# Patient Record
Sex: FEMALE | Race: WHITE | Employment: PART TIME | URBAN - METROPOLITAN AREA
[De-identification: names, ages, dates, MRNs, and addresses within clinical notes are randomized per-mention and may not be internally consistent; named-entity substitution may affect disease eponyms.]

---

## 2019-04-25 ENCOUNTER — OFFICE VISIT (OUTPATIENT)
Dept: ORTHOPEDIC SURGERY | Age: 39
End: 2019-04-25
Payer: MEDICAID

## 2019-04-25 VITALS
BODY MASS INDEX: 20.49 KG/M2 | WEIGHT: 120 LBS | HEART RATE: 95 BPM | DIASTOLIC BLOOD PRESSURE: 88 MMHG | HEIGHT: 64 IN | SYSTOLIC BLOOD PRESSURE: 130 MMHG | TEMPERATURE: 97.8 F

## 2019-04-25 DIAGNOSIS — M25.532 LEFT WRIST PAIN: Primary | ICD-10-CM

## 2019-04-25 PROCEDURE — G8427 DOCREV CUR MEDS BY ELIG CLIN: HCPCS | Performed by: ORTHOPAEDIC SURGERY

## 2019-04-25 PROCEDURE — 99213 OFFICE O/P EST LOW 20 MIN: CPT | Performed by: ORTHOPAEDIC SURGERY

## 2019-04-25 PROCEDURE — G8420 CALC BMI NORM PARAMETERS: HCPCS | Performed by: ORTHOPAEDIC SURGERY

## 2019-04-25 PROCEDURE — 1036F TOBACCO NON-USER: CPT | Performed by: ORTHOPAEDIC SURGERY

## 2019-04-25 PROCEDURE — L3908 WHO COCK-UP NONMOLDE PRE OTS: HCPCS | Performed by: ORTHOPAEDIC SURGERY

## 2019-05-01 ENCOUNTER — HOSPITAL ENCOUNTER (OUTPATIENT)
Age: 39
Discharge: HOME | End: 2019-05-03
Payer: MEDICAID

## 2019-05-01 ENCOUNTER — HOSPITAL ENCOUNTER (OUTPATIENT)
Dept: ULTRASOUND IMAGING | Age: 39
Discharge: HOME | End: 2019-05-03
Payer: MEDICAID

## 2019-05-01 DIAGNOSIS — N91.2 ABSENCE OF MENSTRUATION: ICD-10-CM

## 2019-05-01 PROCEDURE — 88305 TISSUE EXAM BY PATHOLOGIST: CPT

## 2019-05-01 PROCEDURE — 76830 TRANSVAGINAL US NON-OB: CPT

## 2019-05-01 PROCEDURE — 76856 US EXAM PELVIC COMPLETE: CPT

## 2019-05-22 ENCOUNTER — OFFICE VISIT (OUTPATIENT)
Dept: ORTHOPEDIC SURGERY | Age: 39
End: 2019-05-22
Payer: MEDICAID

## 2019-05-22 VITALS
WEIGHT: 120 LBS | HEART RATE: 83 BPM | DIASTOLIC BLOOD PRESSURE: 88 MMHG | BODY MASS INDEX: 20.49 KG/M2 | SYSTOLIC BLOOD PRESSURE: 135 MMHG | TEMPERATURE: 97.5 F | HEIGHT: 64 IN

## 2019-05-22 DIAGNOSIS — S52.522D CLOSED TORUS FRACTURE OF DISTAL END OF LEFT RADIUS WITH ROUTINE HEALING, SUBSEQUENT ENCOUNTER: Primary | ICD-10-CM

## 2019-05-22 PROCEDURE — G8427 DOCREV CUR MEDS BY ELIG CLIN: HCPCS | Performed by: ORTHOPAEDIC SURGERY

## 2019-05-22 PROCEDURE — G8420 CALC BMI NORM PARAMETERS: HCPCS | Performed by: ORTHOPAEDIC SURGERY

## 2019-05-22 PROCEDURE — 1036F TOBACCO NON-USER: CPT | Performed by: ORTHOPAEDIC SURGERY

## 2019-05-22 PROCEDURE — 99212 OFFICE O/P EST SF 10 MIN: CPT | Performed by: ORTHOPAEDIC SURGERY

## 2019-05-23 NOTE — PROGRESS NOTES
Chief Complaint:   Chief Complaint   Patient presents with    Wrist Injury     FU Lt wrist pain. Rarely goes without brace. Pain is slightly improved. Osker Favre  reports persistent but diminishing left wrist pain, still reliant on the brace for the most part. Otherwise she is independent with activities denies numbness tingling in fingers no other joint complaints. Allergies; medications; past medical, surgical, family, and social history; and problem list have been reviewed today and updated as indicated in this encounter seen below. Exam: Left wrist shows some persisting tenderness in the distal radial metaphysis although minimal if any swelling, good motion of the wrist with minimal pain no crepitus no effusion, no motor sensory deficits nor intrinsic or thenar atrophy. Snuffbox nontender, Kit Ora is negative.  strength approaching normal limits. Radiographs: Repeat x-rays 3 views of the left wrist today show healing nondisplaced extra-articular transverse fracture distal radial metaphysis, no angulation or displacement. Scaphoid view shows no evidence for injury of the carpus. Aisha Workman was seen today for wrist injury. Diagnoses and all orders for this visit:    Closed torus fracture of distal end of left radius with routine healing, subsequent encounter  -     XR WRIST LEFT (MIN 3 VIEWS)       Patient was advised as to these findings confirming the presence of a nondisplaced but healing fracture. She will gradually wean herself from the brace as activities allow over the next 4-6 weeks do some exercises as tolerated on the brace, follow-up in 6 weeks if pain persists for further assessment. Return if symptoms worsen or fail to improve. No current outpatient medications on file. No current facility-administered medications for this visit. There is no problem list on file for this patient.       Past Medical History:   Diagnosis Date    Anxiety     Hypertension     diet controlled    Pancreatic cyst        Past Surgical History:   Procedure Laterality Date    ARM SURGERY  1983    PANCREAS SURGERY      SPLENECTOMY, TOTAL         Allergies   Allergen Reactions    Pcn [Penicillins] Hives       Social History     Socioeconomic History    Marital status: Single     Spouse name: None    Number of children: None    Years of education: None    Highest education level: None   Occupational History    None   Social Needs    Financial resource strain: None    Food insecurity:     Worry: None     Inability: None    Transportation needs:     Medical: None     Non-medical: None   Tobacco Use    Smoking status: Never Smoker    Smokeless tobacco: Never Used   Substance and Sexual Activity    Alcohol use: Yes     Comment: social    Drug use: No    Sexual activity: Yes     Partners: Male   Lifestyle    Physical activity:     Days per week: None     Minutes per session: None    Stress: None   Relationships    Social connections:     Talks on phone: None     Gets together: None     Attends Catholic service: None     Active member of club or organization: None     Attends meetings of clubs or organizations: None     Relationship status: None    Intimate partner violence:     Fear of current or ex partner: None     Emotionally abused: None     Physically abused: None     Forced sexual activity: None   Other Topics Concern    None   Social History Narrative    None       Family History   Problem Relation Age of Onset    Other Mother         lymphoma    High Blood Pressure Mother     Other Father         ms    Breast Cancer Other         AUNT         Review of Systems  As follows except as previously noted in HPI:  Constitutional: Negative for chills, diaphoresis, fatigue, fever and unexpected weight change. Respiratory: Negative for cough, shortness of breath and wheezing. Cardiovascular: Negative for chest pain and palpitations.    Neurological: Negative for dizziness, syncope, cephalgia. GI / : negative  Musculoskeletal: see HPI       Objective:   Physical Exam   Constitutional: Oriented to person, place, and time. and appears well-developed and well-nourished. :   Head: Normocephalic and atraumatic. Eyes: EOM are normal.   Neck: Neck supple. Cardiovascular: Normal rate and regular rhythm. Pulmonary/Chest: Effort normal. No stridor. No respiratory distress, no wheezes. Abdominal:  No abnormal distension. Neurological: Alert and oriented to person, place, and time. Skin: Skin is warm and dry. Psychiatric: Normal mood and affect.  Behavior is normal. Thought content normal.    5/23/2019  10:39 AM

## 2019-08-09 ENCOUNTER — APPOINTMENT (OUTPATIENT)
Dept: CT IMAGING | Age: 39
End: 2019-08-09
Payer: MEDICAID

## 2019-08-09 ENCOUNTER — HOSPITAL ENCOUNTER (EMERGENCY)
Age: 39
Discharge: ANOTHER HOSPITAL | End: 2019-08-10
Attending: EMERGENCY MEDICINE
Payer: MEDICAID

## 2019-08-09 ENCOUNTER — APPOINTMENT (OUTPATIENT)
Dept: GENERAL RADIOLOGY | Age: 39
End: 2019-08-09
Payer: MEDICAID

## 2019-08-09 DIAGNOSIS — R16.0 LIVER MASS: ICD-10-CM

## 2019-08-09 DIAGNOSIS — R17 JAUNDICE, NON-NEONATAL: Primary | ICD-10-CM

## 2019-08-09 LAB
ACANTHOCYTES: ABNORMAL
ALBUMIN SERPL-MCNC: 3.1 G/DL (ref 3.5–5.2)
ALP BLD-CCNC: 185 U/L (ref 35–104)
ALT SERPL-CCNC: 63 U/L (ref 0–32)
AMMONIA: 56 UMOL/L (ref 11–51)
AMYLASE: 30 U/L (ref 20–100)
ANION GAP SERPL CALCULATED.3IONS-SCNC: 17 MMOL/L (ref 7–16)
ANISOCYTOSIS: ABNORMAL
AST SERPL-CCNC: 315 U/L (ref 0–31)
BACTERIA: ABNORMAL /HPF
BASOPHILS ABSOLUTE: 0 E9/L (ref 0–0.2)
BASOPHILS RELATIVE PERCENT: 0 % (ref 0–2)
BILIRUB SERPL-MCNC: 21.5 MG/DL (ref 0–1.2)
BILIRUBIN DIRECT: 14.7 MG/DL (ref 0–0.3)
BILIRUBIN URINE: ABNORMAL
BILIRUBIN, INDIRECT: 6.8 MG/DL (ref 0–1)
BLOOD, URINE: ABNORMAL
BUN BLDV-MCNC: 12 MG/DL (ref 6–20)
CALCIUM SERPL-MCNC: 8.3 MG/DL (ref 8.6–10.2)
CASTS: ABNORMAL /LPF
CHLORIDE BLD-SCNC: 89 MMOL/L (ref 98–107)
CLARITY: CLEAR
CO2: 23 MMOL/L (ref 22–29)
COLOR: YELLOW
CREAT SERPL-MCNC: 0.6 MG/DL (ref 0.5–1)
DOHLE BODIES: ABNORMAL
EKG ATRIAL RATE: 108 BPM
EKG P AXIS: 56 DEGREES
EKG P-R INTERVAL: 162 MS
EKG Q-T INTERVAL: 354 MS
EKG QRS DURATION: 80 MS
EKG QTC CALCULATION (BAZETT): 474 MS
EKG R AXIS: 38 DEGREES
EKG T AXIS: -19 DEGREES
EKG VENTRICULAR RATE: 108 BPM
EOSINOPHILS ABSOLUTE: 0 E9/L (ref 0.05–0.5)
EOSINOPHILS RELATIVE PERCENT: 0 % (ref 0–6)
EPITHELIAL CELLS, UA: ABNORMAL /HPF
GFR AFRICAN AMERICAN: >60
GFR NON-AFRICAN AMERICAN: >60 ML/MIN/1.73
GLUCOSE BLD-MCNC: 225 MG/DL (ref 74–99)
GLUCOSE URINE: 100 MG/DL
HCG, URINE, POC: NEGATIVE
HCG, URINE, POC: NEGATIVE
HCT VFR BLD CALC: 30.4 % (ref 34–48)
HEMOGLOBIN: 11 G/DL (ref 11.5–15.5)
HOWELL-JOLLY BODIES: ABNORMAL
HYPOCHROMIA: ABNORMAL
KETONES, URINE: ABNORMAL MG/DL
LACTIC ACID: 4.4 MMOL/L (ref 0.5–2.2)
LACTIC ACID: 5.2 MMOL/L (ref 0.5–2.2)
LEUKOCYTE ESTERASE, URINE: NEGATIVE
LIPASE: 40 U/L (ref 13–60)
LYMPHOCYTES ABSOLUTE: 1.73 E9/L (ref 1.5–4)
LYMPHOCYTES RELATIVE PERCENT: 13 % (ref 20–42)
Lab: NORMAL
Lab: NORMAL
MCH RBC QN AUTO: 36.2 PG (ref 26–35)
MCHC RBC AUTO-ENTMCNC: 36.2 % (ref 32–34.5)
MCV RBC AUTO: 100 FL (ref 80–99.9)
METAMYELOCYTES RELATIVE PERCENT: 1.7 % (ref 0–1)
MONO TEST: NEGATIVE
MONOCYTES ABSOLUTE: 0.93 E9/L (ref 0.1–0.95)
MONOCYTES RELATIVE PERCENT: 7 % (ref 2–12)
MUCUS: PRESENT
NEGATIVE QC PASS/FAIL: NORMAL
NEGATIVE QC PASS/FAIL: NORMAL
NEUTROPHILS ABSOLUTE: 10.64 E9/L (ref 1.8–7.3)
NEUTROPHILS RELATIVE PERCENT: 78.3 % (ref 43–80)
NITRITE, URINE: POSITIVE
NUCLEATED RED BLOOD CELLS: 2.6 /100 WBC
PAPPENHEIMER BODIES: ABNORMAL
PDW BLD-RTO: 20.3 FL (ref 11.5–15)
PH UA: 6.5 (ref 5–9)
PLATELET # BLD: 181 E9/L (ref 130–450)
PMV BLD AUTO: 11.6 FL (ref 7–12)
POIKILOCYTES: ABNORMAL
POLYCHROMASIA: ABNORMAL
POSITIVE QC PASS/FAIL: NORMAL
POSITIVE QC PASS/FAIL: NORMAL
POTASSIUM SERPL-SCNC: 3.2 MMOL/L (ref 3.5–5)
PROTEIN UA: 30 MG/DL
RBC # BLD: 3.04 E12/L (ref 3.5–5.5)
RBC UA: ABNORMAL /HPF (ref 0–2)
RENAL EPITHELIAL, UA: ABNORMAL /HPF
ROULEAUX: ABNORMAL
SODIUM BLD-SCNC: 129 MMOL/L (ref 132–146)
SPECIFIC GRAVITY UA: 1.02 (ref 1–1.03)
TARGET CELLS: ABNORMAL
TOTAL PROTEIN: 9.1 G/DL (ref 6.4–8.3)
TOXIC GRANULATION: ABNORMAL
UROBILINOGEN, URINE: 4 E.U./DL
VACUOLATED NEUTROPHILS: ABNORMAL
WBC # BLD: 13.3 E9/L (ref 4.5–11.5)
WBC UA: ABNORMAL /HPF (ref 0–5)

## 2019-08-09 PROCEDURE — 83605 ASSAY OF LACTIC ACID: CPT

## 2019-08-09 PROCEDURE — 6360000002 HC RX W HCPCS: Performed by: EMERGENCY MEDICINE

## 2019-08-09 PROCEDURE — 81001 URINALYSIS AUTO W/SCOPE: CPT

## 2019-08-09 PROCEDURE — 99285 EMERGENCY DEPT VISIT HI MDM: CPT

## 2019-08-09 PROCEDURE — 85025 COMPLETE CBC W/AUTO DIFF WBC: CPT

## 2019-08-09 PROCEDURE — 2580000003 HC RX 258: Performed by: EMERGENCY MEDICINE

## 2019-08-09 PROCEDURE — 96361 HYDRATE IV INFUSION ADD-ON: CPT

## 2019-08-09 PROCEDURE — 2580000003 HC RX 258: Performed by: RADIOLOGY

## 2019-08-09 PROCEDURE — 87040 BLOOD CULTURE FOR BACTERIA: CPT

## 2019-08-09 PROCEDURE — 6360000002 HC RX W HCPCS: Performed by: STUDENT IN AN ORGANIZED HEALTH CARE EDUCATION/TRAINING PROGRAM

## 2019-08-09 PROCEDURE — 99223 1ST HOSP IP/OBS HIGH 75: CPT | Performed by: INTERNAL MEDICINE

## 2019-08-09 PROCEDURE — 87088 URINE BACTERIA CULTURE: CPT

## 2019-08-09 PROCEDURE — 6360000004 HC RX CONTRAST MEDICATION: Performed by: RADIOLOGY

## 2019-08-09 PROCEDURE — 93005 ELECTROCARDIOGRAM TRACING: CPT | Performed by: STUDENT IN AN ORGANIZED HEALTH CARE EDUCATION/TRAINING PROGRAM

## 2019-08-09 PROCEDURE — 96366 THER/PROPH/DIAG IV INF ADDON: CPT

## 2019-08-09 PROCEDURE — 83690 ASSAY OF LIPASE: CPT

## 2019-08-09 PROCEDURE — 71045 X-RAY EXAM CHEST 1 VIEW: CPT

## 2019-08-09 PROCEDURE — 2580000003 HC RX 258: Performed by: STUDENT IN AN ORGANIZED HEALTH CARE EDUCATION/TRAINING PROGRAM

## 2019-08-09 PROCEDURE — 36415 COLL VENOUS BLD VENIPUNCTURE: CPT

## 2019-08-09 PROCEDURE — 86308 HETEROPHILE ANTIBODY SCREEN: CPT

## 2019-08-09 PROCEDURE — 82140 ASSAY OF AMMONIA: CPT

## 2019-08-09 PROCEDURE — 82150 ASSAY OF AMYLASE: CPT

## 2019-08-09 PROCEDURE — 74177 CT ABD & PELVIS W/CONTRAST: CPT

## 2019-08-09 PROCEDURE — 96365 THER/PROPH/DIAG IV INF INIT: CPT

## 2019-08-09 PROCEDURE — 80053 COMPREHEN METABOLIC PANEL: CPT

## 2019-08-09 PROCEDURE — 96375 TX/PRO/DX INJ NEW DRUG ADDON: CPT

## 2019-08-09 PROCEDURE — 82247 BILIRUBIN TOTAL: CPT

## 2019-08-09 PROCEDURE — 87186 SC STD MICRODIL/AGAR DIL: CPT

## 2019-08-09 PROCEDURE — 82248 BILIRUBIN DIRECT: CPT

## 2019-08-09 RX ORDER — SODIUM CHLORIDE 0.9 % (FLUSH) 0.9 %
10 SYRINGE (ML) INJECTION ONCE
Status: COMPLETED | OUTPATIENT
Start: 2019-08-09 | End: 2019-08-09

## 2019-08-09 RX ORDER — 0.9 % SODIUM CHLORIDE 0.9 %
1000 INTRAVENOUS SOLUTION INTRAVENOUS ONCE
Status: COMPLETED | OUTPATIENT
Start: 2019-08-09 | End: 2019-08-09

## 2019-08-09 RX ORDER — MORPHINE SULFATE 2 MG/ML
2 INJECTION, SOLUTION INTRAMUSCULAR; INTRAVENOUS ONCE
Status: COMPLETED | OUTPATIENT
Start: 2019-08-09 | End: 2019-08-09

## 2019-08-09 RX ORDER — MORPHINE SULFATE 4 MG/ML
4 INJECTION, SOLUTION INTRAMUSCULAR; INTRAVENOUS ONCE
Status: COMPLETED | OUTPATIENT
Start: 2019-08-09 | End: 2019-08-09

## 2019-08-09 RX ORDER — ONDANSETRON 2 MG/ML
4 INJECTION INTRAMUSCULAR; INTRAVENOUS ONCE
Status: DISCONTINUED | OUTPATIENT
Start: 2019-08-09 | End: 2019-08-10 | Stop reason: HOSPADM

## 2019-08-09 RX ORDER — KETOROLAC TROMETHAMINE 30 MG/ML
15 INJECTION, SOLUTION INTRAMUSCULAR; INTRAVENOUS ONCE
Status: DISCONTINUED | OUTPATIENT
Start: 2019-08-09 | End: 2019-08-10 | Stop reason: HOSPADM

## 2019-08-09 RX ORDER — POTASSIUM CHLORIDE 7.45 MG/ML
10 INJECTION INTRAVENOUS ONCE
Status: COMPLETED | OUTPATIENT
Start: 2019-08-09 | End: 2019-08-09

## 2019-08-09 RX ADMIN — IOPAMIDOL 110 ML: 755 INJECTION, SOLUTION INTRAVENOUS at 15:18

## 2019-08-09 RX ADMIN — SODIUM CHLORIDE 1000 ML: 9 INJECTION, SOLUTION INTRAVENOUS at 20:15

## 2019-08-09 RX ADMIN — POTASSIUM CHLORIDE 10 MEQ: 10 INJECTION, SOLUTION INTRAVENOUS at 15:57

## 2019-08-09 RX ADMIN — MORPHINE SULFATE 2 MG: 2 INJECTION, SOLUTION INTRAMUSCULAR; INTRAVENOUS at 23:32

## 2019-08-09 RX ADMIN — MORPHINE SULFATE 4 MG: 4 INJECTION, SOLUTION INTRAMUSCULAR; INTRAVENOUS at 21:59

## 2019-08-09 RX ADMIN — Medication 10 ML: at 15:18

## 2019-08-09 RX ADMIN — SODIUM CHLORIDE 1000 ML: 9 INJECTION, SOLUTION INTRAVENOUS at 16:30

## 2019-08-09 RX ADMIN — MEROPENEM 1 G: 1 INJECTION, POWDER, FOR SOLUTION INTRAVENOUS at 22:52

## 2019-08-09 RX ADMIN — SODIUM CHLORIDE 1000 ML: 9 INJECTION, SOLUTION INTRAVENOUS at 14:52

## 2019-08-09 ASSESSMENT — ENCOUNTER SYMPTOMS
APNEA: 0
RHINORRHEA: 0
SHORTNESS OF BREATH: 0
ROS SKIN COMMENTS: JAUNDICE
COUGH: 0
PHOTOPHOBIA: 0
TROUBLE SWALLOWING: 0
WHEEZING: 0
VOMITING: 0
ABDOMINAL PAIN: 0
DIARRHEA: 0
SORE THROAT: 0
NAUSEA: 0
EYE PAIN: 0
BACK PAIN: 0
JAUNDICE: 1
CONSTIPATION: 0
COLOR CHANGE: 1
CHEST TIGHTNESS: 0

## 2019-08-09 ASSESSMENT — PAIN SCALES - GENERAL
PAINLEVEL_OUTOF10: 4
PAINLEVEL_OUTOF10: 4

## 2019-08-09 NOTE — ED PROVIDER NOTES
[address]      Pt Name: Yamilet Bird  MRN: [id number]  [place] 1980  Date of evaluation: 8/9/2019      CHIEF COMPLAINT       Chief Complaint   Patient presents with    Jaundice     Hx of ETOH abuse      The history is provided by the patient. GI Problem   The primary symptoms include fatigue and jaundice. Primary symptoms do not include fever, abdominal pain, nausea, vomiting, diarrhea or dysuria. The illness began more than 7 days ago. The onset was gradual. The problem has been gradually worsening. The illness does not include chills, constipation, back pain or itching. Significant associated medical issues include alcohol abuse. Associated medical issues do not include inflammatory bowel disease, gallstones or liver disease. Yamilet Bird is a 41 y.o. female with a history of alcohol abuse, pancreatic mass that was resected about 2 years ago, who presents emergency department with chief complaint of jaundice. She states she first noticed that her skin and eyes were turning more yellow about 11 days ago. She reports feeling more fatigued than normal.  She denies any pain complaints. She has never had jaundice in the past.  She denies any nausea, vomiting, constipation, diarrhea, black or bloody stools. Denies any chest pain or shortness of breath. She denies any history of IV drug use. Denies history of viral hepatitis. Denies risk factors for hep B or hep C. She does drink at least 3 drinks every night. She denies ever going through alcohol withdrawal.  After resection of her pancreatic mass about 2 years ago she has not had any follow-up imaging. She did not have jaundice at that time. Review of Systems   Constitutional: Positive for fatigue. Negative for chills, diaphoresis and fever. HENT: Negative for rhinorrhea, sore throat and trouble swallowing.     Eyes: Negative for photophobia and Sexual Activity    Alcohol use: Yes     Comment: daily wine or beer    Drug use: No    Sexual activity: Yes     Partners: Male   Lifestyle    Physical activity:     Days per week: None     Minutes per session: None    Stress: None   Relationships    Social connections:     Talks on phone: None     Gets together: None     Attends Taoism service: None     Active member of club or organization: None     Attends meetings of clubs or organizations: None     Relationship status: None    Intimate partner violence:     Fear of current or ex partner: None     Emotionally abused: None     Physically abused: None     Forced sexual activity: None   Other Topics Concern    None   Social History Narrative    None       SCREENINGS              Physical Exam   Constitutional: She is oriented to person, place, and time. She appears well-developed and well-nourished. No distress. HENT:   Head: Normocephalic and atraumatic. Right Ear: External ear normal.   Left Ear: External ear normal.   Mouth/Throat: Oropharynx is clear and moist.   Eyes: Pupils are equal, round, and reactive to light. EOM are normal. No scleral icterus. Neck: Normal range of motion. Neck supple. Cardiovascular: Regular rhythm and intact distal pulses. No murmur heard. Tachycardic. Pulmonary/Chest: Effort normal and breath sounds normal. No respiratory distress. She has no wheezes. Abdominal: Soft. She exhibits distension and mass. There is no rebound and no guarding. Well-healed old surgical scar in the mid abdomen. Significant mass consistent with hepatomegaly. Patient is nontender. Negative Conklin sign. Musculoskeletal: Normal range of motion. She exhibits no edema, tenderness or deformity. Neurological: She is alert and oriented to person, place, and time. No sensory deficit. She exhibits normal muscle tone. Skin: Skin is warm and dry. Capillary refill takes less than 2 seconds. Jaundiced.    Psychiatric: She has a normal mood and affect. Her behavior is normal.   Nursing note and vitals reviewed. Procedures     EKG: This EKG is signed and interpreted by me. Rate: 108bpm  Rhythm: Sinus  Interpretation: no acute changes, non-specific EKG and sinus tachycardia  Comparison: Stable compared to previous EKG, tachycardia is new, compared to EKG from March 6, 2017. MDM   This is a 61-year-old female with a history of regular alcohol use, pancreatic mass that was removed about 2 years ago without subsequent follow-up, who presents to the emergency department with about 2 weeks of jaundice. Denies any pain complaints, nausea, vomiting. The emergency department she is tachycardic with a pulse of 118. She is afebrile. She is jaundiced. Abdomen is mildly distended with mass consistent of hepatomegaly. There is no abdominal tenderness. Given her tachycardia and Rosemary Mk is a broad work-up was initiated. Initially administered 1 L normal saline IV bolus. Initial lactate returned at 5.2. Administered a second liter of normal saline. Potassium was also slightly low 3.2 and potassium repletion was began. Pregnancy test was negative. Given her jaundice and history of pancreatic mass CT with contrast IV was obtained. The CT was notable for multiple small lesions consistent with malignancy. Pancreas showed postsurgical findings no evidence of mass. No obvious obstruction in the bile duct or common duct. Given the concerning nature of her findings and that she is jaundice, presented tachycardic, we were concerned of progressive disease and wanted to admit her to the hospital or transfer [facility] by ambulance [facility] for further evaluation with a GI specialist.  Throughout multiple discussions the patient is adamant that she did not want to be transferred by ambulance to [facility] for further evaluation from the emergency department.   Initially she also declined to be admitted to the hospital and was going to leave pancreas. The remaining pancreas and tail   demonstrate fatty changes. There are ascites seen throughout the abdomen and pelvis      Abnormal thickened mucosa involving small and large bowel with no   evidence of small bowel obstruction. The gallbladder is very distended with small cholelithiasis             ------------------------- NURSING NOTES AND VITALS REVIEWED ---------------------------  Date / Time Roomed:  8/9/2019  1:47 PM  ED Bed Assignment:  12/12    The nursing notes within the ED encounter and vital signs as below have been reviewed. BP (!) 145/87   Pulse 113   Temp 100.9 °F (38.3 °C) (Rectal)   Resp 16   Ht 5' 4\" (1.626 m)   Wt 119 lb (54 kg)   SpO2 95%   BMI 20.43 kg/m²   Oxygen Saturation Interpretation: Normal      ------------------------------------------ PROGRESS NOTES ------------------------------------------  5:27 PM  I have spoken with the patient and discussed todays results, in addition to providing specific details for the plan of care and counseling regarding the diagnosis and prognosis. Their questions are answered at this time and they are agreeable with the plan. I discussed at length with them reasons for immediate return here for re evaluation. They will followup with their primary care physician and general surgeon by calling their office Today and then [facility] immediately for further evaluation. Davey Morales --------------------------------- ADDITIONAL PROVIDER NOTES ---------------------------------  At this time the patient is without objective evidence of an acute process requiring hospitalization or inpatient management. They have remained hemodynamically stable throughout their entire ED visit and are stable for discharge with outpatient follow-up. The plan has been discussed in detail and they are aware of the specific conditions for emergent return, as well as the importance of follow-up.       New Prescriptions    No medications on file

## 2019-08-10 VITALS
DIASTOLIC BLOOD PRESSURE: 79 MMHG | OXYGEN SATURATION: 96 % | HEIGHT: 64 IN | TEMPERATURE: 97.9 F | BODY MASS INDEX: 20.32 KG/M2 | HEART RATE: 109 BPM | WEIGHT: 119 LBS | SYSTOLIC BLOOD PRESSURE: 126 MMHG | RESPIRATION RATE: 20 BRPM

## 2019-08-10 PROCEDURE — 6370000000 HC RX 637 (ALT 250 FOR IP)

## 2019-08-10 RX ORDER — LORAZEPAM 0.5 MG/1
TABLET ORAL
Status: COMPLETED
Start: 2019-08-10 | End: 2019-08-10

## 2019-08-10 RX ORDER — LORAZEPAM 0.5 MG/1
0.5 TABLET ORAL ONCE
Status: COMPLETED | OUTPATIENT
Start: 2019-08-10 | End: 2019-08-10

## 2019-08-10 RX ADMIN — LORAZEPAM 0.5 MG: 0.5 TABLET ORAL at 02:43

## 2019-08-10 NOTE — PROGRESS NOTES
Called the access center to get patient sent to [facility] main. @[phone]. Wanted too speak too surgeon Dr. Bouchra Buck who did her panc mass resected.     [facility] they will get the Dr baker and hospitalist.

## 2019-08-10 NOTE — CONSULTS
gallbladder. There are small stones seen in the dependent portion of the gallbladder lumen PA There is subhepatic ascites seen. Patient is status post splenectomy. . The head and neck of the pancreas is not identified. The body and the tail the pancreas is seen with fatty changes. There are surgical clips in the pancreatic bed from prior surgery. The adrenal glands do not show any appreciable abnormality. Both kidneys have normal nephrogram with normal excretion of contrast. There is no stone, mass or hydronephrosis. The bowels demonstrate thickened mucosa including small and large bowel with ascites seen in the leaves of the mesentery. No evidence of intestinal obstruction is seen. The appendix is not visualized; however, no evidence of appendicitis is seen. No retroperitoneal or mesenteric lymphadenopathy is detected. The abdominal aorta and iliac arteries demonstrate no evidence of atherosclerotic changes or aneurysmal dilatation. The osseous structures of the abdomen and pelvis appear to be normal. The contrast study demonstrates no enhancing lesion identified. The urinary bladder appears to be normal. There is no mass or debris seen. There is a large ascites seen in the pelvis the uterus and adnexa appears to be normal..   The liver has diffuse metastatic disease. This is amenable for CT-guided biopsy for histological sampling. Status post splenectomy and partial pancreatectomy involving the head and the proximal body of the pancreas. The remaining pancreas and tail demonstrate fatty changes. There are ascites seen throughout the abdomen and pelvis Abnormal thickened mucosa involving small and large bowel with no evidence of small bowel obstruction.  The gallbladder is very distended with small cholelithiasis     Xr Chest Portable  Result Date: 2019  Patient MRN: [id number] : 1980 Age:  41 years Gender: Female Order Date: 2019 3:45 PM Exam: XR CHEST PORTABLE Number of Images: 1 view Indication:

## 2019-08-12 LAB
ORGANISM: ABNORMAL
URINE CULTURE, ROUTINE: ABNORMAL

## 2019-08-14 LAB
BLOOD CULTURE, ROUTINE: NORMAL
CULTURE, BLOOD 2: NORMAL

## 2019-09-25 ENCOUNTER — HOSPITAL ENCOUNTER (OUTPATIENT)
Age: 39
Discharge: HOME | End: 2019-09-25
Payer: MEDICAID

## 2019-09-25 LAB
ALBUMIN SERPL-MCNC: 3.1 G/DL (ref 3.5–5.2)
ALP BLD-CCNC: 185 U/L (ref 35–104)
ALT SERPL-CCNC: 39 U/L (ref 0–32)
ANION GAP SERPL CALCULATED.3IONS-SCNC: 13 MMOL/L (ref 7–16)
AST SERPL-CCNC: 94 U/L (ref 0–31)
BASOPHILS ABSOLUTE: 0.11 E9/L (ref 0–0.2)
BASOPHILS RELATIVE PERCENT: 1 % (ref 0–2)
BILIRUB SERPL-MCNC: 8.9 MG/DL (ref 0–1.2)
BUN BLDV-MCNC: 7 MG/DL (ref 6–20)
BURR CELLS: ABNORMAL
CALCIUM SERPL-MCNC: 8.9 MG/DL (ref 8.6–10.2)
CHLORIDE BLD-SCNC: 103 MMOL/L (ref 98–107)
CO2: 26 MMOL/L (ref 22–29)
CREAT SERPL-MCNC: 0.7 MG/DL (ref 0.5–1)
EOSINOPHILS ABSOLUTE: 0.21 E9/L (ref 0.05–0.5)
EOSINOPHILS RELATIVE PERCENT: 2 % (ref 0–6)
GFR AFRICAN AMERICAN: >60
GFR NON-AFRICAN AMERICAN: >60 ML/MIN/1.73
GLUCOSE BLD-MCNC: 98 MG/DL (ref 74–99)
HCT VFR BLD CALC: 32.4 % (ref 34–48)
HEMOGLOBIN: 11 G/DL (ref 11.5–15.5)
INR BLD: 2.1
LYMPHOCYTES ABSOLUTE: 2.57 E9/L (ref 1.5–4)
LYMPHOCYTES RELATIVE PERCENT: 24 % (ref 20–42)
MCH RBC QN AUTO: 40 PG (ref 26–35)
MCHC RBC AUTO-ENTMCNC: 34 % (ref 32–34.5)
MCV RBC AUTO: 117.8 FL (ref 80–99.9)
MONOCYTES ABSOLUTE: 0.96 E9/L (ref 0.1–0.95)
MONOCYTES RELATIVE PERCENT: 9 % (ref 2–12)
NEUTROPHILS ABSOLUTE: 6.85 E9/L (ref 1.8–7.3)
NEUTROPHILS RELATIVE PERCENT: 64 % (ref 43–80)
PDW BLD-RTO: 14.6 FL (ref 11.5–15)
PLATELET # BLD: 333 E9/L (ref 130–450)
PMV BLD AUTO: 9.5 FL (ref 7–12)
POIKILOCYTES: ABNORMAL
POTASSIUM SERPL-SCNC: 4.3 MMOL/L (ref 3.5–5)
PROTHROMBIN TIME: 24.5 SEC (ref 9.3–12.4)
RBC # BLD: 2.75 E12/L (ref 3.5–5.5)
SODIUM BLD-SCNC: 142 MMOL/L (ref 132–146)
TARGET CELLS: ABNORMAL
TOTAL PROTEIN: 8 G/DL (ref 6.4–8.3)
WBC # BLD: 10.7 E9/L (ref 4.5–11.5)

## 2019-09-25 PROCEDURE — 36415 COLL VENOUS BLD VENIPUNCTURE: CPT

## 2019-09-25 PROCEDURE — 85610 PROTHROMBIN TIME: CPT

## 2019-09-25 PROCEDURE — 85025 COMPLETE CBC W/AUTO DIFF WBC: CPT

## 2019-09-25 PROCEDURE — 80053 COMPREHEN METABOLIC PANEL: CPT

## 2019-10-02 ENCOUNTER — HOSPITAL ENCOUNTER (OUTPATIENT)
Age: 39
Discharge: HOME | End: 2019-10-02
Payer: MEDICAID

## 2019-10-02 LAB
ALBUMIN SERPL-MCNC: 3.1 G/DL (ref 3.5–5.2)
ALP BLD-CCNC: 170 U/L (ref 35–104)
ALT SERPL-CCNC: 32 U/L (ref 0–32)
ANION GAP SERPL CALCULATED.3IONS-SCNC: 11 MMOL/L (ref 7–16)
ANISOCYTOSIS: ABNORMAL
AST SERPL-CCNC: 91 U/L (ref 0–31)
BASOPHILS ABSOLUTE: 0.15 E9/L (ref 0–0.2)
BASOPHILS RELATIVE PERCENT: 1.5 % (ref 0–2)
BILIRUB SERPL-MCNC: 8.1 MG/DL (ref 0–1.2)
BUN BLDV-MCNC: 9 MG/DL (ref 6–20)
BURR CELLS: ABNORMAL
CALCIUM SERPL-MCNC: 9.2 MG/DL (ref 8.6–10.2)
CHLORIDE BLD-SCNC: 100 MMOL/L (ref 98–107)
CO2: 25 MMOL/L (ref 22–29)
CREAT SERPL-MCNC: 0.7 MG/DL (ref 0.5–1)
EOSINOPHILS ABSOLUTE: 0.3 E9/L (ref 0.05–0.5)
EOSINOPHILS RELATIVE PERCENT: 3 % (ref 0–6)
GFR AFRICAN AMERICAN: >60
GFR NON-AFRICAN AMERICAN: >60 ML/MIN/1.73
GLUCOSE BLD-MCNC: 141 MG/DL (ref 74–99)
HCT VFR BLD CALC: 30.5 % (ref 34–48)
HEMOGLOBIN: 10.5 G/DL (ref 11.5–15.5)
HOWELL-JOLLY BODIES: ABNORMAL
IMMATURE GRANULOCYTES #: 0.04 E9/L
IMMATURE GRANULOCYTES %: 0.4 % (ref 0–5)
INR BLD: 2.1
LYMPHOCYTES ABSOLUTE: 2.95 E9/L (ref 1.5–4)
LYMPHOCYTES RELATIVE PERCENT: 29.2 % (ref 20–42)
MCH RBC QN AUTO: 38.9 PG (ref 26–35)
MCHC RBC AUTO-ENTMCNC: 34.4 % (ref 32–34.5)
MCV RBC AUTO: 113 FL (ref 80–99.9)
MONOCYTES ABSOLUTE: 0.69 E9/L (ref 0.1–0.95)
MONOCYTES RELATIVE PERCENT: 6.8 % (ref 2–12)
NEUTROPHILS ABSOLUTE: 5.96 E9/L (ref 1.8–7.3)
NEUTROPHILS RELATIVE PERCENT: 59.1 % (ref 43–80)
PAPPENHEIMER BODIES: ABNORMAL
PDW BLD-RTO: 14.6 FL (ref 11.5–15)
PLATELET # BLD: 292 E9/L (ref 130–450)
PMV BLD AUTO: 9.6 FL (ref 7–12)
POIKILOCYTES: ABNORMAL
POTASSIUM SERPL-SCNC: 3.9 MMOL/L (ref 3.5–5)
PROTHROMBIN TIME: 24.2 SEC (ref 9.3–12.4)
RBC # BLD: 2.7 E12/L (ref 3.5–5.5)
ROULEAUX: ABNORMAL
SCHISTOCYTES: ABNORMAL
SODIUM BLD-SCNC: 136 MMOL/L (ref 132–146)
TARGET CELLS: ABNORMAL
TOTAL PROTEIN: 8.2 G/DL (ref 6.4–8.3)
WBC # BLD: 10.1 E9/L (ref 4.5–11.5)

## 2019-10-02 PROCEDURE — 85025 COMPLETE CBC W/AUTO DIFF WBC: CPT

## 2019-10-02 PROCEDURE — 80053 COMPREHEN METABOLIC PANEL: CPT

## 2019-10-02 PROCEDURE — 85610 PROTHROMBIN TIME: CPT

## 2019-10-02 PROCEDURE — 36415 COLL VENOUS BLD VENIPUNCTURE: CPT

## 2019-11-26 ENCOUNTER — HOSPITAL ENCOUNTER (OUTPATIENT)
Age: 39
Discharge: HOME | End: 2019-11-26
Payer: MEDICAID

## 2019-11-26 LAB
ALBUMIN SERPL-MCNC: 3.4 G/DL (ref 3.5–5.2)
ALP BLD-CCNC: 123 U/L (ref 35–104)
ALT SERPL-CCNC: 40 U/L (ref 0–32)
ANION GAP SERPL CALCULATED.3IONS-SCNC: 12 MMOL/L (ref 7–16)
AST SERPL-CCNC: 96 U/L (ref 0–31)
BASOPHILS ABSOLUTE: 0.13 E9/L (ref 0–0.2)
BASOPHILS RELATIVE PERCENT: 1.9 % (ref 0–2)
BILIRUB SERPL-MCNC: 3.4 MG/DL (ref 0–1.2)
BUN BLDV-MCNC: 8 MG/DL (ref 6–20)
CALCIUM SERPL-MCNC: 9.9 MG/DL (ref 8.6–10.2)
CHLORIDE BLD-SCNC: 103 MMOL/L (ref 98–107)
CO2: 24 MMOL/L (ref 22–29)
CREAT SERPL-MCNC: 0.6 MG/DL (ref 0.5–1)
EOSINOPHILS ABSOLUTE: 0.82 E9/L (ref 0.05–0.5)
EOSINOPHILS RELATIVE PERCENT: 12.2 % (ref 0–6)
GFR AFRICAN AMERICAN: >60
GFR NON-AFRICAN AMERICAN: >60 ML/MIN/1.73
GLUCOSE BLD-MCNC: 132 MG/DL (ref 74–99)
HCT VFR BLD CALC: 32.1 % (ref 34–48)
HEMOGLOBIN: 10.7 G/DL (ref 11.5–15.5)
IMMATURE GRANULOCYTES #: 0.02 E9/L
IMMATURE GRANULOCYTES %: 0.3 % (ref 0–5)
INR BLD: 1.6
LYMPHOCYTES ABSOLUTE: 2.25 E9/L (ref 1.5–4)
LYMPHOCYTES RELATIVE PERCENT: 33.6 % (ref 20–42)
MCH RBC QN AUTO: 35.4 PG (ref 26–35)
MCHC RBC AUTO-ENTMCNC: 33.3 % (ref 32–34.5)
MCV RBC AUTO: 106.3 FL (ref 80–99.9)
MONOCYTES ABSOLUTE: 0.68 E9/L (ref 0.1–0.95)
MONOCYTES RELATIVE PERCENT: 10.1 % (ref 2–12)
NEUTROPHILS ABSOLUTE: 2.8 E9/L (ref 1.8–7.3)
NEUTROPHILS RELATIVE PERCENT: 41.9 % (ref 43–80)
PDW BLD-RTO: 14.2 FL (ref 11.5–15)
PLATELET # BLD: 293 E9/L (ref 130–450)
PMV BLD AUTO: 9.3 FL (ref 7–12)
POTASSIUM SERPL-SCNC: 3.8 MMOL/L (ref 3.5–5)
PROTHROMBIN TIME: 19.1 SEC (ref 9.3–12.4)
RBC # BLD: 3.02 E12/L (ref 3.5–5.5)
SODIUM BLD-SCNC: 139 MMOL/L (ref 132–146)
TOTAL PROTEIN: 7.4 G/DL (ref 6.4–8.3)
WBC # BLD: 6.7 E9/L (ref 4.5–11.5)

## 2019-11-26 PROCEDURE — 85610 PROTHROMBIN TIME: CPT

## 2019-11-26 PROCEDURE — 36415 COLL VENOUS BLD VENIPUNCTURE: CPT

## 2019-11-26 PROCEDURE — 80053 COMPREHEN METABOLIC PANEL: CPT

## 2019-11-26 PROCEDURE — 85025 COMPLETE CBC W/AUTO DIFF WBC: CPT

## 2020-01-02 ENCOUNTER — HOSPITAL ENCOUNTER (OUTPATIENT)
Age: 40
Discharge: HOME | End: 2020-01-02
Payer: MEDICAID

## 2020-01-02 LAB
ALBUMIN SERPL-MCNC: 3.3 G/DL (ref 3.5–5.2)
ALP BLD-CCNC: 179 U/L (ref 35–104)
ALT SERPL-CCNC: 37 U/L (ref 0–32)
ANION GAP SERPL CALCULATED.3IONS-SCNC: 12 MMOL/L (ref 7–16)
AST SERPL-CCNC: 91 U/L (ref 0–31)
BASOPHILS ABSOLUTE: 0.11 E9/L (ref 0–0.2)
BASOPHILS RELATIVE PERCENT: 1.7 % (ref 0–2)
BILIRUB SERPL-MCNC: 2.3 MG/DL (ref 0–1.2)
BUN BLDV-MCNC: 8 MG/DL (ref 6–20)
CALCIUM SERPL-MCNC: 8.9 MG/DL (ref 8.6–10.2)
CHLORIDE BLD-SCNC: 100 MMOL/L (ref 98–107)
CO2: 24 MMOL/L (ref 22–29)
CREAT SERPL-MCNC: 0.6 MG/DL (ref 0.5–1)
EOSINOPHILS ABSOLUTE: 0.35 E9/L (ref 0.05–0.5)
EOSINOPHILS RELATIVE PERCENT: 5.5 % (ref 0–6)
GFR AFRICAN AMERICAN: >60
GFR NON-AFRICAN AMERICAN: >60 ML/MIN/1.73
GLUCOSE BLD-MCNC: 114 MG/DL (ref 74–99)
HCT VFR BLD CALC: 33.8 % (ref 34–48)
HEMOGLOBIN: 11.5 G/DL (ref 11.5–15.5)
IMMATURE GRANULOCYTES #: 0.02 E9/L
IMMATURE GRANULOCYTES %: 0.3 % (ref 0–5)
INR BLD: 1.5
LYMPHOCYTES ABSOLUTE: 2.88 E9/L (ref 1.5–4)
LYMPHOCYTES RELATIVE PERCENT: 45 % (ref 20–42)
MCH RBC QN AUTO: 34.7 PG (ref 26–35)
MCHC RBC AUTO-ENTMCNC: 34 % (ref 32–34.5)
MCV RBC AUTO: 102.1 FL (ref 80–99.9)
MONOCYTES ABSOLUTE: 0.74 E9/L (ref 0.1–0.95)
MONOCYTES RELATIVE PERCENT: 11.6 % (ref 2–12)
NEUTROPHILS ABSOLUTE: 2.3 E9/L (ref 1.8–7.3)
NEUTROPHILS RELATIVE PERCENT: 35.9 % (ref 43–80)
PDW BLD-RTO: 14.2 FL (ref 11.5–15)
PLATELET # BLD: 317 E9/L (ref 130–450)
PMV BLD AUTO: 9.1 FL (ref 7–12)
POTASSIUM SERPL-SCNC: 4.3 MMOL/L (ref 3.5–5)
PROTHROMBIN TIME: 17.9 SEC (ref 9.3–12.4)
RBC # BLD: 3.31 E12/L (ref 3.5–5.5)
SODIUM BLD-SCNC: 136 MMOL/L (ref 132–146)
TOTAL PROTEIN: 7.3 G/DL (ref 6.4–8.3)
WBC # BLD: 6.4 E9/L (ref 4.5–11.5)

## 2020-01-02 PROCEDURE — 36415 COLL VENOUS BLD VENIPUNCTURE: CPT

## 2020-01-02 PROCEDURE — 85610 PROTHROMBIN TIME: CPT

## 2020-01-02 PROCEDURE — 85025 COMPLETE CBC W/AUTO DIFF WBC: CPT

## 2020-01-02 PROCEDURE — 80053 COMPREHEN METABOLIC PANEL: CPT

## 2020-02-14 ENCOUNTER — HOSPITAL ENCOUNTER (OUTPATIENT)
Age: 40
Discharge: HOME | End: 2020-02-14
Payer: MEDICAID

## 2020-02-14 LAB
ALBUMIN SERPL-MCNC: 3.2 G/DL (ref 3.5–5.2)
ALP BLD-CCNC: 171 U/L (ref 35–104)
ALT SERPL-CCNC: 26 U/L (ref 0–32)
ANION GAP SERPL CALCULATED.3IONS-SCNC: 8 MMOL/L (ref 7–16)
AST SERPL-CCNC: 58 U/L (ref 0–31)
BASOPHILS ABSOLUTE: 0.1 E9/L (ref 0–0.2)
BASOPHILS RELATIVE PERCENT: 1.8 % (ref 0–2)
BILIRUB SERPL-MCNC: 1.5 MG/DL (ref 0–1.2)
BUN BLDV-MCNC: 9 MG/DL (ref 6–20)
CALCIUM SERPL-MCNC: 8.8 MG/DL (ref 8.6–10.2)
CHLORIDE BLD-SCNC: 105 MMOL/L (ref 98–107)
CO2: 23 MMOL/L (ref 22–29)
CREAT SERPL-MCNC: 0.6 MG/DL (ref 0.5–1)
EOSINOPHILS ABSOLUTE: 0.27 E9/L (ref 0.05–0.5)
EOSINOPHILS RELATIVE PERCENT: 4.7 % (ref 0–6)
GFR AFRICAN AMERICAN: >60
GFR NON-AFRICAN AMERICAN: >60 ML/MIN/1.73
GLUCOSE BLD-MCNC: 139 MG/DL (ref 74–99)
HCT VFR BLD CALC: 34.2 % (ref 34–48)
HEMOGLOBIN: 11.4 G/DL (ref 11.5–15.5)
IMMATURE GRANULOCYTES #: 0.01 E9/L
IMMATURE GRANULOCYTES %: 0.2 % (ref 0–5)
INR BLD: 1.6
LYMPHOCYTES ABSOLUTE: 2.81 E9/L (ref 1.5–4)
LYMPHOCYTES RELATIVE PERCENT: 49.3 % (ref 20–42)
MCH RBC QN AUTO: 32.9 PG (ref 26–35)
MCHC RBC AUTO-ENTMCNC: 33.3 % (ref 32–34.5)
MCV RBC AUTO: 98.8 FL (ref 80–99.9)
MONOCYTES ABSOLUTE: 0.69 E9/L (ref 0.1–0.95)
MONOCYTES RELATIVE PERCENT: 12.1 % (ref 2–12)
NEUTROPHILS ABSOLUTE: 1.82 E9/L (ref 1.8–7.3)
NEUTROPHILS RELATIVE PERCENT: 31.9 % (ref 43–80)
PDW BLD-RTO: 14 FL (ref 11.5–15)
PLATELET # BLD: 277 E9/L (ref 130–450)
PMV BLD AUTO: 8.8 FL (ref 7–12)
POTASSIUM SERPL-SCNC: 3.8 MMOL/L (ref 3.5–5)
PROTHROMBIN TIME: 19 SEC (ref 9.3–12.4)
RBC # BLD: 3.46 E12/L (ref 3.5–5.5)
SODIUM BLD-SCNC: 136 MMOL/L (ref 132–146)
TOTAL PROTEIN: 6.5 G/DL (ref 6.4–8.3)
WBC # BLD: 5.7 E9/L (ref 4.5–11.5)

## 2020-02-14 PROCEDURE — 36415 COLL VENOUS BLD VENIPUNCTURE: CPT

## 2020-02-14 PROCEDURE — 80053 COMPREHEN METABOLIC PANEL: CPT

## 2020-02-14 PROCEDURE — 85025 COMPLETE CBC W/AUTO DIFF WBC: CPT

## 2020-02-14 PROCEDURE — 85610 PROTHROMBIN TIME: CPT

## 2020-03-11 ENCOUNTER — OFFICE VISIT (OUTPATIENT)
Dept: PRIMARY CARE CLINIC | Age: 40
End: 2020-03-11
Payer: MEDICAID

## 2020-03-11 VITALS
HEIGHT: 64 IN | SYSTOLIC BLOOD PRESSURE: 110 MMHG | WEIGHT: 126 LBS | TEMPERATURE: 97.9 F | OXYGEN SATURATION: 98 % | DIASTOLIC BLOOD PRESSURE: 74 MMHG | BODY MASS INDEX: 21.51 KG/M2 | HEART RATE: 80 BPM

## 2020-03-11 PROCEDURE — 99203 OFFICE O/P NEW LOW 30 MIN: CPT | Performed by: INTERNAL MEDICINE

## 2020-03-11 PROCEDURE — G8484 FLU IMMUNIZE NO ADMIN: HCPCS | Performed by: INTERNAL MEDICINE

## 2020-03-11 PROCEDURE — G8420 CALC BMI NORM PARAMETERS: HCPCS | Performed by: INTERNAL MEDICINE

## 2020-03-11 PROCEDURE — G8427 DOCREV CUR MEDS BY ELIG CLIN: HCPCS | Performed by: INTERNAL MEDICINE

## 2020-03-11 PROCEDURE — 1036F TOBACCO NON-USER: CPT | Performed by: INTERNAL MEDICINE

## 2020-03-11 RX ORDER — ESCITALOPRAM OXALATE 10 MG/1
10 TABLET ORAL DAILY
COMMUNITY
Start: 2020-03-03 | End: 2022-02-10

## 2020-03-11 RX ORDER — ZINC SULFATE 50(220)MG
CAPSULE ORAL
Status: ON HOLD | COMMUNITY
Start: 2020-02-17 | End: 2020-07-04

## 2020-03-11 RX ORDER — ERGOCALCIFEROL 1.25 MG/1
50000 CAPSULE ORAL WEEKLY
Status: ON HOLD | COMMUNITY
Start: 2020-02-11 | End: 2022-07-01

## 2020-03-11 RX ORDER — PANCRELIPASE 60000; 12000; 38000 [USP'U]/1; [USP'U]/1; [USP'U]/1
12000 CAPSULE, DELAYED RELEASE PELLETS ORAL
Status: ON HOLD | COMMUNITY
Start: 2020-02-12 | End: 2022-11-01 | Stop reason: HOSPADM

## 2020-03-11 ASSESSMENT — PATIENT HEALTH QUESTIONNAIRE - PHQ9
SUM OF ALL RESPONSES TO PHQ QUESTIONS 1-9: 0
2. FEELING DOWN, DEPRESSED OR HOPELESS: 0
SUM OF ALL RESPONSES TO PHQ9 QUESTIONS 1 & 2: 0
1. LITTLE INTEREST OR PLEASURE IN DOING THINGS: 0
SUM OF ALL RESPONSES TO PHQ QUESTIONS 1-9: 0

## 2020-03-11 NOTE — PROGRESS NOTES
activity change, appetite change, chills and fatigue. HENT: Negative for hearing loss, Negative for congestion. Respiratory: Negative for chest tightness, shortness of breath and wheezing. Cardiovascular: Negative for leg swelling Negative for chest pain and palpitations. Gastrointestinal: Negative for abdominal pain, Negative for abdominal distention, constipation and diarrhea. Genitourinary: Negative for difficulty urinating, dysuria and frequency. Musculoskeletal: Bilateral hand pain and multiple joint swelling  Skin: Negative for color change rash or wound     Neurological: Negative for dizziness, seizures and headaches. Hematological: Negative for adenopathy. Does not bruise/bleed easily. Psychiatric/Behavioral: Negative for agitation, behavioral problems, confusion and decreased concentration. OBJECTIVE:  /74   Pulse 80   Temp 97.9 °F (36.6 °C)   Ht 5' 4\" (1.626 m)   Wt 126 lb (57.2 kg)   SpO2 98%   BMI 21.63 kg/m²      Physical Exam   Constitutional: Oriented to person, place, and time. Appears well-developed and well-nourished. No distress. HENT:   Head: Normocephalic and atraumatic. Eyes: Pupils are equal, round, and reactive to light. EOM are normal.   Neck: Neck supple. No JVD present. No tracheal deviation present. No thyromegaly present. Cardiovascular: Normal rate, regular rhythm, normal heart sounds and intact distal pulses. Exam reveals no gallop and no friction rub. No murmur heard. Pulmonary/Chest: Effort normal and breath sounds normal. No stridor. No respiratory distress. No wheezes or rales. Abdominal: Soft. Bowel sounds are normal. There is no distension nor mass. There is no tenderness. There is no guarding. Musculoskeletal: No edema tenderness or deformity  Neurological: Alert and oriented to person, place, and time. No cranial nerve deficit. Normal muscle tone. Coordination normal.   Skin: Skin is warm and dry. No diaphoresis. No erythema. Psychiatric: Normal mood and affect. Behavior is normal.         ASSESSMENT AND PLAN:    [facility] was seen today for new patient, hand pain and knee pain. Diagnoses and all orders for this visit:    Myalgia  -     PAYTON; Future  -     Anti-DNA Antibody, Double-Stranded; Future  -     C-Reactive Protein; Future  -     Cyclic Citrul Peptide Antibody, IgG; Future  -     Histone Antibodies IgG; Future  -     Sedimentation Rate; Future  -     Rheumatoid Factor; Future  -     TSH without Reflex; Future  -     XR HAND LEFT (MIN 3 VIEWS); Future    Pain in both hands  -     PAYTON; Future  -     Anti-DNA Antibody, Double-Stranded; Future  -     C-Reactive Protein; Future  -     Cyclic Citrul Peptide Antibody, IgG; Future  -     Histone Antibodies IgG; Future  -     Sedimentation Rate; Future  -     Rheumatoid Factor; Future  -     TSH without Reflex; Future  -     XR HAND LEFT (MIN 3 VIEWS); Future  -     XR HAND RIGHT (MIN 3 VIEWS); Future    H/O malignant neoplasm of pancreas    Elevated liver enzymes    Depression, unspecified depression type        Garvin Meigs presents today as a new patient. She has a history of pancreatic tumor status post pancreatectomy and splenectomy in 2017. Since then she has had persistently elevated liver enzymes and bilirubin. She follows up with gastroenterology at the [facility] clinic. Currently she takes Creon. She denies any symptoms today. She is complaining today of bilateral hand pain which is worse in the morning. She is a student and writes often. After a week of class her hand pain is usually worsened. I will obtain blood work to rule out any autoimmune phenomenon. I will also perform radiography of the hands. In the interim I advised her to use topicals. She is to be wary of using Tylenol given her liver disease. Her mood is well controlled on Lexapro. I will defer any changes for that for now.   Discussed her blood work which showed persistently elevated liver enzymes

## 2020-07-04 ENCOUNTER — HOSPITAL ENCOUNTER (INPATIENT)
Age: 40
LOS: 12 days | Discharge: HOME HEALTH | DRG: 280 | End: 2020-07-16
Attending: EMERGENCY MEDICINE | Admitting: INTERNAL MEDICINE
Payer: MEDICAID

## 2020-07-04 ENCOUNTER — ANESTHESIA EVENT (OUTPATIENT)
Dept: ENDOSCOPY | Age: 40
DRG: 280 | End: 2020-07-04
Payer: MEDICAID

## 2020-07-04 ENCOUNTER — ANESTHESIA (OUTPATIENT)
Dept: ENDOSCOPY | Age: 40
DRG: 280 | End: 2020-07-04
Payer: MEDICAID

## 2020-07-04 ENCOUNTER — APPOINTMENT (OUTPATIENT)
Dept: GENERAL RADIOLOGY | Age: 40
DRG: 280 | End: 2020-07-04
Payer: MEDICAID

## 2020-07-04 VITALS — DIASTOLIC BLOOD PRESSURE: 98 MMHG | SYSTOLIC BLOOD PRESSURE: 152 MMHG | OXYGEN SATURATION: 99 %

## 2020-07-04 PROBLEM — K92.0 HEMATEMESIS: Status: ACTIVE | Noted: 2020-07-04

## 2020-07-04 LAB
ABO/RH: NORMAL
ACETAMINOPHEN LEVEL: <5 MCG/ML (ref 10–30)
ALBUMIN SERPL-MCNC: 2.7 G/DL (ref 3.5–5.2)
ALP BLD-CCNC: 137 U/L (ref 35–104)
ALT SERPL-CCNC: 36 U/L (ref 0–32)
AMMONIA: 149 UMOL/L (ref 11–51)
ANION GAP SERPL CALCULATED.3IONS-SCNC: 27 MMOL/L (ref 7–16)
ANTIBODY SCREEN: NORMAL
AST SERPL-CCNC: 153 U/L (ref 0–31)
BASOPHILS ABSOLUTE: 0.01 E9/L (ref 0–0.2)
BASOPHILS RELATIVE PERCENT: 0.2 % (ref 0–2)
BILIRUB SERPL-MCNC: 1.8 MG/DL (ref 0–1.2)
BILIRUBIN DIRECT: 0.8 MG/DL (ref 0–0.3)
BILIRUBIN, INDIRECT: 1 MG/DL (ref 0–1)
BLOOD BANK DISPENSE STATUS: NORMAL
BLOOD BANK PRODUCT CODE: NORMAL
BPU ID: NORMAL
BUN BLDV-MCNC: 33 MG/DL (ref 6–20)
CALCIUM SERPL-MCNC: 7.2 MG/DL (ref 8.6–10.2)
CHLORIDE BLD-SCNC: 101 MMOL/L (ref 98–107)
CO2: 13 MMOL/L (ref 22–29)
CREAT SERPL-MCNC: 0.7 MG/DL (ref 0.5–1)
DESCRIPTION BLOOD BANK: NORMAL
EOSINOPHILS ABSOLUTE: 0 E9/L (ref 0.05–0.5)
EOSINOPHILS RELATIVE PERCENT: 0 % (ref 0–6)
ETHANOL: 85 MG/DL (ref 0–0.08)
GFR AFRICAN AMERICAN: >60
GFR NON-AFRICAN AMERICAN: >60 ML/MIN/1.73
GLUCOSE BLD-MCNC: 265 MG/DL
GLUCOSE BLD-MCNC: 265 MG/DL (ref 74–99)
HCT VFR BLD CALC: 19.3 % (ref 34–48)
HCT VFR BLD CALC: 29.8 % (ref 34–48)
HEMOGLOBIN: 10 G/DL (ref 11.5–15.5)
HEMOGLOBIN: 6.1 G/DL (ref 11.5–15.5)
IMMATURE GRANULOCYTES #: 0.03 E9/L
IMMATURE GRANULOCYTES %: 0.5 % (ref 0–5)
INR BLD: 2.5
LACTIC ACID: 16.1 MMOL/L (ref 0.5–2.2)
LACTIC ACID: 5.6 MMOL/L (ref 0.5–2.2)
LIPASE: 15 U/L (ref 13–60)
LYMPHOCYTES ABSOLUTE: 0.97 E9/L (ref 1.5–4)
LYMPHOCYTES RELATIVE PERCENT: 16.4 % (ref 20–42)
MCH RBC QN AUTO: 31.1 PG (ref 26–35)
MCHC RBC AUTO-ENTMCNC: 31.6 % (ref 32–34.5)
MCV RBC AUTO: 98.5 FL (ref 80–99.9)
MONOCYTES ABSOLUTE: 0.35 E9/L (ref 0.1–0.95)
MONOCYTES RELATIVE PERCENT: 5.9 % (ref 2–12)
NEUTROPHILS ABSOLUTE: 4.55 E9/L (ref 1.8–7.3)
NEUTROPHILS RELATIVE PERCENT: 77 % (ref 43–80)
PDW BLD-RTO: 16 FL (ref 11.5–15)
PLATELET # BLD: 73 E9/L (ref 130–450)
PLATELET CONFIRMATION: NORMAL
PMV BLD AUTO: 12 FL (ref 7–12)
POTASSIUM REFLEX MAGNESIUM: 3.7 MMOL/L (ref 3.5–5)
PROTHROMBIN TIME: 28.7 SEC (ref 9.3–12.4)
RBC # BLD: 1.96 E12/L (ref 3.5–5.5)
SALICYLATE, SERUM: <0.3 MG/DL (ref 0–30)
SODIUM BLD-SCNC: 141 MMOL/L (ref 132–146)
TOTAL PROTEIN: 5.6 G/DL (ref 6.4–8.3)
TRICYCLIC ANTIDEPRESSANTS SCREEN SERUM: NEGATIVE NG/ML
TROPONIN: <0.01 NG/ML (ref 0–0.03)
WBC # BLD: 5.9 E9/L (ref 4.5–11.5)

## 2020-07-04 PROCEDURE — 82140 ASSAY OF AMMONIA: CPT

## 2020-07-04 PROCEDURE — 93005 ELECTROCARDIOGRAM TRACING: CPT | Performed by: STUDENT IN AN ORGANIZED HEALTH CARE EDUCATION/TRAINING PROGRAM

## 2020-07-04 PROCEDURE — C9113 INJ PANTOPRAZOLE SODIUM, VIA: HCPCS | Performed by: EMERGENCY MEDICINE

## 2020-07-04 PROCEDURE — 2720000010 HC SURG SUPPLY STERILE: Performed by: INTERNAL MEDICINE

## 2020-07-04 PROCEDURE — 2580000003 HC RX 258: Performed by: STUDENT IN AN ORGANIZED HEALTH CARE EDUCATION/TRAINING PROGRAM

## 2020-07-04 PROCEDURE — 6360000002 HC RX W HCPCS: Performed by: INTERNAL MEDICINE

## 2020-07-04 PROCEDURE — 7100000001 HC PACU RECOVERY - ADDTL 15 MIN: Performed by: INTERNAL MEDICINE

## 2020-07-04 PROCEDURE — 6360000002 HC RX W HCPCS: Performed by: STUDENT IN AN ORGANIZED HEALTH CARE EDUCATION/TRAINING PROGRAM

## 2020-07-04 PROCEDURE — 71045 X-RAY EXAM CHEST 1 VIEW: CPT

## 2020-07-04 PROCEDURE — 0BH18EZ INSERTION OF ENDOTRACHEAL AIRWAY INTO TRACHEA, VIA NATURAL OR ARTIFICIAL OPENING ENDOSCOPIC: ICD-10-PCS | Performed by: INTERNAL MEDICINE

## 2020-07-04 PROCEDURE — G0480 DRUG TEST DEF 1-7 CLASSES: HCPCS

## 2020-07-04 PROCEDURE — 99291 CRITICAL CARE FIRST HOUR: CPT

## 2020-07-04 PROCEDURE — 2500000003 HC RX 250 WO HCPCS: Performed by: INTERNAL MEDICINE

## 2020-07-04 PROCEDURE — 86923 COMPATIBILITY TEST ELECTRIC: CPT

## 2020-07-04 PROCEDURE — 86850 RBC ANTIBODY SCREEN: CPT

## 2020-07-04 PROCEDURE — 2580000003 HC RX 258: Performed by: INTERNAL MEDICINE

## 2020-07-04 PROCEDURE — 84484 ASSAY OF TROPONIN QUANT: CPT

## 2020-07-04 PROCEDURE — 6360000002 HC RX W HCPCS

## 2020-07-04 PROCEDURE — 2709999900 HC NON-CHARGEABLE SUPPLY: Performed by: INTERNAL MEDICINE

## 2020-07-04 PROCEDURE — 86920 COMPATIBILITY TEST SPIN: CPT

## 2020-07-04 PROCEDURE — 93005 ELECTROCARDIOGRAM TRACING: CPT | Performed by: INTERNAL MEDICINE

## 2020-07-04 PROCEDURE — 80307 DRUG TEST PRSMV CHEM ANLYZR: CPT

## 2020-07-04 PROCEDURE — 85025 COMPLETE CBC W/AUTO DIFF WBC: CPT

## 2020-07-04 PROCEDURE — 96365 THER/PROPH/DIAG IV INF INIT: CPT

## 2020-07-04 PROCEDURE — 7100000000 HC PACU RECOVERY - FIRST 15 MIN: Performed by: INTERNAL MEDICINE

## 2020-07-04 PROCEDURE — 36415 COLL VENOUS BLD VENIPUNCTURE: CPT

## 2020-07-04 PROCEDURE — 85610 PROTHROMBIN TIME: CPT

## 2020-07-04 PROCEDURE — 3609013000 HC EGD TRANSORAL CONTROL BLEEDING ANY METHOD: Performed by: INTERNAL MEDICINE

## 2020-07-04 PROCEDURE — P9059 PLASMA, FRZ BETWEEN 8-24HOUR: HCPCS

## 2020-07-04 PROCEDURE — 6360000002 HC RX W HCPCS: Performed by: EMERGENCY MEDICINE

## 2020-07-04 PROCEDURE — 2000000000 HC ICU R&B

## 2020-07-04 PROCEDURE — 96375 TX/PRO/DX INJ NEW DRUG ADDON: CPT

## 2020-07-04 PROCEDURE — 3700000001 HC ADD 15 MINUTES (ANESTHESIA): Performed by: INTERNAL MEDICINE

## 2020-07-04 PROCEDURE — 80048 BASIC METABOLIC PNL TOTAL CA: CPT

## 2020-07-04 PROCEDURE — 86901 BLOOD TYPING SEROLOGIC RH(D): CPT

## 2020-07-04 PROCEDURE — 85014 HEMATOCRIT: CPT

## 2020-07-04 PROCEDURE — 2500000003 HC RX 250 WO HCPCS

## 2020-07-04 PROCEDURE — 06L38CZ OCCLUSION OF ESOPHAGEAL VEIN WITH EXTRALUMINAL DEVICE, VIA NATURAL OR ARTIFICIAL OPENING ENDOSCOPIC: ICD-10-PCS | Performed by: INTERNAL MEDICINE

## 2020-07-04 PROCEDURE — P9016 RBC LEUKOCYTES REDUCED: HCPCS

## 2020-07-04 PROCEDURE — 3700000000 HC ANESTHESIA ATTENDED CARE: Performed by: INTERNAL MEDICINE

## 2020-07-04 PROCEDURE — 80076 HEPATIC FUNCTION PANEL: CPT

## 2020-07-04 PROCEDURE — 94760 N-INVAS EAR/PLS OXIMETRY 1: CPT

## 2020-07-04 PROCEDURE — 3609018200 HC EGD INJECTION SCLEROSIS ESOPHGL/GASTRIC VARICES: Performed by: INTERNAL MEDICINE

## 2020-07-04 PROCEDURE — 5A1945Z RESPIRATORY VENTILATION, 24-96 CONSECUTIVE HOURS: ICD-10-PCS | Performed by: INTERNAL MEDICINE

## 2020-07-04 PROCEDURE — 36430 TRANSFUSION BLD/BLD COMPNT: CPT

## 2020-07-04 PROCEDURE — 83690 ASSAY OF LIPASE: CPT

## 2020-07-04 PROCEDURE — 86900 BLOOD TYPING SEROLOGIC ABO: CPT

## 2020-07-04 PROCEDURE — 83605 ASSAY OF LACTIC ACID: CPT

## 2020-07-04 PROCEDURE — 85018 HEMOGLOBIN: CPT

## 2020-07-04 RX ORDER — 0.9 % SODIUM CHLORIDE 0.9 %
20 INTRAVENOUS SOLUTION INTRAVENOUS ONCE
Status: DISCONTINUED | OUTPATIENT
Start: 2020-07-04 | End: 2020-07-06

## 2020-07-04 RX ORDER — PROPOFOL 10 MG/ML
INJECTION, EMULSION INTRAVENOUS PRN
Status: DISCONTINUED | OUTPATIENT
Start: 2020-07-04 | End: 2020-07-04 | Stop reason: SDUPTHER

## 2020-07-04 RX ORDER — PROMETHAZINE HYDROCHLORIDE 25 MG/ML
INJECTION, SOLUTION INTRAMUSCULAR; INTRAVENOUS PRN
Status: DISCONTINUED | OUTPATIENT
Start: 2020-07-04 | End: 2020-07-04 | Stop reason: SDUPTHER

## 2020-07-04 RX ORDER — PANTOPRAZOLE SODIUM 40 MG/10ML
80 INJECTION, POWDER, LYOPHILIZED, FOR SOLUTION INTRAVENOUS ONCE
Status: COMPLETED | OUTPATIENT
Start: 2020-07-04 | End: 2020-07-04

## 2020-07-04 RX ORDER — ONDANSETRON 2 MG/ML
INJECTION INTRAMUSCULAR; INTRAVENOUS PRN
Status: DISCONTINUED | OUTPATIENT
Start: 2020-07-04 | End: 2020-07-04 | Stop reason: SDUPTHER

## 2020-07-04 RX ORDER — PANTOPRAZOLE SODIUM 40 MG/10ML
40 INJECTION, POWDER, LYOPHILIZED, FOR SOLUTION INTRAVENOUS ONCE
Status: DISCONTINUED | OUTPATIENT
Start: 2020-07-04 | End: 2020-07-04

## 2020-07-04 RX ORDER — LABETALOL HYDROCHLORIDE 5 MG/ML
5 INJECTION, SOLUTION INTRAVENOUS EVERY 10 MIN PRN
Status: DISCONTINUED | OUTPATIENT
Start: 2020-07-04 | End: 2020-07-04 | Stop reason: HOSPADM

## 2020-07-04 RX ORDER — SUCCINYLCHOLINE/SOD CL,ISO/PF 200MG/10ML
SYRINGE (ML) INTRAVENOUS PRN
Status: DISCONTINUED | OUTPATIENT
Start: 2020-07-04 | End: 2020-07-04 | Stop reason: SDUPTHER

## 2020-07-04 RX ORDER — THIAMINE HYDROCHLORIDE 100 MG/ML
250 INJECTION, SOLUTION INTRAMUSCULAR; INTRAVENOUS DAILY
Status: DISCONTINUED | OUTPATIENT
Start: 2020-07-04 | End: 2020-07-04 | Stop reason: SDUPTHER

## 2020-07-04 RX ORDER — FOLIC ACID 5 MG/ML
1 INJECTION, SOLUTION INTRAMUSCULAR; INTRAVENOUS; SUBCUTANEOUS DAILY
Status: DISCONTINUED | OUTPATIENT
Start: 2020-07-04 | End: 2020-07-16 | Stop reason: HOSPADM

## 2020-07-04 RX ORDER — PROMETHAZINE HYDROCHLORIDE 25 MG/1
12.5 TABLET ORAL EVERY 6 HOURS PRN
Status: DISCONTINUED | OUTPATIENT
Start: 2020-07-04 | End: 2020-07-16 | Stop reason: HOSPADM

## 2020-07-04 RX ORDER — FENTANYL CITRATE 50 UG/ML
INJECTION, SOLUTION INTRAMUSCULAR; INTRAVENOUS PRN
Status: DISCONTINUED | OUTPATIENT
Start: 2020-07-04 | End: 2020-07-04 | Stop reason: SDUPTHER

## 2020-07-04 RX ORDER — LIDOCAINE HYDROCHLORIDE 20 MG/ML
INJECTION, SOLUTION INTRAVENOUS PRN
Status: DISCONTINUED | OUTPATIENT
Start: 2020-07-04 | End: 2020-07-04 | Stop reason: SDUPTHER

## 2020-07-04 RX ORDER — ROCURONIUM BROMIDE 10 MG/ML
INJECTION, SOLUTION INTRAVENOUS PRN
Status: DISCONTINUED | OUTPATIENT
Start: 2020-07-04 | End: 2020-07-04 | Stop reason: SDUPTHER

## 2020-07-04 RX ORDER — SODIUM CHLORIDE 0.9 % (FLUSH) 0.9 %
10 SYRINGE (ML) INJECTION EVERY 12 HOURS SCHEDULED
Status: DISCONTINUED | OUTPATIENT
Start: 2020-07-04 | End: 2020-07-16 | Stop reason: HOSPADM

## 2020-07-04 RX ORDER — SODIUM CHLORIDE 0.9 % (FLUSH) 0.9 %
10 SYRINGE (ML) INJECTION PRN
Status: DISCONTINUED | OUTPATIENT
Start: 2020-07-04 | End: 2020-07-16 | Stop reason: HOSPADM

## 2020-07-04 RX ORDER — MEPERIDINE HYDROCHLORIDE 25 MG/ML
12.5 INJECTION INTRAMUSCULAR; INTRAVENOUS; SUBCUTANEOUS EVERY 5 MIN PRN
Status: DISCONTINUED | OUTPATIENT
Start: 2020-07-04 | End: 2020-07-04 | Stop reason: HOSPADM

## 2020-07-04 RX ORDER — SODIUM CHLORIDE 9 MG/ML
INJECTION, SOLUTION INTRAVENOUS CONTINUOUS
Status: DISCONTINUED | OUTPATIENT
Start: 2020-07-04 | End: 2020-07-06

## 2020-07-04 RX ORDER — PROMETHAZINE HYDROCHLORIDE 25 MG/ML
6.25 INJECTION, SOLUTION INTRAMUSCULAR; INTRAVENOUS
Status: DISCONTINUED | OUTPATIENT
Start: 2020-07-04 | End: 2020-07-04 | Stop reason: HOSPADM

## 2020-07-04 RX ORDER — DEXAMETHASONE SODIUM PHOSPHATE 10 MG/ML
INJECTION INTRAMUSCULAR; INTRAVENOUS PRN
Status: DISCONTINUED | OUTPATIENT
Start: 2020-07-04 | End: 2020-07-04 | Stop reason: SDUPTHER

## 2020-07-04 RX ORDER — POLYETHYLENE GLYCOL 3350 17 G/17G
17 POWDER, FOR SOLUTION ORAL DAILY PRN
Status: DISCONTINUED | OUTPATIENT
Start: 2020-07-04 | End: 2020-07-16 | Stop reason: HOSPADM

## 2020-07-04 RX ORDER — OCTREOTIDE ACETATE 50 UG/ML
50 INJECTION, SOLUTION INTRAVENOUS; SUBCUTANEOUS ONCE
Status: COMPLETED | OUTPATIENT
Start: 2020-07-04 | End: 2020-07-04

## 2020-07-04 RX ORDER — ONDANSETRON 2 MG/ML
4 INJECTION INTRAMUSCULAR; INTRAVENOUS ONCE
Status: COMPLETED | OUTPATIENT
Start: 2020-07-04 | End: 2020-07-04

## 2020-07-04 RX ORDER — HYDRALAZINE HYDROCHLORIDE 20 MG/ML
5 INJECTION INTRAMUSCULAR; INTRAVENOUS EVERY 10 MIN PRN
Status: DISCONTINUED | OUTPATIENT
Start: 2020-07-04 | End: 2020-07-04 | Stop reason: HOSPADM

## 2020-07-04 RX ORDER — GABAPENTIN 100 MG/1
100 CAPSULE ORAL 3 TIMES DAILY
Status: ON HOLD | COMMUNITY
End: 2020-07-16 | Stop reason: HOSPADM

## 2020-07-04 RX ORDER — ONDANSETRON 2 MG/ML
4 INJECTION INTRAMUSCULAR; INTRAVENOUS EVERY 6 HOURS PRN
Status: DISCONTINUED | OUTPATIENT
Start: 2020-07-04 | End: 2020-07-16 | Stop reason: HOSPADM

## 2020-07-04 RX ORDER — 0.9 % SODIUM CHLORIDE 0.9 %
1000 INTRAVENOUS SOLUTION INTRAVENOUS ONCE
Status: COMPLETED | OUTPATIENT
Start: 2020-07-04 | End: 2020-07-04

## 2020-07-04 RX ADMIN — Medication 20 MG: at 17:19

## 2020-07-04 RX ADMIN — WATER 1 G: 1 INJECTION INTRAMUSCULAR; INTRAVENOUS; SUBCUTANEOUS at 20:45

## 2020-07-04 RX ADMIN — ONDANSETRON 4 MG: 2 INJECTION INTRAMUSCULAR; INTRAVENOUS at 13:55

## 2020-07-04 RX ADMIN — Medication 20 MG: at 17:26

## 2020-07-04 RX ADMIN — OCTREOTIDE ACETATE 50 MCG: 50 INJECTION, SOLUTION INTRAVENOUS; SUBCUTANEOUS at 14:14

## 2020-07-04 RX ADMIN — FOLIC ACID 1 MG: 5 INJECTION, SOLUTION INTRAMUSCULAR; INTRAVENOUS; SUBCUTANEOUS at 20:45

## 2020-07-04 RX ADMIN — Medication 160 MG: at 17:03

## 2020-07-04 RX ADMIN — SODIUM CHLORIDE 50 MCG/HR: 9 INJECTION, SOLUTION INTRAVENOUS at 14:15

## 2020-07-04 RX ADMIN — SODIUM CHLORIDE 1000 ML: 9 INJECTION, SOLUTION INTRAVENOUS at 13:55

## 2020-07-04 RX ADMIN — SUGAMMADEX 229 MG: 100 INJECTION, SOLUTION INTRAVENOUS at 17:30

## 2020-07-04 RX ADMIN — ONDANSETRON HYDROCHLORIDE 4 MG: 2 SOLUTION INTRAMUSCULAR; INTRAVENOUS at 21:00

## 2020-07-04 RX ADMIN — PANTOPRAZOLE SODIUM 80 MG: 40 INJECTION, POWDER, FOR SOLUTION INTRAVENOUS at 13:55

## 2020-07-04 RX ADMIN — SODIUM CHLORIDE: 9 INJECTION, SOLUTION INTRAVENOUS at 20:49

## 2020-07-04 RX ADMIN — PROMETHAZINE HYDROCHLORIDE 12.5 MG: 25 INJECTION INTRAMUSCULAR; INTRAVENOUS at 17:39

## 2020-07-04 RX ADMIN — PROPOFOL 110 MG: 10 INJECTION, EMULSION INTRAVENOUS at 17:03

## 2020-07-04 RX ADMIN — THIAMINE HYDROCHLORIDE 250 MG: 100 INJECTION, SOLUTION INTRAMUSCULAR; INTRAVENOUS at 20:45

## 2020-07-04 RX ADMIN — ONDANSETRON HYDROCHLORIDE 4 MG: 2 INJECTION, SOLUTION INTRAMUSCULAR; INTRAVENOUS at 17:03

## 2020-07-04 RX ADMIN — SODIUM CHLORIDE: 9 INJECTION, SOLUTION INTRAVENOUS at 16:59

## 2020-07-04 RX ADMIN — DEXAMETHASONE SODIUM PHOSPHATE 10 MG: 10 INJECTION INTRAMUSCULAR; INTRAVENOUS at 17:03

## 2020-07-04 RX ADMIN — LIDOCAINE HYDROCHLORIDE 100 MG: 20 INJECTION, SOLUTION INTRAVENOUS at 17:03

## 2020-07-04 RX ADMIN — Medication 10 ML: at 20:51

## 2020-07-04 RX ADMIN — SODIUM CHLORIDE: 9 INJECTION, SOLUTION INTRAVENOUS at 16:35

## 2020-07-04 RX ADMIN — FENTANYL CITRATE 100 MCG: 50 INJECTION, SOLUTION INTRAMUSCULAR; INTRAVENOUS at 17:03

## 2020-07-04 RX ADMIN — TRIMETHOBENZAMIDE HYDROCHLORIDE 200 MG: 100 INJECTION INTRAMUSCULAR at 14:25

## 2020-07-04 ASSESSMENT — PULMONARY FUNCTION TESTS
PIF_VALUE: 2
PIF_VALUE: 24
PIF_VALUE: 28
PIF_VALUE: 27
PIF_VALUE: 23
PIF_VALUE: 22
PIF_VALUE: 27
PIF_VALUE: 24
PIF_VALUE: 0
PIF_VALUE: 26
PIF_VALUE: 23
PIF_VALUE: 2
PIF_VALUE: 24
PIF_VALUE: 27
PIF_VALUE: 25
PIF_VALUE: 26
PIF_VALUE: 20
PIF_VALUE: 2
PIF_VALUE: 2
PIF_VALUE: 28
PIF_VALUE: 21
PIF_VALUE: 0
PIF_VALUE: 22
PIF_VALUE: 0
PIF_VALUE: 16
PIF_VALUE: 28
PIF_VALUE: 21
PIF_VALUE: 1
PIF_VALUE: 24
PIF_VALUE: 22
PIF_VALUE: 25
PIF_VALUE: 25
PIF_VALUE: 26
PIF_VALUE: 23
PIF_VALUE: 24
PIF_VALUE: 20
PIF_VALUE: 1

## 2020-07-04 ASSESSMENT — ENCOUNTER SYMPTOMS
SORE THROAT: 0
ABDOMINAL PAIN: 0
COUGH: 0
BLOOD IN STOOL: 1
WHEEZING: 0
SINUS PRESSURE: 0
EYE REDNESS: 0
DIARRHEA: 0
EYE DISCHARGE: 0
NAUSEA: 1
SHORTNESS OF BREATH: 0
RHINORRHEA: 0
VOMITING: 1
EYE PAIN: 0
ABDOMINAL DISTENTION: 0
BACK PAIN: 0

## 2020-07-04 NOTE — ED NOTES
Bed: 15  Expected date:   Expected time:   Means of arrival:   Comments:  jada Sigala RN  07/04/20 0274

## 2020-07-04 NOTE — CONSULTS
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 Authorizing Provider   gabapentin (NEURONTIN) 100 MG capsule Take 100 mg by mouth 3 times daily. PRN   Yes Historical Provider, MD   vitamin D (CHOLECALCIFEROL) 250 MCG (45549 UT) CAPS capsule Take 1,000 Units by mouth daily 11/20/19   Historical Provider, MD   vitamin D (ERGOCALCIFEROL) 1.25 MG (57218 UT) CAPS capsule TAKE 1 CAPSULE BY MOUTH ONE TIME A WEEK. 2/11/20   Historical Provider, MD   escitalopram (LEXAPRO) 10 MG tablet TAKE 1 TABLET BY MOUTH EVERY DAY 3/3/20   Historical Provider, MD   CREON 97880 units delayed release capsule TAKE 1 CAPSULE BY MOUTH WITH MEALS AND AT BEDTIME. 2/12/20   Historical Provider, MD   vitamin A 07453 units capsule TAKE 1 CAPSULE BY MOUTH EVERY DAY 2/16/20   Historical Provider, MD       Allergies:  Pcn [penicillins]    Social History:   TOBACCO:   reports that she has never smoked. She has never used smokeless tobacco.  ETOH:   reports current alcohol use.     Family History:       Problem Relation Age of Onset    Other Mother         lymphoma    High Blood Pressure Mother     Other Father         ms    Breast Cancer Other         AUNT       REVIEW OF SYSTEMS:    · Constitutional: No fever, no chills, no change in weight; good appetite  · HEENT: No blurred vision, no ear problems, no sore throat, no rhinorrhea. · Respiratory: No cough, no sputum production, no pleuritic chest pain, no shortness of breath  · Cardiology: No angina, no dyspnea on exertion, no paroxysmal nocturnal dyspnea, no orthopnea, no palpitation, no leg swelling. · Gastroenterology: No dysphagia, no reflux; no abdominal pain, no nausea or vomiting; no constipation or diarrhea.  No hematochezia   · Genitourinary: No dysuria, no frequency, hesitancy; no hematuria  · Musculoskeletal: no joint pain, no myalgia, no change in range of movement  · Neurology: no focal weakness in extremities, no slurred speech, no double vision, no tingling or numbness sensation  · Endocrinology: no temperature intolerance, no polyphagia, polydipsia or polyuria  · Hematology: no increased bleeding, no bruising, no lymphadenopathy  · Skin: no skin changes noticed by patient  · Psychology: no depressed mood, no suicidal ideation    Physical Exam   · Vitals: /77   Pulse 105   Temp 99.2 °F (37.3 °C) (Temporal)   Resp 21   Ht 5' 4\" (1.626 m)   Wt 126 lb (57.2 kg)   SpO2 96%   BMI 21.63 kg/m²   ·    ·      · General Appearance: alert and oriented to person, place and time, in mild distress  · Skin: warm and dry, no rash or erythema  · Head: normocephalic and atraumatic  · Eyes: pupils equal, round, and reactive to light, extraocular eye movements intact, scleral icterus   · ENT: tympanic membrane, external ear and ear canal normal bilaterally, nose without deformity, nasal mucosa and turbinates normal without polyps  · Neck: supple and non-tender without mass, no thyromegaly or thyroid nodules, no cervical lymphadenopathy  · Pulmonary/Chest: clear to auscultation bilaterally- no wheezes, rales or rhonchi, normal air movement, no respiratory distress  · Cardiovascular: tachycardic, regular rhythm, normal S1 and S2, no murmurs, rubs, clicks, or gallops, distal pulses intact, no carotid bruits  · Abdomen: soft, non-tender, non-distended, normal bowel sounds, no masses or organomegaly  · Extremities: no cyanosis, clubbing or edema  · Musculoskeletal: normal range of motion, no joint swelling, deformity or tenderness  · Neurologic: no cranial nerve deficit, gait, coordination and speech normal     Lines     site day    Art line   None    TLC R Fem +introducer    PICC None    Hemoaccess None    Oxygen:     On RA     ABG:   No results found for: PHART, PH, JCF7WBY, PCO2, PO2ART, PO2, BUS0YPK, HCO3, BEART, BE, THGBART, THB, DSC5KQJ, M2TTENSJ, O2SAT  Labs and Imaging Studies   Basic Labs  CBC:   Lab Results   Component Value Date    WBC 5.9 2020    RBC 1.96 2020    RBC 3.55 2019    HGB 10.0 2020    HCT 29.8 2020    MCV 98.5 2020    RDW 16.0 2020    PLT 73 2020     CMP:  Lab Results   Component Value Date     2020    K 3.7 2020     2020    CO2 13 2020    BUN 33 2020    PROT 5.6 2020     Ethanol level of 85. Imaging Studies:     Xr Chest Portable    Result Date: 2020  Patient MRN:  [id number] : 1980 Age: 42 years Gender: Female Order Date:  2020 1:45 PM EXAM: XR CHEST PORTABLE NUMBER OF IMAGES:  1 INDICATION:  hematemesis hematemesis COMPARISON: Chest radiograph 2019 RESULT: Lines, tubes, and devices:  None. Lungs and pleura: Lungs are clear without focal consolidation or edema. No pleural effusion or pneumothorax. Cardiomediastinal silhouette:  Cardiac silhouette is within normal limits of size. No acute radiographic abnormality. EKG: sinus tachycardia. Resident's Assessment and Plan      Neurology  - Patient awake alert, able to follow commands. Gastroenterology:   Hematemesis   - likely 2/2 Bleeding Varix just below GE junction ?  July ledbetter tear  - controlled with band/ large amount blood in fundus done during EGD   - MD

## 2020-07-04 NOTE — ANESTHESIA POSTPROCEDURE EVALUATION
Department of Anesthesiology  Postprocedure Note    Patient: Isamar Ventura  MRN: [id number]  [place]: 1980  Date of evaluation: 7/4/2020  Time:  6:53 PM     Procedure Summary     Date:  07/04/20 Room / Location:  [facility] 03 / [facility]    Anesthesia Start:  0008 Anesthesia Stop:  7489    Procedures:       EGD CONTROL HEMORRHAGE (N/A )      EGD ESOPHAGOGASTRODUODENOSCOPY ENDOSCOPIC VARICEAL TREATMENT Diagnosis:  (hematemesis)    Surgeon:  Soila Llanos MD Responsible Provider:  Riri Black MD    Anesthesia Type:  general ASA Status:  3          Anesthesia Type: general    Mitch Phase I: Mitch Score: 10    Mitch Phase II:      Last vitals: Reviewed and per EMR flowsheets.        Anesthesia Post Evaluation    Patient location during evaluation: PACU  Patient participation: complete - patient participated  Level of consciousness: awake and alert  Airway patency: patent  Nausea & Vomiting: no nausea and no vomiting  Complications: no  Cardiovascular status: hemodynamically stable  Respiratory status: acceptable  Hydration status: euvolemic

## 2020-07-04 NOTE — PROGRESS NOTES
Patient admitted to PACU from [facility]. All appropriate monitors applied, siderails up, bed locked, and in low position. Patient nauseated and medicated IV per CRNA with phenergan 12.5 mg. Patient cleaned for black loose tary BM and linens changed.

## 2020-07-04 NOTE — BRIEF OP NOTE
Brief Postoperative Note      Patient: Alison Roman  YOB: 1980  MRN: [id number]    Date of Procedure: 7/4/2020    Pre-Op Diagnosis: hematemesis, cirrhosis    Post-Op Diagnosis: bleeding varix just below GE jxn, controlled with band/large amount blood in the fundus       Procedure(s):  EGD CONTROL HEMORRHAGE    Surgeon(s):  Brenda Funes MD    Assistant:  * No surgical staff found *    Anesthesia: General    Estimated Blood Loss (mL): less than 740     Complications: None    Specimens:   * No specimens in log *    Implants:  * No implants in log *      Drains: * No LDAs found *    Findings: bleeding varix at [facility] jxn No MW tear    Electronically signed by Brenda Funes MD on 7/4/2020 at 5:33 PM

## 2020-07-04 NOTE — H&P
Hospital Medicine History & Physical      PCP: Ghazala Chandler DO    Date of Admission: 7/4/2020    Date of Service:July 4, 2020    Chief Complaint:  *hematemesis       History Of Present Illness:     42 y.o. female who presented to [address] [place] [address] with *hematemesis. 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 States she had one glass of wine on yesterday(DOES NOT WANT HER MOM TO KNOW SHE HAD BEEN DRINKING). Began to have hematemesis, and black stools,  Weakness,  And fatigue. Found to have a HGB of 6.1. Past Medical History:          Diagnosis Date    Anxiety     not on any meds for it    Hypertension     has not required any med a few years as of 8/2019    Hypothyroidism     in her early 25s - pt was on levothyroxine for a while and then hypothyroidism apparently resolved and pt has been off med since her mid-20s    Pancreatic cyst 01/2017    Pt underwent partial pancreatectomy and splenectomy at [facility] 2/23/2017 and was told pathology showed MCN (mucinous cystic neoplasm) of pancreas with clean margins at surgery       Past Surgical History:          Procedure Laterality Date   [address]  02/23/2017    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 - 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    SPLENECTOMY, TOTAL  02/23/2017    along with partial pancreatectomy       Medications Prior to Admission:      Prior to Admission medications    Medication Sig Start Date End Date Taking?  Authorizing Provider   vitamin D (CHOLECALCIFEROL) 250 MCG (59616 UT) CAPS capsule Take 1,000 Units by mouth daily 11/20/19   Historical Provider, MD   vitamin D (ERGOCALCIFEROL) 1.25 MG (80312 UT) CAPS capsule TAKE 1 CAPSULE BY MOUTH ONE TIME A WEEK. 2/11/20   Historical Provider, MD Neurovascularly intact without any focal sensory/motor deficits. Cranial nerves: II-XII intact, grossly non-focal.  Psychiatric:  Alert and oriented, thought content appropriate, normal insight  Capillary Refill: Brisk,< 3 seconds   Peripheral Pulses: +2 palpable, equal bilaterally       Labs:     Recent Labs     07/04/20  1407   WBC 5.9   HGB 6.1*   HCT 19.3*   PLT 73*     Recent Labs     07/04/20  1407      K 3.7      CO2 13*   BUN 33*   CREATININE 0.7   CALCIUM 7.2*     Recent Labs     07/04/20  1407   *   ALT 36*   BILIDIR 0.8*   BILITOT 1.8*   ALKPHOS 137*     Recent Labs     07/04/20  1407   INR 2.5     Recent Labs     07/04/20  1407   TROPONINI <0.01       Urinalysis:      Lab Results   Component Value Date    NITRU POSITIVE 08/09/2019    WBCUA 1-3 08/09/2019    BACTERIA MANY 08/09/2019    RBCUA 0-1 08/09/2019    BLOODU SMALL 08/09/2019    SPECGRAV 1.020 08/09/2019    GLUCOSEU 100 08/09/2019       Radiology:     CXR: I have reviewed the CXR with the following interpretation: *    XR CHEST PORTABLE    (Results Pending)       ASSESSMENT:    Active Hospital Problems    Diagnosis Date Noted    Hematemesis [K92.0] 07/04/2020   GIB  ETOH ABUSE  HISTORY OF esophageal varices with banding  Anxiety  Coagulopathy       PLAN:  micu  Transfuse  GI eval  Monitor HH      DVT Prophylaxis: *scd  Diet: NPO  Code Status: full code    PT/OT Eval Status: na    Dispo -  home    Electronically signed by Cady Mccann DO on 7/4/2020 at 2:54 PM FACOI       Thank you Carissa Tuttle DO for the opportunity to be involved in this patient's care. If you have any questions or concerns please feel free to contact me at [phone].

## 2020-07-04 NOTE — ED PROVIDER NOTES
Iván Al is a 42 y.o. female with a PMHx significant for liver cirrhosis, alcohol abuse, varices with banding in November of last year  who presents for evaluation of weakness, fatigue, nausea, vomiting, beginning prior to arrival.  The complaint has been persistent, moderate in severity, and worsened by nothing. The patient states that yesterday she started to feel nauseated and having episodes of vomiting. She was unable to keep anything down including fluids. She notes that she then started to have episodes of emesis. She also notes this morning she started to have episodes of dark tarry stools. She started to feel weak and fatigued and called EMS to present for evaluation. The history is provided by the patient and medical records. Review of Systems   Constitutional: Negative for chills, diaphoresis and fever. HENT: Negative for congestion, ear pain, rhinorrhea, sinus pressure and sore throat. Eyes: Negative for pain, discharge, redness and visual disturbance. Respiratory: Negative for cough, shortness of breath and wheezing. Cardiovascular: Negative for chest pain. Gastrointestinal: Positive for blood in stool, nausea and vomiting. Negative for abdominal distention, abdominal pain and diarrhea. Genitourinary: Negative for dysuria, frequency and urgency. Musculoskeletal: Negative for arthralgias and back pain. Skin: Negative for rash and wound. Neurological: Negative for dizziness, weakness, light-headedness, numbness and headaches. Hematological: Negative for adenopathy. All other systems reviewed and are negative. Physical Exam  Vitals signs and nursing note reviewed. Constitutional:       Appearance: Normal appearance. She is well-developed. HENT:      Head: Normocephalic and atraumatic. Right Ear: External ear normal.      Left Ear: External ear normal.      Mouth/Throat:      Comments: Dry blood   Eyes:      General: Scleral icterus present. blood return and were flushed with saline solution. The catheter was then securely fastened to the skin with suture. . An antibiotic disk was placed and the site was then covered with a sterile dressing. A post procedure X-ray was not indicated. The patient tolerated the procedure well. Complications: None    Richard Leal presents to the ED for feeling weak, fatigued and having hematemesis. Differential diagnoses included but not limited to anemia, dehydration, electrolyte abnormality. Workup in the ED revealed patient is hypotensive, tachycardic and actively vomiting blood. She had 300 cc vomit of blood in the room. She was given 2 L of IV fluids, however continued hypotensive therefore she was transfused 2 units of trauma blood. Type and cross was ordered and she was ordered an additional 2 units. Case was discussed with intensivist, Dr. Jeffry Malloy as well as Dr. Jacqueline Stanford who states he will be in within the next hour to take the patient to EGD. The patient was also given octreotide and protonix. Patient requires continued workup and management of their symptoms and will be admitted to the hospital for further evaluation and treatment. ED Course as of Jul 04 1647   Sat Jul 04, 2020   1442 Spoke with Dr. Devon Diehl with [facility] physicians. She admit the patientSpoke with Dr. Jeffry Malloy, ICU, she will accept the patient to ICU, Spoke with Dr. Jacqueline Stanford, he will provide consult    [BB]      ED Course User Index  [BB] Uriel DO Juan Francisco       --------------------------------------------- PAST HISTORY ---------------------------------------------  Past Medical History:  has a past medical history of Anxiety, Hypertension, Hypothyroidism, and Pancreatic cyst.    Past Surgical History:  has a past surgical history that includes Arm Surgery (1983); Splenectomy, total (02/23/2017); and Pancreas surgery (02/23/2017). Social History:  reports that she has never smoked.  She has never used smokeless tobacco. She reports current alcohol use. She reports that she does not use drugs. Family History: family history includes Breast Cancer in an other family member; High Blood Pressure in her mother; Other in her father and mother. The patients home medications have been reviewed.     Allergies: Pcn [penicillins]    -------------------------------------------------- RESULTS -------------------------------------------------    Lab  Results for orders placed or performed during the hospital encounter of 07/04/20   Lactic Acid, Plasma   Result Value Ref Range    Lactic Acid 16.1 (HH) 0.5 - 2.2 mmol/L   CBC Auto Differential   Result Value Ref Range    WBC 5.9 4.5 - 11.5 E9/L    RBC 1.96 (L) 3.50 - 5.50 E12/L    Hemoglobin 6.1 (LL) 11.5 - 15.5 g/dL    Hematocrit 19.3 (L) 34.0 - 48.0 %    MCV 98.5 80.0 - 99.9 fL    MCH 31.1 26.0 - 35.0 pg    MCHC 31.6 (L) 32.0 - 34.5 %    RDW 16.0 (H) 11.5 - 15.0 fL    Platelets 73 (L) 696 - 450 E9/L    MPV 12.0 7.0 - 12.0 fL    Neutrophils % 77.0 43.0 - 80.0 %    Immature Granulocytes % 0.5 0.0 - 5.0 %    Lymphocytes % 16.4 (L) 20.0 - 42.0 %    Monocytes % 5.9 2.0 - 12.0 %    Eosinophils % 0.0 0.0 - 6.0 %    Basophils % 0.2 0.0 - 2.0 %    Neutrophils Absolute 4.55 1.80 - 7.30 E9/L    Immature Granulocytes # 0.03 E9/L    Lymphocytes Absolute 0.97 (L) 1.50 - 4.00 E9/L    Monocytes Absolute 0.35 0.10 - 0.95 E9/L    Eosinophils Absolute 0.00 (L) 0.05 - 0.50 E9/L    Basophils Absolute 0.01 0.00 - 0.20 M1/J   Basic Metabolic Panel w/ Reflex to MG   Result Value Ref Range    Sodium 141 132 - 146 mmol/L    Potassium reflex Magnesium 3.7 3.5 - 5.0 mmol/L    Chloride 101 98 - 107 mmol/L    CO2 13 (L) 22 - 29 mmol/L    Anion Gap 27 (H) 7 - 16 mmol/L    Glucose 265 (H) 74 - 99 mg/dL    BUN 33 (H) 6 - 20 mg/dL    CREATININE 0.7 0.5 - 1.0 mg/dL    GFR Non-African American >60 >=60 mL/min/1.73    GFR African American >60     Calcium 7.2 (L) 8.6 - 10.2 mg/dL   Hepatic Function Panel   Result Value Ref Range    Total Protein 5.6 (L) 6.4 - 8.3 g/dL    Alb 2.7 (L) 3.5 - 5.2 g/dL    Alkaline Phosphatase 137 (H) 35 - 104 U/L    ALT 36 (H) 0 - 32 U/L     (H) 0 - 31 U/L    Total Bilirubin 1.8 (H) 0.0 - 1.2 mg/dL    Bilirubin, Direct 0.8 (H) 0.0 - 0.3 mg/dL    Bilirubin, Indirect 1.0 0.0 - 1.0 mg/dL   Lipase   Result Value Ref Range    Lipase 15 13 - 60 U/L   Troponin   Result Value Ref Range    Troponin <0.01 0.00 - 0.03 ng/mL   Protime-INR   Result Value Ref Range    Protime 28.7 (H) 9.3 - 12.4 sec    INR 2.5    Serum Drug Screen   Result Value Ref Range    Ethanol Lvl 85 mg/dL    Acetaminophen Level <5.0 (L) 10.0 - 35.5 mcg/mL    Salicylate, Serum <6.1 0.0 - 30.0 mg/dL    TCA Scrn NEGATIVE Cutoff:300 ng/mL   Platelet Confirmation   Result Value Ref Range    Platelet Confirmation CONFIRMED    POCT Glucose   Result Value Ref Range    Glucose 265 mg/dL   EKG 12 Lead   Result Value Ref Range    Ventricular Rate 138 BPM    Atrial Rate 138 BPM    P-R Interval 92 ms    QRS Duration 78 ms    Q-T Interval 320 ms    QTc Calculation (Bazett) 484 ms    P Axis 54 degrees    R Axis 54 degrees    T Axis 55 degrees   TYPE AND SCREEN   Result Value Ref Range    ABO/Rh O NEG     Antibody Screen NEG    PREPARE RBC (CROSSMATCH), 1 Units   Result Value Ref Range    Product Code Blood Bank [id number]     Description Blood Bank Red Blood Cells, Leuko-reduced     Unit Number [id number]     Dispense Status Blood Bank issued     Product Code Blood Bank [id number]     Description Blood Bank Red Blood Cells, Leuko-reduced     Unit Number [id number]     Dispense Status Blood Bank issued     Product Code Blood Bank [id number]     Description Blood Bank Red Blood Cells, Leuko-reduced     Unit Number [id number]     Dispense Status Blood Bank selected     Product Code Blood Bank [id number]     Description Blood Bank Red Blood Cells, Leuko-reduced     Unit Number [id number]     Dispense Status Blood Bank selected        Radiology  XR CHEST PORTABLE   Final Result No acute radiographic abnormality. EKG:  This EKG is signed and interpreted by me. Rate: 138  Rhythm: Sinus  Interpretation: sinus tachycardia; No ST-Elevations or depressions  Comparison: changes compared to previous EKG      ------------------------- NURSING NOTES AND VITALS REVIEWED ---------------------------  Date / Time Roomed:  7/4/2020  1:34 PM  ED Bed Assignment:  [id number]    The nursing notes within the ED encounter and vital signs as below have been reviewed. Patient Vitals for the past 24 hrs:   BP Temp Temp src Pulse Resp SpO2 Height Weight   07/04/20 1630 115/78 -- -- 124 22 100 % -- --   07/04/20 1626 -- -- -- 137 -- -- -- --   07/04/20 1536 130/86 -- -- 125 21 99 % -- --   07/04/20 1530 130/88 -- -- 134 24 -- -- --   07/04/20 1500 120/80 -- -- 115 18 -- -- --   07/04/20 1439 115/67 -- -- 118 20 -- -- --   07/04/20 1430 101/63 -- -- 125 18 -- -- --   07/04/20 1419 108/65 -- -- 129 18 100 % -- --   07/04/20 1401 (!) 81/52 -- -- 129 15 -- -- --   07/04/20 1337 (!) 93/55 97.8 °F (36.6 °C) Infrared 150 20 100 % 5' 4\" (1.626 m) 126 lb (57.2 kg)       Oxygen Saturation Interpretation: Normal      ------------------------------------------ PROGRESS NOTES ------------------------------------------  I have spoken with the patient and discussed todays results, in addition to providing specific details for the plan of care and counseling regarding the diagnosis and prognosis. Their questions are answered at this time and they are agreeable with the plan.      --------------------------------- ADDITIONAL PROVIDER NOTES ---------------------------------  Consultations:  Spoke with Dr. Adina Pak,  They will admit this patient. Spoke with Dr. Meena Freitas, she will accept the patient into the ICU. Spoke with Dr. Mike Corbett, he will provide consult. To go for EGD within the next hour or so.     This patient's ED course included: a personal history and physicial examination, re-evaluation prior to

## 2020-07-04 NOTE — ANESTHESIA PRE PROCEDURE
Department of Anesthesiology  Preprocedure Note       Name:  Mago Tucker   Age:  42 y.o.  :  1980                                          MRN:  [id number]         Date:  2020      Surgeon: Debra Espinosa):  Kota Lyon MD    Procedure: Procedure(s):  EGD CONTROL HEMORRHAGE    Medications prior to admission:   Prior to Admission medications    Medication Sig Start Date End Date Taking?  Authorizing Provider   vitamin D (CHOLECALCIFEROL) 250 MCG (09095 UT) CAPS capsule Take 1,000 Units by mouth daily 19   Historical Provider, MD   vitamin D (ERGOCALCIFEROL) 1.25 MG (90826 UT) CAPS capsule TAKE 1 CAPSULE BY MOUTH ONE TIME A WEEK. 20   Historical Provider, MD   escitalopram (LEXAPRO) 10 MG tablet TAKE 1 TABLET BY MOUTH EVERY DAY 3/3/20   Historical Provider, MD   CREON 65778 units delayed release capsule TAKE 1 CAPSULE BY MOUTH WITH MEALS AND AT BEDTIME. 20   Historical Provider, MD   vitamin A 69511 units capsule TAKE 1 CAPSULE BY MOUTH EVERY DAY 20   Historical Provider, MD   zinc sulfate (ZINCATE) 220 (50 Zn) MG capsule TAKE 1 CAPSULE BY MOUTH EVERY DAY 20   Historical Provider, MD       Current medications:    Current Facility-Administered Medications   Medication Dose Route Frequency Provider Last Rate Last Dose    octreotide (SANDOSTATIN) 500 mcg in sodium chloride 0.9 % 100 mL infusion  50 mcg/hr Intravenous Continuous Von Kumar DO 10 mL/hr at 20 1415 50 mcg/hr at 20 1415    0.9 % sodium chloride bolus  20 mL Intravenous Once Ciera Rdz MD        0.9 % sodium chloride bolus  20 mL Intravenous Once Ciera Rdz MD        0.9 % sodium chloride bolus  20 mL Intravenous Once Ciera Rdz MD         Current Outpatient Medications   Medication Sig Dispense Refill    vitamin D (CHOLECALCIFEROL) 250 MCG (39763 UT) CAPS capsule Take 1,000 Units by mouth daily      vitamin D (ERGOCALCIFEROL) 1.25 MG (10127 UT) CAPS capsule TAKE 1 CAPSULE BY MOUTH ONE TIME A WEEK.  escitalopram (LEXAPRO) 10 MG tablet TAKE 1 TABLET BY MOUTH EVERY DAY      CREON 36703 units delayed release capsule TAKE 1 CAPSULE BY MOUTH WITH MEALS AND AT BEDTIME.  vitamin A 49043 units capsule TAKE 1 CAPSULE BY MOUTH EVERY DAY      zinc sulfate (ZINCATE) 220 (50 Zn) MG capsule TAKE 1 CAPSULE BY MOUTH EVERY DAY         Allergies:     Allergies   Allergen Reactions    Pcn [Penicillins] Hives       Problem List:    Patient Active Problem List   Diagnosis Code    Sepsis ([facility] [address] A41.9    Jaundice R17    Elevated liver enzymes R74.8    Hyperammonemia (HCC) E72.20    Liver metastases ([facility] [address] C78.7    Malignant ascites R18.0    H/O malignant neoplasm of pancreas Z85.07    Hematemesis K92.0       Past Medical History:        Diagnosis Date    Anxiety     not on any meds for it    Hypertension     has not required any med a few years as of 8/2019    Hypothyroidism     in her early 25s - pt was on levothyroxine for a while and then hypothyroidism apparently resolved and pt has been off med since her mid-20s    Pancreatic cyst 01/2017    Pt underwent partial pancreatectomy and splenectomy at [facility] 2/23/2017 and was told pathology showed MCN (mucinous cystic neoplasm) of pancreas with clean margins at surgery       Past Surgical History:        Procedure Laterality Date   [address]  02/23/2017    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 - 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    SPLENECTOMY, TOTAL  02/23/2017    along with partial pancreatectomy       Social History:    Social History     Tobacco Use    Smoking status: Never Smoker    Smokeless tobacco: Never Used   Substance Use Topics    Alcohol use: Yes     Comment: daily wine or beer                                Counseling given: Not Answered      Vital Signs (Current):   Vitals:    07/04/20 1337 07/04/20 1401 07/04/20 1419 07/04/20 1439   BP: (!) 93/55 (!) 81/52 108/65 115/67   Pulse: 150 129 129 118   Resp: 20 15 18 20   Temp: 97.8 °F (36.6 °C)      TempSrc: Infrared      SpO2: 100%  100%    Weight: 126 lb (57.2 kg)      Height: 5' 4\" (1.626 m)                                                 BP Readings from Last 3 Encounters:   07/04/20 115/67   03/11/20 110/74   08/10/19 126/79       NPO Status:                                                                                 BMI:   Wt Readings from Last 3 Encounters:   07/04/20 126 lb (57.2 kg)   03/11/20 126 lb (57.2 kg)   08/09/19 119 lb (54 kg)     Body mass index is 21.63 kg/m². CBC:   Lab Results   Component Value Date    WBC 5.9 07/04/2020    RBC 1.96 07/04/2020    RBC 3.55 07/08/2019    HGB 6.1 07/04/2020    HCT 19.3 07/04/2020    MCV 98.5 07/04/2020    RDW 16.0 07/04/2020    PLT 73 07/04/2020       CMP:   Lab Results   Component Value Date     07/04/2020    K 3.7 07/04/2020     07/04/2020    CO2 13 07/04/2020    BUN 33 07/04/2020    CREATININE 0.7 07/04/2020    GFRAA >60 07/04/2020    LABGLOM >60 07/04/2020    GLUCOSE 265 07/04/2020    GLUCOSE 102 07/08/2019    PROT 5.6 07/04/2020    CALCIUM 7.2 07/04/2020    BILITOT 1.8 07/04/2020    ALKPHOS 137 07/04/2020     07/04/2020    ALT 36 07/04/2020       POC Tests: No results for input(s): POCGLU, POCNA, POCK, POCCL, POCBUN, POCHEMO, POCHCT in the last 72 hours.     Coags:   Lab Results   Component Value Date    PROTIME 28.7 07/04/2020    INR 2.5 07/04/2020       HCG (If Applicable):   Lab Results   Component Value Date    PREGTESTUR neg 07/08/2019        ABGs: No results found for: PHART, PO2ART, MEC0TVF, ZCU2MCY, BEART, W8RCOVTE     Type & Screen (If Applicable):  No results found for: LABABO, LABRH    Drug/Infectious Status (If Applicable):  No results found for: HIV, HEPCAB    COVID-19 Screening (If Applicable): No results found for: COVID19      Anesthesia Evaluation  Patient summary reviewed no history of anesthetic complications:   Airway: Mallampati: II  TM distance: >3 FB   Neck ROM: full  Mouth opening: > = 3 FB Dental:          Pulmonary: breath sounds clear to auscultation                            ROS comment: CXR 8/2019: The lungs are clear. Cece Kana is no evidence of pulmonary infiltrate or  pleural effusion.  The pulmonary vascularity is unremarkable.  The  cardiac, hilar and mediastinal silhouettes are satisfactory. There is  uncoiling and atherosclerotic change of thoracic aorta. The bony  thorax demonstrates no gross abnormality. Cardiovascular:  Exercise tolerance: good (>4 METS),         ECG reviewed  Rhythm: regular  Rate: normal                 ROS comment: EKG 7/4/2020:  Sinus tachycardia with short WA  Nonspecific ST abnormality  Abnormal ECG  No previous ECGs available     Neuro/Psych:                ROS comment: Alcohol abuse GI/Hepatic/Renal:   (+) liver disease (Cirrhosis):,          ROS comment: Hyperbilirubinemia, transaminitis  Hx of pancreatectomy and splenectomy. Endo/Other:    (+) hypothyroidism, blood dyscrasia: anemia and thrombocytopenia:., .                  ROS comment: Coagulopathy, INR: 2.5  Abdominal:           Vascular: negative vascular ROS. Anesthesia Plan      general     ASA 3       Induction: intravenous and rapid sequence. MIPS: Postoperative opioids intended, Prophylactic antiemetics administered and Postoperative trial extubation. Anesthetic plan and risks discussed with patient. Use of blood products discussed with patient whom consented to blood products.                    Sharon Kayser, MD   7/4/2020

## 2020-07-05 ENCOUNTER — APPOINTMENT (OUTPATIENT)
Dept: GENERAL RADIOLOGY | Age: 40
DRG: 280 | End: 2020-07-05
Payer: MEDICAID

## 2020-07-05 LAB
AADO2: 179.5 MMHG
AADO2: 269 MMHG
AMMONIA: 199 UMOL/L (ref 11–51)
AMPHETAMINE SCREEN, URINE: POSITIVE
ANION GAP SERPL CALCULATED.3IONS-SCNC: 9 MMOL/L (ref 7–16)
B.E.: -1.7 MMOL/L (ref -3–3)
B.E.: -4.7 MMOL/L (ref -3–3)
BARBITURATE SCREEN URINE: NOT DETECTED
BENZODIAZEPINE SCREEN, URINE: NOT DETECTED
BUN BLDV-MCNC: 26 MG/DL (ref 6–20)
CALCIUM SERPL-MCNC: 6.5 MG/DL (ref 8.6–10.2)
CANNABINOID SCREEN URINE: NOT DETECTED
CHLORIDE BLD-SCNC: 115 MMOL/L (ref 98–107)
CO2: 21 MMOL/L (ref 22–29)
COCAINE METABOLITE SCREEN URINE: NOT DETECTED
COHB: 0 % (ref 0–1.5)
COHB: 0.3 % (ref 0–1.5)
CREAT SERPL-MCNC: 0.8 MG/DL (ref 0.5–1)
CRITICAL: ABNORMAL
CRITICAL: ABNORMAL
DATE ANALYZED: ABNORMAL
DATE ANALYZED: ABNORMAL
DATE OF COLLECTION: ABNORMAL
DATE OF COLLECTION: ABNORMAL
EKG ATRIAL RATE: 131 BPM
EKG ATRIAL RATE: 138 BPM
EKG P AXIS: 17 DEGREES
EKG P AXIS: 54 DEGREES
EKG P-R INTERVAL: 92 MS
EKG P-R INTERVAL: 98 MS
EKG Q-T INTERVAL: 320 MS
EKG Q-T INTERVAL: 350 MS
EKG QRS DURATION: 74 MS
EKG QRS DURATION: 78 MS
EKG QTC CALCULATION (BAZETT): 484 MS
EKG QTC CALCULATION (BAZETT): 516 MS
EKG R AXIS: 47 DEGREES
EKG R AXIS: 54 DEGREES
EKG T AXIS: 49 DEGREES
EKG T AXIS: 55 DEGREES
EKG VENTRICULAR RATE: 131 BPM
EKG VENTRICULAR RATE: 138 BPM
FENTANYL SCREEN, URINE: POSITIVE
FIO2: 50 %
FIO2: 50 %
GFR AFRICAN AMERICAN: >60
GFR NON-AFRICAN AMERICAN: >60 ML/MIN/1.73
GLUCOSE BLD-MCNC: 166 MG/DL (ref 74–99)
HCO3: 18.9 MMOL/L (ref 22–26)
HCO3: 21 MMOL/L (ref 22–26)
HCT VFR BLD CALC: 24.2 % (ref 34–48)
HCT VFR BLD CALC: 25 % (ref 34–48)
HCT VFR BLD CALC: 26.1 % (ref 34–48)
HEMOGLOBIN: 8.3 G/DL (ref 11.5–15.5)
HEMOGLOBIN: 8.4 G/DL (ref 11.5–15.5)
HEMOGLOBIN: 8.9 G/DL (ref 11.5–15.5)
HHB: 1.8 % (ref 0–5)
HHB: 23.3 % (ref 0–5)
INR BLD: 2.4
LAB: ABNORMAL
LAB: ABNORMAL
LACTIC ACID: 1.8 MMOL/L (ref 0.5–2.2)
LACTIC ACID: 2.4 MMOL/L (ref 0.5–2.2)
LACTIC ACID: 2.6 MMOL/L (ref 0.5–2.2)
LACTIC ACID: 2.7 MMOL/L (ref 0.5–2.2)
Lab: ABNORMAL
METHADONE SCREEN, URINE: NOT DETECTED
METHB: 0.5 % (ref 0–1.5)
METHB: 0.5 % (ref 0–1.5)
MODE: AC
MODE: AC
O2 CONTENT: 10.2 ML/DL
O2 CONTENT: 12.9 ML/DL
O2 SATURATION: 76.5 % (ref 92–98.5)
O2 SATURATION: 98.2 % (ref 92–98.5)
O2HB: 75.9 % (ref 94–97)
O2HB: 97.7 % (ref 94–97)
OPERATOR ID: 2593
OPERATOR ID: 925
OPIATE SCREEN URINE: NOT DETECTED
OSMOLALITY: 313 MOSM/KG (ref 285–310)
OXYCODONE URINE: NOT DETECTED
PATIENT TEMP: 37 C
PATIENT TEMP: 37 C
PCO2: 28.2 MMHG (ref 35–45)
PCO2: 29.6 MMHG (ref 35–45)
PEEP/CPAP: 5 CMH2O
PEEP/CPAP: 5 CMH2O
PFO2: 0.83 MMHG/%
PFO2: 2.66 MMHG/%
PH BLOOD GAS: 7.42 (ref 7.35–7.45)
PH BLOOD GAS: 7.49 (ref 7.35–7.45)
PHENCYCLIDINE SCREEN URINE: NOT DETECTED
PO2: 132.8 MMHG (ref 75–100)
PO2: 41.7 MMHG (ref 75–100)
POTASSIUM SERPL-SCNC: 4.4 MMOL/L (ref 3.5–5)
PROTHROMBIN TIME: 28 SEC (ref 9.3–12.4)
RI(T): 1.35
RI(T): 645 %
RR MECHANICAL: 16 B/MIN
RR MECHANICAL: 16 B/MIN
SODIUM BLD-SCNC: 145 MMOL/L (ref 132–146)
SOURCE, BLOOD GAS: ABNORMAL
SOURCE, BLOOD GAS: ABNORMAL
THB: 9.2 G/DL (ref 11.5–16.5)
THB: 9.5 G/DL (ref 11.5–16.5)
TIME ANALYZED: 1728
TIME ANALYZED: 2007
VT MECHANICAL: 400 ML
VT MECHANICAL: 400 ML

## 2020-07-05 PROCEDURE — 6360000002 HC RX W HCPCS: Performed by: INTERNAL MEDICINE

## 2020-07-05 PROCEDURE — 2580000003 HC RX 258: Performed by: INTERNAL MEDICINE

## 2020-07-05 PROCEDURE — 6360000002 HC RX W HCPCS

## 2020-07-05 PROCEDURE — 51702 INSERT TEMP BLADDER CATH: CPT

## 2020-07-05 PROCEDURE — 83930 ASSAY OF BLOOD OSMOLALITY: CPT

## 2020-07-05 PROCEDURE — 36415 COLL VENOUS BLD VENIPUNCTURE: CPT

## 2020-07-05 PROCEDURE — 2500000003 HC RX 250 WO HCPCS: Performed by: INTERNAL MEDICINE

## 2020-07-05 PROCEDURE — 93010 ELECTROCARDIOGRAM REPORT: CPT | Performed by: INTERNAL MEDICINE

## 2020-07-05 PROCEDURE — 6370000000 HC RX 637 (ALT 250 FOR IP): Performed by: INTERNAL MEDICINE

## 2020-07-05 PROCEDURE — 2500000003 HC RX 250 WO HCPCS

## 2020-07-05 PROCEDURE — 80307 DRUG TEST PRSMV CHEM ANLYZR: CPT

## 2020-07-05 PROCEDURE — 85610 PROTHROMBIN TIME: CPT

## 2020-07-05 PROCEDURE — 82140 ASSAY OF AMMONIA: CPT

## 2020-07-05 PROCEDURE — 71045 X-RAY EXAM CHEST 1 VIEW: CPT

## 2020-07-05 PROCEDURE — 2000000000 HC ICU R&B

## 2020-07-05 PROCEDURE — 94002 VENT MGMT INPAT INIT DAY: CPT

## 2020-07-05 PROCEDURE — 80048 BASIC METABOLIC PNL TOTAL CA: CPT

## 2020-07-05 PROCEDURE — 85014 HEMATOCRIT: CPT

## 2020-07-05 PROCEDURE — 85018 HEMOGLOBIN: CPT

## 2020-07-05 PROCEDURE — C9113 INJ PANTOPRAZOLE SODIUM, VIA: HCPCS | Performed by: INTERNAL MEDICINE

## 2020-07-05 PROCEDURE — 82805 BLOOD GASES W/O2 SATURATION: CPT

## 2020-07-05 PROCEDURE — 93005 ELECTROCARDIOGRAM TRACING: CPT | Performed by: INTERNAL MEDICINE

## 2020-07-05 PROCEDURE — 83605 ASSAY OF LACTIC ACID: CPT

## 2020-07-05 RX ORDER — LACTULOSE 10 G/15ML
20 SOLUTION ORAL ONCE
Status: DISCONTINUED | OUTPATIENT
Start: 2020-07-05 | End: 2020-07-05

## 2020-07-05 RX ORDER — LORAZEPAM 2 MG/ML
4 INJECTION INTRAMUSCULAR EVERY 4 HOURS
Status: DISCONTINUED | OUTPATIENT
Start: 2020-07-05 | End: 2020-07-06

## 2020-07-05 RX ORDER — ESCITALOPRAM OXALATE 10 MG/1
10 TABLET ORAL DAILY
Status: DISCONTINUED | OUTPATIENT
Start: 2020-07-05 | End: 2020-07-16 | Stop reason: HOSPADM

## 2020-07-05 RX ORDER — GABAPENTIN 100 MG/1
100 CAPSULE ORAL 3 TIMES DAILY
Status: DISCONTINUED | OUTPATIENT
Start: 2020-07-05 | End: 2020-07-16 | Stop reason: HOSPADM

## 2020-07-05 RX ORDER — LORAZEPAM 2 MG/ML
1 INJECTION INTRAMUSCULAR ONCE
Status: COMPLETED | OUTPATIENT
Start: 2020-07-05 | End: 2020-07-05

## 2020-07-05 RX ORDER — SODIUM CHLORIDE 9 MG/ML
10 INJECTION INTRAVENOUS 2 TIMES DAILY
Status: DISCONTINUED | OUTPATIENT
Start: 2020-07-05 | End: 2020-07-16 | Stop reason: HOSPADM

## 2020-07-05 RX ORDER — PROPOFOL 10 MG/ML
10 INJECTION, EMULSION INTRAVENOUS
Status: DISCONTINUED | OUTPATIENT
Start: 2020-07-05 | End: 2020-07-09

## 2020-07-05 RX ORDER — LEVETIRACETAM 10 MG/ML
1000 INJECTION INTRAVASCULAR ONCE
Status: COMPLETED | OUTPATIENT
Start: 2020-07-05 | End: 2020-07-05

## 2020-07-05 RX ORDER — LORAZEPAM 2 MG/ML
2 INJECTION INTRAMUSCULAR ONCE
Status: COMPLETED | OUTPATIENT
Start: 2020-07-05 | End: 2020-07-05

## 2020-07-05 RX ORDER — ROCURONIUM BROMIDE 10 MG/ML
INJECTION, SOLUTION INTRAVENOUS
Status: COMPLETED
Start: 2020-07-05 | End: 2020-07-05

## 2020-07-05 RX ORDER — LORAZEPAM 2 MG/ML
4 INJECTION INTRAMUSCULAR EVERY 6 HOURS
Status: DISCONTINUED | OUTPATIENT
Start: 2020-07-05 | End: 2020-07-05

## 2020-07-05 RX ORDER — MIDAZOLAM HYDROCHLORIDE 1 MG/ML
4 INJECTION INTRAMUSCULAR; INTRAVENOUS ONCE
Status: COMPLETED | OUTPATIENT
Start: 2020-07-05 | End: 2020-07-05

## 2020-07-05 RX ORDER — MIDAZOLAM HYDROCHLORIDE 1 MG/ML
INJECTION INTRAMUSCULAR; INTRAVENOUS
Status: COMPLETED
Start: 2020-07-05 | End: 2020-07-05

## 2020-07-05 RX ORDER — PROPOFOL 10 MG/ML
INJECTION, EMULSION INTRAVENOUS
Status: COMPLETED
Start: 2020-07-05 | End: 2020-07-05

## 2020-07-05 RX ORDER — LORAZEPAM 2 MG/ML
4 INJECTION INTRAMUSCULAR EVERY 6 HOURS PRN
Status: DISCONTINUED | OUTPATIENT
Start: 2020-07-05 | End: 2020-07-05

## 2020-07-05 RX ORDER — PANTOPRAZOLE SODIUM 40 MG/10ML
40 INJECTION, POWDER, LYOPHILIZED, FOR SOLUTION INTRAVENOUS 2 TIMES DAILY
Status: DISCONTINUED | OUTPATIENT
Start: 2020-07-05 | End: 2020-07-16 | Stop reason: HOSPADM

## 2020-07-05 RX ORDER — LORAZEPAM 2 MG/ML
INJECTION INTRAMUSCULAR
Status: DISPENSED
Start: 2020-07-05 | End: 2020-07-06

## 2020-07-05 RX ADMIN — PROPOFOL INJECTABLE EMULSION 50 MCG/KG/MIN: 10 INJECTION, EMULSION INTRAVENOUS at 20:17

## 2020-07-05 RX ADMIN — MIDAZOLAM HYDROCHLORIDE 4 MG: 2 INJECTION, SOLUTION INTRAMUSCULAR; INTRAVENOUS at 15:03

## 2020-07-05 RX ADMIN — SODIUM CHLORIDE, PRESERVATIVE FREE 10 ML: 5 INJECTION INTRAVENOUS at 20:14

## 2020-07-05 RX ADMIN — SODIUM CHLORIDE, PRESERVATIVE FREE 10 ML: 5 INJECTION INTRAVENOUS at 13:43

## 2020-07-05 RX ADMIN — SODIUM CHLORIDE, PRESERVATIVE FREE 10 ML: 5 INJECTION INTRAVENOUS at 20:17

## 2020-07-05 RX ADMIN — SODIUM CHLORIDE: 9 INJECTION, SOLUTION INTRAVENOUS at 06:45

## 2020-07-05 RX ADMIN — Medication 10 ML: at 20:15

## 2020-07-05 RX ADMIN — MIDAZOLAM HYDROCHLORIDE 4 MG: 1 INJECTION INTRAMUSCULAR; INTRAVENOUS at 15:03

## 2020-07-05 RX ADMIN — LEVETIRACETAM 1000 MG: 10 INJECTION, SOLUTION INTRAVENOUS at 14:59

## 2020-07-05 RX ADMIN — SODIUM CHLORIDE, PRESERVATIVE FREE 10 ML: 5 INJECTION INTRAVENOUS at 10:12

## 2020-07-05 RX ADMIN — LACTULOSE: 10 SOLUTION ORAL at 17:37

## 2020-07-05 RX ADMIN — PROPOFOL 20 MCG/KG/MIN: 10 INJECTION, EMULSION INTRAVENOUS at 15:05

## 2020-07-05 RX ADMIN — SODIUM CHLORIDE, PRESERVATIVE FREE 10 ML: 5 INJECTION INTRAVENOUS at 15:07

## 2020-07-05 RX ADMIN — SODIUM CHLORIDE 50 MCG/HR: 9 INJECTION, SOLUTION INTRAVENOUS at 08:19

## 2020-07-05 RX ADMIN — ONDANSETRON HYDROCHLORIDE 4 MG: 2 SOLUTION INTRAMUSCULAR; INTRAVENOUS at 13:43

## 2020-07-05 RX ADMIN — SODIUM CHLORIDE 50 MCG/HR: 9 INJECTION, SOLUTION INTRAVENOUS at 01:25

## 2020-07-05 RX ADMIN — ONDANSETRON HYDROCHLORIDE 4 MG: 2 SOLUTION INTRAMUSCULAR; INTRAVENOUS at 07:31

## 2020-07-05 RX ADMIN — Medication 10 ML: at 08:18

## 2020-07-05 RX ADMIN — FOLIC ACID 1 MG: 5 INJECTION, SOLUTION INTRAMUSCULAR; INTRAVENOUS; SUBCUTANEOUS at 08:18

## 2020-07-05 RX ADMIN — ROCURONIUM BROMIDE 75 MG: 10 INJECTION, SOLUTION INTRAVENOUS at 15:00

## 2020-07-05 RX ADMIN — SODIUM CHLORIDE 50 MCG/HR: 9 INJECTION, SOLUTION INTRAVENOUS at 17:16

## 2020-07-05 RX ADMIN — CALCIUM GLUCONATE 1 G: 98 INJECTION, SOLUTION INTRAVENOUS at 10:12

## 2020-07-05 RX ADMIN — LORAZEPAM 1 MG: 2 INJECTION INTRAMUSCULAR; INTRAVENOUS at 14:22

## 2020-07-05 RX ADMIN — THIAMINE HYDROCHLORIDE 250 MG: 100 INJECTION, SOLUTION INTRAMUSCULAR; INTRAVENOUS at 08:18

## 2020-07-05 RX ADMIN — LORAZEPAM 4 MG: 2 INJECTION INTRAMUSCULAR; INTRAVENOUS at 20:23

## 2020-07-05 RX ADMIN — LORAZEPAM 4 MG: 2 INJECTION INTRAMUSCULAR; INTRAVENOUS at 16:08

## 2020-07-05 RX ADMIN — PROPOFOL INJECTABLE EMULSION 20 MCG/KG/MIN: 10 INJECTION, EMULSION INTRAVENOUS at 15:05

## 2020-07-05 RX ADMIN — LORAZEPAM 2 MG: 2 INJECTION INTRAMUSCULAR; INTRAVENOUS at 15:03

## 2020-07-05 RX ADMIN — SODIUM CHLORIDE: 9 INJECTION, SOLUTION INTRAVENOUS at 22:01

## 2020-07-05 RX ADMIN — PANTOPRAZOLE SODIUM 40 MG: 40 INJECTION, POWDER, FOR SOLUTION INTRAVENOUS at 20:16

## 2020-07-05 RX ADMIN — SODIUM CHLORIDE, PRESERVATIVE FREE 10 ML: 5 INJECTION INTRAVENOUS at 20:25

## 2020-07-05 RX ADMIN — PHYTONADIONE 10 MG: 10 INJECTION, EMULSION INTRAMUSCULAR; INTRAVENOUS; SUBCUTANEOUS at 03:14

## 2020-07-05 RX ADMIN — SODIUM CHLORIDE, PRESERVATIVE FREE 10 ML: 5 INJECTION INTRAVENOUS at 20:16

## 2020-07-05 RX ADMIN — PANTOPRAZOLE SODIUM 40 MG: 40 INJECTION, POWDER, FOR SOLUTION INTRAVENOUS at 10:12

## 2020-07-05 RX ADMIN — WATER 1 G: 1 INJECTION INTRAMUSCULAR; INTRAVENOUS; SUBCUTANEOUS at 20:14

## 2020-07-05 ASSESSMENT — PULMONARY FUNCTION TESTS
PIF_VALUE: 16
PIF_VALUE: 19
PIF_VALUE: 16
PIF_VALUE: 16
PIF_VALUE: 18
PIF_VALUE: 17
PIF_VALUE: 17
PIF_VALUE: 16

## 2020-07-05 ASSESSMENT — PAIN SCALES - GENERAL
PAINLEVEL_OUTOF10: 0
PAINLEVEL_OUTOF10: 0

## 2020-07-05 NOTE — PROGRESS NOTES
[address] [facility]  Department of Internal Medicine   Internal Medicine Residency   MICU Progress Note    Patient:  Mei Jacome 42 y.o. female  MRN: [id number]     Date of Service: 7/5/2020    Allergy: Pcn [penicillins]    Subjective     Mei Jacome is a 42 y.o. female who is present in the MICU for mngt of GI Bleed 2/2 bleeding varix just below GE junction. Pt underwent EGD yesterday evening where the esophogeal variceal bleed was banded. She had no acute events overnight. Pt seen and examined this AM. She reports subjective improvement. She continues to complain of mild nausea. Her appetite has not returned yet. ROS: Denies Fever/chills/CP/SOB/N/V/D/C/Dysuria/Blood in stool or urine  Objective     VS: /67   Pulse 101   Temp 97.3 °F (36.3 °C) (Temporal)   Resp 12   Ht 5' 4\" (1.626 m)   Wt 138 lb 14.2 oz (63 kg)   SpO2 96%   BMI 23.84 kg/m²           I & O - 24hr:     Intake/Output Summary (Last 24 hours) at 7/5/2020 8282  Last data filed at 7/5/2020 9277  Gross per 24 hour   Intake 4019 ml   Output 100 ml   Net 3919 ml       Physical Exam:  · General Appearance: alert, appears stated age and cooperative mild distress  · Neck: no adenopathy, no carotid bruit, no JVD, supple, symmetrical, trachea midline and thyroid not enlarged, symmetric, no tenderness/mass/nodules   · Eyes: scleral icterus improving   · Lung: clear to auscultation bilaterally  · Heart: tachycardic rate and regular rhythm, S1, S2 normal, no murmur, click, rub or gallop  · Abdomen: soft, non-tender; bowel sounds normal; no masses,  no organomegaly  · Extremities:  extremities normal, atraumatic, no cyanosis or edema  · Musculoskeletal: No joint swelling, no muscle tenderness. ROM normal in all joints of extremities.    · Neurologic: Mental status: Alert, oriented, thought content appropriate    Lines     site day    Art line   None    TLC None    PICC None    introducer Femoral right 1   Hemoaccess None    Oxygen:  RA; comfortably breathing   Mechanical Ventilation:   N/a  ABG:   No results found for: PHART, PH, IPC5FWV, PCO2, PO2ART, PO2, MZS6JYX, HCO3, BEART, BE, THGBART, THB, NWP2JLB, C2JZTVIX, O2SAT     Medications     Infusions: (Fluid, Sedation, Vasopressors)  IVF:    NaCl 0.9% boluses    Nutrition:   NPO since yesterday   ATB:   Antibiotics  Days   Ceftriaxone 2             Skin issues: n/a  Patient currently has   DVT prophylaxis/ GI prophylaxis,      Labs     CBC:   Lab Results   Component Value Date    WBC 5.9 07/04/2020    RBC 1.96 07/04/2020    RBC 3.55 07/08/2019    HGB 8.9 07/05/2020    HCT 26.1 07/05/2020    MCV 98.5 07/04/2020    MCH 31.1 07/04/2020    MCHC 31.6 07/04/2020    RDW 16.0 07/04/2020    PLT 73 07/04/2020    MPV 12.0 07/04/2020     CMP:    Lab Results   Component Value Date     07/05/2020    K 4.4 07/05/2020    K 3.7 07/04/2020     07/05/2020    CO2 21 07/05/2020    BUN 26 07/05/2020    CREATININE 0.8 07/05/2020    GFRAA >60 07/05/2020    LABGLOM >60 07/05/2020    GLUCOSE 166 07/05/2020    GLUCOSE 102 07/08/2019    PROT 5.6 07/04/2020    LABALBU 2.7 07/04/2020    LABALBU 5.0 03/13/2012    CALCIUM 6.5 07/05/2020    BILITOT 1.8 07/04/2020    ALKPHOS 137 07/04/2020     07/04/2020    ALT 36 07/04/2020         Resident's Assessment and Plan   Neurology:   - Awake, alert, following commands. Cardiology:   - VS stable. Pulmonary:   - VS stable. Breathing room air comfortably. Nephrology (Fluids/ Electrolytes & Nutrition):   - Cr is at baseline at 0.8    HAGMA  - likely 2/2 lactic acidosis  - Resolving.  - Lactic acid trending down 16.1 --> 5.6 --> 2.7    Gastroenterology:   Hematemesis 2/2 bleeding varix just below GE junction. - controlled with band/ large amount blood in fundus during EGD  - GI reccs appreciated.   - Continue ppi bid and ocreotide drip   - D/c ceftriaxone - unlikely patient will develop SBP given no evidence of ascites on PE  - Continue to monitor patient in ICU for another 24 hours. - NPO for now. Nausea  - Ondansetron q6h     Chronic Liver cirrhosis with esophageal varices  - diagnosed last year (2019) 2/2 autoimmune process? - Work up in [facility] [place] - anti smooth muscle positive but patient not aware of any diagnosis  - Patient with transaminitis, hyperbilirubinemia, as well as hyperammonemia  -  has undergone endoscopic evaluations three times at [facility] per GI, the first in 10/2019 where two grade 2 varices were banded.  Followup endoscopy in 12/2019 and again in 05/2020 - grade 1 varices with no additional bandings performed. - Lactulose on hold as patient has no neurologic deficit/ altered mental status  - Continue to monitor liver enzymes      Hx of pancreatic cyst   - s/p partial splenectomy and pancrectomy      Elevated INR  - 2.5 in the setting of liver disease  - continue vitamin k in the setting of acute GI bleed. - Recheck INR     Hematology/ Oncology:   Acute blood loss anemia  - Acute secondary to GI bleeding. Presenting hemoglobin of 6.1. INR 2.5.  - S/p 4 units packed red blood cell transfusion.    - continue fluids, has received 4 units RBC since yesterday   - Current Hgbis 8.9. Down from 8.   - trend H/H q6h, hgb goal > 7g/dl    Psychiatry   History of alcohol abuse  - Ethanol level 85 on admission.  -  Continue thiamine and folate. Monitor for signs of withdrawal.   - Patient also has history of anxiety and depression. Resume home lexapro and Neurontin. Early Mobility:   PT/OT reccs appreciated   Next of Kind/ POA:   Saba Kelsey [facility] - [phone]  Code Status:   Full Code      PT/OT: See Early Mobility;   DVT ppx:   GI ppx: protonix     Disposition: will keep patient in ICU for one more day for close monitoring as she could deteriorate quickly. Monitor for another 24 hours. Dayami Villa DO, PGY-1    Attending physician: Dr. Hemalatha Shaikh    I personally saw, examined and provided care for the patient.  Radiographs, labs and medication list were reviewed by me independently. I spoke with bedside nursing, therapists and consultants. Critical care services and times documented are independent of procedures and multidisciplinary rounds with Residents. Additionally comprehensive, multidisciplinary rounds were conducted with the MICU team. The case was discussed in detail and plans for care were established. Review of Residents documentation was conducted and revisions were made as appropriate. I agree with the above documented exam, problem list and plan of care with the following additions:    Seen on rounds, please see my addendum to the original critical care consult.     Electronically signed by Tres Barajas MD on 7/5/2020 at 1:32 PM

## 2020-07-05 NOTE — CONSULTS
months. OBJECTIVE:  VITAL SIGNS:  Blood pressure is 125 systolic. She is tachycardic at  rate 115. GENERAL:  She is alert and oriented. HEENT:  She is not visibly jaundiced. NECK:  She lacks neck vein elevation or adenopathy. She has a degree of  pallor. HEART/LUNGS:  Normal.  ABDOMEN:  The liver and spleen are not palpable. No clinical ascites  and no edema. LAB WORK:  Reveals a bilirubin of 1.8, an INR of 2.5, a hemoglobin of  6.1, and a platelet count of 90,452. An alcohol level was 85 and  suggests the history of abstinence is not correct. BUN 33, creatinine  0.7, CO2 is 13. Lactic acid level is 16.1. ASSESSMENT:  Two recent subsequent endoscopic evaluations, last six weeks ago described only grade 1 varices. The varices are probably the most likely cause. Ulcer disease is not at all probable, but a July-Rodas tear could fit the clinical picture except that the first episode of vomiting contained copious blood. PLAN:  Octreotide to continue. Transfusions. Expeditious endoscopic  evaluation with endotracheal intubation.         Rodríguez Jasso MD    D: 07/04/2020 20:45:23       T: 07/04/2020 20:56:55     [email or web address]  Job#: [id number]     Doc#: [id number]    CC:

## 2020-07-05 NOTE — PLAN OF CARE
Problem: Pain:  Goal: Pain level will decrease  Description: Pain level will decrease  Outcome: Met This Shift     Problem: Pain:  Goal: Control of acute pain  Description: Control of acute pain  Outcome: Met This Shift     Problem: Pain:  Goal: Control of chronic pain  Description: Control of chronic pain  Outcome: Met This Shift     Problem: Falls - Risk of:  Goal: Will remain free from falls  Description: Will remain free from falls  Outcome: Met This Shift     Problem: Falls - Risk of:  Goal: Absence of physical injury  Description: Absence of physical injury  Outcome: Met This Shift     Problem: Skin Integrity:  Goal: Will show no infection signs and symptoms  Description: Will show no infection signs and symptoms  Outcome: Met This Shift     Problem: Skin Integrity:  Goal: Absence of new skin breakdown  Description: Absence of new skin breakdown  Outcome: Met This Shift

## 2020-07-05 NOTE — PROGRESS NOTES
Intubation Procedure Note    Indication: airway compromise and airway protection, seizure    Consent: Unable to be obtained due to the emergent nature of this procedure. Medications Used: propofol intravenously and rocuronium intravenously    Procedure: The patient was placed in the appropriate position. Cricoid pressure was not required. Intubation was performed using a glidescope and a 7.5 cuffed endotracheal tube. The cuff was then inflated and the tube was secured appropriately at a distance of 22 cm to the dental ridge. Initial confirmation of placement included bilateral breath sounds, an end tidal CO2 detector, absence of sounds over the stomach, tube fogging, adequate chest rise, adequate pulse oximetry reading. A chest x-ray to verify correct placement of the tube has been ordered but is still pending. The patient tolerated the procedure well.      Complications: None    Electronically signed by Marcelo Glover MD on 7/5/2020 at 3:42 PM

## 2020-07-05 NOTE — PROGRESS NOTES
Patient states she is havingan anxiety attack. Patient is lying in bed, appears calm, composed. Patient states she woke up  And started having one. She states she is taking deep breaths. She'll be okay. Allowed emotional expression. Patient did not say anything further. Will monitor.

## 2020-07-05 NOTE — PROGRESS NOTES
Hospitalist Progress Note      PCP: Roc Thornton DO    Date of Admission: 7/4/2020    Chief Complaint: * hematemesis     Hospital Course: 42 y.o. female who presented to [facility] with *hematemesis. 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 States she had one glass of wine on yesterday(DOES NOT WANT HER MOM TO KNOW SHE HAD BEEN DRINKING). Began to have hematemesis, and black stools,  Weakness,  And fatigue. Found to have a HGB of 6.1. ** transfused blood and had a stat EGD, found to haveActively bleeding varix just immediately distal to the squamocolumnar transition at the gastroesophageal junction. Residual varices proximal to this grade 2 without overlying red  spots. Fundus covered by blood, but the cardia was well seen and unremarkable.   Distal stomach and proximal duodenum were normal  *  She was found to be positive for amphetamines and fentanyl, she states she does not remember using drugs     Subjective: * no complaints      Medications:  Reviewed    Infusion Medications    octreotide (SANDOSTATIN) infusion 50 mcg/hr (07/05/20 0819)    sodium chloride 100 mL/hr at 07/05/20 0645     Scheduled Medications    pantoprazole  40 mg Intravenous BID    And    sodium chloride (PF)  10 mL Intravenous BID    escitalopram  10 mg Oral Daily    gabapentin  100 mg Oral TID    lactulose enema   Rectal Q12H    sodium chloride  20 mL Intravenous Once    sodium chloride  20 mL Intravenous Once    sodium chloride  20 mL Intravenous Once    sodium chloride flush  10 mL Intravenous 2 times per day    folic acid  1 mg Intravenous Daily    cefTRIAXone (ROCEPHIN) IV  1 g Intravenous Q24H    thiamine (VITAMIN B1) IVPB  250 mg Intravenous Daily     PRN Meds: sodium chloride flush, polyethylene glycol, promethazine **OR** ondansetron      Intake/Output Summary (Last 24 hours) at 7/5/2020 1425  Last data filed at 7/5/2020 1400  Gross per 24 hour   Intake Component Value Date    AMMONIA 199.0 (H) 07/05/2020    AMMONIA 149.0 (H) 07/04/2020    AMMONIA 56.0 (H) 08/09/2019       Assessment:    Active Hospital Problems    Diagnosis Date Noted    Hematemesis [K92.0] 07/04/2020   GIB  H/o esophageal varices  Acute blood loss anemia   polysubstance use  H/o ETOH abuse  H.o partial pancreatectomy and splenectomy at [facility]    Depression      Plan:  *supportive care   Octreotide drip  Rocephin   lexapro        DVT Prophylaxis: scd  Diet: Diet NPO Time Specified  Code Status: Full Code    PT/OT Eval Status: *na    Dispo - *?  Psych vs home      Electronically signed by Miracle Paiz DO on 7/5/2020 at 2:25 PM [facility]

## 2020-07-05 NOTE — FLOWSHEET NOTE
Pt attempts to pull at medical devices despite verbal redirection. Will start bilateral wrist restraints for safety.

## 2020-07-05 NOTE — OP NOTE
[address]                  [address] [place],  [facility]                                OPERATIVE REPORT    PATIENT NAME: Diamond Alexander                   :        1980  MED REC NO:   [id number]                            ROOM:       4402  ACCOUNT NO:   [de-identified]                           ADMIT DATE: 2020  PROVIDER:     Maggy Springer MD    DATE OF PROCEDURE:  2020    SURGEON:  Maggy Springer MD    PROCEDURES:  Esophagogastroduodenoscopy plus banding of bleeding  esophageal varices. PREOPERATIVE DIAGNOSES:  Gastrointestinal hemorrhage in the setting of  known cirrhosis, grade 1 varices, and no history of peptic ulcer  disease. POSTOPERATIVE DIAGNOSES:  1. Actively bleeding varix just immediately distal to the  squamocolumnar transition at the gastroesophageal junction. 2.  Residual varices proximal to this grade 2 without overlying red  spots. 3.  Fundus covered by blood, but the cardia was well seen and  unremarkable. 4.  Distal stomach and proximal duodenum were normal.    INDICATION:  She is 42years of age with an established diagnosis of  cirrhosis, complicating chronic alcohol but apparently has been  abstinent for seven months. She has been followed at the [facility]. 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 She  presented to the hospital with repetitive vomiting of red blood over 12  or more hour period of time. On arrival she was hypotensive with a  hemoglobin of 6 down from 13 within the last two months. She was  witnessed to vomit red blood in the emergency room. She has undergone  at least three endoscopic evaluations of the upper GI tract at the  [facility]. In 10/2019 two bands were placed and what were said  to be grade 2 esophageal varices.   Repeat endoscopic evaluation in  December and as recently

## 2020-07-05 NOTE — PROGRESS NOTES
Pt noted to having jerking movements of bilateral arms and legs. Resident brought to bedside, ordered to increase propofol infusion. Will continue to monitor.

## 2020-07-05 NOTE — CONSULTS
months. OBJECTIVE:  VITAL SIGNS:  Blood pressure is 491 systolic. She is tachycardic at  rate 115. GENERAL:  She is alert and oriented. HEENT:  She is not visibly jaundiced. NECK:  She lacks neck vein elevation or adenopathy. She has a degree of  pallor. HEART/LUNGS:  Normal.  ABDOMEN:  The liver and spleen are not palpable. No clinical ascites  and no edema. LAB WORK:  Reveals a bilirubin of 1.8, an INR of 2.5, a hemoglobin of  6.1, and a platelet count of 00,343. An alcohol level was 85 and  suggests the history of abstinence is not correct. BUN 33, creatinine  0.7, CO2 is 13. Lactic acid level is 16.1. ASSESSMENT:  Two recent subsequent endoscopic evaluations, last six weeks ago  described only grade 1 varices. The varices are probably the most likely cause. Ulcer disease is not at all probable, but a July-Rodas tear could fit the clinical  picture except that the first episode of vomiting contained copious blood. PLAN:  Octreotide to continue. Transfusions. Expeditious endoscopic  evaluation with endotracheal intubation.         Heidi García MD    D: 07/04/2020 20:45:23       T: 07/04/2020 20:56:55     [email or web address]  Job#: [id number]     Doc#: [id number]    CC:

## 2020-07-05 NOTE — PROGRESS NOTES
Notified medical ICU team of pts increased AMS, see new orders. Upon assessment pt hrs in 170s, breathing fast, unable to console, spastic like movements of all 4 extremities and neck. Non-re-breather applied, medical ICU team at bedside pt progressively worse and medical decision made per team to intubate. See orders, will continue to monitor.

## 2020-07-05 NOTE — PROGRESS NOTES
No bleeding, stable Hct. Mental status changes today, progressive. I  suggested lactulose enema rather than NGT this soon after banding  Situation progressed and suggestion of seizures noted  Loaded with Keppra,and receiving lorezapam  No prior seizure hx    Now intubated and tachycardic but normotensive  Sedated      AMS no doubt large component portosystemic encephalopathy  ? Withdrawal  Doubt cephalosporin a factor in seizure    HE seems to be seizure risk but uncommon  Not bleeding since band ligation  Renal fxn stable  CXR clear    Lactulose rectally for now  NGT OK in another 24 hrs.

## 2020-07-06 ENCOUNTER — APPOINTMENT (OUTPATIENT)
Dept: GENERAL RADIOLOGY | Age: 40
DRG: 280 | End: 2020-07-06
Payer: MEDICAID

## 2020-07-06 LAB
AADO2: 142.2 MMHG
ALBUMIN SERPL-MCNC: 2.3 G/DL (ref 3.5–5.2)
ALP BLD-CCNC: 97 U/L (ref 35–104)
ALT SERPL-CCNC: 51 U/L (ref 0–32)
AMMONIA: 102 UMOL/L (ref 11–51)
AMMONIA: 118 UMOL/L (ref 11–51)
ANION GAP SERPL CALCULATED.3IONS-SCNC: 7 MMOL/L (ref 7–16)
ANION GAP SERPL CALCULATED.3IONS-SCNC: 9 MMOL/L (ref 7–16)
AST SERPL-CCNC: 182 U/L (ref 0–31)
B.E.: -0.8 MMOL/L (ref -3–0)
B.E.: -3.2 MMOL/L (ref -3–3)
BASOPHILS ABSOLUTE: 0.03 E9/L (ref 0–0.2)
BASOPHILS RELATIVE PERCENT: 0.2 % (ref 0–2)
BILIRUB SERPL-MCNC: 2 MG/DL (ref 0–1.2)
BILIRUBIN DIRECT: 1.4 MG/DL (ref 0–0.3)
BILIRUBIN, INDIRECT: 0.6 MG/DL (ref 0–1)
BLOOD BANK DISPENSE STATUS: NORMAL
BLOOD BANK DISPENSE STATUS: NORMAL
BLOOD BANK PRODUCT CODE: NORMAL
BLOOD BANK PRODUCT CODE: NORMAL
BPU ID: NORMAL
BPU ID: NORMAL
BUN BLDV-MCNC: 24 MG/DL (ref 6–20)
BUN BLDV-MCNC: 25 MG/DL (ref 6–20)
CALCIUM SERPL-MCNC: 6.8 MG/DL (ref 8.6–10.2)
CALCIUM SERPL-MCNC: 7.4 MG/DL (ref 8.6–10.2)
CHLORIDE BLD-SCNC: 116 MMOL/L (ref 98–107)
CHLORIDE BLD-SCNC: 119 MMOL/L (ref 98–107)
CO2: 19 MMOL/L (ref 22–29)
CO2: 22 MMOL/L (ref 22–29)
COHB: 0.7 % (ref 0–1.5)
CREAT SERPL-MCNC: 0.7 MG/DL (ref 0.5–1)
CREAT SERPL-MCNC: 0.8 MG/DL (ref 0.5–1)
CRITICAL: ABNORMAL
DATE ANALYZED: ABNORMAL
DATE OF COLLECTION: ABNORMAL
DESCRIPTION BLOOD BANK: NORMAL
DESCRIPTION BLOOD BANK: NORMAL
DEVICE: ABNORMAL
EKG ATRIAL RATE: 214 BPM
EKG Q-T INTERVAL: 246 MS
EKG QRS DURATION: 66 MS
EKG QTC CALCULATION (BAZETT): 435 MS
EKG R AXIS: 48 DEGREES
EKG T AXIS: -143 DEGREES
EKG VENTRICULAR RATE: 188 BPM
EOSINOPHILS ABSOLUTE: 0 E9/L (ref 0.05–0.5)
EOSINOPHILS RELATIVE PERCENT: 0 % (ref 0–6)
FIO2 ARTERIAL: 40
FIO2: 40 %
GFR AFRICAN AMERICAN: >60
GFR AFRICAN AMERICAN: >60
GFR NON-AFRICAN AMERICAN: >60 ML/MIN/1.73
GFR NON-AFRICAN AMERICAN: >60 ML/MIN/1.73
GLUCOSE BLD-MCNC: 127 MG/DL (ref 74–99)
GLUCOSE BLD-MCNC: 128 MG/DL (ref 74–99)
HCO3 ARTERIAL: 21.8 MMOL/L (ref 22–26)
HCO3: 19.8 MMOL/L (ref 22–26)
HCT (EST): 20 % (ref 34–48)
HCT VFR BLD CALC: 22.3 % (ref 34–48)
HCT VFR BLD CALC: 23.1 % (ref 34–48)
HCT VFR BLD CALC: 23.4 % (ref 34–48)
HEMOGLOBIN: 7.3 G/DL (ref 11.5–15.5)
HEMOGLOBIN: 7.8 G/DL (ref 11.5–15.5)
HEMOGLOBIN: 8 G/DL (ref 11.5–15.5)
HGB, (EST): 6.9 G/DL (ref 11.5–15.5)
HHB: 2.5 % (ref 0–5)
IMMATURE GRANULOCYTES #: 0.09 E9/L
IMMATURE GRANULOCYTES %: 0.6 % (ref 0–5)
LAB: ABNORMAL
LACTIC ACID: 1.7 MMOL/L (ref 0.5–2.2)
LYMPHOCYTES ABSOLUTE: 2.32 E9/L (ref 1.5–4)
LYMPHOCYTES RELATIVE PERCENT: 16.6 % (ref 20–42)
Lab: ABNORMAL
MAGNESIUM: 1.5 MG/DL (ref 1.6–2.6)
MAGNESIUM: 2.2 MG/DL (ref 1.6–2.6)
MCH RBC QN AUTO: 31 PG (ref 26–35)
MCHC RBC AUTO-ENTMCNC: 33.8 % (ref 32–34.5)
MCV RBC AUTO: 91.7 FL (ref 80–99.9)
METHB: 0.6 % (ref 0–1.5)
MODE: AC
MODE: AC
MONOCYTES ABSOLUTE: 0.94 E9/L (ref 0.1–0.95)
MONOCYTES RELATIVE PERCENT: 6.7 % (ref 2–12)
NEUTROPHILS ABSOLUTE: 10.6 E9/L (ref 1.8–7.3)
NEUTROPHILS RELATIVE PERCENT: 75.9 % (ref 43–80)
O2 CONTENT: 11.8 ML/DL
O2 SATURATION: 97.5 % (ref 92–98.5)
O2 SATURATION: 99 % (ref 92–98.5)
O2HB: 96.2 % (ref 94–97)
OPERATOR ID: 101
OPERATOR ID: 1874
PATIENT TEMP: 37
PATIENT TEMP: 37 C
PCO2 (TEMP CORRECTED): 27 MMHG (ref 35–45)
PCO2: 27.8 MMHG (ref 35–45)
PDW BLD-RTO: 16.8 FL (ref 11.5–15)
PEEP/CPAP: 5 CMH2O
PFO2: 2.53 MMHG/%
PH (TEMPERATURE CORRECTED): 7.52 (ref 7.35–7.45)
PH BLOOD GAS: 7.47 (ref 7.35–7.45)
PHOSPHORUS: 2.4 MG/DL (ref 2.5–4.5)
PLATELET # BLD: 77 E9/L (ref 130–450)
PLATELET CONFIRMATION: NORMAL
PMV BLD AUTO: 12.9 FL (ref 7–12)
PO2 (TEMP CORRECTED): 113.3 MMHG (ref 60–100)
PO2: 101 MMHG (ref 75–100)
POSITIVE END EXP PRESS: 5 CMH2O
POTASSIUM SERPL-SCNC: 3.6 MMOL/L (ref 3.5–5)
POTASSIUM SERPL-SCNC: 3.7 MMOL/L (ref 3.5–5)
PROCALCITONIN: 0.33 NG/ML (ref 0–0.08)
RBC # BLD: 2.52 E12/L (ref 3.5–5.5)
REASON FOR REJECTION: NORMAL
REJECTED TEST: NORMAL
RESPIRATORY RATE: 14 B/MIN
RI(T): 1.41
RR MECHANICAL: 12 B/MIN
SODIUM BLD-SCNC: 145 MMOL/L (ref 132–146)
SODIUM BLD-SCNC: 147 MMOL/L (ref 132–146)
SOURCE, BLOOD GAS: ABNORMAL
SOURCE, BLOOD GAS: ABNORMAL
THB: 8.6 G/DL (ref 11.5–16.5)
TIDAL VOLUME: 400 ML
TIME ANALYZED: 1746
TOTAL PROTEIN: 4.4 G/DL (ref 6.4–8.3)
VT MECHANICAL: 400 ML
WBC # BLD: 14 E9/L (ref 4.5–11.5)

## 2020-07-06 PROCEDURE — 6360000002 HC RX W HCPCS: Performed by: INTERNAL MEDICINE

## 2020-07-06 PROCEDURE — 85018 HEMOGLOBIN: CPT

## 2020-07-06 PROCEDURE — 71045 X-RAY EXAM CHEST 1 VIEW: CPT

## 2020-07-06 PROCEDURE — 84100 ASSAY OF PHOSPHORUS: CPT

## 2020-07-06 PROCEDURE — 85014 HEMATOCRIT: CPT

## 2020-07-06 PROCEDURE — C9113 INJ PANTOPRAZOLE SODIUM, VIA: HCPCS | Performed by: INTERNAL MEDICINE

## 2020-07-06 PROCEDURE — 2580000003 HC RX 258: Performed by: INTERNAL MEDICINE

## 2020-07-06 PROCEDURE — 82805 BLOOD GASES W/O2 SATURATION: CPT

## 2020-07-06 PROCEDURE — 6370000000 HC RX 637 (ALT 250 FOR IP): Performed by: INTERNAL MEDICINE

## 2020-07-06 PROCEDURE — 87206 SMEAR FLUORESCENT/ACID STAI: CPT

## 2020-07-06 PROCEDURE — 2500000003 HC RX 250 WO HCPCS: Performed by: INTERNAL MEDICINE

## 2020-07-06 PROCEDURE — P9059 PLASMA, FRZ BETWEEN 8-24HOUR: HCPCS

## 2020-07-06 PROCEDURE — 80048 BASIC METABOLIC PNL TOTAL CA: CPT

## 2020-07-06 PROCEDURE — 84145 PROCALCITONIN (PCT): CPT

## 2020-07-06 PROCEDURE — 82803 BLOOD GASES ANY COMBINATION: CPT

## 2020-07-06 PROCEDURE — 80076 HEPATIC FUNCTION PANEL: CPT

## 2020-07-06 PROCEDURE — 83605 ASSAY OF LACTIC ACID: CPT

## 2020-07-06 PROCEDURE — 93010 ELECTROCARDIOGRAM REPORT: CPT | Performed by: INTERNAL MEDICINE

## 2020-07-06 PROCEDURE — 83735 ASSAY OF MAGNESIUM: CPT

## 2020-07-06 PROCEDURE — 87070 CULTURE OTHR SPECIMN AEROBIC: CPT

## 2020-07-06 PROCEDURE — 82140 ASSAY OF AMMONIA: CPT

## 2020-07-06 PROCEDURE — 94003 VENT MGMT INPAT SUBQ DAY: CPT

## 2020-07-06 PROCEDURE — 2000000000 HC ICU R&B

## 2020-07-06 PROCEDURE — 36415 COLL VENOUS BLD VENIPUNCTURE: CPT

## 2020-07-06 PROCEDURE — 85025 COMPLETE CBC W/AUTO DIFF WBC: CPT

## 2020-07-06 PROCEDURE — 36430 TRANSFUSION BLD/BLD COMPNT: CPT

## 2020-07-06 RX ORDER — POTASSIUM CHLORIDE 29.8 MG/ML
40 INJECTION INTRAVENOUS ONCE
Status: COMPLETED | OUTPATIENT
Start: 2020-07-06 | End: 2020-07-06

## 2020-07-06 RX ORDER — MAGNESIUM SULFATE IN WATER 40 MG/ML
2 INJECTION, SOLUTION INTRAVENOUS ONCE
Status: COMPLETED | OUTPATIENT
Start: 2020-07-06 | End: 2020-07-06

## 2020-07-06 RX ORDER — 0.9 % SODIUM CHLORIDE 0.9 %
20 INTRAVENOUS SOLUTION INTRAVENOUS ONCE
Status: DISCONTINUED | OUTPATIENT
Start: 2020-07-06 | End: 2020-07-11

## 2020-07-06 RX ORDER — DEXTROSE AND SODIUM CHLORIDE 5; .45 G/100ML; G/100ML
INJECTION, SOLUTION INTRAVENOUS CONTINUOUS
Status: DISCONTINUED | OUTPATIENT
Start: 2020-07-06 | End: 2020-07-07

## 2020-07-06 RX ORDER — LACTULOSE 10 G/15ML
20 SOLUTION ORAL 3 TIMES DAILY
Status: DISCONTINUED | OUTPATIENT
Start: 2020-07-06 | End: 2020-07-07

## 2020-07-06 RX ORDER — LEVETIRACETAM 5 MG/ML
500 INJECTION INTRAVASCULAR EVERY 12 HOURS
Status: DISCONTINUED | OUTPATIENT
Start: 2020-07-06 | End: 2020-07-16 | Stop reason: HOSPADM

## 2020-07-06 RX ORDER — LORAZEPAM 2 MG/ML
2 INJECTION INTRAMUSCULAR EVERY 6 HOURS
Status: DISCONTINUED | OUTPATIENT
Start: 2020-07-06 | End: 2020-07-07

## 2020-07-06 RX ADMIN — FOLIC ACID 1 MG: 5 INJECTION, SOLUTION INTRAMUSCULAR; INTRAVENOUS; SUBCUTANEOUS at 08:30

## 2020-07-06 RX ADMIN — PROPOFOL INJECTABLE EMULSION 50 MCG/KG/MIN: 10 INJECTION, EMULSION INTRAVENOUS at 06:08

## 2020-07-06 RX ADMIN — POTASSIUM CHLORIDE 40 MEQ: 400 INJECTION, SOLUTION INTRAVENOUS at 12:01

## 2020-07-06 RX ADMIN — SODIUM CHLORIDE, PRESERVATIVE FREE 10 ML: 5 INJECTION INTRAVENOUS at 20:33

## 2020-07-06 RX ADMIN — LACTULOSE: 10 SOLUTION ORAL at 04:32

## 2020-07-06 RX ADMIN — LEVETIRACETAM 500 MG: 5 INJECTION INTRAVENOUS at 20:31

## 2020-07-06 RX ADMIN — PROPOFOL INJECTABLE EMULSION 50 MCG/KG/MIN: 10 INJECTION, EMULSION INTRAVENOUS at 00:02

## 2020-07-06 RX ADMIN — Medication 10 ML: at 20:32

## 2020-07-06 RX ADMIN — PANTOPRAZOLE SODIUM 40 MG: 40 INJECTION, POWDER, FOR SOLUTION INTRAVENOUS at 08:30

## 2020-07-06 RX ADMIN — DEXTROSE AND SODIUM CHLORIDE: 5; 450 INJECTION, SOLUTION INTRAVENOUS at 12:38

## 2020-07-06 RX ADMIN — SODIUM CHLORIDE, PRESERVATIVE FREE 10 ML: 5 INJECTION INTRAVENOUS at 09:00

## 2020-07-06 RX ADMIN — LORAZEPAM 4 MG: 2 INJECTION INTRAMUSCULAR; INTRAVENOUS at 04:14

## 2020-07-06 RX ADMIN — Medication 10 ML: at 09:00

## 2020-07-06 RX ADMIN — LEVETIRACETAM 500 MG: 5 INJECTION INTRAVENOUS at 15:41

## 2020-07-06 RX ADMIN — THIAMINE HYDROCHLORIDE 250 MG: 100 INJECTION, SOLUTION INTRAMUSCULAR; INTRAVENOUS at 08:20

## 2020-07-06 RX ADMIN — RIFAXIMIN 400 MG: 200 TABLET ORAL at 15:41

## 2020-07-06 RX ADMIN — RIFAXIMIN 400 MG: 200 TABLET ORAL at 20:33

## 2020-07-06 RX ADMIN — SODIUM CHLORIDE 50 MCG/HR: 9 INJECTION, SOLUTION INTRAVENOUS at 04:06

## 2020-07-06 RX ADMIN — PANTOPRAZOLE SODIUM 40 MG: 40 INJECTION, POWDER, FOR SOLUTION INTRAVENOUS at 20:32

## 2020-07-06 RX ADMIN — WATER 1 G: 1 INJECTION INTRAMUSCULAR; INTRAVENOUS; SUBCUTANEOUS at 20:31

## 2020-07-06 RX ADMIN — CALCIUM GLUCONATE 1 G: 98 INJECTION, SOLUTION INTRAVENOUS at 17:05

## 2020-07-06 RX ADMIN — LACTULOSE 20 G: 20 SOLUTION ORAL at 20:32

## 2020-07-06 RX ADMIN — MAGNESIUM SULFATE 2 G: 2 INJECTION INTRAVENOUS at 12:00

## 2020-07-06 RX ADMIN — LORAZEPAM 2 MG: 2 INJECTION INTRAMUSCULAR; INTRAVENOUS at 20:32

## 2020-07-06 RX ADMIN — SODIUM CHLORIDE 50 MCG/HR: 9 INJECTION, SOLUTION INTRAVENOUS at 14:45

## 2020-07-06 RX ADMIN — SODIUM CHLORIDE 50 MCG/HR: 9 INJECTION, SOLUTION INTRAVENOUS at 20:29

## 2020-07-06 RX ADMIN — LACTULOSE 20 G: 20 SOLUTION ORAL at 15:41

## 2020-07-06 RX ADMIN — LORAZEPAM 4 MG: 2 INJECTION INTRAMUSCULAR; INTRAVENOUS at 00:01

## 2020-07-06 ASSESSMENT — PULMONARY FUNCTION TESTS
PIF_VALUE: 25
PIF_VALUE: 19
PIF_VALUE: 21
PIF_VALUE: 19
PIF_VALUE: 20
PIF_VALUE: 19
PIF_VALUE: 19
PIF_VALUE: 21
PIF_VALUE: 18
PIF_VALUE: 18
PIF_VALUE: 19
PIF_VALUE: 22
PIF_VALUE: 20
PIF_VALUE: 22
PIF_VALUE: 20
PIF_VALUE: 20
PIF_VALUE: 17
PIF_VALUE: 23
PIF_VALUE: 18
PIF_VALUE: 22
PIF_VALUE: 17
PIF_VALUE: 30
PIF_VALUE: 21

## 2020-07-06 NOTE — PROGRESS NOTES
[facility] Quality Flow/Interdisciplinary Rounds Progress Note        Quality Flow Rounds held on July 6, 2020    Disciplines Attending:  Bedside nurse, charge nurse, , PT/OT, pharmacy, nursing unit leadership, , Medical residents    Earlene Hassan was admitted on 7/4/2020  1:34 PM    Anticipated Discharge Date:  Expected Discharge Date: 07/07/20    Disposition:    Jeffry Score:  Jeffry Scale Score: 13    Readmission Risk              Risk of Unplanned Readmission:        19           Discussed patient goal for the day, patient clinical progression, and barriers to discharge. The following Goal(s) of the Day/Commitment(s) have been identified:  Continue icu care, sedation vacation.        Katey Godoy  July 6, 2020

## 2020-07-06 NOTE — PROGRESS NOTES
Essie [facility]  Department of Internal Medicine   Internal Medicine Residency   MICU Progress Note    Patient:  Allen Lan 42 y.o. female  MRN: [id number]     Date of Service: 7/6/2020    Allergy: Pcn [penicillins]    Subjective     Allen Lan is a 42 y.o. female who is present in the MICU for mngt of GI Bleed 2/2 bleeding varix just below GE junction. Pt underwent EGD on 7/4/2020 where the esophogeal variceal bleed was banded. Yesterday during the day patient continued to be increasingly confused and started seizing and needed to be sedated and intubated for airway protection. She was given Keppra and Ativan   Pt seen and examined this AM.     ROS: Unable to assess. Pt intubated and sedated. Objective     VS: BP 93/67   Pulse 84   Temp 99.5 °F (37.5 °C) (Bladder)   Resp 14   Ht 5' 4\" (1.626 m)   Wt 138 lb 7.2 oz (62.8 kg)   SpO2 100%   BMI 23.76 kg/m²           I & O - 24hr:     Intake/Output Summary (Last 24 hours) at 7/6/2020 0709  Last data filed at 7/6/2020 0600  Gross per 24 hour   Intake 2976 ml   Output 1395 ml   Net 1581 ml       Physical Exam:  · General Appearance: Intubated Sedated Patient. · Neck: no adenopathy, no carotid bruit, no JVD, supple, symmetrical, trachea midline and thyroid not enlarged, symmetric, no tenderness/mass/nodules   · Eyes: conjunctiva pink; sclera white. · Lung: clear to auscultation bilaterally  · Heart: regular rate and regular rhythm, S1, S2 normal, no murmur, click, rub or gallop  · Abdomen: soft, non-tender; bowel sounds normal; no masses,  no organomegaly  · Extremities:  extremities normal, atraumatic, no cyanosis or edema  · Musculoskeletal: No joint swelling, no muscle tenderness. ROM normal in all joints of extremities.    · Neurologic: Obtunded     Lines     site day    Art line   None    TLC None    PICC None    introducer Femoral right 2   Hemoaccess None    Oxygen:     Intubated and sedated   Mechanical Ventilation:   AC/VC (14/450/5/40%) PPlataue: 14 and Cstat: 49   ABG:     Lab Results   Component Value Date    PH 7.490 07/05/2020    PCO2 28.2 07/05/2020    PO2 132.8 07/05/2020    SJW5OHU 21.8 07/06/2020    HCO3 21.0 07/05/2020    BE -0.8 07/06/2020    THB 9.2 07/05/2020    O2SAT 99.0 07/06/2020        Medications     Infusions: (Fluid, Sedation, Vasopressors)  IVF:    NaCl 0.9% boluses   Sedation   - Propofol 18.9 cc/hr   - Versed and Ativan injection    Nutrition:   NPO  ATB:   Antibiotics  Days   none              Skin issues: n/a  Patient currently has   DVT prophylaxis/ GI prophylaxis,      Labs     CBC:   Lab Results   Component Value Date    WBC 14.0 07/06/2020    RBC 2.52 07/06/2020    RBC 3.55 07/08/2019    HGB 7.8 07/06/2020    HCT 23.1 07/06/2020    MCV 91.7 07/06/2020    MCH 31.0 07/06/2020    MCHC 33.8 07/06/2020    RDW 16.8 07/06/2020    PLT 77 07/06/2020    MPV 12.9 07/06/2020     CMP:    Lab Results   Component Value Date     07/05/2020    K 4.4 07/05/2020    K 3.7 07/04/2020     07/05/2020    CO2 21 07/05/2020    BUN 26 07/05/2020    CREATININE 0.8 07/05/2020    GFRAA >60 07/05/2020    LABGLOM >60 07/05/2020    GLUCOSE 166 07/05/2020    GLUCOSE 102 07/08/2019    PROT 5.6 07/04/2020    LABALBU 2.7 07/04/2020    LABALBU 5.0 03/13/2012    CALCIUM 6.5 07/05/2020    BILITOT 1.8 07/04/2020    ALKPHOS 137 07/04/2020     07/04/2020    ALT 36 07/04/2020         Resident's Assessment and Plan   Neurology:   Acute metabolic Encephalopathy 2/2 Seizures 2/2 Withdrawal   - Pt has hx of alcoholism. Ethanol level in ED was 85.  - RAAS goal -1 to 0 with propofol. Wean down propofol to see patient response. Withdrawal Siezures  - Yesterday afternoon patient starting seizing. Received Keppra 1x.    - Will start IV Keppra   - Scheduled Ativan 2 mg q4-q6     Cardiology:   -  Goal MAP greater than 65     Pulmonary:   Intubated for airway protection   Respiratory Alkalosis 2/2 mechanical ventilation   - ABG shows pH of 7.5 CO2 of 27 and HCO3 of 19.   - Adjust vent accordingly. Will repeat ABG This PM.        Nephrology (Fluids/ Electrolytes & Nutrition):   - Cr is at baseline at 0.8    HAGMA likely 2/2 lactic acidosis  - Resolved  - Lactic acid trending down 16.1 --> 5.6 --> 2.7--> 1.7 this AM    Hypernatremia  - Pt sodium climbing up 145-->147. If Na continues to rise will give free water and d/c NaCl. Gastroenterology:   Hematemesis 2/2 bleeding varix just below GE junction.  - Controlled with band/ large amount blood in fundus during EGD  - GI reccs appreciated. - Continue ppi bid and ocreotide drip   - NPO  Nausea  - Ondansetron q6h     Chronic Liver cirrhosis with esophageal varices  - diagnosed last year (2019) 2/2 autoimmune process  - Work up in [facility] - anti smooth muscle positive but patient not aware of any diagnosis  -  has undergone endoscopic evaluations three times at [facility] per GI, the first in 10/2019 where two grade 2 varices were banded.  Followup endoscopy in 12/2019 and again in 05/2020 - grade 1 varices with no additional bandings performed. - Pt has chronic Transaminitis and hyperbilirubinemia  Hyperammonemia  - Ammonia trending down 149--> 199 --> 102  - Place OG tube and recc Lactulose via OG tube. Will add rifaximin as well. - Continue to monitor liver enzymes      Hx of pancreatic cyst   - s/p partial splenectomy and pancrectomy      Elevated INR  - 2.5--> 2.4 in the setting of liver disease  - Given 1 dose vit K yesterday. - Will transfuse 2 units FFP. - Monitor INR. Hematology/ Oncology:   Acute blood loss anemia  - Acute secondary to GI bleeding. Presenting hemoglobin of 6.1. INR 2.5.  - S/p 4 units packed red blood cell transfusion.    - continue fluids, has received 4 units RBC thus far. - Current Hgb is trending down 10--> 8.4--> 4.0 --> 7.4.    - trend H/H q6h, Transfuse prn for hgb goal > 7g/dl.      Psychiatry   History of alcohol abuse  - Ethanol level 85 on admission.  - Continue thiamine and folate. Monitor for signs of withdrawal.   - Patient also has history of anxiety and depression. Resume home lexapro and Neurontin. Early Mobility:   PT/OT not indicated at this time. [facility] - [phone]  Code Status:   Full Code      PT/OT: See Early Mobility;   DVT ppx: contraindicated 2/2 GI bleed   GI ppx: protonix     Disposition: Currently intubated and sedated. Desiree Sutherland DO, PGY-1    Attending physician: Dr. Haley Mendez    I personally saw, examined and provided care for the patient. Radiographs, labs and medication list were reviewed by me independently. Review of Residents documentation was conducted and revisions were made as appropriate. I agree with the above documented exam, problem list and plan of care. Assessment:    43 y/o F with cirrhosis (ETOH +autoimmune), varices here for UGIB with active ETOH abuse. EGD with grade 2 varices at GE junction s/p banding. Developed ETOH withdrawal seizure requiring intubation. AMS. Elevated ammonia.       Plan:    Neuro/Psych: AMS, elevated ammonia  -start lactulose, rifaximin  -decrease IV ativan dose  -continue keppra    CV: adequate BP    Pulm: resp failure intubated for AMS    ID: monitor    GI: Cirrhosis, UGIB, ETOH  -OG ok per GI start lactulose, and rifaximin    Renal: monitor    Heme: monitor    Endo: monitor    Proph:  GI - SUP  DVT - SCDs        CCT excluding procedures 40 minutes    Electronically signed by Lorelei Collier DO on 7/6/2020 at 2:29 PM

## 2020-07-06 NOTE — CARE COORDINATION
Patient remains in MICU on vent with prop gtt. Patient has hx ETOH abuse, I met with patient's mom Fran at the bedside to discuss transition of care at discharge. Patient lives with her boyfriend in a 1st floor apartment 3 steps to enter. She is independent with ADLs, drives, does not work at this time, patient just finished school in May (which is when she moved out of her parent's place and into her boyfriends). She has no DME. Her pharmacy is [facility] in [place]. She has no hx HHC or NELLY. She has a hx of rehab in an intensive 4 month outpatient program through [facility] which she completed in November. Fran states patient couldn't have inpatient f/u visits d/t covid at [facility] or ; she knows patient did have some virtual meetings, and she talked with her psych doctor last week and Fran believes her f/u appt for that was July 8th but she is unsure of psych doctors name stating patient would only reveal so much information to her. Fran states the patient will be moving back in with her (mom & dad) at discharge stating patient will not have a choice on that and they have already got her stuff from the apartment stating they do not believe being at the apartment with her boyfriend is a healthy environment for her in terms of her sobriety. Fran requested addiction recovery lists and psych lists to review. I explained to Fran that I will offer these same lists as well as Peer Recovery services to the patient when she is extubated. CM/SW will continue to follow for discharge planning.

## 2020-07-06 NOTE — PROGRESS NOTES
Hospitalist Progress Note      PCP: Jeanette Coppola DO    Date of Admission: 7/4/2020    Chief Complaint: *hematemesis      Hospital Course: 42 y. o. female who presented to [facility] with *hematemesis. Gil Alfaro has a known history of cirrhosis due to alcohol, has esophageal varices with banding.  States she had one glass of wine on yesterday(DOES NOT WANT HER MOM TO KNOW SHE HAD BEEN DRINKING).   Began to have hematemesis, and black stools,  Weakness,  And fatigue.  Found to have a HGB of 6.1.  ** transfused blood and had a stat EGD, found to haveActively bleeding varix just immediately distal to the squamocolumnar transition at the gastroesophageal junction.  Residual varices proximal to this grade 2 without overlying red  spots.  Fundus covered by blood, but the cardia was well seen and unremarkable.  Distal stomach and proximal duodenum were normal  *  She was found to be positive for amphetamines and fentanyl, she states she does not remember using drugs    ** had a seizure last pm and had to be intubated.           Subjective: sedated and intubated      Medications:  Reviewed    Infusion Medications    dextrose 5 % and 0.45 % NaCl 50 mL/hr at 07/06/20 1238    propofol 50 mcg/kg/min (07/06/20 0608)    octreotide (SANDOSTATIN) infusion 50 mcg/hr (07/06/20 0406)     Scheduled Medications    potassium chloride  40 mEq Intravenous Once    calcium gluconate IVPB  1 g Intravenous Once    LORazepam  2 mg Intravenous Q6H    levetiracetam  500 mg Intravenous Q12H    sodium chloride  20 mL Intravenous Once    rifaximin  400 mg Oral TID    lactulose  20 g Oral TID    pantoprazole  40 mg Intravenous BID    And    sodium chloride (PF)  10 mL Intravenous BID    escitalopram  10 mg Oral Daily    [Held by provider] gabapentin  100 mg Oral TID    sodium chloride flush  10 mL Intravenous 2 times per day    folic acid  1 mg Intravenous Daily    cefTRIAXone (ROCEPHIN) IV  1 g Intravenous Q24H    thiamine (VITAMIN B1) IVPB  250 mg Intravenous Daily     PRN Meds: sodium chloride flush, polyethylene glycol, promethazine **OR** ondansetron      Intake/Output Summary (Last 24 hours) at 7/6/2020 1417  Last data filed at 7/6/2020 1258  Gross per 24 hour   Intake 3118 ml   Output 1310 ml   Net 1808 ml       Exam:    /74   Pulse 77   Temp 99 °F (37.2 °C)   Resp 13   Ht 5' 4\" (1.626 m)   Wt 138 lb 7.2 oz (62.8 kg)   SpO2 100%   BMI 23.76 kg/m²       General appearance:   well developed   HEENT:  Normal cephalic, atraumatic without obvious deformity. Sclera icteric   Neck: Supple, with full range of motion. No jugular venous distention. Trachea midline. Respiratory:  Normal respiratory effort. Clear to auscultation  Cardiovascular:  Regular rate and rhythm with normal S1/S2 without murmurs, rubs or gallops. Abdomen: Soft, non-tender, non-distended with normal bowel sounds. Musculoskeletal:  No clubbing, cyanosis or edema bilaterally. Skin: Skin  Jaundiced , texture, turgor normal.  No rashes or lesions. Capillary Refill: Brisk,< 3 seconds   Peripheral Pulses: +2 palpable, equal bilaterally               Labs:   Recent Labs     07/04/20  1407  07/05/20  1722 07/06/20  0130 07/06/20  0620   WBC 5.9  --   --   --  14.0*   HGB 6.1*   < > 8.4* 8.0* 7.8*   HCT 19.3*   < > 25.0* 23.4* 23.1*   PLT 73*  --   --   --  77*    < > = values in this interval not displayed.      Recent Labs     07/04/20  1407 07/05/20  0500 07/06/20  0620    145 147*   K 3.7 4.4 3.6    115* 119*   CO2 13* 21* 19*   BUN 33* 26* 25*   CREATININE 0.7 0.8 0.7   CALCIUM 7.2* 6.5* 6.8*   PHOS  --   --  2.4*     Recent Labs     07/04/20  1407 07/06/20  0620   * 182*   ALT 36* 51*   BILIDIR 0.8* 1.4*   BILITOT 1.8* 2.0*   ALKPHOS 137* 97     Recent Labs     07/04/20  1407 07/05/20  0500   INR 2.5 2.4     Recent Labs     07/04/20  1407   TROPONINI <0.01     Recent Labs     07/04/20  1407 07/06/20  0620   * 182*   ALT 36* 46*   BILIDIR 0.8* 1.4*   BILITOT 1.8* 2.0*   ALKPHOS 137* 97     Recent Labs     07/05/20  1323 07/05/20 2003 07/06/20  0130   LACTA 2.4* 1.8 1.7     No results found for: Soraya Rice  Lab Results   Component Value Date    AMMONIA 102.0 (H) 07/06/2020    AMMONIA 199.0 (H) 07/05/2020    AMMONIA 149.0 (H) 07/04/2020       Assessment:    Active Hospital Problems    Diagnosis Date Noted    Hematemesis [K92.0] 07/04/2020   new onset seizure  GIB  H/o esophageal varices  Acute blood loss anemia   polysubstance use  H/o ETOH abuse  H.o partial pancreatectomy and splenectomy at [facility]    Depression   elevated NH3     Plan:  *supportive care   Octreotide drip  Aimee Mahajan     DVT Prophylaxis: scd  Diet: Diet NPO Time Specified  Code Status: Full Code     PT/OT Eval Status: *na     Dispo - *?  Psych vs home         Electronically signed by Rolando Vargas DO on 7/6/2020 at 2:17 PM Doug Diaz

## 2020-07-06 NOTE — PLAN OF CARE
Problem: Restraint Use - Nonviolent/Non-Self-Destructive Behavior:  Goal: Absence of restraint-related injury  Description: Absence of restraint-related injury  7/6/2020 1008 by Jatin Mohr RN  Outcome: Met This Shift  7/6/2020 0041 by Patti Reich RN  Outcome: Met This Shift  7/5/2020 2134 by Khushi Roman, RN  Outcome: Met This Shift     Problem: Restraint Use - Nonviolent/Non-Self-Destructive Behavior:  Goal: Absence of restraint indications  Description: Absence of restraint indications  7/6/2020 1008 by Jatin Mohr RN  Outcome: Not Met This Shift  7/6/2020 0041 by Patti Reich RN  Outcome: Not Met This Shift  7/5/2020 2134 by Khushi Roman RN  Outcome: Not Met This Shift

## 2020-07-06 NOTE — PLAN OF CARE
Problem: Pain:  Goal: Pain level will decrease  Description: Pain level will decrease  Outcome: Met This Shift     Problem: Pain:  Goal: Control of acute pain  Description: Control of acute pain  Outcome: Met This Shift     Problem: Pain:  Goal: Control of chronic pain  Description: Control of chronic pain  Outcome: Met This Shift     Problem: Falls - Risk of:  Goal: Will remain free from falls  Description: Will remain free from falls  Outcome: Met This Shift     Problem: Falls - Risk of:  Goal: Absence of physical injury  Description: Absence of physical injury  Outcome: Met This Shift     Problem: Skin Integrity:  Goal: Will show no infection signs and symptoms  Description: Will show no infection signs and symptoms  Outcome: Met This Shift     Problem: Skin Integrity:  Goal: Absence of new skin breakdown  Description: Absence of new skin breakdown  Outcome: Met This Shift     Problem: Restraint Use - Nonviolent/Non-Self-Destructive Behavior:  Goal: Absence of restraint indications  Description: Absence of restraint indications  7/6/2020 0041 by Nelida Otto RN  Outcome: Not Met This Shift     Problem: Restraint Use - Nonviolent/Non-Self-Destructive Behavior:  Goal: Absence of restraint-related injury  Description: Absence of restraint-related injury  7/6/2020 0041 by Nelida Otto RN  Outcome: Met This Shift

## 2020-07-07 ENCOUNTER — APPOINTMENT (OUTPATIENT)
Dept: GENERAL RADIOLOGY | Age: 40
DRG: 280 | End: 2020-07-07
Payer: MEDICAID

## 2020-07-07 LAB
AADO2: 125.3 MMHG
AADO2: 152.7 MMHG
ALBUMIN SERPL-MCNC: 2.6 G/DL (ref 3.5–5.2)
ALP BLD-CCNC: 120 U/L (ref 35–104)
ALT SERPL-CCNC: 77 U/L (ref 0–32)
AMMONIA: 110 UMOL/L (ref 11–51)
ANION GAP SERPL CALCULATED.3IONS-SCNC: 9 MMOL/L (ref 7–16)
AST SERPL-CCNC: 257 U/L (ref 0–31)
B.E.: -2 MMOL/L (ref -3–3)
B.E.: -3.2 MMOL/L (ref -3–3)
BASOPHILS ABSOLUTE: 0.06 E9/L (ref 0–0.2)
BASOPHILS RELATIVE PERCENT: 0.4 % (ref 0–2)
BILIRUB SERPL-MCNC: 3.3 MG/DL (ref 0–1.2)
BILIRUBIN DIRECT: 2 MG/DL (ref 0–0.3)
BILIRUBIN, INDIRECT: 1.3 MG/DL (ref 0–1)
BUN BLDV-MCNC: 18 MG/DL (ref 6–20)
CALCIUM SERPL-MCNC: 7.6 MG/DL (ref 8.6–10.2)
CHLORIDE BLD-SCNC: 113 MMOL/L (ref 98–107)
CO2: 21 MMOL/L (ref 22–29)
COHB: 0.9 % (ref 0–1.5)
COHB: 1.1 % (ref 0–1.5)
CREAT SERPL-MCNC: 0.7 MG/DL (ref 0.5–1)
CRITICAL: ABNORMAL
CRITICAL: ABNORMAL
DATE ANALYZED: ABNORMAL
DATE ANALYZED: ABNORMAL
DATE OF COLLECTION: ABNORMAL
DATE OF COLLECTION: ABNORMAL
EOSINOPHILS ABSOLUTE: 0.08 E9/L (ref 0.05–0.5)
EOSINOPHILS RELATIVE PERCENT: 0.5 % (ref 0–6)
FIO2: 40 %
FIO2: 40 %
GFR AFRICAN AMERICAN: >60
GFR NON-AFRICAN AMERICAN: >60 ML/MIN/1.73
GLUCOSE BLD-MCNC: 132 MG/DL (ref 74–99)
HCO3: 19.4 MMOL/L (ref 22–26)
HCO3: 21.2 MMOL/L (ref 22–26)
HCT VFR BLD CALC: 24.1 % (ref 34–48)
HEMOGLOBIN: 7.9 G/DL (ref 11.5–15.5)
HHB: 1.8 % (ref 0–5)
HHB: 2.8 % (ref 0–5)
IMMATURE GRANULOCYTES #: 0.1 E9/L
IMMATURE GRANULOCYTES %: 0.7 % (ref 0–5)
LAB: ABNORMAL
LAB: ABNORMAL
LYMPHOCYTES ABSOLUTE: 3.03 E9/L (ref 1.5–4)
LYMPHOCYTES RELATIVE PERCENT: 20.6 % (ref 20–42)
Lab: ABNORMAL
Lab: ABNORMAL
MAGNESIUM: 1.8 MG/DL (ref 1.6–2.6)
MCH RBC QN AUTO: 30.5 PG (ref 26–35)
MCHC RBC AUTO-ENTMCNC: 32.8 % (ref 32–34.5)
MCV RBC AUTO: 93.1 FL (ref 80–99.9)
METHB: 0.3 % (ref 0–1.5)
METHB: 0.7 % (ref 0–1.5)
MODE: AC
MODE: AC
MONOCYTES ABSOLUTE: 1 E9/L (ref 0.1–0.95)
MONOCYTES RELATIVE PERCENT: 6.8 % (ref 2–12)
NEUTROPHILS ABSOLUTE: 10.47 E9/L (ref 1.8–7.3)
NEUTROPHILS RELATIVE PERCENT: 71 % (ref 43–80)
O2 CONTENT: 11.5 ML/DL
O2 CONTENT: 11.9 ML/DL
O2 SATURATION: 97.2 % (ref 92–98.5)
O2 SATURATION: 98.2 % (ref 92–98.5)
O2HB: 96 % (ref 94–97)
O2HB: 96.4 % (ref 94–97)
OPERATOR ID: 1893
OPERATOR ID: 319
PATIENT TEMP: 37 C
PATIENT TEMP: 37 C
PCO2: 26.1 MMHG (ref 35–45)
PCO2: 29.6 MMHG (ref 35–45)
PDW BLD-RTO: 17.1 FL (ref 11.5–15)
PEEP/CPAP: 5 CMH2O
PEEP/CPAP: 5 CMH2O
PFO2: 2.31 MMHG/%
PFO2: 2.9 MMHG/%
PH BLOOD GAS: 7.47 (ref 7.35–7.45)
PH BLOOD GAS: 7.49 (ref 7.35–7.45)
PHOSPHORUS: 2.1 MG/DL (ref 2.5–4.5)
PLATELET # BLD: 89 E9/L (ref 130–450)
PLATELET CONFIRMATION: NORMAL
PMV BLD AUTO: 12.7 FL (ref 7–12)
PO2: 115.9 MMHG (ref 75–100)
PO2: 92.5 MMHG (ref 75–100)
POTASSIUM SERPL-SCNC: 3.4 MMOL/L (ref 3.5–5)
POTASSIUM SERPL-SCNC: 3.53 MMOL/L (ref 3.5–5)
RBC # BLD: 2.59 E12/L (ref 3.5–5.5)
RI(T): 1.08
RI(T): 1.65
RR MECHANICAL: 12 B/MIN
RR MECHANICAL: 12 B/MIN
SODIUM BLD-SCNC: 143 MMOL/L (ref 132–146)
SOURCE, BLOOD GAS: ABNORMAL
SOURCE, BLOOD GAS: ABNORMAL
THB: 8.3 G/DL (ref 11.5–16.5)
THB: 8.7 G/DL (ref 11.5–16.5)
TIME ANALYZED: 1345
TIME ANALYZED: 515
TOTAL PROTEIN: 5.2 G/DL (ref 6.4–8.3)
VT MECHANICAL: 350 ML
VT MECHANICAL: 400 ML
WBC # BLD: 14.7 E9/L (ref 4.5–11.5)

## 2020-07-07 PROCEDURE — 2580000003 HC RX 258: Performed by: INTERNAL MEDICINE

## 2020-07-07 PROCEDURE — 85025 COMPLETE CBC W/AUTO DIFF WBC: CPT

## 2020-07-07 PROCEDURE — 80048 BASIC METABOLIC PNL TOTAL CA: CPT

## 2020-07-07 PROCEDURE — 84132 ASSAY OF SERUM POTASSIUM: CPT

## 2020-07-07 PROCEDURE — 83735 ASSAY OF MAGNESIUM: CPT

## 2020-07-07 PROCEDURE — 36415 COLL VENOUS BLD VENIPUNCTURE: CPT

## 2020-07-07 PROCEDURE — 82805 BLOOD GASES W/O2 SATURATION: CPT

## 2020-07-07 PROCEDURE — 2000000000 HC ICU R&B

## 2020-07-07 PROCEDURE — 71045 X-RAY EXAM CHEST 1 VIEW: CPT

## 2020-07-07 PROCEDURE — 6360000002 HC RX W HCPCS: Performed by: INTERNAL MEDICINE

## 2020-07-07 PROCEDURE — 6370000000 HC RX 637 (ALT 250 FOR IP): Performed by: INTERNAL MEDICINE

## 2020-07-07 PROCEDURE — C9113 INJ PANTOPRAZOLE SODIUM, VIA: HCPCS | Performed by: INTERNAL MEDICINE

## 2020-07-07 PROCEDURE — 87450 HC DIRECT STREP B ANTIGEN: CPT

## 2020-07-07 PROCEDURE — 94003 VENT MGMT INPAT SUBQ DAY: CPT

## 2020-07-07 PROCEDURE — 2500000003 HC RX 250 WO HCPCS: Performed by: INTERNAL MEDICINE

## 2020-07-07 PROCEDURE — 80076 HEPATIC FUNCTION PANEL: CPT

## 2020-07-07 PROCEDURE — 84100 ASSAY OF PHOSPHORUS: CPT

## 2020-07-07 PROCEDURE — 82140 ASSAY OF AMMONIA: CPT

## 2020-07-07 RX ORDER — 0.9 % SODIUM CHLORIDE 0.9 %
1000 INTRAVENOUS SOLUTION INTRAVENOUS ONCE
Status: COMPLETED | OUTPATIENT
Start: 2020-07-08 | End: 2020-07-08

## 2020-07-07 RX ORDER — CLINDAMYCIN PHOSPHATE 600 MG/50ML
600 INJECTION INTRAVENOUS EVERY 8 HOURS
Status: DISCONTINUED | OUTPATIENT
Start: 2020-07-07 | End: 2020-07-11

## 2020-07-07 RX ORDER — 0.9 % SODIUM CHLORIDE 0.9 %
20 INTRAVENOUS SOLUTION INTRAVENOUS ONCE
Status: COMPLETED | OUTPATIENT
Start: 2020-07-08 | End: 2020-07-08

## 2020-07-07 RX ORDER — LORAZEPAM 2 MG/ML
2 INJECTION INTRAMUSCULAR EVERY 6 HOURS PRN
Status: DISCONTINUED | OUTPATIENT
Start: 2020-07-07 | End: 2020-07-10

## 2020-07-07 RX ORDER — MAGNESIUM SULFATE 1 G/100ML
1 INJECTION INTRAVENOUS ONCE
Status: COMPLETED | OUTPATIENT
Start: 2020-07-07 | End: 2020-07-07

## 2020-07-07 RX ORDER — ACETAMINOPHEN 325 MG/1
650 TABLET ORAL ONCE
Status: COMPLETED | OUTPATIENT
Start: 2020-07-07 | End: 2020-07-07

## 2020-07-07 RX ORDER — IBUPROFEN 400 MG/1
400 TABLET ORAL ONCE
Status: COMPLETED | OUTPATIENT
Start: 2020-07-07 | End: 2020-07-07

## 2020-07-07 RX ORDER — LACTULOSE 10 G/15ML
30 SOLUTION ORAL 3 TIMES DAILY
Status: DISCONTINUED | OUTPATIENT
Start: 2020-07-07 | End: 2020-07-09

## 2020-07-07 RX ADMIN — RIFAXIMIN 400 MG: 200 TABLET ORAL at 14:00

## 2020-07-07 RX ADMIN — OCTREOTIDE ACETATE 50 MCG/HR: 500 INJECTION, SOLUTION INTRAVENOUS; SUBCUTANEOUS at 20:07

## 2020-07-07 RX ADMIN — LACTULOSE 30 G: 20 SOLUTION ORAL at 14:00

## 2020-07-07 RX ADMIN — LEVETIRACETAM 500 MG: 5 INJECTION INTRAVENOUS at 10:18

## 2020-07-07 RX ADMIN — POTASSIUM BICARBONATE 40 MEQ: 782 TABLET, EFFERVESCENT ORAL at 09:39

## 2020-07-07 RX ADMIN — Medication 10 ML: at 09:36

## 2020-07-07 RX ADMIN — POTASSIUM PHOSPHATE, MONOBASIC POTASSIUM PHOSPHATE, DIBASIC 30 MMOL: 224; 236 INJECTION, SOLUTION, CONCENTRATE INTRAVENOUS at 10:57

## 2020-07-07 RX ADMIN — RIFAXIMIN 400 MG: 200 TABLET ORAL at 09:42

## 2020-07-07 RX ADMIN — LACTULOSE 30 G: 20 SOLUTION ORAL at 09:31

## 2020-07-07 RX ADMIN — ESCITALOPRAM 10 MG: 10 TABLET, FILM COATED ORAL at 09:30

## 2020-07-07 RX ADMIN — FOLIC ACID 1 MG: 5 INJECTION, SOLUTION INTRAMUSCULAR; INTRAVENOUS; SUBCUTANEOUS at 09:40

## 2020-07-07 RX ADMIN — POTASSIUM BICARBONATE 40 MEQ: 782 TABLET, EFFERVESCENT ORAL at 16:54

## 2020-07-07 RX ADMIN — SODIUM CHLORIDE, PRESERVATIVE FREE 10 ML: 5 INJECTION INTRAVENOUS at 09:36

## 2020-07-07 RX ADMIN — MAGNESIUM SULFATE HEPTAHYDRATE 1 G: 1 INJECTION, SOLUTION INTRAVENOUS at 09:32

## 2020-07-07 RX ADMIN — LACTULOSE 30 G: 20 SOLUTION ORAL at 23:15

## 2020-07-07 RX ADMIN — ACETAMINOPHEN 650 MG: 325 TABLET ORAL at 08:32

## 2020-07-07 RX ADMIN — OCTREOTIDE ACETATE 50 MCG/HR: 500 INJECTION, SOLUTION INTRAVENOUS; SUBCUTANEOUS at 10:59

## 2020-07-07 RX ADMIN — LORAZEPAM 2 MG: 2 INJECTION INTRAMUSCULAR; INTRAVENOUS at 03:16

## 2020-07-07 RX ADMIN — PANTOPRAZOLE SODIUM 40 MG: 40 INJECTION, POWDER, FOR SOLUTION INTRAVENOUS at 09:40

## 2020-07-07 RX ADMIN — CLINDAMYCIN PHOSPHATE 600 MG: 600 INJECTION, SOLUTION INTRAVENOUS at 11:46

## 2020-07-07 RX ADMIN — PANTOPRAZOLE SODIUM 40 MG: 40 INJECTION, POWDER, FOR SOLUTION INTRAVENOUS at 23:15

## 2020-07-07 RX ADMIN — WATER 1 G: 1 INJECTION INTRAMUSCULAR; INTRAVENOUS; SUBCUTANEOUS at 20:12

## 2020-07-07 RX ADMIN — Medication 10 ML: at 20:12

## 2020-07-07 RX ADMIN — IBUPROFEN 400 MG: 400 TABLET, FILM COATED ORAL at 18:41

## 2020-07-07 RX ADMIN — CLINDAMYCIN PHOSPHATE 600 MG: 600 INJECTION, SOLUTION INTRAVENOUS at 18:00

## 2020-07-07 RX ADMIN — SODIUM CHLORIDE, PRESERVATIVE FREE 10 ML: 5 INJECTION INTRAVENOUS at 23:16

## 2020-07-07 RX ADMIN — THIAMINE HYDROCHLORIDE 250 MG: 100 INJECTION, SOLUTION INTRAMUSCULAR; INTRAVENOUS at 09:44

## 2020-07-07 RX ADMIN — RIFAXIMIN 400 MG: 200 TABLET ORAL at 23:15

## 2020-07-07 RX ADMIN — LEVETIRACETAM 500 MG: 5 INJECTION INTRAVENOUS at 23:15

## 2020-07-07 RX ADMIN — SODIUM CHLORIDE, PRESERVATIVE FREE 10 ML: 5 INJECTION INTRAVENOUS at 17:01

## 2020-07-07 RX ADMIN — LORAZEPAM 2 MG: 2 INJECTION INTRAMUSCULAR; INTRAVENOUS at 21:08

## 2020-07-07 RX ADMIN — DEXTROSE AND SODIUM CHLORIDE: 5; 450 INJECTION, SOLUTION INTRAVENOUS at 09:52

## 2020-07-07 ASSESSMENT — PULMONARY FUNCTION TESTS
PIF_VALUE: 23
PIF_VALUE: 29
PIF_VALUE: 25
PIF_VALUE: 22
PIF_VALUE: 33
PIF_VALUE: 23
PIF_VALUE: 23
PIF_VALUE: 24
PIF_VALUE: 30
PIF_VALUE: 25
PIF_VALUE: 26
PIF_VALUE: 21
PIF_VALUE: 29
PIF_VALUE: 27
PIF_VALUE: 30
PIF_VALUE: 27
PIF_VALUE: 30
PIF_VALUE: 22
PIF_VALUE: 21
PIF_VALUE: 27
PIF_VALUE: 24
PIF_VALUE: 21
PIF_VALUE: 22
PIF_VALUE: 29
PIF_VALUE: 33

## 2020-07-07 ASSESSMENT — PAIN SCALES - GENERAL
PAINLEVEL_OUTOF10: 0
PAINLEVEL_OUTOF10: 0

## 2020-07-07 NOTE — PROGRESS NOTES
Intubated, obtunded  OGT in place w/o evidence of bleeding  Remains on Octreotide  Receiving  Lactulose and Xifaxan  Has been recieving prn lorzepam  Renal fxn stable, Bilirubin to 3.3, last INR was 2.4 on the 5th.     Stable w/o bleeding  No evidence of complicating infection  So far no evidence of progressive liver failure    Continue Octreotide for 5 days  With OGT in place, no need q 8 hrs Hct determinations  Continue lactulose and Xifaxan

## 2020-07-07 NOTE — PATIENT CARE CONFERENCE
P Quality Flow/Interdisciplinary Rounds Progress Note        Quality Flow Rounds held on July 7, 2020    Disciplines Attending:  , pt/ot, resp therapy, bedside nurse, charge nurse, nursing management    Le Reason was admitted on 7/4/2020  1:34 PM    Anticipated Discharge Date:  Expected Discharge Date: 07/10/20    Disposition:    Jeffry Score:  Jeffry Scale Score: 13    Readmission Risk              Risk of Unplanned Readmission:        17           Discussed patient goal for the day, patient clinical progression, and barriers to discharge. The following Goal(s) of the Day/Commitment(s) have been identified:  Wean vent as tolerated.       Viet Rebollar  July 7, 2020

## 2020-07-07 NOTE — PROGRESS NOTES
[address] [facility]  Department of Internal Medicine   Internal Medicine Residency   MICU Progress Note    Patient:  Hector Monique 42 y.o. female  MRN: [id number]     Date of Service: 7/7/2020    Allergy: Pcn [penicillins]    Subjective     Hector Monique is a 42 y.o. female who is present in the MICU for mngt of GI Bleed 2/2 bleeding varix just below GE junction. Pt underwent EGD on 7/4/2020 where the esophogeal variceal bleed was banded. Pt has increasing AMS and developed withdrawal seizures, needed to be sedated and intubated for airway protection on 7/5/2020. Pt seen and examined this AM.     ROS: Unable to assess. Pt intubated and sedated. Objective     VS: BP (!) 143/99   Pulse 122   Temp 100.2 °F (37.9 °C)   Resp 17   Ht 5' 4\" (1.626 m)   Wt 145 lb 4.5 oz (65.9 kg)   SpO2 98%   BMI 24.94 kg/m²           I & O - 24hr:     Intake/Output Summary (Last 24 hours) at 7/7/2020 0758  Last data filed at 7/7/2020 0531  Gross per 24 hour   Intake 2413 ml   Output 2225 ml   Net 188 ml       Physical Exam:  · General Appearance: Intubated Sedated Patient. · Neck: no adenopathy, no carotid bruit, no JVD, supple, symmetrical, trachea midline and thyroid not enlarged, symmetric, no tenderness/mass/nodules   · Eyes: conjunctiva pink; sclera white; normal pupillary response. · Lung: clear to auscultation bilaterally no wheezes, rales or rhonchi appreciated. · Heart: regular rate and regular rhythm, S1, S2 normal, no murmur, click, rub or gallop  · Abdomen: soft, non-tender; bowel sounds normal; no masses,  no organomegaly  · Extremities:  extremities normal, atraumatic, no cyanosis or edema  · Musculoskeletal: No joint swelling, no muscle tenderness. ROM normal in all joints of extremities.    · Neurologic: Obtunded     Lines     site day    Art line   None    TLC None    PICC None    introducer Femoral right 3   Hemoaccess None    Oxygen:     Intubated and sedated   Mechanical Ventilation:   AC/VC Cardiology:   -  Goal MAP greater than 65     Pulmonary:   ARF Intubated for airway protection     Likely Aspiration Pneumonia   - CXR from 7/7/2020 shows new infiltrates of the LML and LLL as compared to yesterday. - Pt is also febrile at 100.4.   - Likely aspirated yesterday while seizing.   - Already on ceftriaxone. Will add Clindamycin for coverage. Respiratory Alkalosis 2/2 mechanical ventilation   - ABG shows pH of 7.472 CO2 of 29.6 and HCO3 of 21.2      Nephrology (Fluids/ Electrolytes & Nutrition):   - Cr of 0.7. Baseline at 0.8    HAGMA likely 2/2 lactic acidosis  - Resolved  - Lactic acid trend 16.1 --> 5.6 --> 2.7--> 1.7 . Hypernatremia  - Resolved. Currently at 143. Continue to monitor. If jump switch to free water. Gastroenterology:   Hematemesis 2/2 bleeding varix just below GE junction.  - Controlled with band/ large amount blood in fundus during EGD  - GI reccs appreciated. - Continue ppi bid and ocreotide. - Start trickle tube feeds via OG tube   Nausea  - Ondansetron q6h     Chronic Liver cirrhosis with esophageal varices  - diagnosed last year (2019) 2/2 autoimmune process  - Work up in [facility] - anti smooth muscle positive but patient not aware of any diagnosis  -  has undergone endoscopic evaluations three times at [facility] per GI, the first in 10/2019 where two grade 2 varices were banded.  Followup endoscopy in 12/2019 and again in 05/2020 - grade 1 varices with no additional bandings performed. - Pt has chronic Transaminitis and hyperbilirubinemia  Hyperammonemia  - Ammonia currently at 110. Trend is decreasing but slower than anticipated. Highest was at 199 2 days ago. - Increase Lactulose via OG tube. Continue rifaximin  - Continue to monitor liver enzymes      Hx of pancreatic cyst   - s/p partial splenectomy and pancrectomy      Elevated INR  - 2.5--> 2.4 in the setting of liver disease  - Given 1 dose vit K yesterday. - Will transfuse 2 units FFP. - Monitor INR. Hematology/ Oncology:   Acute blood loss anemia  - Acute secondary to GI bleeding. Presenting hemoglobin of 6.1. INR 2.5.  - S/p 4 units packed red blood cell transfusion.    - continue fluids  - Current Hgb is stable at 7.9.   - trend H/H daily, Transfuse prn for hgb goal > 7g/dl. Psychiatry   History of alcohol abuse  - Ethanol level 85 on admission.  - Continue thiamine and folate. Monitor for signs of withdrawal.   - Patient also has history of anxiety and depression. Resume home lexapro and Neurontin. Early Mobility:   PT/OT not indicated at this time. Next of Kind/ POA:   Suyapa Samantha [facility]) - [phone]  Code Status:   Full Code      PT/OT: See Early Mobility;   DVT ppx: contraindicated 2/2 GI bleed. SCD. GI ppx: protonix     Disposition: Currently intubated and sedated. Donta Martin DO, PGY-1    Attending physician: Dr. Joelle Clifford      I personally saw, examined and provided care for the patient. Radiographs, labs and medication list were reviewed by me independently. Review of Residents documentation was conducted and revisions were made as appropriate. I agree with the above documented exam, problem list and plan of care. Assessment:     41 y/o F with cirrhosis (ETOH +autoimmune), varices here for UGIB with active ETOH abuse. EGD with grade 2 varices at GE junction s/p banding. Developed ETOH withdrawal seizure requiring intubation. AMS. Elevated ammonia. 7/7: new infiltrate RLL,      1. Decompensated cirrhosis (ETOH +Autoimmune)  2. Hepatic encephalopathy  3. ETOH withdrawal seizure  4. Resp failure 2nd AMS  5.  UGIB with varices s/p banding         Proph:  GI - SUP  DVT - SCDs    CCT excluding procedures 37 minutes    Electronically signed by Cliffton Bumpers, DO on 7/7/2020 at 3:34 PM

## 2020-07-07 NOTE — PLAN OF CARE
Problem: Pain:  Goal: Control of acute pain  Description: Control of acute pain  7/7/2020 0813 by Lacey Linn RN  Outcome: Met This Shift     Problem: Falls - Risk of:  Goal: Will remain free from falls  Description: Will remain free from falls  7/7/2020 1541 by Josefina Hunter RN  Outcome: Met This Shift  7/7/2020 0813 by Lacey Linn RN  Outcome: Met This Shift  Goal: Absence of physical injury  Description: Absence of physical injury  7/7/2020 1541 by Josefina Hunter RN  Outcome: Met This Shift  7/7/2020 0813 by Lacey Linn RN  Outcome: Met This Shift     Problem: Skin Integrity:  Goal: Will show no infection signs and symptoms  Description: Will show no infection signs and symptoms  Outcome: Met This Shift  Goal: Absence of new skin breakdown  Description: Absence of new skin breakdown  7/7/2020 1541 by Josefina Hunter RN  Outcome: Met This Shift  7/7/2020 0813 by Lacey Linn RN  Outcome: Met This Shift     Problem: Restraint Use - Nonviolent/Non-Self-Destructive Behavior:  Goal: Absence of restraint-related injury  Description: Absence of restraint-related injury  7/7/2020 1541 by Josefina Hunter RN  Outcome: Met This Shift  7/7/2020 0813 by Lacey Linn RN  Outcome: Met This Shift

## 2020-07-07 NOTE — PLAN OF CARE
Problem: Pain:  Goal: Control of acute pain  Description: Control of acute pain  Outcome: Met This Shift     Problem: Falls - Risk of:  Goal: Will remain free from falls  Description: Will remain free from falls  Outcome: Met This Shift     Problem: Falls - Risk of:  Goal: Absence of physical injury  Description: Absence of physical injury  Outcome: Met This Shift     Problem: Skin Integrity:  Goal: Absence of new skin breakdown  Description: Absence of new skin breakdown  Outcome: Met This Shift     Problem: Restraint Use - Nonviolent/Non-Self-Destructive Behavior:  Goal: Absence of restraint-related injury  Description: Absence of restraint-related injury  Outcome: Met This Shift   Plan of care discussed with patient / family.

## 2020-07-07 NOTE — PROGRESS NOTES
BILITOT 1.8* 2.0* 3.3*   ALKPHOS 137* 97 120*     Recent Labs     07/05/20  1323 07/05/20 2003 07/06/20  0130   LACTA 2.4* 1.8 1.7     No results found for: Vaughn Robledo  Lab Results   Component Value Date    AMMONIA 110.0 (H) 07/07/2020    AMMONIA 118.0 (H) 07/06/2020    AMMONIA 102.0 (H) 07/06/2020       Assessment:    Active Hospital Problems    Diagnosis Date Noted    Hematemesis [K92.0] 07/04/2020   new onset seizure  GIB  H/o esophageal varices  Acute blood loss anemia   polysubstance use  H/o ETOH abuse  H.o partial pancreatectomy and splenectomy at [facility]    Depression   elevated NH3     Plan:    Octreotide drip  Continues on mechanical ventilation  Aimee Simmons  Discussed with nursing charge     DVT Prophylaxis: scd  Diet: Diet NPO Time Specified  Code Status: Full Code     PT/OT Eval Status: *na     Dispo - *?  Psych vs home         Electronically signed by Camilla Espinosa MD on 7/7/2020 at 12:31 PM Jim Valdez

## 2020-07-08 ENCOUNTER — APPOINTMENT (OUTPATIENT)
Dept: GENERAL RADIOLOGY | Age: 40
DRG: 280 | End: 2020-07-08
Payer: MEDICAID

## 2020-07-08 LAB
AADO2: 135 MMHG
ABO/RH: NORMAL
ALBUMIN SERPL-MCNC: 2.4 G/DL (ref 3.5–5.2)
ALBUMIN SERPL-MCNC: 2.6 G/DL (ref 3.5–5.2)
ALP BLD-CCNC: 104 U/L (ref 35–104)
ALP BLD-CCNC: 96 U/L (ref 35–104)
ALT SERPL-CCNC: 79 U/L (ref 0–32)
ALT SERPL-CCNC: 82 U/L (ref 0–32)
AMMONIA: 56 UMOL/L (ref 11–51)
ANGLE (CLOT STRENGTH): 71.9 DEGREE (ref 59–74)
ANION GAP SERPL CALCULATED.3IONS-SCNC: 7 MMOL/L (ref 7–16)
ANION GAP SERPL CALCULATED.3IONS-SCNC: 9 MMOL/L (ref 7–16)
ANISOCYTOSIS: ABNORMAL
ANTIBODY SCREEN: NORMAL
APTT: 28 SEC (ref 24.5–35.1)
AST SERPL-CCNC: 255 U/L (ref 0–31)
AST SERPL-CCNC: 268 U/L (ref 0–31)
B.E.: -2.5 MMOL/L (ref -3–3)
BASOPHILS ABSOLUTE: 0 E9/L (ref 0–0.2)
BASOPHILS RELATIVE PERCENT: 0.4 % (ref 0–2)
BILIRUB SERPL-MCNC: 4.3 MG/DL (ref 0–1.2)
BILIRUB SERPL-MCNC: 4.6 MG/DL (ref 0–1.2)
BILIRUBIN DIRECT: 3.2 MG/DL (ref 0–0.3)
BILIRUBIN, INDIRECT: 1.1 MG/DL (ref 0–1)
BLASTS RELATIVE PERCENT: 0.9 % (ref 0–0)
BLOOD BANK DISPENSE STATUS: NORMAL
BLOOD BANK PRODUCT CODE: NORMAL
BPU ID: NORMAL
BUN BLDV-MCNC: 13 MG/DL (ref 6–20)
BUN BLDV-MCNC: 14 MG/DL (ref 6–20)
BURR CELLS: ABNORMAL
CALCIUM SERPL-MCNC: 6.9 MG/DL (ref 8.6–10.2)
CALCIUM SERPL-MCNC: 7.4 MG/DL (ref 8.6–10.2)
CHLORIDE BLD-SCNC: 113 MMOL/L (ref 98–107)
CHLORIDE BLD-SCNC: 116 MMOL/L (ref 98–107)
CO2: 20 MMOL/L (ref 22–29)
CO2: 23 MMOL/L (ref 22–29)
COHB: 0.9 % (ref 0–1.5)
CREAT SERPL-MCNC: 0.6 MG/DL (ref 0.5–1)
CREAT SERPL-MCNC: 0.7 MG/DL (ref 0.5–1)
CRITICAL: ABNORMAL
CULTURE, RESPIRATORY: NORMAL
DATE ANALYZED: ABNORMAL
DATE OF COLLECTION: ABNORMAL
DESCRIPTION BLOOD BANK: NORMAL
EOSINOPHILS ABSOLUTE: 0.12 E9/L (ref 0.05–0.5)
EOSINOPHILS RELATIVE PERCENT: 0.9 % (ref 0–6)
EPL-TEG: 0.5 % (ref 0–15)
FIO2: 40 %
G-TEG: 6.7 K D/SC (ref 4.5–11)
GFR AFRICAN AMERICAN: >60
GFR AFRICAN AMERICAN: >60
GFR NON-AFRICAN AMERICAN: >60 ML/MIN/1.73
GFR NON-AFRICAN AMERICAN: >60 ML/MIN/1.73
GLUCOSE BLD-MCNC: 120 MG/DL (ref 74–99)
GLUCOSE BLD-MCNC: 133 MG/DL (ref 74–99)
HCO3: 21.5 MMOL/L (ref 22–26)
HCT VFR BLD CALC: 21.1 % (ref 34–48)
HCT VFR BLD CALC: 22.4 % (ref 34–48)
HCT VFR BLD CALC: 26.2 % (ref 34–48)
HCT VFR BLD CALC: 26.2 % (ref 34–48)
HEMOGLOBIN: 6.8 G/DL (ref 11.5–15.5)
HEMOGLOBIN: 7.4 G/DL (ref 11.5–15.5)
HEMOGLOBIN: 8.5 G/DL (ref 11.5–15.5)
HEMOGLOBIN: 8.6 G/DL (ref 11.5–15.5)
HHB: 2.7 % (ref 0–5)
INR BLD: 1.7
INR BLD: 1.8
K (CLOTTING TIME): 1.2 MIN (ref 1–3)
L. PNEUMOPHILA SEROGP 1 UR AG: NORMAL
LAB: ABNORMAL
LY30 (FIBRINOLYSIS): 0.5 % (ref 0–8)
LYMPHOCYTES ABSOLUTE: 1.92 E9/L (ref 1.5–4)
LYMPHOCYTES RELATIVE PERCENT: 14.8 % (ref 20–42)
Lab: ABNORMAL
MA (MAX AMPLITUDE): 57.4 MM (ref 50–70)
MAGNESIUM: 1.7 MG/DL (ref 1.6–2.6)
MCH RBC QN AUTO: 31.1 PG (ref 26–35)
MCHC RBC AUTO-ENTMCNC: 32.2 % (ref 32–34.5)
MCV RBC AUTO: 96.3 FL (ref 80–99.9)
METHB: 0.5 % (ref 0–1.5)
MODE: AC
MONOCYTES ABSOLUTE: 0.38 E9/L (ref 0.1–0.95)
MONOCYTES RELATIVE PERCENT: 2.6 % (ref 2–12)
MYELOCYTE PERCENT: 0.9 % (ref 0–0)
NEUTROPHILS ABSOLUTE: 10.37 E9/L (ref 1.8–7.3)
NEUTROPHILS RELATIVE PERCENT: 80 % (ref 43–80)
O2 CONTENT: 10.7 ML/DL
O2 SATURATION: 97.3 % (ref 92–98.5)
O2HB: 95.9 % (ref 94–97)
OPERATOR ID: 2962
PATIENT TEMP: 37 C
PCO2: 33.3 MMHG (ref 35–45)
PDW BLD-RTO: 18.1 FL (ref 11.5–15)
PEEP/CPAP: 5 CMH2O
PFO2: 2.55 MMHG/%
PH BLOOD GAS: 7.43 (ref 7.35–7.45)
PHOSPHORUS: 2.4 MG/DL (ref 2.5–4.5)
PLATELET # BLD: 92 E9/L (ref 130–450)
PLATELET CONFIRMATION: NORMAL
PMV BLD AUTO: 11.7 FL (ref 7–12)
PO2: 101.9 MMHG (ref 75–100)
POIKILOCYTES: ABNORMAL
POLYCHROMASIA: ABNORMAL
POTASSIUM SERPL-SCNC: 3.6 MMOL/L (ref 3.5–5)
POTASSIUM SERPL-SCNC: 3.8 MMOL/L (ref 3.5–5)
PROTHROMBIN TIME: 19.8 SEC (ref 9.3–12.4)
PROTHROMBIN TIME: 20.5 SEC (ref 9.3–12.4)
R (REACTION TIME): 3.7 MIN (ref 5–10)
RBC # BLD: 2.19 E12/L (ref 3.5–5.5)
RI(T): 1.32
RR MECHANICAL: 12 B/MIN
SMEAR, RESPIRATORY: NORMAL
SODIUM BLD-SCNC: 143 MMOL/L (ref 132–146)
SODIUM BLD-SCNC: 145 MMOL/L (ref 132–146)
SOURCE, BLOOD GAS: ABNORMAL
STREP PNEUMONIAE ANTIGEN, URINE: NORMAL
THB: 7.8 G/DL (ref 11.5–16.5)
TIME ANALYZED: 358
TOTAL PROTEIN: 4.4 G/DL (ref 6.4–8.3)
TOTAL PROTEIN: 4.8 G/DL (ref 6.4–8.3)
VT MECHANICAL: 350 ML
WBC # BLD: 12.8 E9/L (ref 4.5–11.5)

## 2020-07-08 PROCEDURE — P9035 PLATELET PHERES LEUKOREDUCED: HCPCS

## 2020-07-08 PROCEDURE — 36430 TRANSFUSION BLD/BLD COMPNT: CPT

## 2020-07-08 PROCEDURE — 85018 HEMOGLOBIN: CPT

## 2020-07-08 PROCEDURE — 2000000000 HC ICU R&B

## 2020-07-08 PROCEDURE — 6360000002 HC RX W HCPCS: Performed by: INTERNAL MEDICINE

## 2020-07-08 PROCEDURE — 86900 BLOOD TYPING SEROLOGIC ABO: CPT

## 2020-07-08 PROCEDURE — 94003 VENT MGMT INPAT SUBQ DAY: CPT

## 2020-07-08 PROCEDURE — P9016 RBC LEUKOCYTES REDUCED: HCPCS

## 2020-07-08 PROCEDURE — 36415 COLL VENOUS BLD VENIPUNCTURE: CPT

## 2020-07-08 PROCEDURE — 2500000003 HC RX 250 WO HCPCS: Performed by: INTERNAL MEDICINE

## 2020-07-08 PROCEDURE — 80076 HEPATIC FUNCTION PANEL: CPT

## 2020-07-08 PROCEDURE — 6370000000 HC RX 637 (ALT 250 FOR IP): Performed by: INTERNAL MEDICINE

## 2020-07-08 PROCEDURE — 82140 ASSAY OF AMMONIA: CPT

## 2020-07-08 PROCEDURE — 2580000003 HC RX 258: Performed by: INTERNAL MEDICINE

## 2020-07-08 PROCEDURE — 85014 HEMATOCRIT: CPT

## 2020-07-08 PROCEDURE — 83735 ASSAY OF MAGNESIUM: CPT

## 2020-07-08 PROCEDURE — 71045 X-RAY EXAM CHEST 1 VIEW: CPT

## 2020-07-08 PROCEDURE — C9113 INJ PANTOPRAZOLE SODIUM, VIA: HCPCS | Performed by: INTERNAL MEDICINE

## 2020-07-08 PROCEDURE — 86923 COMPATIBILITY TEST ELECTRIC: CPT

## 2020-07-08 PROCEDURE — 85610 PROTHROMBIN TIME: CPT

## 2020-07-08 PROCEDURE — 85025 COMPLETE CBC W/AUTO DIFF WBC: CPT

## 2020-07-08 PROCEDURE — 80053 COMPREHEN METABOLIC PANEL: CPT

## 2020-07-08 PROCEDURE — 85576 BLOOD PLATELET AGGREGATION: CPT

## 2020-07-08 PROCEDURE — 86901 BLOOD TYPING SEROLOGIC RH(D): CPT

## 2020-07-08 PROCEDURE — 84100 ASSAY OF PHOSPHORUS: CPT

## 2020-07-08 PROCEDURE — 86850 RBC ANTIBODY SCREEN: CPT

## 2020-07-08 PROCEDURE — 80048 BASIC METABOLIC PNL TOTAL CA: CPT

## 2020-07-08 PROCEDURE — 85347 COAGULATION TIME ACTIVATED: CPT

## 2020-07-08 PROCEDURE — 85384 FIBRINOGEN ACTIVITY: CPT

## 2020-07-08 PROCEDURE — 82805 BLOOD GASES W/O2 SATURATION: CPT

## 2020-07-08 PROCEDURE — 85730 THROMBOPLASTIN TIME PARTIAL: CPT

## 2020-07-08 RX ORDER — FUROSEMIDE 10 MG/ML
20 INJECTION INTRAMUSCULAR; INTRAVENOUS ONCE
Status: COMPLETED | OUTPATIENT
Start: 2020-07-08 | End: 2020-07-08

## 2020-07-08 RX ORDER — POTASSIUM CHLORIDE 7.45 MG/ML
10 INJECTION INTRAVENOUS
Status: COMPLETED | OUTPATIENT
Start: 2020-07-08 | End: 2020-07-08

## 2020-07-08 RX ORDER — 0.9 % SODIUM CHLORIDE 0.9 %
20 INTRAVENOUS SOLUTION INTRAVENOUS ONCE
Status: DISCONTINUED | OUTPATIENT
Start: 2020-07-08 | End: 2020-07-11

## 2020-07-08 RX ORDER — FENTANYL CITRATE 50 UG/ML
50 INJECTION, SOLUTION INTRAMUSCULAR; INTRAVENOUS ONCE
Status: COMPLETED | OUTPATIENT
Start: 2020-07-09 | End: 2020-07-08

## 2020-07-08 RX ORDER — POTASSIUM CHLORIDE 29.8 MG/ML
10 INJECTION INTRAVENOUS
Status: DISCONTINUED | OUTPATIENT
Start: 2020-07-08 | End: 2020-07-08 | Stop reason: CLARIF

## 2020-07-08 RX ORDER — MAGNESIUM SULFATE IN WATER 40 MG/ML
2 INJECTION, SOLUTION INTRAVENOUS ONCE
Status: COMPLETED | OUTPATIENT
Start: 2020-07-08 | End: 2020-07-08

## 2020-07-08 RX ADMIN — POTASSIUM CHLORIDE 10 MEQ: 10 INJECTION, SOLUTION INTRAVENOUS at 09:17

## 2020-07-08 RX ADMIN — RIFAXIMIN 400 MG: 200 TABLET ORAL at 09:18

## 2020-07-08 RX ADMIN — RIFAXIMIN 400 MG: 200 TABLET ORAL at 13:39

## 2020-07-08 RX ADMIN — SODIUM CHLORIDE, PRESERVATIVE FREE 10 ML: 5 INJECTION INTRAVENOUS at 20:41

## 2020-07-08 RX ADMIN — FUROSEMIDE 20 MG: 10 INJECTION, SOLUTION INTRAMUSCULAR; INTRAVENOUS at 10:04

## 2020-07-08 RX ADMIN — PANTOPRAZOLE SODIUM 40 MG: 40 INJECTION, POWDER, FOR SOLUTION INTRAVENOUS at 20:39

## 2020-07-08 RX ADMIN — PANTOPRAZOLE SODIUM 40 MG: 40 INJECTION, POWDER, FOR SOLUTION INTRAVENOUS at 09:18

## 2020-07-08 RX ADMIN — THIAMINE HYDROCHLORIDE 250 MG: 100 INJECTION, SOLUTION INTRAMUSCULAR; INTRAVENOUS at 09:17

## 2020-07-08 RX ADMIN — Medication 10 ML: at 09:19

## 2020-07-08 RX ADMIN — FENTANYL CITRATE 50 MCG: 0.05 INJECTION, SOLUTION INTRAMUSCULAR; INTRAVENOUS at 23:54

## 2020-07-08 RX ADMIN — OCTREOTIDE ACETATE 50 MCG/HR: 500 INJECTION, SOLUTION INTRAVENOUS; SUBCUTANEOUS at 22:28

## 2020-07-08 RX ADMIN — WATER 1 G: 1 INJECTION INTRAMUSCULAR; INTRAVENOUS; SUBCUTANEOUS at 21:04

## 2020-07-08 RX ADMIN — CLINDAMYCIN PHOSPHATE 600 MG: 600 INJECTION, SOLUTION INTRAVENOUS at 05:21

## 2020-07-08 RX ADMIN — POTASSIUM CHLORIDE 10 MEQ: 10 INJECTION, SOLUTION INTRAVENOUS at 06:14

## 2020-07-08 RX ADMIN — FOLIC ACID 1 MG: 5 INJECTION, SOLUTION INTRAMUSCULAR; INTRAVENOUS; SUBCUTANEOUS at 09:17

## 2020-07-08 RX ADMIN — POTASSIUM CHLORIDE 10 MEQ: 10 INJECTION, SOLUTION INTRAVENOUS at 08:38

## 2020-07-08 RX ADMIN — LORAZEPAM 2 MG: 2 INJECTION INTRAMUSCULAR; INTRAVENOUS at 09:18

## 2020-07-08 RX ADMIN — SODIUM CHLORIDE, PRESERVATIVE FREE 10 ML: 5 INJECTION INTRAVENOUS at 09:17

## 2020-07-08 RX ADMIN — LACTULOSE: 20 SOLUTION ORAL at 20:39

## 2020-07-08 RX ADMIN — Medication 10 ML: at 20:41

## 2020-07-08 RX ADMIN — LACTULOSE 30 G: 20 SOLUTION ORAL at 09:18

## 2020-07-08 RX ADMIN — SODIUM CHLORIDE 20 ML: 9 INJECTION, SOLUTION INTRAVENOUS at 00:07

## 2020-07-08 RX ADMIN — LACTULOSE 30 G: 20 SOLUTION ORAL at 13:39

## 2020-07-08 RX ADMIN — RIFAXIMIN 400 MG: 200 TABLET ORAL at 20:39

## 2020-07-08 RX ADMIN — OCTREOTIDE ACETATE 50 MCG/HR: 500 INJECTION, SOLUTION INTRAVENOUS; SUBCUTANEOUS at 09:58

## 2020-07-08 RX ADMIN — ESCITALOPRAM 10 MG: 10 TABLET, FILM COATED ORAL at 09:18

## 2020-07-08 RX ADMIN — POTASSIUM CHLORIDE 10 MEQ: 10 INJECTION, SOLUTION INTRAVENOUS at 08:30

## 2020-07-08 RX ADMIN — MAGNESIUM SULFATE 2 G: 2 INJECTION INTRAVENOUS at 06:14

## 2020-07-08 RX ADMIN — LORAZEPAM 2 MG: 2 INJECTION INTRAMUSCULAR; INTRAVENOUS at 18:48

## 2020-07-08 RX ADMIN — CLINDAMYCIN PHOSPHATE 600 MG: 600 INJECTION, SOLUTION INTRAVENOUS at 11:23

## 2020-07-08 RX ADMIN — SODIUM CHLORIDE 1000 ML: 9 INJECTION, SOLUTION INTRAVENOUS at 00:07

## 2020-07-08 RX ADMIN — LEVETIRACETAM 500 MG: 5 INJECTION INTRAVENOUS at 09:17

## 2020-07-08 ASSESSMENT — PULMONARY FUNCTION TESTS
PIF_VALUE: 16
PIF_VALUE: 13
PIF_VALUE: 18
PIF_VALUE: 16
PIF_VALUE: 16
PIF_VALUE: 27
PIF_VALUE: 14
PIF_VALUE: 15
PIF_VALUE: 16
PIF_VALUE: 25
PIF_VALUE: 24
PIF_VALUE: 14
PIF_VALUE: 24
PIF_VALUE: 23
PIF_VALUE: 25
PIF_VALUE: 16
PIF_VALUE: 23
PIF_VALUE: 24
PIF_VALUE: 16
PIF_VALUE: 40
PIF_VALUE: 14
PIF_VALUE: 20
PIF_VALUE: 23
PIF_VALUE: 22
PIF_VALUE: 41
PIF_VALUE: 15
PIF_VALUE: 16
PIF_VALUE: 11
PIF_VALUE: 28

## 2020-07-08 ASSESSMENT — PAIN SCALES - GENERAL
PAINLEVEL_OUTOF10: 0

## 2020-07-08 NOTE — CARE COORDINATION
Patient remains in MICU on vent with sandostatin gtt; weaning vent settings- currently on PS. Patient has hx ETOH abuse. Patient currently lives with her boyfriend but per patient's mom Hilda Hahn patient will be moving back home with them at discharge. Kevin Gilliam states patient follows with [facility] and [facility]; however hasn't been able to have face-to-face appt d/t cornavirus. Patient also follows with a psych doctor; her mom was unsure of that doctors name. I provided addiction recovery lists to Hilda Hahn and will offer Peer Recovery and addiction recovery lists to patient when she is extubated. CM/SW will continue to follow for discharge planning.

## 2020-07-08 NOTE — PLAN OF CARE
Problem: Inadequate oral food/beverage intake (NI-2.1)  Goal: Food and/or Nutrient Delivery  Description: Individualized approach for food/nutrient provision.   Outcome: Ongoing

## 2020-07-08 NOTE — PROGRESS NOTES
Nutrition Assessment    Type and Reason for Visit: Initial, Consult    Nutrition Recommendations: Advance nutrition as medically appropriate  If plan to restart TF please consult for appropriate recommendations    Nutrition Assessment: Pt nutritionally at risk w/ inability for PO intake 2/2 intubation w/ ETOH withdrawal, ETOH cirrhosis w/ ammonia now trending down. Noted consult for TF recs however pt now held for BRBPR. Will monitor for nutrition progression    Malnutrition Assessment:  · Malnutrition Status: At risk for malnutrition  · Findings of the 6 clinical characteristics of malnutrition (Minimum of 2 out of 6 clinical characteristics is required to make the diagnosis of moderate or severe Protein Calorie Malnutrition based on AND/ASPEN Guidelines):  1. Energy Intake-Less than or equal to 50% of estimated energy requirement, (x4 days since adm)    2. Weight Loss-Unable to assess    3. Fat Loss-No significant subcutaneous fat loss    4. Muscle Loss-No significant muscle mass loss    5. Fluid Accumulation-No significant fluid accumulation    6.  Strength-Not measured    Nutrition Risk Level:  Moderate    Nutrient Needs:  · Estimated Daily Total Kcal: (PS3B Tmax 38.2, MV 6.37, RMR 1610)  · Estimated Daily Protein (g): 75-90(1.2-1.4 gm/kg CBW)  · Estimated Daily Total Fluid (ml/day): per critical care management    Nutrition Diagnosis:   · Problem: Inadequate oral intake  · Etiology: related to Impaired respiratory function-inability to consume food     Signs and symptoms:  as evidenced by NPO status due to medical condition, Intubation    Objective Information:  · Nutrition-Focused Physical Findings: vent, abd distention, active BS, OGT clamped, jaundice w/ ETOH cirrhosis, ammonia trending down now 56, generalized +1 edema, +I/Os, MAP WNL, new onset BRBPR  · Wound Type: None  · Current Nutrition Therapies:  · Oral Diet Orders: NPO   · Anthropometric Measures:  · Ht: 5' 4\" (162.6 cm)   · Current Body Wt: 138 lb (62.6 kg)(bed scale 7/8)  · Admission Body Wt: 138 lb (62.6 kg)(bed scale 7/5)  · Usual Body Wt: (119-126# stated per EMR)  · % Weight Change: wt stable since adm despite +fluid balance at this time, JOHN long term wt changes d/t lack of hx on file  · Ideal Body Wt: 120 lb (54.4 kg), % Ideal Body 115%  · BMI Classification: BMI 18.5 - 24.9 Normal Weight    Nutrition Interventions:   Continued Inpatient Monitoring, Education not appropriate at this time, Coordination of Care    Nutrition Evaluation:   · Evaluation: Goals set   · Goals: Nutrition progression    · Monitoring: Nutrition Progression, Skin Integrity, I&O, Mental Status/Confusion, Weight, Pertinent Labs, Monitor Bowel Function, Monitor Hemodynamic Status      Electronically signed by Montserrat Colin, MS, RD, LD on 7/8/20 at 3:24 PM EDT    Contact Number: [phone]

## 2020-07-08 NOTE — PROGRESS NOTES
Remains intubated. Febrile  No bleeding via rectum or OGT  Copious output via the FMS. Diuresis after singe dose of Lasix  On rocephin + clindamycin  Bilirubin and INR stable  Day # 4 octreotide    Obtunded,sedated  No ascites, restrained      Stable w/o bleeding since banding. Concerns last PM re: possible BRB per rectum but this was old from UGI source. Can cut the dose of Lactulose, NH3 need not normalize and a factor in Na 145 , suggest  20 grams BID.    Will terminate Octreotide tomorrw

## 2020-07-08 NOTE — PROGRESS NOTES
[address] [facility]  Department of Internal Medicine   Internal Medicine Residency   MICU Progress Note    Patient:  Alison Roman 42 y.o. female  MRN: [id number]     Date of Service: 7/8/2020    Allergy: Pcn [penicillins]    Subjective     Overnight events: Overnight patient had large amounts of initially melanotic stools, eventually changing to bright red blood per rectum. GI (Dr. Shawanda Arnold) consulted overnight for recommendations. Dr. Shawanda Arnold recommended trending H&H, with no acute interventions necessary as long as patient remained hemodynamically stable. Patient interview could not be performed as patient was unresponsive to verbal cues. Objective     VS: BP (!) 145/98   Pulse 90   Temp 99 °F (37.2 °C)   Resp 16   Ht 5' 4\" (1.626 m)   Wt 138 lb 14.2 oz (63 kg)   SpO2 100%   BMI 23.84 kg/m²         I & O - 24hr:     Intake/Output Summary (Last 24 hours) at 7/8/2020 1009  Last data filed at 7/8/2020 9070  Gross per 24 hour   Intake 3427 ml   Output 1940 ml   Net 1487 ml       Physical Exam:  · General: sedated female, well-developed, well-nourished  · Skin: jaundiced, clean, dry, intact  · Head: normocephalic, atraumatic   · CV: RRR, no m/g/r, normal S1 and S2  · Resp: ET tube in place, lungs CTAB, no wheezing, rhonchi, rales, no respiratory distress  · GI: Bowel support in place draining melanotic material, abdomen mildly distended, bs+  · : Cerna catheter in place draining large amounts of dark urine  · MSK: no gross joint abnormalities, no gross joing swelling, range of motion intact  · Extremities: Small amount of edema in BLE, dorsalis pedis and radial pulses +3 bilaterally, moves extremities appropriately  · Neuro: Patient unresponsive, no tremor noted.     Lines     site day    Art line   None    TLC None    PICC None    Introducer R Fem      Mechanical Ventilation:   Mode:  PS      ABG:     Lab Results   Component Value Date    PH 7.428 07/08/2020    PCO2 33.3 07/08/2020    PO2 101.9 07/08/2020    SDJ9REM 21.8 07/06/2020    HCO3 21.5 07/08/2020    BE -2.5 07/08/2020    THB 7.8 07/08/2020    O2SAT 97.3 07/08/2020        Medications     Infusions: (Fluid, Sedation, Vasopressors)    Nutrition:   TF type: Semielemental at initial rate: 10 ml/h, goal rate: 25 mL/h  ATB:   Antibiotics  Days   Rocephin 4   Clindamycin  1         Patient currently has   Urinary cath  Restraints  GI prophylaxis -tonics    Labs     CBC:   Lab Results   Component Value Date    WBC 12.8 07/08/2020    RBC 2.19 07/08/2020    RBC 3.55 07/08/2019    HGB 6.8 07/08/2020    HCT 21.1 07/08/2020    MCV 96.3 07/08/2020    MCH 31.1 07/08/2020    MCHC 32.2 07/08/2020    RDW 18.1 07/08/2020    PLT 92 07/08/2020    MPV 11.7 07/08/2020     BMP:    Lab Results   Component Value Date     07/08/2020    K 3.6 07/08/2020    K 3.7 07/04/2020     07/08/2020    CO2 20 07/08/2020    BUN 13 07/08/2020    LABALBU 2.4 07/08/2020    LABALBU 5.0 03/13/2012    CREATININE 0.6 07/08/2020    CALCIUM 6.9 07/08/2020    GFRAA >60 07/08/2020    LABGLOM >60 07/08/2020    GLUCOSE 120 07/08/2020    GLUCOSE 102 07/08/2019     Hepatic Function Panel:    Lab Results   Component Value Date    ALKPHOS 96 07/08/2020    ALT 79 07/08/2020     07/08/2020    PROT 4.4 07/08/2020    BILITOT 4.3 07/08/2020    BILIDIR 3.2 07/08/2020    IBILI 1.1 07/08/2020    LABALBU 2.4 07/08/2020    LABALBU 5.0 03/13/2012     Magnesium:    Lab Results   Component Value Date    MG 1.7 07/08/2020     Phosphorus:    Lab Results   Component Value Date    PHOS 2.4 07/08/2020     PT/INR:    Lab Results   Component Value Date    PROTIME 20.5 07/08/2020    INR 1.8 07/08/2020     PTT:    Lab Results   Component Value Date    APTT 28.0 07/08/2020   [APTT}    Imaging Studies:  CXR:   Impression       1. Increasing bibasilar atelectasis.           2. New fullness of the right hilum. Consider CT chest rule out PE. Resident's Assessment and Plan     Neurological:  1.   Acute encephalopathy- improving. Likely acute metabolic encephalopathy secondary to seizures and alcohol withdrawal.  Ammonia level decreasing, currently 56. Clinically, patient is now intermittently responsive and able to follow commands. ABGs improving as well, current pH 7.428, PCO2 33.3, PO2 101.9, bicarbonate 21.5.  - Continue trending ammonia  - Continue lactulose and rifaximin  -Continue Keppra and Lexapro  - Continue to follow ABGs  Continue to follow daily CBC and CMP, as well as liver function panel    Pulmonary:  1. Acute respiratory failure requiring sedation and mechanical ventilation -likely patient inability to protect airway secondary to alcohol withdrawal.  Chest x-ray this a.m. showing atelectasis and new fullness of the right hilum, suspicious for potential PE. Patient started on pressure support, in attempt to wean off ventilator.  - Continue to monitor patient on pressure support, if tolerated will attempt extubation.  - We will continue to monitor patient SPO2  - Will consider CT chest to rule out potential PE    GI:  1. Upper GI bleed secondary to esophageal varices status post banding -  Patient has had no new episode of hematemesis. Hemoglobin currently 6.8, status post transfusion of 1 unit of packed red blood cells. - GI currently following, appreciate further recs  -We will continue to follow serial H&H  - We will transfuse to maintain hemoglobin greater than 7.0  - We will continue octreotide  - Currently holding tube feeds    2. New onset bright red blood per rectum- likely flushing of blood that remained in colon secondary to patient's initial esophageal variceal bleed secondary to beginning tube feeds. GI alerted, recommended no acute intervention as long as patient remains hemodynamically stable.  Bowel support in place, currently draining moderate amount of melanotic material.  Hemoglobin currently 6.8, status post transfusion of 1 unit of packed red blood cells  - GI following, appreciate further recs  - We will continue to monitor serial H&H  - We will continue to monitor PT/INR and LFTs  - Will transfuse if necessary to maintain hemoglobin greater than 7.0  -We will continue to monitor patient blood pressure and hemodynamic status  - Remainder of plan as per upper GI bleed seen above. 3.  Hyperammonemia- likely secondary to patient's chronic alcohol use. Currently AST of 255 and ALT of 79.   - We will continue to monitor daily LFTs  - GI following, appreciate recs    4. Chronic liver cirrhosis- likely secondary to patient's history of alcohol abuse versus less likely autoimmune etiology. Has been seen at [facility] in the past for this complaint, anti-smooth muscle antibody positive. Patient has received 2 banding procedures prior to this admission. Patient's PT and INR are elevated, 20.5 and 1.8 respectively. Total protein 4.4, albumin 2.4 total bilirubin 4.3, direct bilirubin 3.2.  - We will follow daily PT/INR  -We will continue to monitor ammonia levels  -We will continue to administer lactulose and rifaximin  - We will continue to monitor daily LFTs, CBC, CMP.  - GI following, appreciate further recs    Psychiatric:  1. History of alcohol abuse- patient has a past medical history of chronic alcohol abuse. Ethanol level 85 on admission.  - Will replete folate and thiamine as necessary  - Social work consult to discuss plan for sobriety in the future. 2.  History of anxiety and depression- patient has a documented past medical history of anxiety and depression.  -Continue Lexapro  - Ativan 2 mg IV every 6 hours PRN for agitation    . Jonathan King DO, PGY-1    Attending physician: Dr. Emelia Bates    I personally saw, examined and provided care for the patient. Radiographs, labs and medication list were reviewed by me independently. Review of Residents documentation was conducted and revisions were made as appropriate.  I agree with the above documented exam, problem list and plan of care.    Assessment:     41 y/o F with cirrhosis (ETOH +autoimmune), varices here for UGIB with active ETOH abuse. EGD with grade 2 varices at GE junction s/p banding. Developed ETOH withdrawal seizure requiring intubation. AMS. Elevated ammonia.     7/7: new infiltrate RLL,      7/8: bloody BM, BRB, still confused     1. Decompensated cirrhosis (ETOH +Autoimmune)  2. Hepatic encephalopathy  3. ETOH withdrawal seizure  4. Resp failure 2nd AMS  5.  UGIB with varices s/p banding      -transfuse 1 unit prbc, and 1 plt  -dose of lasix  -continue SBT, mentation needs to be improved for extubation     Proph:  GI - SUP  DVT - SCDs      CCT excluding procedures 35 minutes    Electronically signed by Mayank Benjamin DO on 7/8/2020 at 12:28 PM

## 2020-07-08 NOTE — PROGRESS NOTES
P Quality Flow/Interdisciplinary Rounds Progress Note        Quality Flow Rounds held on July 8, 2020    Disciplines Attending:      Richard Leal was admitted on 7/4/2020  1:34 PM    Anticipated Discharge Date:  Expected Discharge Date: 07/10/20    Disposition:    Jeffry Score:  Jeffry Scale Score: 13    Readmission Risk              Risk of Unplanned Readmission:        17           Discussed patient goal for the day, patient clinical progression, and barriers to discharge.   The following Goal(s) of the Day/Commitment(s) have been identified:  Continue icu care      Leilani Black  July 8, 2020

## 2020-07-08 NOTE — PROGRESS NOTES
Hospitalist Progress Note      PCP: Yeimy Sutherland DO    Date of Admission: 7/4/2020    Chief Complaint: *hematemesis      Hospital Course: 42 y. o. female who presented to [facility] with *hematemesis. Arthur Reyes has a known history of cirrhosis due to alcohol, has esophageal varices with banding.  States she had one glass of wine on yesterday(DOES NOT WANT HER MOTHER TO KNOW SHE HAD BEEN DRINKING).   Began to have hematemesis, and black stools,  Weakness,  And fatigue.  Found to have a HGB of 6.1.  ** transfused blood and had a stat EGD, found to haveActively bleeding varix just immediately distal to the squamocolumnar transition at the gastroesophageal junction.  Residual varices proximal to this grade 2 without overlying red  spots.  Fundus covered by blood, but the cardia was well seen and unremarkable.  Distal stomach and proximal duodenum were normal  *  She was found to be positive for amphetamines and fentanyl, she states she does not remember using drugs   Pad a seizure  and had to be intubated.           Subjective:     Patient continues on mechanical ventilation, sedated      Medications:  Reviewed    Infusion Medications    octreotide (SANDOSTATIN) infusion 50 mcg/hr (07/08/20 0958)    propofol 50 mcg/kg/min (07/06/20 2109)     Scheduled Medications    sodium chloride  20 mL Intravenous Once    sodium chloride  20 mL Intravenous Once    lactulose  30 g Oral TID    clindamycin (CLEOCIN) IV  600 mg Intravenous Q8H    levetiracetam  500 mg Intravenous Q12H    sodium chloride  20 mL Intravenous Once    rifaximin  400 mg Oral TID    pantoprazole  40 mg Intravenous BID    And    sodium chloride (PF)  10 mL Intravenous BID    escitalopram  10 mg Oral Daily    [Held by provider] gabapentin  100 mg Oral TID    sodium chloride flush  10 mL Intravenous 2 times per day    folic acid  1 mg Intravenous Daily    cefTRIAXone (ROCEPHIN) IV  1 g Intravenous Q24H    thiamine (VITAMIN B1) IVPB  250 mg

## 2020-07-08 NOTE — PLAN OF CARE
Problem: Pain:  Goal: Pain level will decrease  Description: Pain level will decrease  Outcome: Met This Shift  Goal: Control of acute pain  Description: Control of acute pain  Outcome: Met This Shift  Goal: Control of chronic pain  Description: Control of chronic pain  Outcome: Met This Shift     Problem: Falls - Risk of:  Goal: Will remain free from falls  Description: Will remain free from falls  7/8/2020 0326 by Najma Colbert RN  Outcome: Met This Shift  7/7/2020 1541 by Sierra Juan RN  Outcome: Met This Shift  Goal: Absence of physical injury  Description: Absence of physical injury  7/8/2020 0326 by Najma Colbert RN  Outcome: Met This Shift  7/7/2020 1541 by Sierra Juan RN  Outcome: Met This Shift     Problem: Skin Integrity:  Goal: Will show no infection signs and symptoms  Description: Will show no infection signs and symptoms  7/8/2020 0326 by Najma Colbert RN  Outcome: Met This Shift  7/7/2020 1541 by Sierra Juan RN  Outcome: Met This Shift  Goal: Absence of new skin breakdown  Description: Absence of new skin breakdown  7/8/2020 0326 by Najma Colbert RN  Outcome: Met This Shift  7/7/2020 1541 by Sierra Juan RN  Outcome: Met This Shift     Problem: Restraint Use - Nonviolent/Non-Self-Destructive Behavior:  Goal: Absence of restraint-related injury  Description: Absence of restraint-related injury  7/8/2020 0326 by Najma Colbert RN  Outcome: Met This Shift  7/7/2020 1541 by Sierra Juan RN  Outcome: Met This Shift

## 2020-07-09 ENCOUNTER — APPOINTMENT (OUTPATIENT)
Dept: GENERAL RADIOLOGY | Age: 40
DRG: 280 | End: 2020-07-09
Payer: MEDICAID

## 2020-07-09 LAB
AADO2: 158.9 MMHG
ALBUMIN SERPL-MCNC: 2.7 G/DL (ref 3.5–5.2)
ALP BLD-CCNC: 112 U/L (ref 35–104)
ALT SERPL-CCNC: 95 U/L (ref 0–32)
AMMONIA: 43 UMOL/L (ref 11–51)
ANION GAP SERPL CALCULATED.3IONS-SCNC: 9 MMOL/L (ref 7–16)
AST SERPL-CCNC: 284 U/L (ref 0–31)
B.E.: 0 MMOL/L (ref -3–3)
BASOPHILS ABSOLUTE: 0.05 E9/L (ref 0–0.2)
BASOPHILS RELATIVE PERCENT: 0.4 % (ref 0–2)
BILIRUB SERPL-MCNC: 5.9 MG/DL (ref 0–1.2)
BILIRUBIN DIRECT: 4.5 MG/DL (ref 0–0.3)
BILIRUBIN, INDIRECT: 1.4 MG/DL (ref 0–1)
BUN BLDV-MCNC: 8 MG/DL (ref 6–20)
CALCIUM SERPL-MCNC: 7.6 MG/DL (ref 8.6–10.2)
CHLORIDE BLD-SCNC: 109 MMOL/L (ref 98–107)
CO2: 23 MMOL/L (ref 22–29)
COHB: 0.9 % (ref 0–1.5)
CREAT SERPL-MCNC: 0.5 MG/DL (ref 0.5–1)
CRITICAL: ABNORMAL
D DIMER: 1695 NG/ML DDU
DATE ANALYZED: ABNORMAL
DATE OF COLLECTION: ABNORMAL
EOSINOPHILS ABSOLUTE: 0.29 E9/L (ref 0.05–0.5)
EOSINOPHILS RELATIVE PERCENT: 2.1 % (ref 0–6)
FIBRINOGEN: 267 MG/DL (ref 225–540)
FIO2: 40 %
GFR AFRICAN AMERICAN: >60
GFR NON-AFRICAN AMERICAN: >60 ML/MIN/1.73
GLUCOSE BLD-MCNC: 111 MG/DL (ref 74–99)
HCO3: 24.3 MMOL/L (ref 22–26)
HCT VFR BLD CALC: 24.3 % (ref 34–48)
HCT VFR BLD CALC: 25.5 % (ref 34–48)
HCT VFR BLD CALC: 25.7 % (ref 34–48)
HCT VFR BLD CALC: 26.1 % (ref 34–48)
HEMOGLOBIN: 7.9 G/DL (ref 11.5–15.5)
HEMOGLOBIN: 8.2 G/DL (ref 11.5–15.5)
HEMOGLOBIN: 8.2 G/DL (ref 11.5–15.5)
HEMOGLOBIN: 8.3 G/DL (ref 11.5–15.5)
HHB: 5.9 % (ref 0–5)
IMMATURE GRANULOCYTES #: 0.13 E9/L
IMMATURE GRANULOCYTES %: 0.9 % (ref 0–5)
INR BLD: 2
LAB: ABNORMAL
LYMPHOCYTES ABSOLUTE: 2.24 E9/L (ref 1.5–4)
LYMPHOCYTES RELATIVE PERCENT: 16.2 % (ref 20–42)
Lab: ABNORMAL
MAGNESIUM: 1.6 MG/DL (ref 1.6–2.6)
MCH RBC QN AUTO: 31.5 PG (ref 26–35)
MCHC RBC AUTO-ENTMCNC: 32.5 % (ref 32–34.5)
MCV RBC AUTO: 96.8 FL (ref 80–99.9)
METHB: 0.3 % (ref 0–1.5)
MODE: AC
MONOCYTES ABSOLUTE: 1.47 E9/L (ref 0.1–0.95)
MONOCYTES RELATIVE PERCENT: 10.6 % (ref 2–12)
NEUTROPHILS ABSOLUTE: 9.66 E9/L (ref 1.8–7.3)
NEUTROPHILS RELATIVE PERCENT: 69.8 % (ref 43–80)
O2 CONTENT: 12 ML/DL
O2 SATURATION: 94 % (ref 92–98.5)
O2HB: 92.9 % (ref 94–97)
OPERATOR ID: 459
PATIENT TEMP: 37 C
PCO2: 37.7 MMHG (ref 35–45)
PDW BLD-RTO: 17.9 FL (ref 11.5–15)
PEEP/CPAP: 5 CMH2O
PFO2: 1.82 MMHG/%
PH BLOOD GAS: 7.43 (ref 7.35–7.45)
PHOSPHORUS: 2.8 MG/DL (ref 2.5–4.5)
PLATELET # BLD: 174 E9/L (ref 130–450)
PMV BLD AUTO: 10.8 FL (ref 7–12)
PO2: 72.9 MMHG (ref 75–100)
POTASSIUM SERPL-SCNC: 3.1 MMOL/L (ref 3.5–5)
PROTHROMBIN TIME: 22.4 SEC (ref 9.3–12.4)
RBC # BLD: 2.51 E12/L (ref 3.5–5.5)
RI(T): 2.18
RR MECHANICAL: 12 B/MIN
SODIUM BLD-SCNC: 141 MMOL/L (ref 132–146)
SOURCE, BLOOD GAS: ABNORMAL
THB: 9.1 G/DL (ref 11.5–16.5)
TIME ANALYZED: 454
TOTAL PROTEIN: 5.2 G/DL (ref 6.4–8.3)
VT MECHANICAL: 350 ML
WBC # BLD: 13.8 E9/L (ref 4.5–11.5)

## 2020-07-09 PROCEDURE — 94003 VENT MGMT INPAT SUBQ DAY: CPT

## 2020-07-09 PROCEDURE — 6370000000 HC RX 637 (ALT 250 FOR IP): Performed by: INTERNAL MEDICINE

## 2020-07-09 PROCEDURE — 2580000003 HC RX 258: Performed by: INTERNAL MEDICINE

## 2020-07-09 PROCEDURE — 82805 BLOOD GASES W/O2 SATURATION: CPT

## 2020-07-09 PROCEDURE — 6370000000 HC RX 637 (ALT 250 FOR IP)

## 2020-07-09 PROCEDURE — 85384 FIBRINOGEN ACTIVITY: CPT

## 2020-07-09 PROCEDURE — 2500000003 HC RX 250 WO HCPCS: Performed by: INTERNAL MEDICINE

## 2020-07-09 PROCEDURE — 71045 X-RAY EXAM CHEST 1 VIEW: CPT

## 2020-07-09 PROCEDURE — 6360000002 HC RX W HCPCS: Performed by: INTERNAL MEDICINE

## 2020-07-09 PROCEDURE — 87070 CULTURE OTHR SPECIMN AEROBIC: CPT

## 2020-07-09 PROCEDURE — 85378 FIBRIN DEGRADE SEMIQUANT: CPT

## 2020-07-09 PROCEDURE — C9113 INJ PANTOPRAZOLE SODIUM, VIA: HCPCS | Performed by: INTERNAL MEDICINE

## 2020-07-09 PROCEDURE — 02HV33Z INSERTION OF INFUSION DEVICE INTO SUPERIOR VENA CAVA, PERCUTANEOUS APPROACH: ICD-10-PCS | Performed by: INTERNAL MEDICINE

## 2020-07-09 PROCEDURE — 84100 ASSAY OF PHOSPHORUS: CPT

## 2020-07-09 PROCEDURE — 85018 HEMOGLOBIN: CPT

## 2020-07-09 PROCEDURE — 36556 INSERT NON-TUNNEL CV CATH: CPT

## 2020-07-09 PROCEDURE — 36415 COLL VENOUS BLD VENIPUNCTURE: CPT

## 2020-07-09 PROCEDURE — 2700000000 HC OXYGEN THERAPY PER DAY

## 2020-07-09 PROCEDURE — 85014 HEMATOCRIT: CPT

## 2020-07-09 PROCEDURE — 85610 PROTHROMBIN TIME: CPT

## 2020-07-09 PROCEDURE — 83735 ASSAY OF MAGNESIUM: CPT

## 2020-07-09 PROCEDURE — 80076 HEPATIC FUNCTION PANEL: CPT

## 2020-07-09 PROCEDURE — 85025 COMPLETE CBC W/AUTO DIFF WBC: CPT

## 2020-07-09 PROCEDURE — 2000000000 HC ICU R&B

## 2020-07-09 PROCEDURE — 6360000002 HC RX W HCPCS: Performed by: STUDENT IN AN ORGANIZED HEALTH CARE EDUCATION/TRAINING PROGRAM

## 2020-07-09 PROCEDURE — 87206 SMEAR FLUORESCENT/ACID STAI: CPT

## 2020-07-09 PROCEDURE — 82140 ASSAY OF AMMONIA: CPT

## 2020-07-09 PROCEDURE — 80048 BASIC METABOLIC PNL TOTAL CA: CPT

## 2020-07-09 RX ORDER — FENTANYL CITRATE 50 UG/ML
50 INJECTION, SOLUTION INTRAMUSCULAR; INTRAVENOUS ONCE
Status: COMPLETED | OUTPATIENT
Start: 2020-07-09 | End: 2020-07-09

## 2020-07-09 RX ORDER — LACTULOSE 10 G/15ML
20 SOLUTION ORAL 2 TIMES DAILY
Status: DISCONTINUED | OUTPATIENT
Start: 2020-07-09 | End: 2020-07-16 | Stop reason: HOSPADM

## 2020-07-09 RX ORDER — POTASSIUM CHLORIDE 29.8 MG/ML
40 INJECTION INTRAVENOUS ONCE
Status: COMPLETED | OUTPATIENT
Start: 2020-07-09 | End: 2020-07-09

## 2020-07-09 RX ORDER — DIMETHICONE, OXYBENZONE, AND PADIMATE O 2; 2.5; 6.6 G/100G; G/100G; G/100G
STICK TOPICAL
Status: COMPLETED
Start: 2020-07-09 | End: 2020-07-09

## 2020-07-09 RX ORDER — FENTANYL CITRATE 50 UG/ML
25 INJECTION, SOLUTION INTRAMUSCULAR; INTRAVENOUS EVERY 4 HOURS PRN
Status: DISCONTINUED | OUTPATIENT
Start: 2020-07-09 | End: 2020-07-11

## 2020-07-09 RX ORDER — FENTANYL CITRATE 50 UG/ML
INJECTION, SOLUTION INTRAMUSCULAR; INTRAVENOUS
Status: DISPENSED
Start: 2020-07-09 | End: 2020-07-09

## 2020-07-09 RX ADMIN — THIAMINE HYDROCHLORIDE 250 MG: 100 INJECTION, SOLUTION INTRAMUSCULAR; INTRAVENOUS at 08:46

## 2020-07-09 RX ADMIN — LACTULOSE 30 G: 20 SOLUTION ORAL at 08:45

## 2020-07-09 RX ADMIN — PANTOPRAZOLE SODIUM 40 MG: 40 INJECTION, POWDER, FOR SOLUTION INTRAVENOUS at 08:45

## 2020-07-09 RX ADMIN — CLINDAMYCIN PHOSPHATE 600 MG: 600 INJECTION, SOLUTION INTRAVENOUS at 02:03

## 2020-07-09 RX ADMIN — CLINDAMYCIN PHOSPHATE 600 MG: 600 INJECTION, SOLUTION INTRAVENOUS at 11:19

## 2020-07-09 RX ADMIN — SODIUM CHLORIDE, PRESERVATIVE FREE 10 ML: 5 INJECTION INTRAVENOUS at 08:45

## 2020-07-09 RX ADMIN — Medication: at 06:37

## 2020-07-09 RX ADMIN — LORAZEPAM 2 MG: 2 INJECTION INTRAMUSCULAR; INTRAVENOUS at 01:10

## 2020-07-09 RX ADMIN — Medication 10 ML: at 08:46

## 2020-07-09 RX ADMIN — WATER 1 G: 1 INJECTION INTRAMUSCULAR; INTRAVENOUS; SUBCUTANEOUS at 20:42

## 2020-07-09 RX ADMIN — SODIUM CHLORIDE, PRESERVATIVE FREE 10 ML: 5 INJECTION INTRAVENOUS at 20:42

## 2020-07-09 RX ADMIN — POTASSIUM BICARBONATE 40 MEQ: 782 TABLET, EFFERVESCENT ORAL at 09:41

## 2020-07-09 RX ADMIN — OCTREOTIDE ACETATE 50 MCG/HR: 500 INJECTION, SOLUTION INTRAVENOUS; SUBCUTANEOUS at 06:38

## 2020-07-09 RX ADMIN — PROPOFOL INJECTABLE EMULSION 50 MCG/KG/MIN: 10 INJECTION, EMULSION INTRAVENOUS at 06:25

## 2020-07-09 RX ADMIN — Medication 10 ML: at 20:43

## 2020-07-09 RX ADMIN — FENTANYL CITRATE 50 MCG: 0.05 INJECTION, SOLUTION INTRAMUSCULAR; INTRAVENOUS at 00:15

## 2020-07-09 RX ADMIN — POTASSIUM CHLORIDE 40 MEQ: 400 INJECTION, SOLUTION INTRAVENOUS at 09:41

## 2020-07-09 RX ADMIN — ESCITALOPRAM 10 MG: 10 TABLET, FILM COATED ORAL at 08:45

## 2020-07-09 RX ADMIN — RIFAXIMIN 400 MG: 200 TABLET ORAL at 08:45

## 2020-07-09 RX ADMIN — LEVETIRACETAM 500 MG: 5 INJECTION INTRAVENOUS at 22:59

## 2020-07-09 RX ADMIN — PROPOFOL INJECTABLE EMULSION 20 MCG/KG/MIN: 10 INJECTION, EMULSION INTRAVENOUS at 01:49

## 2020-07-09 RX ADMIN — FOLIC ACID 1 MG: 5 INJECTION, SOLUTION INTRAMUSCULAR; INTRAVENOUS; SUBCUTANEOUS at 08:45

## 2020-07-09 RX ADMIN — CLINDAMYCIN PHOSPHATE 600 MG: 600 INJECTION, SOLUTION INTRAVENOUS at 20:42

## 2020-07-09 RX ADMIN — LEVETIRACETAM 500 MG: 5 INJECTION INTRAVENOUS at 08:45

## 2020-07-09 RX ADMIN — RIFAXIMIN 400 MG: 200 TABLET ORAL at 13:36

## 2020-07-09 RX ADMIN — PANTOPRAZOLE SODIUM 40 MG: 40 INJECTION, POWDER, FOR SOLUTION INTRAVENOUS at 20:42

## 2020-07-09 ASSESSMENT — PULMONARY FUNCTION TESTS
PIF_VALUE: 17
PIF_VALUE: 10
PIF_VALUE: 13
PIF_VALUE: 17
PIF_VALUE: 17
PIF_VALUE: 14
PIF_VALUE: 20
PIF_VALUE: 12
PIF_VALUE: 13
PIF_VALUE: 15
PIF_VALUE: 14
PIF_VALUE: 13
PIF_VALUE: 15
PIF_VALUE: 21
PIF_VALUE: 18
PIF_VALUE: 13
PIF_VALUE: 14
PIF_VALUE: 19
PIF_VALUE: 18

## 2020-07-09 ASSESSMENT — PAIN SCALES - GENERAL: PAINLEVEL_OUTOF10: 0

## 2020-07-09 NOTE — PROCEDURES
Central Line Insertion     Procedure: left internal jugular vein Triple Lumen Catheter placement. Indications: vascular access    Consent: The family members were counseled regarding the procedure, its indications, risks, potential complications and alternatives, and any questions were answered. Consent was obtained to proceed. Number of sticks: 4    Number of Kits used: 2    Procedure: Time Out: Immediately prior to the procedure a \"timeout\" was called to verify the correct patient and procedure. The patient was place in the trendelenburg position and the skin over the left internal jugular vein was prepped with betadine and draped in a sterile fashion. Local anesthesia was obtained by infiltration using 1% Lidocaine without epinephrine. With Ultrasound guidance a large bore needle was used to identify the vein, dark non pulsatile blood returned. The guide wire was then inserted through the needle with minimal resistance. 2 mm nick was made in the skin beside the guidewire. Then a dilator was inserted and removed. A triple lumen catheter was then inserted into the vessel over the guide wire using the Seldinger technique to the  18 cm petty. All ports showed good, free flowing blood return and were flushed with saline solution. The catheter was then securely fastened to the skin with sutures and covered with a bio patch and sterile dressing. A post procedure X-ray was ordered and showed good line position. Complications: None   The patient tolerated the procedure well. Estimated blood loss: 5 ml.     Vincent Ulloa MD PGY-3  7/9/2020  3:45 AM      Attending: Dr. Patt Carbone

## 2020-07-09 NOTE — PATIENT CARE CONFERENCE
[facility] Quality Flow/Interdisciplinary Rounds Progress Note        Quality Flow Rounds held on July 9, 2020    Disciplines Attending:  , pt/ot, resp therapy, bedside nurse, charge nurse, nursing management    Darrold Mohs was admitted on 7/4/2020  1:34 PM    Anticipated Discharge Date:  Expected Discharge Date: 07/10/20    Disposition:    Jeffry Score:  Jeffry Scale Score: 14    Readmission Risk              Risk of Unplanned Readmission:        18           Discussed patient goal for the day, patient clinical progression, and barriers to discharge. The following Goal(s) of the Day/Commitment(s) have been identified:  Wean vent as tolerated.       Josef Stage  July 9, 2020

## 2020-07-09 NOTE — PROGRESS NOTES
Pt continues to reach for lines and tubes despite attempts to deter. Restraints continued for pt safety.  Johnny Mary

## 2020-07-09 NOTE — PROGRESS NOTES
Hospitalist Progress Note      PCP: Noa Chavez DO    Date of Admission: 7/4/2020    Chief Complaint: *hematemesis      Hospital Course: 42 y. o. female who presented to [facility] with *hematemesis. 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  States she had one glass of wine on yesterday(DOES NOT WANT HER MOTHER TO KNOW SHE HAD BEEN DRINKING).   Began to have hematemesis, and black stools,  Weakness,  And fatigue.  Found to have a HGB of 6.1.  ** transfused blood and had a stat EGD, found to haveActively bleeding varix just immediately distal to the squamocolumnar transition at the gastroesophageal junction.  Residual varices proximal to this grade 2 without overlying red  spots.  Fundus covered by blood, but the cardia was well seen and unremarkable.  Distal stomach and proximal duodenum were normal  *  She was found to be positive for amphetamines and fentanyl, she states she does not remember using drugs   Pad a seizure  and had to be intubated.           Subjective:     Patient continues on mechanical ventilation, sedated      Medications:  Reviewed    Infusion Medications    propofol 45 mcg/kg/min (07/09/20 0630)     Scheduled Medications    fentaNYL        lactulose  20 g Oral BID    potassium chloride  40 mEq Intravenous Once    sodium chloride  20 mL Intravenous Once    sodium chloride  20 mL Intravenous Once    clindamycin (CLEOCIN) IV  600 mg Intravenous Q8H    levetiracetam  500 mg Intravenous Q12H    sodium chloride  20 mL Intravenous Once    rifaximin  400 mg Oral TID    pantoprazole  40 mg Intravenous BID    And    sodium chloride (PF)  10 mL Intravenous BID    escitalopram  10 mg Oral Daily    [Held by provider] gabapentin  100 mg Oral TID    sodium chloride flush  10 mL Intravenous 2 times per day    folic acid  1 mg Intravenous Daily    cefTRIAXone (ROCEPHIN) IV  1 g Intravenous Q24H    thiamine (VITAMIN B1) IVPB  250 mg 4. 3* 5.9*   ALKPHOS 120* 104 96 112*     Recent Labs     07/08/20  0010 07/08/20  0340 07/09/20  0415   INR 1.7 1.8 2.0     No results for input(s): Karlee Mtz in the last 72 hours. Recent Labs     07/07/20  0510 07/08/20  0010 07/08/20  0340 07/09/20  0415   * 268* 255* 284*   ALT 77* 82* 79* 95*   BILIDIR 2.0*  --  3.2* 4.5*   BILITOT 3.3* 4.6* 4.3* 5.9*   ALKPHOS 120* 104 96 112*     No results for input(s): LACTA in the last 72 hours. No results found for: Joel Mcclellan  Lab Results   Component Value Date    AMMONIA 43.0 07/09/2020    AMMONIA 56.0 (H) 07/08/2020    AMMONIA 110.0 (H) 07/07/2020       Assessment:    Active Hospital Problems    Diagnosis Date Noted    Hematemesis [K92.0] 07/04/2020   new onset seizure  GIB  H/o esophageal varices  Acute blood loss anemia   polysubstance use  H/o ETOH abuse  H.o partial pancreatectomy and splenectomy at [facility]    Depression   elevated NH3     Plan:    S/p Octreotide drip  Continues on mechanical ventilation  Ceftriaxone IV  Clindamycin IV   lexapro  Gilmar Serafin  GI recommendations reviewed  Discussed with nursing charge     DVT Prophylaxis: scd  Diet: Diet NPO Time Specified  Code Status: Full Code     PT/OT Eval Status: *na     Dispo - *?  Psych vs home         Electronically signed by Melissa Potts MD on 7/9/2020 at 10:38 AM [facility]

## 2020-07-09 NOTE — PROGRESS NOTES
None    TLC L IJ Less than 1   PICC None    introducer Removed 7/9/2020    Hemoaccess None    Oxygen:     Intubated and sedated   Mechanical Ventilation:   AC/VC (14/450/5/40%) PPlataue: 16 and Cstat:35. Switched to PS 8 this AM.   ABG:     Lab Results   Component Value Date    PH 7.427 07/09/2020    PCO2 37.7 07/09/2020    PO2 72.9 07/09/2020    VHL1DAS 21.8 07/06/2020    HCO3 24.3 07/09/2020    BE 0.0 07/09/2020    THB 9.1 07/09/2020    O2SAT 94.0 07/09/2020        Medications     Infusions: (Fluid, Sedation, Vasopressors)  IVF:    LR    Sedation   - Propofol being weaned off   -  Ativan injection    Nutrition:   NPO  ATB:   Antibiotics  Days   Clindamycin    Rocephin          Skin issues: n/a  Patient currently has   DVT prophylaxis/ GI prophylaxis,      Labs     CBC:   Lab Results   Component Value Date    WBC 13.8 07/09/2020    RBC 2.51 07/09/2020    RBC 3.55 07/08/2019    HGB 7.9 07/09/2020    HCT 24.3 07/09/2020    MCV 96.8 07/09/2020    MCH 31.5 07/09/2020    MCHC 32.5 07/09/2020    RDW 17.9 07/09/2020     07/09/2020    MPV 10.8 07/09/2020     CMP:    Lab Results   Component Value Date     07/09/2020    K 3.1 07/09/2020    K 3.7 07/04/2020     07/09/2020    CO2 23 07/09/2020    BUN 8 07/09/2020    CREATININE 0.5 07/09/2020    GFRAA >60 07/09/2020    LABGLOM >60 07/09/2020    GLUCOSE 111 07/09/2020    GLUCOSE 102 07/08/2019    PROT 5.2 07/09/2020    LABALBU 2.7 07/09/2020    LABALBU 5.0 03/13/2012    CALCIUM 7.6 07/09/2020    BILITOT 5.9 07/09/2020    ALKPHOS 112 07/09/2020     07/09/2020    ALT 95 07/09/2020         Resident's Assessment and Plan   Neurology:   Acute metabolic Encephalopathy 2/2 Seizures 2/2 Withdrawal   - Pt has hx of alcoholism. Ethanol level in ED was 85.  - RAAS goal -1 to 0 with propofol. Wean down propofol. Monitor mentation.      Withdrawal Siezures  -  IV Keppra   - Scheduled Ativan 2 mg q4-q6     Cardiology:   -  Goal MAP greater than 65   - Hematology/ Oncology:   Acute blood loss anemia  - Acute secondary to GI bleeding. Presenting hemoglobin of 6.1. INR 2.5.  - S/p 4 units packed red blood cell transfusion.    - continue fluids, has received 4 units RBC thus far. - Current Hgb is trending down. Currently  7.9.   - trend H/H q8h, Transfuse prn for hgb goal > 7g/dl. Psychiatry   History of alcohol abuse  - Ethanol level 85 on admission.  - Continue thiamine and folate. Monitor for signs of withdrawal.   - Patient also has history of anxiety and depression. Resume home lexapro and Neurontin. Early Mobility:   PT/OT not indicated at this time. Next of Kind/ POA:   S[facility]) - [phone]  Code Status:   Full Code      PT/OT: See Early Mobility;   DVT ppx: contraindicated 2/2 GI bleed   GI ppx: protonix     Disposition: Currently intubated and sedated. Will monitor off propofol for mentation and continue PS ventilation as tolerated. Avoid drips overnight, if patient is agitated. Dayami Villa DO, PGY-1    Attending physician: Dr. Pam Mccrary        I personally saw, examined and provided care for the patient. Radiographs, labs and medication list were reviewed by me independently. Review of Residents documentation was conducted and revisions were made as appropriate. I agree with the above documented exam, problem list and plan of care.       43 y/o F with cirrhosis (ETOH +autoimmune), varices here for UGIB with active ETOH abuse. EGD with grade 2 varices at GE junction s/p banding. Developed ETOH withdrawal seizure requiring intubation. AMS. Elevated ammonia.     7/7: new infiltrate RLL,      7/8: bloody BM, BRB, still confused     7/9: no acute events, less confused.     1. Decompensated cirrhosis (ETOH +Autoimmune)  2. Hepatic encephalopathy  3. ETOH withdrawal seizure  4. Resp failure 2nd AMS  5.  UGIB with varices s/p banding     -continue SBT, mentation needs to be improved for extubation     Proph:  GI - SUP  DVT - SCDs    Updated father at bedside.     CCT excluding procedures 35 minutes    Electronically signed by Jane Barton DO on 7/9/2020 at 5:18 PM

## 2020-07-09 NOTE — PROGRESS NOTES
Occupational Therapy    OT consult received to eval/treat and chart review complete. Patient on hold. Per RN during morning rounds, patient sedated and not following commands appropriately. OT to re-attempt at a later time. Thank you.        Harper Fields, OTR/L  [id number]

## 2020-07-10 ENCOUNTER — APPOINTMENT (OUTPATIENT)
Dept: GENERAL RADIOLOGY | Age: 40
DRG: 280 | End: 2020-07-10
Payer: MEDICAID

## 2020-07-10 LAB
ALBUMIN SERPL-MCNC: 2.8 G/DL (ref 3.5–5.2)
ALP BLD-CCNC: 130 U/L (ref 35–104)
ALT SERPL-CCNC: 96 U/L (ref 0–32)
AMMONIA: 25 UMOL/L (ref 11–51)
ANION GAP SERPL CALCULATED.3IONS-SCNC: 10 MMOL/L (ref 7–16)
ANISOCYTOSIS: ABNORMAL
AST SERPL-CCNC: 251 U/L (ref 0–31)
BASOPHILS ABSOLUTE: 0.12 E9/L (ref 0–0.2)
BASOPHILS RELATIVE PERCENT: 0.9 % (ref 0–2)
BILIRUB SERPL-MCNC: 6.6 MG/DL (ref 0–1.2)
BILIRUBIN DIRECT: 5.2 MG/DL (ref 0–0.3)
BILIRUBIN, INDIRECT: 1.4 MG/DL (ref 0–1)
BUN BLDV-MCNC: 10 MG/DL (ref 6–20)
CALCIUM SERPL-MCNC: 7.9 MG/DL (ref 8.6–10.2)
CHLORIDE BLD-SCNC: 106 MMOL/L (ref 98–107)
CO2: 24 MMOL/L (ref 22–29)
CREAT SERPL-MCNC: 0.5 MG/DL (ref 0.5–1)
EOSINOPHILS ABSOLUTE: 0.24 E9/L (ref 0.05–0.5)
EOSINOPHILS RELATIVE PERCENT: 1.8 % (ref 0–6)
GFR AFRICAN AMERICAN: >60
GFR NON-AFRICAN AMERICAN: >60 ML/MIN/1.73
GLUCOSE BLD-MCNC: 95 MG/DL (ref 74–99)
HCT VFR BLD CALC: 25.1 % (ref 34–48)
HEMOGLOBIN: 8.3 G/DL (ref 11.5–15.5)
HYPOCHROMIA: ABNORMAL
LYMPHOCYTES ABSOLUTE: 0.94 E9/L (ref 1.5–4)
LYMPHOCYTES RELATIVE PERCENT: 7.1 % (ref 20–42)
MAGNESIUM: 1.5 MG/DL (ref 1.6–2.6)
MCH RBC QN AUTO: 31.8 PG (ref 26–35)
MCHC RBC AUTO-ENTMCNC: 33.1 % (ref 32–34.5)
MCV RBC AUTO: 96.2 FL (ref 80–99.9)
MONOCYTES ABSOLUTE: 0.94 E9/L (ref 0.1–0.95)
MONOCYTES RELATIVE PERCENT: 7.1 % (ref 2–12)
NEUTROPHILS ABSOLUTE: 11.12 E9/L (ref 1.8–7.3)
NEUTROPHILS RELATIVE PERCENT: 83.2 % (ref 43–80)
NUCLEATED RED BLOOD CELLS: 0.9 /100 WBC
OVALOCYTES: ABNORMAL
PATHOLOGIST REVIEW: NORMAL
PDW BLD-RTO: 17.3 FL (ref 11.5–15)
PHOSPHORUS: 2.5 MG/DL (ref 2.5–4.5)
PLATELET # BLD: 203 E9/L (ref 130–450)
PMV BLD AUTO: 10.4 FL (ref 7–12)
POIKILOCYTES: ABNORMAL
POLYCHROMASIA: ABNORMAL
POTASSIUM SERPL-SCNC: 3.7 MMOL/L (ref 3.5–5)
PROCALCITONIN: 0.59 NG/ML (ref 0–0.08)
RBC # BLD: 2.61 E12/L (ref 3.5–5.5)
SODIUM BLD-SCNC: 140 MMOL/L (ref 132–146)
TARGET CELLS: ABNORMAL
TOTAL PROTEIN: 5.6 G/DL (ref 6.4–8.3)
WBC # BLD: 13.4 E9/L (ref 4.5–11.5)

## 2020-07-10 PROCEDURE — C9113 INJ PANTOPRAZOLE SODIUM, VIA: HCPCS | Performed by: INTERNAL MEDICINE

## 2020-07-10 PROCEDURE — 6360000002 HC RX W HCPCS: Performed by: INTERNAL MEDICINE

## 2020-07-10 PROCEDURE — 6370000000 HC RX 637 (ALT 250 FOR IP): Performed by: INTERNAL MEDICINE

## 2020-07-10 PROCEDURE — 2580000003 HC RX 258: Performed by: INTERNAL MEDICINE

## 2020-07-10 PROCEDURE — 97530 THERAPEUTIC ACTIVITIES: CPT

## 2020-07-10 PROCEDURE — 84145 PROCALCITONIN (PCT): CPT

## 2020-07-10 PROCEDURE — 2500000003 HC RX 250 WO HCPCS: Performed by: INTERNAL MEDICINE

## 2020-07-10 PROCEDURE — 36415 COLL VENOUS BLD VENIPUNCTURE: CPT

## 2020-07-10 PROCEDURE — 74018 RADEX ABDOMEN 1 VIEW: CPT

## 2020-07-10 PROCEDURE — 82140 ASSAY OF AMMONIA: CPT

## 2020-07-10 PROCEDURE — 71045 X-RAY EXAM CHEST 1 VIEW: CPT

## 2020-07-10 PROCEDURE — 80048 BASIC METABOLIC PNL TOTAL CA: CPT

## 2020-07-10 PROCEDURE — 97166 OT EVAL MOD COMPLEX 45 MIN: CPT

## 2020-07-10 PROCEDURE — 80076 HEPATIC FUNCTION PANEL: CPT

## 2020-07-10 PROCEDURE — 2000000000 HC ICU R&B

## 2020-07-10 PROCEDURE — 85025 COMPLETE CBC W/AUTO DIFF WBC: CPT

## 2020-07-10 PROCEDURE — 83735 ASSAY OF MAGNESIUM: CPT

## 2020-07-10 PROCEDURE — 97162 PT EVAL MOD COMPLEX 30 MIN: CPT

## 2020-07-10 PROCEDURE — 2700000000 HC OXYGEN THERAPY PER DAY

## 2020-07-10 PROCEDURE — 84100 ASSAY OF PHOSPHORUS: CPT

## 2020-07-10 PROCEDURE — 97535 SELF CARE MNGMENT TRAINING: CPT

## 2020-07-10 RX ORDER — MAGNESIUM SULFATE IN WATER 40 MG/ML
2 INJECTION, SOLUTION INTRAVENOUS ONCE
Status: COMPLETED | OUTPATIENT
Start: 2020-07-10 | End: 2020-07-10

## 2020-07-10 RX ORDER — QUETIAPINE FUMARATE 25 MG/1
25 TABLET, FILM COATED ORAL 2 TIMES DAILY
Status: DISCONTINUED | OUTPATIENT
Start: 2020-07-10 | End: 2020-07-16 | Stop reason: HOSPADM

## 2020-07-10 RX ORDER — POTASSIUM CHLORIDE 29.8 MG/ML
20 INJECTION INTRAVENOUS ONCE
Status: COMPLETED | OUTPATIENT
Start: 2020-07-10 | End: 2020-07-10

## 2020-07-10 RX ORDER — PROPRANOLOL HYDROCHLORIDE 10 MG/1
10 TABLET ORAL 2 TIMES DAILY
Status: DISCONTINUED | OUTPATIENT
Start: 2020-07-10 | End: 2020-07-16 | Stop reason: HOSPADM

## 2020-07-10 RX ADMIN — QUETIAPINE FUMARATE 25 MG: 25 TABLET ORAL at 12:35

## 2020-07-10 RX ADMIN — PANTOPRAZOLE SODIUM 40 MG: 40 INJECTION, POWDER, FOR SOLUTION INTRAVENOUS at 22:52

## 2020-07-10 RX ADMIN — QUETIAPINE FUMARATE 25 MG: 25 TABLET ORAL at 23:46

## 2020-07-10 RX ADMIN — Medication 10 ML: at 08:32

## 2020-07-10 RX ADMIN — CLINDAMYCIN PHOSPHATE 600 MG: 600 INJECTION, SOLUTION INTRAVENOUS at 18:35

## 2020-07-10 RX ADMIN — SODIUM PHOSPHATE, MONOBASIC, MONOHYDRATE 10 MMOL: 276; 142 INJECTION, SOLUTION INTRAVENOUS at 09:01

## 2020-07-10 RX ADMIN — FOLIC ACID 1 MG: 5 INJECTION, SOLUTION INTRAMUSCULAR; INTRAVENOUS; SUBCUTANEOUS at 08:32

## 2020-07-10 RX ADMIN — CLINDAMYCIN PHOSPHATE 600 MG: 600 INJECTION, SOLUTION INTRAVENOUS at 04:07

## 2020-07-10 RX ADMIN — PROPRANOLOL HYDROCHLORIDE 10 MG: 10 TABLET ORAL at 23:46

## 2020-07-10 RX ADMIN — RIFAXIMIN 400 MG: 200 TABLET ORAL at 23:46

## 2020-07-10 RX ADMIN — POTASSIUM CHLORIDE 20 MEQ: 400 INJECTION, SOLUTION INTRAVENOUS at 07:44

## 2020-07-10 RX ADMIN — CLINDAMYCIN PHOSPHATE 600 MG: 600 INJECTION, SOLUTION INTRAVENOUS at 11:02

## 2020-07-10 RX ADMIN — THIAMINE HYDROCHLORIDE 250 MG: 100 INJECTION, SOLUTION INTRAMUSCULAR; INTRAVENOUS at 08:32

## 2020-07-10 RX ADMIN — LORAZEPAM 2 MG: 2 INJECTION INTRAMUSCULAR; INTRAVENOUS at 05:39

## 2020-07-10 RX ADMIN — MAGNESIUM SULFATE 2 G: 2 INJECTION INTRAVENOUS at 07:44

## 2020-07-10 RX ADMIN — LACTULOSE 20 G: 20 SOLUTION ORAL at 12:35

## 2020-07-10 RX ADMIN — LACTULOSE 20 G: 20 SOLUTION ORAL at 23:46

## 2020-07-10 RX ADMIN — SODIUM CHLORIDE, PRESERVATIVE FREE 10 ML: 5 INJECTION INTRAVENOUS at 22:52

## 2020-07-10 RX ADMIN — SODIUM CHLORIDE, PRESERVATIVE FREE 10 ML: 5 INJECTION INTRAVENOUS at 08:32

## 2020-07-10 RX ADMIN — Medication 10 ML: at 22:53

## 2020-07-10 RX ADMIN — PANTOPRAZOLE SODIUM 40 MG: 40 INJECTION, POWDER, FOR SOLUTION INTRAVENOUS at 08:32

## 2020-07-10 RX ADMIN — LEVETIRACETAM 500 MG: 5 INJECTION INTRAVENOUS at 10:11

## 2020-07-10 RX ADMIN — LEVETIRACETAM 500 MG: 5 INJECTION INTRAVENOUS at 22:53

## 2020-07-10 RX ADMIN — RIFAXIMIN 400 MG: 200 TABLET ORAL at 14:15

## 2020-07-10 ASSESSMENT — PAIN SCALES - GENERAL
PAINLEVEL_OUTOF10: 0

## 2020-07-10 NOTE — PROGRESS NOTES
[address] [facility]  Department of Internal Medicine   Internal Medicine Residency   MICU Progress Note    Patient:  Yelitza Mac 42 y.o. female  MRN: [id number]     Date of Service: 7/10/2020    Allergy: Pcn [penicillins]    Subjective     Yelitza Mac is a 42 y.o. female with hx of cirrhosis (2/2 autoimmune and EtOH) who is present in the MICU for mngt of GI Bleed 2/2 bleeding grade 2 varix just below GE. Developed EtOH withdrawal seizure + increasingly agitated requiring intubating. Pt seen and examined this AM.     Yesterday afternoon, patient was extubated. Overnight, patient was agitated and given Ativan x2 (1am and 5 am). Pt seen and examined this AM. She complains of hallucinations, fatigue and being anxious. Patient failed her bedside swallow this AM. Denies CP, SOB, N/V, D/C. Objective     VS: BP (!) 158/97   Pulse 114   Temp 99 °F (37.2 °C)   Resp (!) 34   Ht 5' 4\" (1.626 m)   Wt 129 lb 3 oz (58.6 kg)   SpO2 97%   BMI 22.18 kg/m²         I & O - 24hr:     Intake/Output Summary (Last 24 hours) at 7/10/2020 0745  Last data filed at 7/10/2020 0543  Gross per 24 hour   Intake 844.9 ml   Output 1305 ml   Net -460.1 ml       Physical Exam:  · General Appearance: appears stated age, extubated on NC and resting comfortably. · Neck: no adenopathy, no carotid bruit, no JVD, supple, symmetrical, trachea midline and thyroid not enlarged, symmetric, no tenderness/mass/nodules   · Eyes: conjunctiva pink; sclera icteric. · Lung: clear to auscultation bilaterally  · Heart: regular rate and regular rhythm, S1, S2 normal, no murmur, click, rub or gallop  · Abdomen: soft, non-tender; bowel sounds normal; no masses,  no organomegaly ; NG tube in place. · Extremities:  extremities normal, atraumatic, no cyanosis or edema  · Musculoskeletal: No joint swelling, no muscle tenderness. ROM normal in all joints of extremities. · Neurologic: CN I-XII intact; patient is alert, oriented x3.  Is mildly swallow. Will put in NG tube. Start trickle tube feeds. Withdrawal Siezures  - Continue to monitor. Continue Keppra. - d/c Ativan 2 mg. Cardiology:   - Goal MAP greater than 65   - VSS    Pulmonary:   Extubated yesterday    Aspiration Pneumonia  - CXR shows infiltrates are clearing up. - Continue Clindamycin for 1 more day and d/c Rocephin      Nephrology (Fluids/ Electrolytes & Nutrition):   - Cr is 0.5. Around baseline of 0.8. HAGMA likely 2/2 lactic acidosis  - Resolved. - Lactic acid trending down 16.1 --> 5.6 --> 2.7--> 1.7. Hypernatremia  -  Resolved. NA stable at 140    Hypokalemia  - K of 3.7. Keep above 3.5  - Replete as necessary. Gastroenterology:   Hematemesis 2/2 bleeding varix just below GE junction.  - Controlled with band/ large amount blood in fundus during EGD  - GI reccs appreciated. - Continue ppi bid. Octreotide has been d/c.   - Pt had an episode of BRBPR night of 7/7/2020. May have been blood from fundus as seen in EGD. Appears to have resolved. Not currently bleeding from rectum. - Trickle tube feeds   Nausea  - Ondansetron q6h prn     Chronic Liver cirrhosis with esophageal varices  - diagnosed last year (2019) 2/2 autoimmune process  - Work up in [facility] - anti smooth muscle positive but patient not aware of any diagnosis  -  has undergone endoscopic evaluations three times at [facility] per GI, the first in 10/2019 where two grade 2 varices were banded.  Followup endoscopy in 12/2019 and again in 05/2020 - grade 1 varices with no additional bandings performed. - Pt has chronic Transaminitis and hyperbilirubinemia  Hyperammonemia  - Ammonia trending down 149--> 199 --> 102-->48--> 25  - Place OG tube and recc Lactulose via OG tube. Will add rifaximin as well.    - Continue to monitor liver enzymes   - Will start patient on low dose betablocker which should also help with tachycardia and hypertensive BP     Hx of pancreatic cyst   - s/p partial splenectomy and pancrectomy    Elevated INR  - 2.5--> 2.4 in the setting of liver disease. Currently 2.0.   - Resolved   - Monitor INR. Hematology/ Oncology:   Acute blood loss anemia  - Acute secondary to GI bleeding. Presenting hemoglobin of 6.1. INR 2.5.  - S/p 4 units packed red blood cell transfusion.    - continue fluids, has received 4 units RBC thus far. - Current Hgb is trending down. Currently  7.9.   - trend H/H q8h, Transfuse prn for hgb goal > 7g/dl. Psychiatry   History of alcohol abuse  - Ethanol level 85 on admission.  - Continue thiamine and folate. Monitor for signs of withdrawal.   - Patient also has history of anxiety and depression. Resume home lexapro and Neurontin. Early Mobility:   PT/OT reccs appreciated. [facility] - [phone]  Code Status:   Full Code      PT/OT: See Early Mobility;   DVT ppx: contraindicated 2/2 GI bleed   GI ppx: protonix     Disposition: Given patient high risk, will monitor in ICU for 24 hours. Tomorrow, she will likely be able to go to the general floors. Kayleigh Acuna DO, PGY-1    Attending physician: Dr. Ana Martinez    I personally saw, examined and provided care for the patient. Radiographs, labs and medication list were reviewed by me independently. Review of Residents documentation was conducted and revisions were made as appropriate. I agree with the above documented exam, problem list and plan of care. 41 y/o F with cirrhosis (ETOH +autoimmune), varices here for UGIB with active ETOH abuse. EGD with grade 2 varices at GE junction s/p banding. Developed ETOH withdrawal seizure requiring intubation. AMS. Elevated ammonia.     7/7: new infiltrate RLL,      7/8: bloody BM, BRB, still confused     7/9: no acute events, less confused. 7/10: extubated yesterday, needed ativan overnight, confused     1. Decompensated cirrhosis (ETOH +Autoimmune)  2. Hepatic encephalopathy  3. ETOH withdrawal seizure  4. Resp failure 2nd AMS  5.  UGIB with varices s/p banding  6. Pneumonia  *aspiration  -repeat procal, clinda      Proph:  GI - SUP  DVT - SCDs     Updated father at bedside.     CCT excluding procedures 33 minutes    Electronically signed by Remedios Pires DO on 7/10/2020 at 2:13 PM

## 2020-07-10 NOTE — PROGRESS NOTES
Patient pulls at lines when unrestrained. Attempts to get out of bed. Unable to be verbally redirected.  Remains in 4 point soft restraints for patient safety

## 2020-07-10 NOTE — PROGRESS NOTES
OCCUPATIONAL THERAPY INITIAL EVALUATION      Date:7/10/2020  Patient Name: Mei Jacome  MRN: [id number]  : 1980  [facility]    Evaluating OT: SIENA Nj/L  Referring Provider: Odilon Dewitt MD    AM-PAC Daily Activity Raw Score:   Recommended Adaptive Equipment: to be determined     Comments: Based on patient's functional performance as stated below and level of assistance needed prior to admission, this therapist believes that the patient would benefit from further skilled OT following hospital stay in an effort to increase safety, functional independence, and quality of life. Diagnosis: Hematemesis [K92.0]   Surgery:  Esophagogastroduodenoscopy plus banding of bleeding  esophageal varices ,  intubated,  extubated      Pertinent Medical History: anxiety, HTN, hypothyroidism, pancreatic cyst, ETOH abuse    Precautions:  Falls, 4 point restraints     Home Living: Pt was living with boyfriend, will be staying with parents- who live in a 2 story with no step(s) to enter and no rail(s); bed/bath on second floor. Bathroom setup: walk-in shower  Equipment owned: none    Prior Level of Function: independent with ADLs and with IADLs; using no device for ambulation. Driving: yes  Occupation: not working at present    Pain Level: denies pain  Cognition: A&O: to self and hospital only- states Carol for month and [place]achna for location, states \"hallucinations\" for reason for admission; Follows 1 step directions <50% of the time. Poor participation with max cues, lethargy vs volitional vs combination of the two. Memory: F-   Sequencing: P   Problem solving: P   Judgement/safety: P   RASS: -2 to -1  CAM-ICU: negative; However demonstrates symptoms of hypoactive delirium during evaluation. Continue to monitor. Patient actively hallucinating per RNs during prior evening.      Functional Assessment:   Initial Eval Status  Date: 7/10/20 Treatment Status  Date: STG=LTG (~5-14  days) Feeding Min A  simulated     improve self feeding task to independent   Grooming Mod A  Simulated; poor volition to attempt    improve grooming/hygiene tasks to SBA while standing at sink    UB Dressing Mod A    improve UB dressing to independent   LB Dressing Max A  To magda incontinence brief    improve LB dressing to min A with AE PRN   Bathing Max A    improve UB/LB bathing to min A   Toileting DEP    improve toileting task and all clothing mgmt to min A   Bed Mobility  Supine to sit: Max A  Sit to supine: Max A x2  improve bed mobility to Supervision in prep for EOB ADL tasks   Functional Transfers Sit to stand: Max A  Stand to sit: Max A  improve all functional transfers to independent   Functional Mobility Max A x2 ww  For short steps to/from bedside chair; assistance for advancing LEs, max cues for sequencing  improve functional mobility to Mod I/Ind with AE PRN   Balance Sitting:     Static:  Mod A  L lateral lean    Dynamic: Max A  Standing: Max A x2  demo independent dynamic sitting balance EOB during ADL tasks   Endurance/Activity Tolerance Poor  demo Good activity tolerance/endurance during 15-20 min ADL task    Visual/  Perceptual Glasses: none              Hand dominance: R  UE ROM: RUE: WFL  LUE: WFL  Strength: RUE: grossly 2/5 LUE: grossly 2/5   Strength: impaired b/l  Fine Motor Coordination: impaired b/l    Hearing: WFL  Sensation: No c/o numbness or tingling  Tone: WNL  Edema: mild global edema    Vitals:   BP at rest: 136/92 BP at end of session: 111/75   HR at rest: 85 HR at end of session: 80   Spo2 at rest: 100% on 4.5 L o2  Spo2 at end of session: 100% on 4.5 L o2                              Comments: Attended interdisciplinary rounds. OK from RN to see patient. Session completed with PT collaboration. Upon arrival patient supine with HOB slightly elevated and 4 pt restraints applied.  Patient difficult to rouse, alertness increased with positional change however began to wane as session went on. Demonstrates poor volition for occupational therapy intervention. Functional assessment limited d/t participation. After session, patient back in bed with all devices within reach, all lines and tubes intact, nursing notified. Pt required cues and education as noted above for safe facilitation and completion of tasks. Therapist provided skilled monitoring of HR, O2 saturation, blood pressure and patient's response during treatment session. Prior to and at the end of session, environmental modifications/line management completed for patients safety and efficiency of treatment session. Overall, patient demonstrates significant difficulties with completion of BADLs and IADLs. Factors contributing to these difficulties include lethargy, poor volition, decreased endurance, and generalized weakness. Required co-session with PT due to medical acuity and level of physical assistance needed. As noted above, patient likely to benefit from further OT intervention to increase independence, safety, and overall quality of life. Treatment:  · Bed mobility: Facilitated bed mobility with cues for proper body mechanics and sequencing to prepare for ADL completion. · Functional transfers: Facilitated transfers from various surfaces with cues for body alignment, safety and hand placement. · ADL completion: Self-care retraining for the above-mentioned ADLs; training on proper hand placement, safety technique, sequencing, and energy conservation techniques. · Postural Balance: Sitting and standing balance retraining to improve righting reactions with postural changes during ADLs. · Cognitive/Perceptual training: retraining exercises to improve attention, mentation, and spatial awareness for ADLs & transfers. · Skilled positioning: Proper positioning to improve interaction with environment, overall functioning and decrease/prevent edema and contractures.   · Delirium Prevention/Management: Implementation of non-pharmacologic interventions for delirium prevention incorporated throughout session to patient. Including environmental and sensory modifications to decrease patient's internal distraction caused by delirium, and improve overall mentation. Eval Complexity:   · Medium Complexity  · History: Expanded review of medical records and additional review of physical, cognitive, or psychosocial history related to current functional performance  · Exam: 3+ performance deficits  · Assistance/Modification: Min/mod assistance or modifications required to perform tasks. May have comorbidities that affect occupational performance. Assessment of current deficits   Functional mobility [x]  ADLs [x] Strength [x]  Cognition [x]  Functional transfers  [x] IADLs [x] Safety Awareness [x]  Endurance [x]  Fine Motor Coordination [x] Balance [x] Vision/perception [] Sensation []   Gross Motor Coordination [] ROM [x] Delirium []                  Motor Control []    Plan of Care: 1-5 tx/wk PRN   ADL retraining [x]   Equipment needs [x]   Neuromuscular re-education [] Energy Conservation Techniques [x]  Functional Transfer training [x] Patient and/or Family Education [x]  Functional Mobility training [x]  Environmental Modifications [x]  Cognitive re-training []   Compensatory techniques for ADLs [x]  Splinting Needs []   Positioning to improve overall function [x]   Therapeutic Activity [x]                       Therapeutic Exercise  [x]  Visual/Perceptual: []    Delirium prevention/treatment  [x]   Other:  []    Rehab Potential: Good for established goals    Patient / Family Goal: None stated     Patient and/or family were instructed/educated on diagnosis, prognosis/goals and plan of care. Demonstrated poor understanding, further information likely needed. [] Malnutrition indicators have been identified and nursing has been notified to ensure a dietitian consult is ordered.         (Evaluation time includes thorough review of current medical information, gathering information on past medical history/social history and prior level of function, completion of standardized testing/informal observation of tasks, assessment of data and development of POC/Goals.)    Medium Complexity Evaluation +  Treatment Time In: 0920            Treatment Time Out: 1000               Treatment Charges: Mins Units   Ther Ex  52149     Manual Therapy [id number]     Thera Activities 17745 15 1   ADL/Home Mgt [id number] 25 2   Neuro Re-ed [id number]     Group Therapy      Orthotic manage/training  02609     Non-Billable Time     Total Timed Treatment 40 2430 Naresh Fox, OTR/L  [id number]

## 2020-07-10 NOTE — FLOWSHEET NOTE
Discovered pt after she removed her fecal collection bag and was attempting to remove her vazquez using her toes.  Ankle restraints reapplied

## 2020-07-10 NOTE — CARE COORDINATION
Patient remains in MICU on 4L nc and 4pt soft restraints d/t pulling at lines/equipment. I met with patient's mom Romi Vazquez to discuss discharge planning. I explained that patient's AM-PAC score is 11/24 suggesting patient will likely need some rehab to get stronger prior to returning home. I provided mom with a list of SNF facilities that are more likely to accept patient with her hx ETOH. Romi Vazquez is going to review this list over the weekend and I stated we would get back to her on Monday with her choices. I have Hawa Huizar from [address] [place] [address] following as mom has not choiced for this facility yet but was strongly leaning toward this facility. CM/SW will continue to follow for discharge needs.

## 2020-07-10 NOTE — FLOWSHEET NOTE
Responding to internal stimuli. Yelling at [facility] AND [facility]" to speak up and talk to her. States that \"Cheryl\" is present in the room and speaking to her but she cannot make out what she is saying. Oriented to self only. Cannot identify time or place. When asked she only states \"I'm here because my parents dropped me off to take my kids to lunch because I was dressed like a cupcake\"   Unable to reorient as she only becomes more agitated/ confused with my attempts.    Medicated PRN with ativan

## 2020-07-10 NOTE — PLAN OF CARE
Problem: Pain:  Goal: Pain level will decrease  Description: Pain level will decrease  Outcome: Met This Shift     Problem: Falls - Risk of:  Goal: Will remain free from falls  Description: Will remain free from falls  Outcome: Met This Shift  Goal: Absence of physical injury  Description: Absence of physical injury  Outcome: Met This Shift     Problem: Skin Integrity:  Goal: Will show no infection signs and symptoms  Description: Will show no infection signs and symptoms  Outcome: Met This Shift  Goal: Absence of new skin breakdown  Description: Absence of new skin breakdown  Outcome: Met This Shift     Problem: Restraint Use - Nonviolent/Non-Self-Destructive Behavior:  Goal: Absence of restraint-related injury  Description: Absence of restraint-related injury  Outcome: Met This Shift

## 2020-07-10 NOTE — PROGRESS NOTES
Physical Therapy    Physical Therapy Initial Assessment     Name: Le Reason  : 1980  MRN: [id number]    Referring Provider:  Peggy Sheffield MD    Date of Service: 7/10/2020    Evaluating PT:  Lidia Ailin PT, DPT [id number]     Room #:  4955/2955-Q  Diagnosis:  Hematemesis   PMHx/PSHx: Anxiety, HTN, hypothyroidism, pancreatic cyst   Procedure/Surgery:  EGD control hemorrhage    Precautions:  Falls, O2, 4 pt restraint, hx ETOH abuse   Equipment Needs:  TBD    SUBJECTIVE:    Pt lives with parents in a 2 story home with ? stairs to enter and ? rail. Bedroom and bathroom are on the 2nd level. Pt ambulated with no AD and was independent  PTA. OBJECTIVE:   Initial Evaluation  Date: 7/10/20 Treatment Short Term/ Long Term   Goals   AM-PAC 6 Clicks      Was pt agreeable to Eval/treatment? Yes      Does pt have pain? No c/o pain      Bed Mobility  Rolling: NT  Supine to sit: Max A  Sit to supine: Max A x2  Scooting: Max A to EOB   Rolling: Independent   Supine to sit: Independent   Sit to supine: Independent   Scooting: Independent    Transfers Sit to stand: Max A  Stand to sit: Max A  Stand pivot: Max A x 2 with no AD and with [employer]   Sit to stand: Independent   Stand to sit: Independent   Stand pivot: Independent    Ambulation    Few feet bed<>chair with [employer] Max A x2  >200 feet with no AD Independent    Stair negotiation: ascended and descended  NT  >4 steps with 1 rail Modified Independent     ROM BUE:  Per OT eval  BLE:  WFL     Strength BUE:  Per OT eval   BLE:  WFL     Balance Sitting EOB:  Mod A  Dynamic Standing:   Max A  Sitting EOB:  Independent   Dynamic Standing:  Independent      Pt is A & O x self, hospital   RASS:  -1  CAM-ICU:  Negative but demonstrates symptoms of hypoactive delirium   Sensation:  Pt denies numbness and tingling to extremities  Edema:  Unremarkable     Vitals:  Blood Pressure at rest 136/92 Blood Pressure post session 111/75   Heart Rate at rest 91 bpm Heart Rate post session 80 bpm   SPO2 at rest 99%  SPO2 post session 100%        Functional Status Score-Intensive Care Unit (FSS-ICU)   Rolling --/7   Supine to sit transfer 2/7   Unsupported sitting  3/7   Sit to stand transfers 2/7   Ambulation 1/7   Total  8/35     Therapeutic Exercises:     BLE ROM    Patient education  Pt educated on safety during functional mobility    Patient response to education:   Pt verbalized understanding Pt demonstrated skill Pt requires further education in this area   No  Somewhat with cues  Yes      ASSESSMENT:    Comments:  Pt received supine and agreeable to PT evaluation with OT collaboration. Pt cleared for participation by RN prior to session. Vitals monitored during session. Pt lethargic throughout session. Requires excessive cueing for instruction following and attention to task. Requires assistance of trunk and LE during supine>sit. Performed STS and transfer to chair. Demonstrates B knee flexion during transfer. Performed seated ADLs in chair. Performed STS from chair and again demonstrates B knee flexion and requires excessive cueing for eye opening, upright posture, and attention to task. Pt demonstrates L lean in sitting and standing and L gaze. Returned to sitting in chair. Performed another STS and transfer back to bed. Left in chair position on exit. Pt left with call button in reach, lines attached, and needs met.   Discussed pt case at interdisciplinary rounds in AM.     Treatment:  Patient practiced and was instructed in the following treatment:     Bed mobility training - pt given verbal and tactile cues to facilitate proper sequencing and safety during rolling and supine<>sit as well as provided with physical assistance to complete task     STS and pivot transfer training - pt educated on proper hand and foot placement, safety and sequencing, and use of no AD and WW to safely complete sit<>stand and pivot transfers with hands on assistance to complete task

## 2020-07-10 NOTE — PATIENT CARE CONFERENCE
P Quality Flow/Interdisciplinary Rounds Progress Note        Quality Flow Rounds held on July 10, 2020    Disciplines Attending:  , pt/ot, resp therapy, bedside nurse, charge nurse, nusing management    Noni Goltz was admitted on 7/4/2020  1:34 PM    Anticipated Discharge Date:  Expected Discharge Date: 07/11/20    Disposition:    Jeffry Score:  Jeffry Scale Score: 15    Readmission Risk              Risk of Unplanned Readmission:        17           Discussed patient goal for the day, patient clinical progression, and barriers to discharge. The following Goal(s) of the Day/Commitment(s) have been identified:  Check transfer.       Brody Yañez  July 10, 2020

## 2020-07-11 ENCOUNTER — APPOINTMENT (OUTPATIENT)
Dept: GENERAL RADIOLOGY | Age: 40
DRG: 280 | End: 2020-07-11
Payer: MEDICAID

## 2020-07-11 LAB
ALBUMIN SERPL-MCNC: 2.7 G/DL (ref 3.5–5.2)
ALP BLD-CCNC: 150 U/L (ref 35–104)
ALT SERPL-CCNC: 88 U/L (ref 0–32)
ANION GAP SERPL CALCULATED.3IONS-SCNC: 9 MMOL/L (ref 7–16)
AST SERPL-CCNC: 205 U/L (ref 0–31)
BASOPHILS ABSOLUTE: 0.04 E9/L (ref 0–0.2)
BASOPHILS RELATIVE PERCENT: 0.4 % (ref 0–2)
BILIRUB SERPL-MCNC: 6.2 MG/DL (ref 0–1.2)
BILIRUBIN DIRECT: 4.9 MG/DL (ref 0–0.3)
BILIRUBIN, INDIRECT: 1.3 MG/DL (ref 0–1)
BUN BLDV-MCNC: 10 MG/DL (ref 6–20)
CALCIUM SERPL-MCNC: 8 MG/DL (ref 8.6–10.2)
CHLORIDE BLD-SCNC: 108 MMOL/L (ref 98–107)
CO2: 23 MMOL/L (ref 22–29)
CREAT SERPL-MCNC: 0.5 MG/DL (ref 0.5–1)
CULTURE, RESPIRATORY: NORMAL
EOSINOPHILS ABSOLUTE: 0.4 E9/L (ref 0.05–0.5)
EOSINOPHILS RELATIVE PERCENT: 3.8 % (ref 0–6)
GFR AFRICAN AMERICAN: >60
GFR NON-AFRICAN AMERICAN: >60 ML/MIN/1.73
GLUCOSE BLD-MCNC: 86 MG/DL (ref 74–99)
HCT VFR BLD CALC: 25.4 % (ref 34–48)
HEMOGLOBIN: 8.3 G/DL (ref 11.5–15.5)
IMMATURE GRANULOCYTES #: 0.08 E9/L
IMMATURE GRANULOCYTES %: 0.8 % (ref 0–5)
LYMPHOCYTES ABSOLUTE: 1.56 E9/L (ref 1.5–4)
LYMPHOCYTES RELATIVE PERCENT: 14.7 % (ref 20–42)
MAGNESIUM: 1.6 MG/DL (ref 1.6–2.6)
MCH RBC QN AUTO: 31.4 PG (ref 26–35)
MCHC RBC AUTO-ENTMCNC: 32.7 % (ref 32–34.5)
MCV RBC AUTO: 96.2 FL (ref 80–99.9)
MONOCYTES ABSOLUTE: 1.49 E9/L (ref 0.1–0.95)
MONOCYTES RELATIVE PERCENT: 14.1 % (ref 2–12)
NEUTROPHILS ABSOLUTE: 7.02 E9/L (ref 1.8–7.3)
NEUTROPHILS RELATIVE PERCENT: 66.2 % (ref 43–80)
PDW BLD-RTO: 17.2 FL (ref 11.5–15)
PHOSPHORUS: 2.4 MG/DL (ref 2.5–4.5)
PLATELET # BLD: 228 E9/L (ref 130–450)
PMV BLD AUTO: 10.1 FL (ref 7–12)
POTASSIUM SERPL-SCNC: 3.6 MMOL/L (ref 3.5–5)
RBC # BLD: 2.64 E12/L (ref 3.5–5.5)
SMEAR, RESPIRATORY: NORMAL
SODIUM BLD-SCNC: 140 MMOL/L (ref 132–146)
TOTAL PROTEIN: 5.4 G/DL (ref 6.4–8.3)
WBC # BLD: 10.6 E9/L (ref 4.5–11.5)

## 2020-07-11 PROCEDURE — 6360000002 HC RX W HCPCS: Performed by: INTERNAL MEDICINE

## 2020-07-11 PROCEDURE — 6370000000 HC RX 637 (ALT 250 FOR IP): Performed by: INTERNAL MEDICINE

## 2020-07-11 PROCEDURE — C9113 INJ PANTOPRAZOLE SODIUM, VIA: HCPCS | Performed by: INTERNAL MEDICINE

## 2020-07-11 PROCEDURE — 83735 ASSAY OF MAGNESIUM: CPT

## 2020-07-11 PROCEDURE — 2500000003 HC RX 250 WO HCPCS: Performed by: INTERNAL MEDICINE

## 2020-07-11 PROCEDURE — 85025 COMPLETE CBC W/AUTO DIFF WBC: CPT

## 2020-07-11 PROCEDURE — 2580000003 HC RX 258: Performed by: INTERNAL MEDICINE

## 2020-07-11 PROCEDURE — 2060000000 HC ICU INTERMEDIATE R&B

## 2020-07-11 PROCEDURE — 80048 BASIC METABOLIC PNL TOTAL CA: CPT

## 2020-07-11 PROCEDURE — 36415 COLL VENOUS BLD VENIPUNCTURE: CPT

## 2020-07-11 PROCEDURE — 92610 EVALUATE SWALLOWING FUNCTION: CPT

## 2020-07-11 PROCEDURE — 36592 COLLECT BLOOD FROM PICC: CPT

## 2020-07-11 PROCEDURE — 80076 HEPATIC FUNCTION PANEL: CPT

## 2020-07-11 PROCEDURE — 2700000000 HC OXYGEN THERAPY PER DAY

## 2020-07-11 PROCEDURE — 71045 X-RAY EXAM CHEST 1 VIEW: CPT

## 2020-07-11 PROCEDURE — 84100 ASSAY OF PHOSPHORUS: CPT

## 2020-07-11 RX ORDER — LORAZEPAM 2 MG/ML
1 INJECTION INTRAMUSCULAR ONCE
Status: COMPLETED | OUTPATIENT
Start: 2020-07-11 | End: 2020-07-11

## 2020-07-11 RX ORDER — 0.9 % SODIUM CHLORIDE 0.9 %
20 INTRAVENOUS SOLUTION INTRAVENOUS ONCE
Status: DISCONTINUED | OUTPATIENT
Start: 2020-07-11 | End: 2020-07-11

## 2020-07-11 RX ORDER — QUETIAPINE FUMARATE 25 MG/1
25 TABLET, FILM COATED ORAL ONCE
Status: COMPLETED | OUTPATIENT
Start: 2020-07-11 | End: 2020-07-11

## 2020-07-11 RX ORDER — MAGNESIUM SULFATE IN WATER 40 MG/ML
2 INJECTION, SOLUTION INTRAVENOUS ONCE
Status: COMPLETED | OUTPATIENT
Start: 2020-07-11 | End: 2020-07-11

## 2020-07-11 RX ORDER — CLINDAMYCIN PHOSPHATE 600 MG/50ML
600 INJECTION INTRAVENOUS EVERY 8 HOURS
Status: CANCELLED | OUTPATIENT
Start: 2020-07-11 | End: 2020-07-14

## 2020-07-11 RX ORDER — CLINDAMYCIN PHOSPHATE 600 MG/50ML
600 INJECTION INTRAVENOUS EVERY 8 HOURS
Status: DISCONTINUED | OUTPATIENT
Start: 2020-07-11 | End: 2020-07-15

## 2020-07-11 RX ADMIN — MAGNESIUM SULFATE HEPTAHYDRATE 2 G: 40 INJECTION, SOLUTION INTRAVENOUS at 09:34

## 2020-07-11 RX ADMIN — LEVETIRACETAM 500 MG: 5 INJECTION INTRAVENOUS at 09:34

## 2020-07-11 RX ADMIN — PANTOPRAZOLE SODIUM 40 MG: 40 INJECTION, POWDER, FOR SOLUTION INTRAVENOUS at 09:34

## 2020-07-11 RX ADMIN — FOLIC ACID 1 MG: 5 INJECTION, SOLUTION INTRAMUSCULAR; INTRAVENOUS; SUBCUTANEOUS at 09:33

## 2020-07-11 RX ADMIN — THIAMINE HYDROCHLORIDE 250 MG: 100 INJECTION, SOLUTION INTRAMUSCULAR; INTRAVENOUS at 09:56

## 2020-07-11 RX ADMIN — CLINDAMYCIN PHOSPHATE 600 MG: 600 INJECTION, SOLUTION INTRAVENOUS at 03:46

## 2020-07-11 RX ADMIN — PROPRANOLOL HYDROCHLORIDE 10 MG: 10 TABLET ORAL at 20:27

## 2020-07-11 RX ADMIN — QUETIAPINE FUMARATE 25 MG: 25 TABLET ORAL at 04:46

## 2020-07-11 RX ADMIN — RIFAXIMIN 400 MG: 200 TABLET ORAL at 14:17

## 2020-07-11 RX ADMIN — RIFAXIMIN 400 MG: 200 TABLET ORAL at 20:27

## 2020-07-11 RX ADMIN — Medication 10 ML: at 10:19

## 2020-07-11 RX ADMIN — LACTULOSE 20 G: 20 SOLUTION ORAL at 09:31

## 2020-07-11 RX ADMIN — SODIUM CHLORIDE, PRESERVATIVE FREE 10 ML: 5 INJECTION INTRAVENOUS at 09:35

## 2020-07-11 RX ADMIN — PANTOPRAZOLE SODIUM 40 MG: 40 INJECTION, POWDER, FOR SOLUTION INTRAVENOUS at 20:27

## 2020-07-11 RX ADMIN — POTASSIUM PHOSPHATE, MONOBASIC POTASSIUM PHOSPHATE, DIBASIC 15 MMOL: 224; 236 INJECTION, SOLUTION, CONCENTRATE INTRAVENOUS at 09:56

## 2020-07-11 RX ADMIN — SODIUM CHLORIDE, PRESERVATIVE FREE 10 ML: 5 INJECTION INTRAVENOUS at 20:27

## 2020-07-11 RX ADMIN — LEVETIRACETAM 500 MG: 5 INJECTION INTRAVENOUS at 21:10

## 2020-07-11 RX ADMIN — CLINDAMYCIN PHOSPHATE 600 MG: 600 INJECTION, SOLUTION INTRAVENOUS at 11:37

## 2020-07-11 RX ADMIN — QUETIAPINE FUMARATE 25 MG: 25 TABLET ORAL at 20:27

## 2020-07-11 RX ADMIN — QUETIAPINE FUMARATE 25 MG: 25 TABLET ORAL at 09:32

## 2020-07-11 RX ADMIN — LORAZEPAM 1 MG: 2 INJECTION INTRAMUSCULAR; INTRAVENOUS at 18:46

## 2020-07-11 RX ADMIN — ESCITALOPRAM 10 MG: 10 TABLET, FILM COATED ORAL at 09:32

## 2020-07-11 RX ADMIN — POTASSIUM BICARBONATE 40 MEQ: 782 TABLET, EFFERVESCENT ORAL at 09:45

## 2020-07-11 RX ADMIN — Medication 10 ML: at 21:59

## 2020-07-11 RX ADMIN — CLINDAMYCIN IN 5 PERCENT DEXTROSE 600 MG: 12 INJECTION, SOLUTION INTRAVENOUS at 20:41

## 2020-07-11 RX ADMIN — LACTULOSE 20 G: 20 SOLUTION ORAL at 20:26

## 2020-07-11 RX ADMIN — RIFAXIMIN 400 MG: 200 TABLET ORAL at 09:45

## 2020-07-11 RX ADMIN — PROPRANOLOL HYDROCHLORIDE 10 MG: 10 TABLET ORAL at 09:32

## 2020-07-11 ASSESSMENT — PAIN SCALES - GENERAL
PAINLEVEL_OUTOF10: 0

## 2020-07-11 NOTE — PLAN OF CARE
Problem: Pain:  Goal: Pain level will decrease  Description: Pain level will decrease  7/11/2020 0643 by Breana Valenzuela RN  Outcome: Met This Shift     Problem: Pain:  Goal: Control of acute pain  Description: Control of acute pain  7/11/2020 0643 by Breana Valenzuela RN  Outcome: Met This Shift     Problem: Pain:  Goal: Control of chronic pain  Description: Control of chronic pain  7/11/2020 0643 by Breana Valenzuela RN  Outcome: Met This Shift     Problem: Falls - Risk of:  Goal: Will remain free from falls  Description: Will remain free from falls  7/11/2020 0643 by Breana Valenzuela RN  Outcome: Met This Shift     Problem: Falls - Risk of:  Goal: Absence of physical injury  Description: Absence of physical injury  7/11/2020 0643 by Breana Valenzuela RN  Outcome: Met This Shift     Problem: Skin Integrity:  Goal: Will show no infection signs and symptoms  Description: Will show no infection signs and symptoms  7/11/2020 0643 by Breana Valenzuela RN  Outcome: Met This Shift     Problem: Skin Integrity:  Goal: Absence of new skin breakdown  Description: Absence of new skin breakdown  7/11/2020 0643 by Breana Valenzuela RN  Outcome: Met This Shift     Problem: Restraint Use - Nonviolent/Non-Self-Destructive Behavior:  Goal: Absence of restraint-related injury  Description: Absence of restraint-related injury  7/11/2020 0643 by Breana Valenzuela RN  Outcome: Met This Shift     Problem: Restraint Use - Nonviolent/Non-Self-Destructive Behavior:  Goal: Absence of restraint indications  Description: Absence of restraint indications  7/11/2020 0643 by Breana Valenzuela RN  Outcome: Not Met This Shift   Plan of care reviewed with patient and family, as available.

## 2020-07-11 NOTE — PROGRESS NOTES
Patient restless and wiggling body in bed. Patient repeatedly requesting to go home. Patient disoriented to place and situation. Patient stated that she is at [employer] and her boyfriend is making pizzas. Patient hallucinating and talking to people that aren't present in room. When attempted to reorient patient, she became agitated and said that she is in [employer] and that \"there is a refrigerator right there and there are pizzas in it. \" Patient looking towards door when statement was made.

## 2020-07-11 NOTE — PROGRESS NOTES
Hospitalist Progress Note      PCP: Lisbet Pace DO    Date of Admission: 7/4/2020    Chief Complaint: *hematemesis      Hospital Course:     42 y. o. female who presented to [facility] with *hematemesis. 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  States she had one glass of wine before admission. (DOES NOT WANT HER MOTHER TO KNOW SHE HAD BEEN DRINKING).   Began to have hematemesis, and black stools,  Weakness,  And fatigue.  Found to have a HGB of 6.1.  ** transfused blood and had a stat EGD, found to haveActively bleeding varix just immediately distal to the squamocolumnar transition at the gastroesophageal junction.  Residual varices proximal to this grade 2 without overlying red  spots.  Fundus covered by blood, but the cardia was well seen and unremarkable.  Distal stomach and proximal duodenum were normal  *  She was found to be positive for amphetamines and fentanyl, she stated she does not remember using drugs   Pad a seizure  and had to be intubated.           Subjective:     Patient lethargic  Patient currently with restraints  Patient continues in ICU      Medications:  Reviewed    Infusion Medications     Scheduled Medications    potassium phosphate IVPB  15 mmol Intravenous Once    QUEtiapine  25 mg Oral BID    propranolol  10 mg Oral BID    lactulose  20 g Oral BID    sodium chloride  20 mL Intravenous Once    sodium chloride  20 mL Intravenous Once    clindamycin (CLEOCIN) IV  600 mg Intravenous Q8H    levetiracetam  500 mg Intravenous Q12H    sodium chloride  20 mL Intravenous Once    rifaximin  400 mg Oral TID    pantoprazole  40 mg Intravenous BID    And    sodium chloride (PF)  10 mL Intravenous BID    escitalopram  10 mg Oral Daily    [Held by provider] gabapentin  100 mg Oral TID    sodium chloride flush  10 mL Intravenous 2 times per day    folic acid  1 mg Intravenous Daily    thiamine (VITAMIN B1) IVPB  250 mg Intravenous Daily     PRN Meds: [Held by provider] fentanNYL, sodium chloride flush, polyethylene glycol, promethazine **OR** ondansetron      Intake/Output Summary (Last 24 hours) at 7/11/2020 0915  Last data filed at 7/11/2020 0600  Gross per 24 hour   Intake 1473.6 ml   Output 1675 ml   Net -201.4 ml       Exam:    BP (!) 155/89   Pulse 93   Temp 99.9 °F (37.7 °C) (Bladder)   Resp (!) 31   Ht 5' 4\" (1.626 m)   Wt 129 lb 3 oz (58.6 kg)   SpO2 94%   BMI 22.18 kg/m²       General appearance:   well developed   HEENT:  Normal cephalic, atraumatic without obvious deformity. Sclera icteric   Neck: Supple, with full range of motion. No jugular venous distention. Trachea midline. Respiratory:  Normal respiratory effort. Clear to auscultation  Cardiovascular:  S1/S2  Abdomen: Soft, non-tender, non-distended with normal bowel sounds. Musculoskeletal:  No clubbing, cyanosis or edema bilaterally. Skin: Skin  Jaundiced , texture, turgor normal.  No rashes or lesions. Neuro lethargic              Labs:   Recent Labs     07/09/20 0415 07/09/20  2330 07/10/20  0530 07/11/20  0450   WBC 13.8*  --   --  13.4* 10.6   HGB 7.9*   < > 8.2* 8.3* 8.3*   HCT 24.3*   < > 25.5* 25.1* 25.4*     --   --  203 228    < > = values in this interval not displayed. Recent Labs     07/09/20  0415 07/10/20  0530 07/11/20  0450    140 140   K 3.1* 3.7 3.6   * 106 108*   CO2 23 24 23   BUN 8 10 10   CREATININE 0.5 0.5 0.5   CALCIUM 7.6* 7.9* 8.0*   PHOS 2.8 2.5 2.4*     Recent Labs     07/09/20  0415 07/10/20  0530 07/11/20  0450   * 251* 205*   ALT 95* 96* 88*   BILIDIR 4.5* 5.2* 4.9*   BILITOT 5.9* 6.6* 6.2*   ALKPHOS 112* 130* 150*     Recent Labs     07/09/20  0415   INR 2.0     No results for input(s): CKTOTAL, TROPONINI in the last 72 hours.   Recent Labs     07/09/20  0415 07/10/20  0530 07/11/20  0450   * 251* 205*   ALT 95* 96* 88*   BILIDIR 4.5* 5.2* 4.9*   BILITOT 5.9* 6.6* 6.2*   ALKPHOS 112* 130* 150*     No results for input(s): LACTA in the last 72 hours. No results found for: Alivia Colonel  Lab Results   Component Value Date    AMMONIA 25.0 07/10/2020    AMMONIA 43.0 07/09/2020    AMMONIA 56.0 (H) 07/08/2020       Assessment:    Active Hospital Problems    Diagnosis Date Noted    Hematemesis [K92.0] 07/04/2020     GIB  H/o esophageal varices  Acute blood loss anemia  Polysubstance use  H/o ETOH abuse  Metabolic encephalopathy  Aspiration Pneumonia  S/p Seizures  Possible withdrawal seizures  H.o partial pancreatectomy and splenectomy at [facility]    Depression  Elevated NH3     Plan:    S/p Octreotide drip  Status post mechanical ventilation  Patient extubated on 7/09/2020  Ceftriaxone IV  Clindamycin IV  Lexapro  Keppra  Lactulose  GI recommendations reviewed  Discussed with nurse in charge  Continue current management     DVT Prophylaxis: scd  Diet: Diet NPO Time Specified  Code Status: Full Code     PT/OT Eval Status: *na     Dispo - *?  Psych vs home         Electronically signed by Tyler Hopkins MD on 7/11/2020 at [address] [place] [address]

## 2020-07-11 NOTE — PROGRESS NOTES
Entered patient's room and requesting to leave. Explained to patient that she is not discharged and risks of leaving against medical advise. Patient alert and oriented x4 at this time and answered all questions appropriately. Patient demanding to leave AMA.

## 2020-07-11 NOTE — PROGRESS NOTES
Patient restless in bed and hallucinating. Patient talking to empty chair. Patient stated, \"Harvey, tell her to let me go home. I'm tired of being in [place]. Mom, get over here and let me go. \" Attempted to reorient patient with no success. Patient continues to talk to empty chair and still believes she is in [place].

## 2020-07-11 NOTE — PROGRESS NOTES
SPEECH/LANGUAGE PATHOLOGY  CLINICAL ASSESSMENT OF SWALLOWING FUNCTION    PATIENT NAME:  Ivory Rodriguez      :  1980      TODAY'S DATE:  2020  ROOM:  4402/4402-A    SUMMARY OF EVALUATION     DYSPHAGIA DIAGNOSIS:  Marked oral pharyngeal dysphagia      DIET RECOMMENDATIONS:  NPO (nothing by mouth including oral meds)       FEEDING RECOMMENDATIONS:     Assistance level:  Not applicable      Compensatory strategies recommended: Not applicable    THERAPY RECOMMENDATIONS:      Dysphagia therapy is recommended 3-5 times per week for LOS or when goals are met. 1. Pt will complete oral motor strength/ coordination exercises to improve bolus prep/ control and mastication with  minimal verbal prompts . 2. Pt will complete BOTR strength/ ROM exercises to reduce pharyngeal residuals and improve epiglottic inversion with  minimal verbal prompts. 3. Pt will complete laryngeal strength/ ROM therapeutic exercises to improve airway protection for the least restrictive PO diet with  minimal verbal prompts    4. A Video Swallow Study (MBSS) is recommended when the patient is more alert and requires a physician order                 PROCEDURE     Consistencies Administered During the Evaluation   Liquids: thin liquid   Solids:  pureed foods      Method of Intake:   spoon  Fed by clinician      Position:   Seated, reclined                   RESULTS     Oral Stage:         Inadequate labial seal resulting anterior labial spillage from midline  and Delayed A-P transit due to: decreased ability for initiation   , reduced lingual strength  and cognitive function       Pharyngeal Stage:      Wet/gurgly vocal quality was noted after presentation of thin liquid and pureed foods                  The Speech Language Pathologist (SLP) completed education with the patient regarding results of evaluation.    Explained that Speech Pathology intervention is warranted  at this time   Prognosis for improvements is good     This plan will be re-evaluated and revised in 1 week  if warranted. Patient stated goals: Agreed with above,   Treatment goals discussed with Patient   The Patient understand(s) the diagnosis, prognosis and plan of care       CPT code:  08378  bedside swallow eval      [x]The admitting diagnosis and active problem list, as listed below have been reviewed prior to initiation of this evaluation. ADMITTING DIAGNOSIS: Hematemesis [K92.0]     ACTIVE PROBLEM LIST:   Patient Active Problem List   Diagnosis    Sepsis ([facility] [address]    Jaundice    Elevated liver enzymes    Hyperammonemia (HCC)    Liver metastases (HCC)    Malignant ascites    H/O malignant neoplasm of pancreas    Hematemesis       Fiona Mcgee M.S., CCC-SLP/L  Speech-Language Pathologist

## 2020-07-11 NOTE — PROGRESS NOTES
[facility]  Department of Internal Medicine   Internal Medicine Residency   MICU Progress Note    Patient:  Richard Leal 42 y.o. female  MRN: [id number]     Date of Service: 7/11/2020    Allergy: Pcn [penicillins]    Subjective     Richard Leal is a 42 y.o. female with hx of cirrhosis (2/2 autoimmune and EtOH) who is present in the MICU for mngt of GI Bleed 2/2 bleeding grade 2 varix just below GE. Developed EtOH withdrawal seizure + increasingly agitated requiring intubating. Pt has been extubated for 2 days now. Overnight patient agitated and given 1 dose of Seroquel. Pt seen and examined this AM.  She continues to have hallucinations. She will say she is \"back in the 4th grade or at a pizza shot\". Pt denies SOB/ CP/ N/V/ or any other complaints at this time. She reports her appetite is returning. Requesting some toast.       Objective     VS: BP 94/69   Pulse 77   Temp 99.9 °F (37.7 °C) (Bladder)   Resp 20   Ht 5' 4\" (1.626 m)   Wt 129 lb 3 oz (58.6 kg)   SpO2 99%   BMI 22.18 kg/m²         I & O - 24hr:     Intake/Output Summary (Last 24 hours) at 7/11/2020 1128  Last data filed at 7/11/2020 1100  Gross per 24 hour   Intake 1223.6 ml   Output 1590 ml   Net -366.4 ml       Physical Exam:  · General Appearance: appears stated age, no acute distress and resting comfortably. On 2 L NC. Very weak and whispering conversations/ cannot speak in normal voice. · Neck: no adenopathy, no carotid bruit, no JVD, supple, symmetrical, trachea midline and thyroid not enlarged, symmetric, no tenderness/mass/nodules   · Eyes: conjunctiva pink; sclera icteric. · Lung: clear to auscultation bilaterally  · Heart: regular rate and regular rhythm, S1, S2 normal, no murmur, click, rub or gallop  · Abdomen: soft, non-tender; bowel sounds normal; no masses,  no organomegaly ; NG tube in place.    · Extremities:  extremities normal, atraumatic, no cyanosis or edema  · Musculoskeletal: No joint swelling, no muscle tenderness. ROM normal in all joints of extremities. · Neurologic: CN I-XII intact; patient is alert, oriented x3. Is mildly confused and self-reports hallucinations. Lines     site day    Art line   None    TLC L IJ Less than 3   PICC None    introducer Removed 7/9/2020    Hemoaccess None    Oxygen:     On NC 2 L NC  Mechanical Ventilation:   N/a   ABG:     Lab Results   Component Value Date    PH 7.427 07/09/2020    PCO2 37.7 07/09/2020    PO2 72.9 07/09/2020    LDS1EKS 21.8 07/06/2020    HCO3 24.3 07/09/2020    BE 0.0 07/09/2020    THB 9.1 07/09/2020    O2SAT 94.0 07/09/2020        Medications     Infusions: (Fluid, Sedation, Vasopressors)  IVF:    LR    Sedation   N/a     Nutrition:   NPO  ATB:   Antibiotics  Days   Clindamycin 5   Rocephin D/c         Skin issues: n/a  Patient currently has   DVT prophylaxis/ GI prophylaxis,      Labs     CBC:   Lab Results   Component Value Date    WBC 10.6 07/11/2020    RBC 2.64 07/11/2020    RBC 3.55 07/08/2019    HGB 8.3 07/11/2020    HCT 25.4 07/11/2020    MCV 96.2 07/11/2020    MCH 31.4 07/11/2020    MCHC 32.7 07/11/2020    RDW 17.2 07/11/2020     07/11/2020    MPV 10.1 07/11/2020     CMP:    Lab Results   Component Value Date     07/11/2020    K 3.6 07/11/2020    K 3.7 07/04/2020     07/11/2020    CO2 23 07/11/2020    BUN 10 07/11/2020    CREATININE 0.5 07/11/2020    GFRAA >60 07/11/2020    LABGLOM >60 07/11/2020    GLUCOSE 86 07/11/2020    GLUCOSE 102 07/08/2019    PROT 5.4 07/11/2020    LABALBU 2.7 07/11/2020    LABALBU 5.0 03/13/2012    CALCIUM 8.0 07/11/2020    BILITOT 6.2 07/11/2020    ALKPHOS 150 07/11/2020     07/11/2020    ALT 88 07/11/2020         Resident's Assessment and Plan   Neurology:   Acute metabolic Encephalopathy 2/2 Seizures 2/2 Withdrawal - Resolving.    - Pt has hx of alcoholism. Ethanol level in ED was 85. - She is extubated; alert and orientatedx3.  Pt does continue to have hallucinations from time to time.    - Continue Lexapro  - Continue  Seroquel given her hallucination symptoms likely from withdrawal.   - Failed 2nd bedside swallow. Keep NG tube. Continue trickle tube feeds. - Speech consult. Withdrawal Siezures  - Continue to monitor. Continue Keppra. - d/c Ativan 2 mg. Cardiology:   - Goal MAP greater than 65   - VSS    Pulmonary:   Extubated; SpO2 98% on 2 L NC. Aspiration Pneumonia  - CXR shows infiltrates are clearing up. - Continue Clindamycin Day 5.   - procalcitonin 0.33-> 0.59      Nephrology (Fluids/ Electrolytes & Nutrition):   - Cr is 0.5. Around baseline of 0.8. HAGMA likely 2/2 lactic acidosis  - Resolved. - Lactic acid trending down 16.1 --> 5.6 --> 2.7--> 1.7. Hypernatremia  -  Resolved. NA stable at 140    Hypokalemia  - K of 3.7. Keep above 3.5  - Replete as necessary. Hypophosphatemia   - Replete phosphate this AM.    Gastroenterology:   Hematemesis 2/2 bleeding varix just below GE junction.- Resolved. - Controlled with band/ large amount blood in fundus during EGD  - GI reccs appreciated. - Continue ppi bid. Octreotide has been d/c.   - Pt had an episode of BRBPR night of 7/7/2020. May have been blood from fundus as seen in EGD. Appears to have resolved. Not currently bleeding from rectum. - Trickle tube feeds   Nausea  - Ondansetron q6h prn     Chronic Liver cirrhosis with esophageal varices  - diagnosed last year (2019) 2/2 autoimmune process  - [facility] up in [facility] - anti smooth muscle positive but patient not aware of any diagnosis  -  has undergone [facility] times at [facility] per [facility], the first in 10/2019 where two grade 2 varices were banded.  Followup endoscopy in 12/2019 and again in 05/2020 - grade 1 varices with no additional bandings performed.   - Pt has chronic Transaminitis and hyperbilirubinemia    Hyperammonemia  - Ammonia trending down 149--> 199 --> 102-->48--> 25  - Continue to monitor liver enzymes   - Continue low dose betablocker which should also help with tachycardia and hypertensive BP     Hx of pancreatic cyst   - s/p partial splenectomy and pancrectomy      Elevated INR  - 2.5--> 2.4 in the setting of liver disease. Currently 2.0.   - Resolved   - Monitor INR. Hematology/ Oncology:   Acute blood loss anemia  - Acute secondary to GI bleeding. Presenting hemoglobin of 6.1. INR 2.5.  - S/p 4 units packed red blood cell transfusion.    - continue fluids, has received 4 units RBC thus far. - Current Hgb is trending up. Currently  8.3.   - trend H/H q12h, Transfuse prn for hgb goal > 7g/dl. Psychiatry   History of alcohol abuse  - Ethanol level 85 on admission.  - Continue thiamine and folate. Monitor for signs of withdrawal.   - Patient also has history of anxiety and depression. Resume home lexapro and Neurontin. Early Mobility:   PT/OT reccs appreciated. Next of Kind/ POA:   [facility]) - [phone]  Code Status:   Full Code      PT/OT: See Early Mobility;   DVT ppx: contraindicated 2/2 GI bleed   GI ppx: protonix     Disposition: Pt is medically stable and is able to transfer to floors. Pt will need social work to discuss possible rehab facility at discharge? Khushi Kilgore DO, PGY-1    Attending physician: Dr. Gonsalo Trujilloly    I personally saw, examined and provided care for the patient. Radiographs, labs and medication list were reviewed by me independently. Review of Residents documentation was conducted and revisions were made as appropriate. I agree with the above documented exam, problem list and plan of care.     Continue clinda for aspiration pna      Electronically signed by Margoth Waite DO on 7/11/2020 at 5:21 PM

## 2020-07-11 NOTE — PROGRESS NOTES
Pt continues to reach for lines and tubes despite attempts to deter. Patient attempts to throw her legs over the side of the bed and pull on tubing with feet and hands. Patient unable to be redirected. 4 point soft restraints continued for pt safety.

## 2020-07-11 NOTE — PROGRESS NOTES
Pt continues to reach for lines and tubes despite attempts to deter. 4 point soft restraints continued for pt safety.

## 2020-07-11 NOTE — PLAN OF CARE
Problem: Pain:  Goal: Pain level will decrease  Description: Pain level will decrease  7/10/2020 2209 by Beryl Sanchez RN  Outcome: Met This Shift     Problem: Pain:  Goal: Control of acute pain  Description: Control of acute pain  Outcome: Met This Shift     Problem: Pain:  Goal: Control of chronic pain  Description: Control of chronic pain  Outcome: Met This Shift     Problem: Falls - Risk of:  Goal: Will remain free from falls  Description: Will remain free from falls  7/10/2020 2209 by Beryl Sanchez RN  Outcome: Met This Shift     Problem: Falls - Risk of:  Goal: Absence of physical injury  Description: Absence of physical injury  7/10/2020 2209 by Beryl Sanchez RN  Outcome: Met This Shift     Problem: Skin Integrity:  Goal: Will show no infection signs and symptoms  Description: Will show no infection signs and symptoms  7/10/2020 2209 by Beryl Sanchez RN  Outcome: Met This Shift     Problem: Skin Integrity:  Goal: Absence of new skin breakdown  Description: Absence of new skin breakdown  7/10/2020 2209 by Beryl Sanchez RN  Outcome: Met This Shift     Problem: Restraint Use - Nonviolent/Non-Self-Destructive Behavior:  Goal: Absence of restraint-related injury  Description: Absence of restraint-related injury  7/10/2020 2209 by Beryl Sanchez RN  Outcome: Met This Shift     Problem: Restraint Use - Nonviolent/Non-Self-Destructive Behavior:  Goal: Absence of restraint indications  Description: Absence of restraint indications  7/10/2020 2209 by Beryl Sanchez RN  Outcome: Not Met This Shift   Plan of care reviewed with patient and family, as available.

## 2020-07-12 ENCOUNTER — APPOINTMENT (OUTPATIENT)
Dept: GENERAL RADIOLOGY | Age: 40
DRG: 280 | End: 2020-07-12
Payer: MEDICAID

## 2020-07-12 LAB
ALBUMIN SERPL-MCNC: 2.8 G/DL (ref 3.5–5.2)
ALP BLD-CCNC: 185 U/L (ref 35–104)
ALT SERPL-CCNC: 87 U/L (ref 0–32)
ANION GAP SERPL CALCULATED.3IONS-SCNC: 10 MMOL/L (ref 7–16)
ANISOCYTOSIS: ABNORMAL
AST SERPL-CCNC: 190 U/L (ref 0–31)
BASOPHILS ABSOLUTE: 0 E9/L (ref 0–0.2)
BASOPHILS RELATIVE PERCENT: 0 % (ref 0–2)
BILIRUB SERPL-MCNC: 6.2 MG/DL (ref 0–1.2)
BILIRUBIN DIRECT: 4.9 MG/DL (ref 0–0.3)
BILIRUBIN, INDIRECT: 1.3 MG/DL (ref 0–1)
BLOOD BANK DISPENSE STATUS: NORMAL
BLOOD BANK DISPENSE STATUS: NORMAL
BLOOD BANK PRODUCT CODE: NORMAL
BLOOD BANK PRODUCT CODE: NORMAL
BPU ID: NORMAL
BPU ID: NORMAL
BUN BLDV-MCNC: 10 MG/DL (ref 6–20)
BURR CELLS: ABNORMAL
CALCIUM SERPL-MCNC: 8.3 MG/DL (ref 8.6–10.2)
CHLORIDE BLD-SCNC: 105 MMOL/L (ref 98–107)
CO2: 23 MMOL/L (ref 22–29)
CREAT SERPL-MCNC: 0.5 MG/DL (ref 0.5–1)
DESCRIPTION BLOOD BANK: NORMAL
DESCRIPTION BLOOD BANK: NORMAL
EOSINOPHILS ABSOLUTE: 0 E9/L (ref 0.05–0.5)
EOSINOPHILS RELATIVE PERCENT: 0 % (ref 0–6)
GFR AFRICAN AMERICAN: >60
GFR NON-AFRICAN AMERICAN: >60 ML/MIN/1.73
GLUCOSE BLD-MCNC: 105 MG/DL (ref 74–99)
HCT VFR BLD CALC: 28.6 % (ref 34–48)
HEMOGLOBIN: 9.2 G/DL (ref 11.5–15.5)
HYPOCHROMIA: ABNORMAL
LYMPHOCYTES ABSOLUTE: 2.35 E9/L (ref 1.5–4)
LYMPHOCYTES RELATIVE PERCENT: 23 % (ref 20–42)
MAGNESIUM: 1.7 MG/DL (ref 1.6–2.6)
MCH RBC QN AUTO: 30.8 PG (ref 26–35)
MCHC RBC AUTO-ENTMCNC: 32.2 % (ref 32–34.5)
MCV RBC AUTO: 95.7 FL (ref 80–99.9)
MONOCYTES ABSOLUTE: 1.53 E9/L (ref 0.1–0.95)
MONOCYTES RELATIVE PERCENT: 15 % (ref 2–12)
NEUTROPHILS ABSOLUTE: 6.32 E9/L (ref 1.8–7.3)
NEUTROPHILS RELATIVE PERCENT: 62 % (ref 43–80)
OVALOCYTES: ABNORMAL
PDW BLD-RTO: 17.2 FL (ref 11.5–15)
PHOSPHORUS: 2.1 MG/DL (ref 2.5–4.5)
PLATELET # BLD: 285 E9/L (ref 130–450)
PMV BLD AUTO: 10.2 FL (ref 7–12)
POIKILOCYTES: ABNORMAL
POLYCHROMASIA: ABNORMAL
POTASSIUM SERPL-SCNC: 3.6 MMOL/L (ref 3.5–5)
PROCALCITONIN: 0.52 NG/ML (ref 0–0.08)
RBC # BLD: 2.99 E12/L (ref 3.5–5.5)
SODIUM BLD-SCNC: 138 MMOL/L (ref 132–146)
TARGET CELLS: ABNORMAL
TOTAL PROTEIN: 5.7 G/DL (ref 6.4–8.3)
WBC # BLD: 10.2 E9/L (ref 4.5–11.5)

## 2020-07-12 PROCEDURE — 2060000000 HC ICU INTERMEDIATE R&B

## 2020-07-12 PROCEDURE — 71045 X-RAY EXAM CHEST 1 VIEW: CPT

## 2020-07-12 PROCEDURE — 6370000000 HC RX 637 (ALT 250 FOR IP): Performed by: INTERNAL MEDICINE

## 2020-07-12 PROCEDURE — 84100 ASSAY OF PHOSPHORUS: CPT

## 2020-07-12 PROCEDURE — 2500000003 HC RX 250 WO HCPCS: Performed by: INTERNAL MEDICINE

## 2020-07-12 PROCEDURE — 84145 PROCALCITONIN (PCT): CPT

## 2020-07-12 PROCEDURE — C9113 INJ PANTOPRAZOLE SODIUM, VIA: HCPCS | Performed by: INTERNAL MEDICINE

## 2020-07-12 PROCEDURE — 85025 COMPLETE CBC W/AUTO DIFF WBC: CPT

## 2020-07-12 PROCEDURE — 2580000003 HC RX 258: Performed by: INTERNAL MEDICINE

## 2020-07-12 PROCEDURE — 36415 COLL VENOUS BLD VENIPUNCTURE: CPT

## 2020-07-12 PROCEDURE — 6360000002 HC RX W HCPCS: Performed by: INTERNAL MEDICINE

## 2020-07-12 PROCEDURE — 80076 HEPATIC FUNCTION PANEL: CPT

## 2020-07-12 PROCEDURE — 87040 BLOOD CULTURE FOR BACTERIA: CPT

## 2020-07-12 PROCEDURE — 2700000000 HC OXYGEN THERAPY PER DAY

## 2020-07-12 PROCEDURE — 87088 URINE BACTERIA CULTURE: CPT

## 2020-07-12 PROCEDURE — 83735 ASSAY OF MAGNESIUM: CPT

## 2020-07-12 PROCEDURE — 80048 BASIC METABOLIC PNL TOTAL CA: CPT

## 2020-07-12 RX ORDER — MAGNESIUM SULFATE IN WATER 40 MG/ML
2 INJECTION, SOLUTION INTRAVENOUS ONCE
Status: COMPLETED | OUTPATIENT
Start: 2020-07-12 | End: 2020-07-12

## 2020-07-12 RX ADMIN — CLINDAMYCIN IN 5 PERCENT DEXTROSE 600 MG: 12 INJECTION, SOLUTION INTRAVENOUS at 04:06

## 2020-07-12 RX ADMIN — POTASSIUM & SODIUM PHOSPHATES POWDER PACK 280-160-250 MG 250 MG: 280-160-250 PACK at 20:43

## 2020-07-12 RX ADMIN — QUETIAPINE FUMARATE 25 MG: 25 TABLET ORAL at 08:53

## 2020-07-12 RX ADMIN — SODIUM CHLORIDE, PRESERVATIVE FREE 10 ML: 5 INJECTION INTRAVENOUS at 20:42

## 2020-07-12 RX ADMIN — POTASSIUM & SODIUM PHOSPHATES POWDER PACK 280-160-250 MG 250 MG: 280-160-250 PACK at 10:17

## 2020-07-12 RX ADMIN — PROPRANOLOL HYDROCHLORIDE 10 MG: 10 TABLET ORAL at 08:53

## 2020-07-12 RX ADMIN — ESCITALOPRAM 10 MG: 10 TABLET, FILM COATED ORAL at 08:53

## 2020-07-12 RX ADMIN — Medication 10 ML: at 22:06

## 2020-07-12 RX ADMIN — CLINDAMYCIN IN 5 PERCENT DEXTROSE 600 MG: 12 INJECTION, SOLUTION INTRAVENOUS at 12:26

## 2020-07-12 RX ADMIN — RIFAXIMIN 400 MG: 200 TABLET ORAL at 08:53

## 2020-07-12 RX ADMIN — PANTOPRAZOLE SODIUM 40 MG: 40 INJECTION, POWDER, FOR SOLUTION INTRAVENOUS at 08:53

## 2020-07-12 RX ADMIN — PROPRANOLOL HYDROCHLORIDE 10 MG: 10 TABLET ORAL at 20:42

## 2020-07-12 RX ADMIN — QUETIAPINE FUMARATE 25 MG: 25 TABLET ORAL at 20:42

## 2020-07-12 RX ADMIN — PANTOPRAZOLE SODIUM 40 MG: 40 INJECTION, POWDER, FOR SOLUTION INTRAVENOUS at 20:42

## 2020-07-12 RX ADMIN — MAGNESIUM SULFATE 2 G: 2 INJECTION INTRAVENOUS at 10:18

## 2020-07-12 RX ADMIN — SODIUM CHLORIDE, PRESERVATIVE FREE 10 ML: 5 INJECTION INTRAVENOUS at 08:53

## 2020-07-12 RX ADMIN — LACTULOSE 20 G: 20 SOLUTION ORAL at 20:42

## 2020-07-12 RX ADMIN — POTASSIUM & SODIUM PHOSPHATES POWDER PACK 280-160-250 MG 250 MG: 280-160-250 PACK at 14:12

## 2020-07-12 RX ADMIN — THIAMINE HYDROCHLORIDE 250 MG: 100 INJECTION, SOLUTION INTRAMUSCULAR; INTRAVENOUS at 08:52

## 2020-07-12 RX ADMIN — LACTULOSE 20 G: 20 SOLUTION ORAL at 08:52

## 2020-07-12 RX ADMIN — LEVETIRACETAM 500 MG: 5 INJECTION INTRAVENOUS at 21:46

## 2020-07-12 RX ADMIN — LEVETIRACETAM 500 MG: 5 INJECTION INTRAVENOUS at 09:54

## 2020-07-12 RX ADMIN — CLINDAMYCIN IN 5 PERCENT DEXTROSE 600 MG: 12 INJECTION, SOLUTION INTRAVENOUS at 20:43

## 2020-07-12 RX ADMIN — RIFAXIMIN 550 MG: 550 TABLET ORAL at 20:41

## 2020-07-12 RX ADMIN — Medication 10 ML: at 08:54

## 2020-07-12 RX ADMIN — FOLIC ACID 1 MG: 5 INJECTION, SOLUTION INTRAMUSCULAR; INTRAVENOUS; SUBCUTANEOUS at 08:52

## 2020-07-12 ASSESSMENT — PAIN SCALES - GENERAL
PAINLEVEL_OUTOF10: 0

## 2020-07-12 NOTE — PROGRESS NOTES
7/12/2020 0900  Last data filed at 7/12/2020 0308  Gross per 24 hour   Intake 836 ml   Output 492 ml   Net 344 ml       Exam:    /86   Pulse 82   Temp 97.8 °F (36.6 °C) (Temporal)   Resp 18   Ht 5' 4\" (1.626 m)   Wt 127 lb 10 oz (57.9 kg)   SpO2 96%   BMI 21.91 kg/m²       General appearance:   well developed   HEENT:  Normal cephalic, atraumatic without obvious deformity. Sclera icteric   Neck: Supple, with full range of motion. No jugular venous distention. Trachea midline. Respiratory:  Normal respiratory effort. Clear to auscultation  Cardiovascular:  S1/S2  Abdomen: Soft, non-tender, non-distended with normal bowel sounds. Musculoskeletal:  No clubbing, cyanosis or edema bilaterally. Skin: Skin  Jaundiced , texture, turgor normal.  No rashes or lesions. Neuro lethargic              Labs:   Recent Labs     07/10/20  0530 07/11/20  0450 07/12/20  0439   WBC 13.4* 10.6 10.2   HGB 8.3* 8.3* 9.2*   HCT 25.1* 25.4* 28.6*    228 285     Recent Labs     07/10/20  0530 07/11/20  0450 07/12/20  0439    140 138   K 3.7 3.6 3.6    108* 105   CO2 24 23 23   BUN 10 10 10   CREATININE 0.5 0.5 0.5   CALCIUM 7.9* 8.0* 8.3*   PHOS 2.5 2.4* 2.1*     Recent Labs     07/10/20  0530 07/11/20  0450 07/12/20  0439   * 205* 190*   ALT 96* 88* 87*   BILIDIR 5.2* 4.9* 4.9*   BILITOT 6.6* 6.2* 6.2*   ALKPHOS 130* 150* 185*     No results for input(s): INR in the last 72 hours. No results for input(s): Justin Savage in the last 72 hours. Recent Labs     07/10/20  0530 07/11/20  0450 07/12/20  0439   * 205* 190*   ALT 96* 88* 87*   BILIDIR 5.2* 4.9* 4.9*   BILITOT 6.6* 6.2* 6.2*   ALKPHOS 130* 150* 185*     No results for input(s): LACTA in the last 72 hours.   No results found for: Imani Rangel Results   Component Value Date    AMMONIA 25.0 07/10/2020    AMMONIA 43.0 07/09/2020    AMMONIA 56.0 (H) 07/08/2020       Assessment:    Active Hospital Problems    Diagnosis Date Noted    Hematemesis [K92.0] 07/04/2020     GIB  Esophageal varices  Cirrhosis  Acute blood loss anemia  Polysubstance use  H/o ETOH abuse  Metabolic encephalopathy  Aspiration Pneumonia  S/p Seizures  Withdrawal seizures  Metabolic acidosis  S/p hypernatremia  H.o partial pancreatectomy and splenectomy at [facility]    Depression  Elevated NH3     Plan:    S/p Octreotide drip  Status post mechanical ventilation  Patient extubated on 7/09/2020  Clindamycin IV  Lexapro  Keppra  Lactulose  GI recommendations reviewed  history of a distal pancreatectomy and splenectomy at [facility]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 Discussed with nurse in charge  Continue current management     DVT Prophylaxis: scd  Diet: Diet NPO Time Specified  Code Status: Full Code     PT/OT Eval Status: *na     Dispo - *?  Psych vs home         Electronically signed by Lonnie Nieto MD on 7/12/2020 at 9:00 AM [facility]

## 2020-07-12 NOTE — PLAN OF CARE
Problem: Pain:  Goal: Pain level will decrease  Description: Pain level will decrease  Outcome: Met This Shift  Goal: Control of acute pain  Description: Control of acute pain  Outcome: Met This Shift  Goal: Control of chronic pain  Description: Control of chronic pain  Outcome: Met This Shift     Problem: Falls - Risk of:  Goal: Will remain free from falls  Description: Will remain free from falls  7/12/2020 1956 by [facility]  Outcome: Met This Shift  7/12/2020 1852 by Becky Borrego RN  Outcome: Met This Shift  Goal: Absence of physical injury  Description: Absence of physical injury  Outcome: Met This Shift     Problem: Skin Integrity:  Goal: Will show no infection signs and symptoms  Description: Will show no infection signs and symptoms  Outcome: Met This Shift  Goal: Absence of new skin breakdown  Description: Absence of new skin breakdown  7/12/2020 1956 by [facility]  Outcome: Met This Shift  7/12/2020 1852 by Becky Borrego RN  Outcome: Met This Shift     Problem: Restraint Use - Nonviolent/Non-Self-Destructive Behavior:  Goal: Absence of restraint indications  Description: Absence of restraint indications  Outcome: Met This Shift  Goal: Absence of restraint-related injury  Description: Absence of restraint-related injury  Outcome: Met This Shift     Problem: Confusion - Acute:  Goal: Absence of continued neurological deterioration signs and symptoms  Description: Absence of continued neurological deterioration signs and symptoms  Outcome: Met This Shift  Goal: Mental status will be restored to baseline  Description: Mental status will be restored to baseline  Outcome: Met This Shift     Problem: Discharge Planning:  Goal: Ability to perform activities of daily living will improve  Description: Ability to perform activities of daily living will improve  Outcome: Met This Shift  Goal: Participates in care planning  Description: Participates in care planning  Outcome: Met This Shift

## 2020-07-12 NOTE — PLAN OF CARE
Problem: Pain:  Goal: Pain level will decrease  Description: Pain level will decrease  Outcome: Met This Shift  Goal: Control of acute pain  Description: Control of acute pain  Outcome: Met This Shift  Goal: Control of chronic pain  Description: Control of chronic pain  Outcome: Met This Shift     Problem: Falls - Risk of:  Goal: Will remain free from falls  Description: Will remain free from falls  Outcome: Met This Shift  Goal: Absence of physical injury  Description: Absence of physical injury  Outcome: Met This Shift     Problem: Skin Integrity:  Goal: Will show no infection signs and symptoms  Description: Will show no infection signs and symptoms  Outcome: Met This Shift  Goal: Absence of new skin breakdown  Description: Absence of new skin breakdown  Outcome: Met This Shift     Problem: Restraint Use - Nonviolent/Non-Self-Destructive Behavior:  Goal: Absence of restraint indications  Description: Absence of restraint indications  Outcome: Met This Shift  Goal: Absence of restraint-related injury  Description: Absence of restraint-related injury  Outcome: Met This Shift

## 2020-07-12 NOTE — PROGRESS NOTES
Patient continues to pull at lines and tubes when unrestrained for patient care. ROM performed to all extremities. No injury to ankles/wrists noted bilaterally. Continued need for restraints at this time for patient safety.      Electronically signed by Jessy Hernandez RN on 7/12/2020 at 6:54 PM

## 2020-07-12 NOTE — PROGRESS NOTES
Dixie Kahn M.D.,[facility]  Alexandra Romero D.O., F.A.C.O.I., Maura Siddiqui M.D. Krzysztof Espinosa M.D., Marguerite Ty M.D. Tim Farrell D.O. Daily Pulmonary Progress Note    Patient:  Hector Monique 42 y.o. female MRN: [id number]     Date of Service: 7/12/2020        Subjective      Patient was seen and examined. Patient transferred out of ICU. Mother at bedside. Has been more alert. Objective   Vitals: /66   Pulse 77   Temp 97.4 °F (36.3 °C) (Temporal)   Resp 18   Ht 5' 4\" (1.626 m)   Wt 127 lb 10 oz (57.9 kg)   SpO2 96%   BMI 21.91 kg/m²     I/O:    Intake/Output Summary (Last 24 hours) at 7/12/2020 1448  Last data filed at 7/12/2020 0308  Gross per 24 hour   Intake 360 ml   Output 322 ml   Net 38 ml       CURRENT MEDS :  Scheduled Meds:   potassium & sodium phosphates  1 packet Oral TID    rifaximin  550 mg Oral BID    clindamycin (CLEOCIN) IV  600 mg Intravenous Q8H    QUEtiapine  25 mg Oral BID    propranolol  10 mg Oral BID    lactulose  20 g Oral BID    levetiracetam  500 mg Intravenous Q12H    pantoprazole  40 mg Intravenous BID    And    sodium chloride (PF)  10 mL Intravenous BID    escitalopram  10 mg Oral Daily    [Held by provider] gabapentin  100 mg Oral TID    sodium chloride flush  10 mL Intravenous 2 times per day    folic acid  1 mg Intravenous Daily    thiamine (VITAMIN B1) IVPB  250 mg Intravenous Daily       Continuous Infusions:      PRN Meds:  sodium chloride flush, polyethylene glycol, promethazine **OR** ondansetron      Physical Exam:  Physical Exam  Constitutional:       Appearance: She is ill-appearing. HENT:      Head: Normocephalic and atraumatic. Eyes:      Conjunctiva/sclera: Conjunctivae normal.   Neck:      Musculoskeletal: Neck supple. Trachea: No tracheal deviation. Cardiovascular:      Rate and Rhythm: Normal rate and regular rhythm. Heart sounds: Normal heart sounds.    Pulmonary:      Effort: Pulmonary effort is normal.      Breath sounds: Normal breath sounds. Abdominal:      General: Bowel sounds are normal.      Palpations: Abdomen is soft. Tenderness: There is no abdominal tenderness. Musculoskeletal:         General: No swelling. Lymphadenopathy:      Cervical: No cervical adenopathy. Skin:     General: Skin is warm and dry. Findings: No rash. Neurological:      General: No focal deficit present. Mental Status: She is easily aroused. She is lethargic. Psychiatric:         Behavior: Behavior normal.         Pertinent/ New Labs and Imaging Studies     Pulmonary Function Testing personally reviewed and interpreted. PERTINENT LAB RESULTS: Labs reviewed. DIAGNOSTICS: Pertinent imaging reviewed. Assessment:      1. Respiratory failure 2nd AMS, intubated 7/5 - 7/9  2. Aspiration pneumonia  3. Decompensated cirrhosis (ETOH +Autoimmune)  4. Hepatic encephalopathy  5. ETOH withdrawal seizure  6. UGIB with varices s/p banding        Plan:      Follow chest imaging   Continue abx with Clinda   Aspiration precautions   Speech eval, PT/OT    Mother updated at bedside. Thank you for allowing me to participate in the care of Jayson Saldana. Please feel free to call with questions.      Electronically signed by Cliffton Bumpers, DO on 7/12/2020 at 2:48 PM

## 2020-07-12 NOTE — PROGRESS NOTES
Asleep. Mother at bedside  Bridled NGT remains and on tube feedings  Restrained as she pulls at NGT when awake. No bleeding  WBC normal CXR looks improved  Bilirubin 6.2, up from admission but down from peak at 6.6, INR also down from admission    Favor formal video swallow study  ET tube only reason for swallow issue. Doubt much to be gained from swallow training in this individual at this time and the NGT will be a hinderence if it must remain. With drug and alcohol relapse, prognosis is substantially worse. Eventual repeat EGD with banding here or [facility] but mother states no plan for repeat EGD for one year @  [facility] but recent events have modified this obviously.

## 2020-07-13 LAB
ALBUMIN SERPL-MCNC: 2.4 G/DL (ref 3.5–5.2)
ALP BLD-CCNC: 195 U/L (ref 35–104)
ALT SERPL-CCNC: 78 U/L (ref 0–32)
ANION GAP SERPL CALCULATED.3IONS-SCNC: 11 MMOL/L (ref 7–16)
AST SERPL-CCNC: 167 U/L (ref 0–31)
BASOPHILS ABSOLUTE: 0.06 E9/L (ref 0–0.2)
BASOPHILS RELATIVE PERCENT: 0.5 % (ref 0–2)
BILIRUB SERPL-MCNC: 4.6 MG/DL (ref 0–1.2)
BILIRUBIN DIRECT: 3.4 MG/DL (ref 0–0.3)
BILIRUBIN, INDIRECT: 1.2 MG/DL (ref 0–1)
BUN BLDV-MCNC: 10 MG/DL (ref 6–20)
CALCIUM SERPL-MCNC: 7.7 MG/DL (ref 8.6–10.2)
CHLORIDE BLD-SCNC: 102 MMOL/L (ref 98–107)
CO2: 23 MMOL/L (ref 22–29)
CREAT SERPL-MCNC: 0.5 MG/DL (ref 0.5–1)
EOSINOPHILS ABSOLUTE: 0.26 E9/L (ref 0.05–0.5)
EOSINOPHILS RELATIVE PERCENT: 2.3 % (ref 0–6)
GFR AFRICAN AMERICAN: >60
GFR NON-AFRICAN AMERICAN: >60 ML/MIN/1.73
GLUCOSE BLD-MCNC: 98 MG/DL (ref 74–99)
HCT VFR BLD CALC: 27.2 % (ref 34–48)
HEMOGLOBIN: 8.8 G/DL (ref 11.5–15.5)
IMMATURE GRANULOCYTES #: 0.12 E9/L
IMMATURE GRANULOCYTES %: 1.1 % (ref 0–5)
LYMPHOCYTES ABSOLUTE: 2.63 E9/L (ref 1.5–4)
LYMPHOCYTES RELATIVE PERCENT: 23.8 % (ref 20–42)
MAGNESIUM: 1.6 MG/DL (ref 1.6–2.6)
MCH RBC QN AUTO: 30.8 PG (ref 26–35)
MCHC RBC AUTO-ENTMCNC: 32.4 % (ref 32–34.5)
MCV RBC AUTO: 95.1 FL (ref 80–99.9)
MONOCYTES ABSOLUTE: 1.49 E9/L (ref 0.1–0.95)
MONOCYTES RELATIVE PERCENT: 13.5 % (ref 2–12)
NEUTROPHILS ABSOLUTE: 6.51 E9/L (ref 1.8–7.3)
NEUTROPHILS RELATIVE PERCENT: 58.8 % (ref 43–80)
PDW BLD-RTO: 17.4 FL (ref 11.5–15)
PHOSPHORUS: 3.2 MG/DL (ref 2.5–4.5)
PLATELET # BLD: 299 E9/L (ref 130–450)
PMV BLD AUTO: 10.2 FL (ref 7–12)
POTASSIUM SERPL-SCNC: 3.3 MMOL/L (ref 3.5–5)
RBC # BLD: 2.86 E12/L (ref 3.5–5.5)
SODIUM BLD-SCNC: 136 MMOL/L (ref 132–146)
TOTAL PROTEIN: 5.3 G/DL (ref 6.4–8.3)
WBC # BLD: 11.1 E9/L (ref 4.5–11.5)

## 2020-07-13 PROCEDURE — C9113 INJ PANTOPRAZOLE SODIUM, VIA: HCPCS | Performed by: INTERNAL MEDICINE

## 2020-07-13 PROCEDURE — 84100 ASSAY OF PHOSPHORUS: CPT

## 2020-07-13 PROCEDURE — 6360000002 HC RX W HCPCS: Performed by: INTERNAL MEDICINE

## 2020-07-13 PROCEDURE — 2580000003 HC RX 258: Performed by: INTERNAL MEDICINE

## 2020-07-13 PROCEDURE — 2060000000 HC ICU INTERMEDIATE R&B

## 2020-07-13 PROCEDURE — 80048 BASIC METABOLIC PNL TOTAL CA: CPT

## 2020-07-13 PROCEDURE — 2500000003 HC RX 250 WO HCPCS: Performed by: INTERNAL MEDICINE

## 2020-07-13 PROCEDURE — 6370000000 HC RX 637 (ALT 250 FOR IP): Performed by: INTERNAL MEDICINE

## 2020-07-13 PROCEDURE — 85025 COMPLETE CBC W/AUTO DIFF WBC: CPT

## 2020-07-13 PROCEDURE — 92526 ORAL FUNCTION THERAPY: CPT | Performed by: SPEECH-LANGUAGE PATHOLOGIST

## 2020-07-13 PROCEDURE — 83735 ASSAY OF MAGNESIUM: CPT

## 2020-07-13 PROCEDURE — 36415 COLL VENOUS BLD VENIPUNCTURE: CPT

## 2020-07-13 PROCEDURE — 2700000000 HC OXYGEN THERAPY PER DAY

## 2020-07-13 PROCEDURE — 80076 HEPATIC FUNCTION PANEL: CPT

## 2020-07-13 RX ORDER — MAGNESIUM SULFATE IN WATER 40 MG/ML
2 INJECTION, SOLUTION INTRAVENOUS ONCE
Status: COMPLETED | OUTPATIENT
Start: 2020-07-13 | End: 2020-07-13

## 2020-07-13 RX ADMIN — QUETIAPINE FUMARATE 25 MG: 25 TABLET ORAL at 09:57

## 2020-07-13 RX ADMIN — LEVETIRACETAM 500 MG: 5 INJECTION INTRAVENOUS at 21:17

## 2020-07-13 RX ADMIN — Medication 10 ML: at 21:17

## 2020-07-13 RX ADMIN — PROPRANOLOL HYDROCHLORIDE 10 MG: 10 TABLET ORAL at 21:16

## 2020-07-13 RX ADMIN — PROPRANOLOL HYDROCHLORIDE 10 MG: 10 TABLET ORAL at 09:57

## 2020-07-13 RX ADMIN — Medication 10 ML: at 11:06

## 2020-07-13 RX ADMIN — RIFAXIMIN 550 MG: 550 TABLET ORAL at 09:57

## 2020-07-13 RX ADMIN — POTASSIUM & SODIUM PHOSPHATES POWDER PACK 280-160-250 MG 250 MG: 280-160-250 PACK at 09:57

## 2020-07-13 RX ADMIN — LACTULOSE 20 G: 20 SOLUTION ORAL at 09:57

## 2020-07-13 RX ADMIN — PANTOPRAZOLE SODIUM 40 MG: 40 INJECTION, POWDER, FOR SOLUTION INTRAVENOUS at 21:17

## 2020-07-13 RX ADMIN — SODIUM CHLORIDE, PRESERVATIVE FREE 10 ML: 5 INJECTION INTRAVENOUS at 11:07

## 2020-07-13 RX ADMIN — CLINDAMYCIN IN 5 PERCENT DEXTROSE 600 MG: 12 INJECTION, SOLUTION INTRAVENOUS at 15:03

## 2020-07-13 RX ADMIN — ESCITALOPRAM 10 MG: 10 TABLET, FILM COATED ORAL at 09:57

## 2020-07-13 RX ADMIN — FOLIC ACID 1 MG: 5 INJECTION, SOLUTION INTRAMUSCULAR; INTRAVENOUS; SUBCUTANEOUS at 11:05

## 2020-07-13 RX ADMIN — QUETIAPINE FUMARATE 25 MG: 25 TABLET ORAL at 21:16

## 2020-07-13 RX ADMIN — RIFAXIMIN 550 MG: 550 TABLET ORAL at 21:16

## 2020-07-13 RX ADMIN — LEVETIRACETAM 500 MG: 5 INJECTION INTRAVENOUS at 11:56

## 2020-07-13 RX ADMIN — SODIUM CHLORIDE, PRESERVATIVE FREE 10 ML: 5 INJECTION INTRAVENOUS at 23:37

## 2020-07-13 RX ADMIN — POTASSIUM & SODIUM PHOSPHATES POWDER PACK 280-160-250 MG 250 MG: 280-160-250 PACK at 21:16

## 2020-07-13 RX ADMIN — THIAMINE HYDROCHLORIDE 250 MG: 100 INJECTION, SOLUTION INTRAMUSCULAR; INTRAVENOUS at 11:05

## 2020-07-13 RX ADMIN — SODIUM CHLORIDE, PRESERVATIVE FREE 10 ML: 5 INJECTION INTRAVENOUS at 21:17

## 2020-07-13 RX ADMIN — CLINDAMYCIN IN 5 PERCENT DEXTROSE 600 MG: 12 INJECTION, SOLUTION INTRAVENOUS at 23:36

## 2020-07-13 RX ADMIN — PANTOPRAZOLE SODIUM 40 MG: 40 INJECTION, POWDER, FOR SOLUTION INTRAVENOUS at 11:05

## 2020-07-13 RX ADMIN — POTASSIUM BICARBONATE 40 MEQ: 782 TABLET, EFFERVESCENT ORAL at 11:56

## 2020-07-13 RX ADMIN — LACTULOSE 20 G: 20 SOLUTION ORAL at 21:16

## 2020-07-13 RX ADMIN — CLINDAMYCIN IN 5 PERCENT DEXTROSE 600 MG: 12 INJECTION, SOLUTION INTRAVENOUS at 05:19

## 2020-07-13 RX ADMIN — POTASSIUM & SODIUM PHOSPHATES POWDER PACK 280-160-250 MG 250 MG: 280-160-250 PACK at 15:42

## 2020-07-13 RX ADMIN — MAGNESIUM SULFATE 2 G: 2 INJECTION INTRAVENOUS at 12:25

## 2020-07-13 ASSESSMENT — PAIN SCALES - GENERAL
PAINLEVEL_OUTOF10: 0
PAINLEVEL_OUTOF10: 0

## 2020-07-13 NOTE — PROGRESS NOTES
SPEECH LANGUAGE PATHOLOGY  DAILY PROGRESS NOTE        PATIENT NAME:  Isamar Ventura      :  1980          TODAY'S DATE:  2020 ROOM:  7462/6034-E    Order received, chart reviewed. Pt completed CBSE on  with recommendations for NPO. This SLP re-assessed Pt's swallow function. Pt observed to alert to vocal cues, indep maintained attention and alertness throughout session. SLP presented thin liquid via tsp with adequate labial strip/ seal, appropriate swallow onset and no overt s/s of pen/asp. Thin liquids via cup resulted in adequate labial strip/ seal, swallow onset but followed by immediate, strong overt cough response. Puree via tsp again with adequate labial seal, bolus manipulation, and swallow onset however followed by multiple (3-4) swallows possible indicating pharyngeal residuals. Pt was intubated -, voice weak and reports some mild pain when swallowing. Recommend MBSS to further assess swallow function. Discussed with Pt, agreeable.        CPT code(s) 27490  dysphagia tx  Total minutes :  15 minutes

## 2020-07-13 NOTE — PROGRESS NOTES
Panfilo Tavarez M.D.,[facility]  Amanda Reyes D.O., F.A.C.O.I., Gabby Macias M.D. Nuvia Vega M.D., Chas Sesay M.D. Gregory Joseph D.O. Daily Pulmonary Progress Note    Patient:  Go Elena 42 y.o. female MRN: [id number]     Date of Service: 7/13/2020        Subjective   Following: Aspiration pneumonia       Patient was seen and examined. No family at bedside, she remains restrained bilateral wrist restraints. more alert, she does open eyes to her name and tries to answer her speech seems to be garbled. Not answer all questions. Objective   Vitals: BP 97/60   Pulse 82   Temp 98.5 °F (36.9 °C) (Temporal)   Resp 18   Ht 5' 4\" (1.626 m)   Wt 139 lb 5.3 oz (63.2 kg)   SpO2 97%   BMI 23.92 kg/m²     I/O:    Intake/Output Summary (Last 24 hours) at 7/13/2020 1340  Last data filed at 7/13/2020 1106  Gross per 24 hour   Intake 2748 ml   Output 530 ml   Net 2218 ml       CURRENT MEDS :  Scheduled Meds:   magnesium sulfate  2 g Intravenous Once    potassium & sodium phosphates  1 packet Oral TID    rifaximin  550 mg Oral BID    clindamycin (CLEOCIN) IV  600 mg Intravenous Q8H    QUEtiapine  25 mg Oral BID    propranolol  10 mg Oral BID    lactulose  20 g Oral BID    levetiracetam  500 mg Intravenous Q12H    pantoprazole  40 mg Intravenous BID    And    sodium chloride (PF)  10 mL Intravenous BID    escitalopram  10 mg Oral Daily    [Held by provider] gabapentin  100 mg Oral TID    sodium chloride flush  10 mL Intravenous 2 times per day    folic acid  1 mg Intravenous Daily    thiamine (VITAMIN B1) IVPB  250 mg Intravenous Daily       Continuous Infusions:      PRN Meds:  sodium chloride flush, polyethylene glycol, promethazine **OR** ondansetron      Physical Exam:  Physical Exam  Constitutional:       Appearance: She is ill-appearing. HENT:      Head: Normocephalic and atraumatic.    Eyes:      Conjunctiva/sclera: Conjunctivae normal.   Neck: Musculoskeletal: Neck supple. Trachea: No tracheal deviation. Cardiovascular:      Rate and Rhythm: Normal rate and regular rhythm. Heart sounds: Normal heart sounds. Pulmonary:      Effort: Pulmonary effort is normal.      Breath sounds: Normal breath sounds. Abdominal:      General: Bowel sounds are normal.      Palpations: Abdomen is soft. Tenderness: There is no abdominal tenderness. Musculoskeletal:         General: No swelling. Lymphadenopathy:      Cervical: No cervical adenopathy. Skin:     General: Skin is warm and dry. Findings: No rash. Neurological:      General: No focal deficit present. Mental Status: She is easily aroused. She is lethargic. Psychiatric:         Behavior: Behavior normal.         Pertinent/ New Labs and Imaging Studies     Pulmonary Function Testing personally reviewed and interpreted. PERTINENT LAB RESULTS: Labs reviewed. DIAGNOSTICS: Pertinent imaging reviewed. Assessment:      1. Respiratory failure 2nd AMS, intubated 7/5 - 7/9  2. Aspiration pneumonia  3. Decompensated cirrhosis (ETOH +Autoimmune)  4. Hepatic encephalopathy  5. ETOH withdrawal seizure  6. UGIB with varices s/p banding        Plan:      Follow chest imaging   Continue abx with Clinda   Aspiration precautions   Speech eval, PT/OT   Keep pulse oximetry greater than 92%, she currently is on 2 L nasal cannula 97% wean as tolerated        Thank you for allowing me to participate in the care of Terrence Lawrence. Please feel free to call with questions. Electronically signed by Author RADHA Peña on 7/13/2020 at 1:40 PM     I personally saw, examined, and cared for the patient. Labs, medications, radiographs reviewed. I agree with history exam and plans detailed in NP note. Allyson Bonds M.D.    Pulmonary/Critical Care Medicine

## 2020-07-13 NOTE — PLAN OF CARE
restraint-related injury  7/13/2020 0042 by Rocío Nunez  Outcome: Met This Shift  7/12/2020 1956 by [facility]  Outcome: Met This Shift     Problem: Confusion - Acute:  Goal: Absence of continued neurological deterioration signs and symptoms  Description: Absence of continued neurological deterioration signs and symptoms  7/13/2020 0042 by Rocío Nunez  Outcome: Met This Shift  7/12/2020 1956 by [facility]  Outcome: Met This Shift  Goal: Mental status will be restored to baseline  Description: Mental status will be restored to baseline  7/13/2020 0042 by Rocío Nunez  Outcome: Met This Shift  7/12/2020 1956 by Rocío Nunez  Outcome: Met This Shift     Problem: Discharge Planning:  Goal: Ability to perform activities of daily living will improve  Description: Ability to perform activities of daily living will improve  7/13/2020 0042 by [facility]  Outcome: Met This Shift  7/12/2020 1956 by [facility]  Outcome: Met This Shift  Goal: Participates in care planning  Description: Participates in care planning  7/13/2020 0042 by [facility]  Outcome: Met This Shift  7/12/2020 1956 by [facility]  Outcome: Met This Shift     Problem: Mood - Altered:  Goal: Mood stable  Description: Mood stable  7/13/2020 0042 by Rocío Nunez  Outcome: Met This Shift  7/12/2020 1956 by Rocío Nunez  Outcome: Met This Shift  Goal: Absence of abusive behavior  Description: Absence of abusive behavior  7/13/2020 0042 by Rocío Nunez  Outcome: Met This Shift  7/12/2020 1956 by [facility]  Outcome: Met This Shift  Goal: Verbalizations of feeling emotionally comfortable while being cared for will increase  Description: Verbalizations of feeling emotionally comfortable while being cared for will increase  7/13/2020 0042 by [facility]  Outcome: Met This Shift  7/12/2020 1956 by Rocío Nuenz  Outcome: Met This Shift     Problem: Sensory Perception - Impaired:  Goal: Demonstrations of improved sensory functioning will increase  Description: Demonstrations of improved sensory functioning will increase  7/13/2020 0042 by [facility]  Outcome: Met This Shift  7/12/2020 1956 by [facility]  Outcome: Met This Shift  Goal: Decrease in sensory misperception frequency  Description: Decrease in sensory misperception frequency  7[facility]13/2020 [facility]  Outcome: Met This Shift  7/12/2020 1956 by [facility]  Outcome: Met This Shift  Goal: Able to refrain from responding to false sensory perceptions  Description: Able to refrain from responding to false sensory perceptions  7/13/2020 0042 by [facility]  Outcome: Met This Shift  7/12/2020 1956 by [facility]  Outcome: Met This Shift  Goal: Demonstrates accurate environmental perceptions  Description: Demonstrates accurate environmental perceptions  7/13/2020 0042 by [facility]  Outcome: Met This Shift  7/12/2020 1956 by [facility]  Outcome: Met This Shift  Goal: Able to distinguish between reality-based and nonreality-based thinking  Description: Able to distinguish between reality-based and nonreality-based thinking  7/13/2020 0042 by [facility]  Outcome: Met This Shift  7/12/2020 1956 by [facility]  Outcome: Met This Shift  Goal: Able to interrupt nonreality-based thinking  Description: Able to interrupt nonreality-based thinking  7/13/2020 0042 by [facility]  Outcome: Met This Shift  7/12/2020 1956 by [facility]  Outcome: Met This Shift     Problem: Sleep Pattern Disturbance:  Goal: Appears well-rested  Description: Appears well-rested  7/13/2020 0042 by [facility]  Outcome: Met This Shift  [facility]12/2020 [facility]  Outcome: Met This Shift

## 2020-07-13 NOTE — PROGRESS NOTES
Patient continues to pull at lines and tubes when unrestrained for patient care. ROM performed to all extremities. No injury to ankles/wrists noted bilaterally. Continued need for restraints at this time for patient safety.

## 2020-07-13 NOTE — PROGRESS NOTES
Patient confused pulling at lines and tubing such as NG tube and IV. Patient also trying to get out of bed and is unsteady. Unable to re-orient patient at this time. Vitals are stable restraints removed range of motion preformed. Restraints remain at this time for patient safety.  Will continue to monitor

## 2020-07-13 NOTE — PROGRESS NOTES
hours) at 7/13/2020 0944  Last data filed at 7/13/2020 0550  Gross per 24 hour   Intake 2738 ml   Output 530 ml   Net 2208 ml       Exam:    /75   Pulse 87   Temp 98 °F (36.7 °C) (Temporal)   Resp 18   Ht 5' 4\" (1.626 m)   Wt 139 lb 5.3 oz (63.2 kg)   SpO2 94%   BMI 23.92 kg/m²       General appearance:   well developed   HEENT:  Normal cephalic, atraumatic without obvious deformity. Sclera icteric   Neck: Supple, with full range of motion. No jugular venous distention. Trachea midline. Respiratory:  Normal respiratory effort. Clear to auscultation  Cardiovascular:  S1/S2  Abdomen: Soft, non-tender, non-distended with normal bowel sounds. Musculoskeletal:  No clubbing, cyanosis or edema bilaterally. Skin: Skin  Jaundiced , texture, turgor normal.  No rashes or lesions. Neuro awake, cooperative              Labs:   Recent Labs     07/11/20 0450 07/12/20 0439 07/13/20 0448   WBC 10.6 10.2 11.1   HGB 8.3* 9.2* 8.8*   HCT 25.4* 28.6* 27.2*    285 299     Recent Labs     07/11/20 0450 07/12/20 0439 07/13/20 0448    138 136   K 3.6 3.6 3.3*   * 105 102   CO2 23 23 23   BUN 10 10 10   CREATININE 0.5 0.5 0.5   CALCIUM 8.0* 8.3* 7.7*   PHOS 2.4* 2.1* 3.2     Recent Labs     07/11/20 0450 07/12/20 0439 07/13/20 0448   * 190* 167*   ALT 88* 87* 78*   BILIDIR 4.9* 4.9* 3.4*   BILITOT 6.2* 6.2* 4.6*   ALKPHOS 150* 185* 195*     No results for input(s): INR in the last 72 hours. No results for input(s): Patel Lust in the last 72 hours. Recent Labs     07/11/20 0450 07/12/20 0439 07/13/20 0448   * 190* 167*   ALT 88* 87* 78*   BILIDIR 4.9* 4.9* 3.4*   BILITOT 6.2* 6.2* 4.6*   ALKPHOS 150* 185* 195*     No results for input(s): LACTA in the last 72 hours.   No results found for: Ellin Muster Results   Component Value Date    AMMONIA 25.0 07/10/2020    AMMONIA 43.0 07/09/2020    AMMONIA 56.0 (H) 07/08/2020       Assessment:    Active Hospital Problems Diagnosis Date Noted    Hematemesis [K92.0] 07/04/2020     GIB  Esophageal varices  Cirrhosis  Acute blood loss anemia  Polysubstance use  H/o ETOH abuse  Metabolic encephalopathy  Aspiration Pneumonia  S/p Seizures  Withdrawal seizures  Metabolic acidosis  S/p hypernatremia  H.o partial pancreatectomy and splenectomy at [facility]    Depression  Elevated NH3  Hypokalemia  Hypomagnesemia     Plan:    S/p Octreotide drip  Status post mechanical ventilation  Patient extubated on 7/09/2020  Clindamycin IV  Lexapro  Keppra  Lactulose  GI recommendations reviewed  Pulmonology following  history of a distal pancreatectomy and splenectomy at [facility]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 Discussed with nurse in charge  Continue current management     DVT Prophylaxis: scd  Diet: Diet NPO Time Specified  Code Status: Full Code     PT/OT Eval Status: *na     Dispo - *?  Psych vs home         Electronically signed by Erci Burnette MD on 7/13/2020 at 9:44 AM [place]

## 2020-07-14 ENCOUNTER — APPOINTMENT (OUTPATIENT)
Dept: GENERAL RADIOLOGY | Age: 40
DRG: 280 | End: 2020-07-14
Payer: MEDICAID

## 2020-07-14 LAB
ALBUMIN SERPL-MCNC: 2.5 G/DL (ref 3.5–5.2)
ALP BLD-CCNC: 194 U/L (ref 35–104)
ALT SERPL-CCNC: 70 U/L (ref 0–32)
ANION GAP SERPL CALCULATED.3IONS-SCNC: 9 MMOL/L (ref 7–16)
ANISOCYTOSIS: ABNORMAL
AST SERPL-CCNC: 148 U/L (ref 0–31)
BASOPHILS ABSOLUTE: 0 E9/L (ref 0–0.2)
BASOPHILS RELATIVE PERCENT: 0.7 % (ref 0–2)
BILIRUB SERPL-MCNC: 4 MG/DL (ref 0–1.2)
BILIRUBIN DIRECT: 2.6 MG/DL (ref 0–0.3)
BILIRUBIN, INDIRECT: 1.4 MG/DL (ref 0–1)
BUN BLDV-MCNC: 12 MG/DL (ref 6–20)
CALCIUM SERPL-MCNC: 7.7 MG/DL (ref 8.6–10.2)
CHLORIDE BLD-SCNC: 104 MMOL/L (ref 98–107)
CO2: 22 MMOL/L (ref 22–29)
CREAT SERPL-MCNC: 0.6 MG/DL (ref 0.5–1)
EOSINOPHILS ABSOLUTE: 0.36 E9/L (ref 0.05–0.5)
EOSINOPHILS RELATIVE PERCENT: 2.6 % (ref 0–6)
GFR AFRICAN AMERICAN: >60
GFR NON-AFRICAN AMERICAN: >60 ML/MIN/1.73
GLUCOSE BLD-MCNC: 105 MG/DL (ref 74–99)
HCT VFR BLD CALC: 26.6 % (ref 34–48)
HEMOGLOBIN: 8.6 G/DL (ref 11.5–15.5)
LYMPHOCYTES ABSOLUTE: 1.96 E9/L (ref 1.5–4)
LYMPHOCYTES RELATIVE PERCENT: 13.9 % (ref 20–42)
MAGNESIUM: 1.7 MG/DL (ref 1.6–2.6)
MCH RBC QN AUTO: 30.9 PG (ref 26–35)
MCHC RBC AUTO-ENTMCNC: 32.3 % (ref 32–34.5)
MCV RBC AUTO: 95.7 FL (ref 80–99.9)
MONOCYTES ABSOLUTE: 1.12 E9/L (ref 0.1–0.95)
MONOCYTES RELATIVE PERCENT: 7.8 % (ref 2–12)
MYELOCYTE PERCENT: 1.7 % (ref 0–0)
NEUTROPHILS ABSOLUTE: 10.64 E9/L (ref 1.8–7.3)
NEUTROPHILS RELATIVE PERCENT: 73.9 % (ref 43–80)
NUCLEATED RED BLOOD CELLS: 0.9 /100 WBC
PDW BLD-RTO: 18.1 FL (ref 11.5–15)
PHOSPHORUS: 2.6 MG/DL (ref 2.5–4.5)
PLATELET # BLD: 336 E9/L (ref 130–450)
PMV BLD AUTO: 10.1 FL (ref 7–12)
POIKILOCYTES: ABNORMAL
POTASSIUM SERPL-SCNC: 3.9 MMOL/L (ref 3.5–5)
RBC # BLD: 2.78 E12/L (ref 3.5–5.5)
SARS-COV-2, NAAT: NOT DETECTED
SODIUM BLD-SCNC: 135 MMOL/L (ref 132–146)
TARGET CELLS: ABNORMAL
TOTAL PROTEIN: 5.4 G/DL (ref 6.4–8.3)
URINE CULTURE, ROUTINE: NORMAL
WBC # BLD: 14 E9/L (ref 4.5–11.5)

## 2020-07-14 PROCEDURE — 6360000002 HC RX W HCPCS: Performed by: FAMILY MEDICINE

## 2020-07-14 PROCEDURE — 6370000000 HC RX 637 (ALT 250 FOR IP): Performed by: INTERNAL MEDICINE

## 2020-07-14 PROCEDURE — 2500000003 HC RX 250 WO HCPCS: Performed by: INTERNAL MEDICINE

## 2020-07-14 PROCEDURE — 97530 THERAPEUTIC ACTIVITIES: CPT | Performed by: PHYSICAL THERAPIST

## 2020-07-14 PROCEDURE — 2580000003 HC RX 258: Performed by: INTERNAL MEDICINE

## 2020-07-14 PROCEDURE — 83735 ASSAY OF MAGNESIUM: CPT

## 2020-07-14 PROCEDURE — 6360000002 HC RX W HCPCS: Performed by: INTERNAL MEDICINE

## 2020-07-14 PROCEDURE — C9113 INJ PANTOPRAZOLE SODIUM, VIA: HCPCS | Performed by: INTERNAL MEDICINE

## 2020-07-14 PROCEDURE — 80076 HEPATIC FUNCTION PANEL: CPT

## 2020-07-14 PROCEDURE — 2500000003 HC RX 250 WO HCPCS: Performed by: PHYSICIAN ASSISTANT

## 2020-07-14 PROCEDURE — 92611 MOTION FLUOROSCOPY/SWALLOW: CPT

## 2020-07-14 PROCEDURE — 85025 COMPLETE CBC W/AUTO DIFF WBC: CPT

## 2020-07-14 PROCEDURE — 74230 X-RAY XM SWLNG FUNCJ C+: CPT

## 2020-07-14 PROCEDURE — 2700000000 HC OXYGEN THERAPY PER DAY

## 2020-07-14 PROCEDURE — U0002 COVID-19 LAB TEST NON-CDC: HCPCS

## 2020-07-14 PROCEDURE — 84100 ASSAY OF PHOSPHORUS: CPT

## 2020-07-14 PROCEDURE — 2060000000 HC ICU INTERMEDIATE R&B

## 2020-07-14 PROCEDURE — 36415 COLL VENOUS BLD VENIPUNCTURE: CPT

## 2020-07-14 PROCEDURE — 97535 SELF CARE MNGMENT TRAINING: CPT

## 2020-07-14 PROCEDURE — 97116 GAIT TRAINING THERAPY: CPT | Performed by: PHYSICAL THERAPIST

## 2020-07-14 PROCEDURE — 97530 THERAPEUTIC ACTIVITIES: CPT

## 2020-07-14 PROCEDURE — 80048 BASIC METABOLIC PNL TOTAL CA: CPT

## 2020-07-14 RX ORDER — DIPHENHYDRAMINE HYDROCHLORIDE 50 MG/ML
25 INJECTION INTRAMUSCULAR; INTRAVENOUS EVERY 6 HOURS PRN
Status: DISCONTINUED | OUTPATIENT
Start: 2020-07-14 | End: 2020-07-16 | Stop reason: HOSPADM

## 2020-07-14 RX ADMIN — POTASSIUM & SODIUM PHOSPHATES POWDER PACK 280-160-250 MG 250 MG: 280-160-250 PACK at 09:04

## 2020-07-14 RX ADMIN — PROPRANOLOL HYDROCHLORIDE 10 MG: 10 TABLET ORAL at 21:04

## 2020-07-14 RX ADMIN — Medication 10 ML: at 09:05

## 2020-07-14 RX ADMIN — ESCITALOPRAM 10 MG: 10 TABLET, FILM COATED ORAL at 09:04

## 2020-07-14 RX ADMIN — POTASSIUM & SODIUM PHOSPHATES POWDER PACK 280-160-250 MG 250 MG: 280-160-250 PACK at 21:03

## 2020-07-14 RX ADMIN — Medication 10 ML: at 22:17

## 2020-07-14 RX ADMIN — BARIUM SULFATE 15 ML: 400 PASTE ORAL at 13:44

## 2020-07-14 RX ADMIN — RIFAXIMIN 550 MG: 550 TABLET ORAL at 09:04

## 2020-07-14 RX ADMIN — FOLIC ACID 1 MG: 5 INJECTION, SOLUTION INTRAMUSCULAR; INTRAVENOUS; SUBCUTANEOUS at 09:03

## 2020-07-14 RX ADMIN — PANTOPRAZOLE SODIUM 40 MG: 40 INJECTION, POWDER, FOR SOLUTION INTRAVENOUS at 21:03

## 2020-07-14 RX ADMIN — POTASSIUM & SODIUM PHOSPHATES POWDER PACK 280-160-250 MG 250 MG: 280-160-250 PACK at 15:03

## 2020-07-14 RX ADMIN — CLINDAMYCIN IN 5 PERCENT DEXTROSE 600 MG: 12 INJECTION, SOLUTION INTRAVENOUS at 07:11

## 2020-07-14 RX ADMIN — POLYETHYLENE GLYCOL 3350 17 G: 17 POWDER, FOR SOLUTION ORAL at 09:04

## 2020-07-14 RX ADMIN — BARIUM SULFATE 15 ML: 400 SUSPENSION ORAL at 13:43

## 2020-07-14 RX ADMIN — SODIUM CHLORIDE, PRESERVATIVE FREE 10 ML: 5 INJECTION INTRAVENOUS at 21:03

## 2020-07-14 RX ADMIN — SODIUM CHLORIDE, PRESERVATIVE FREE 10 ML: 5 INJECTION INTRAVENOUS at 07:11

## 2020-07-14 RX ADMIN — QUETIAPINE FUMARATE 25 MG: 25 TABLET ORAL at 09:04

## 2020-07-14 RX ADMIN — THIAMINE HYDROCHLORIDE 250 MG: 100 INJECTION, SOLUTION INTRAMUSCULAR; INTRAVENOUS at 09:03

## 2020-07-14 RX ADMIN — QUETIAPINE FUMARATE 25 MG: 25 TABLET ORAL at 21:04

## 2020-07-14 RX ADMIN — PANTOPRAZOLE SODIUM 40 MG: 40 INJECTION, POWDER, FOR SOLUTION INTRAVENOUS at 09:05

## 2020-07-14 RX ADMIN — LACTULOSE 20 G: 20 SOLUTION ORAL at 09:03

## 2020-07-14 RX ADMIN — CLINDAMYCIN IN 5 PERCENT DEXTROSE 600 MG: 12 INJECTION, SOLUTION INTRAVENOUS at 15:01

## 2020-07-14 RX ADMIN — LACTULOSE 20 G: 20 SOLUTION ORAL at 21:03

## 2020-07-14 RX ADMIN — CLINDAMYCIN IN 5 PERCENT DEXTROSE 600 MG: 12 INJECTION, SOLUTION INTRAVENOUS at 21:04

## 2020-07-14 RX ADMIN — LEVETIRACETAM 500 MG: 5 INJECTION INTRAVENOUS at 09:03

## 2020-07-14 RX ADMIN — LEVETIRACETAM 500 MG: 5 INJECTION INTRAVENOUS at 21:13

## 2020-07-14 RX ADMIN — SODIUM CHLORIDE, PRESERVATIVE FREE 10 ML: 5 INJECTION INTRAVENOUS at 09:06

## 2020-07-14 RX ADMIN — DIPHENHYDRAMINE HYDROCHLORIDE 25 MG: 50 INJECTION, SOLUTION INTRAMUSCULAR; INTRAVENOUS at 21:03

## 2020-07-14 RX ADMIN — RIFAXIMIN 550 MG: 550 TABLET ORAL at 22:17

## 2020-07-14 RX ADMIN — BARIUM SULFATE 15 ML: 0.81 POWDER, FOR SUSPENSION ORAL at 13:43

## 2020-07-14 ASSESSMENT — PAIN SCALES - GENERAL
PAINLEVEL_OUTOF10: 0

## 2020-07-14 NOTE — PROGRESS NOTES
Omar Novak M.D.,[facility]  Sola Covington D.O., F.A.C.O.I., Izzy Kowalski M.D. Sarah Solomon M.D., Marga Lynn M.D. Jamel Aguirre D.O. Daily Pulmonary Progress Note    Patient:  Gwen Mustafa 42 y.o. female MRN: [id number]     Date of Service: 7/14/2020        Subjective   Following: Aspiration pneumonia       Patient was seen and examined. No family at bedside, patient is no longer estranged she has now been more alert following all commands I can have discussion with her. She reports she has no shortness of breath and is currently off oxygen. [employer] remains . Objective   Vitals: BP (!) 93/56   Pulse 96   Temp 99 °F (37.2 °C) (Oral)   Resp 22   Ht 5' 4\" (1.626 m)   Wt 139 lb 5.3 oz (63.2 kg)   SpO2 93%   BMI 23.92 kg/m²     I/O:    Intake/Output Summary (Last 24 hours) at 7/14/2020 1146  Last data filed at 7/14/2020 0540  Gross per 24 hour   Intake 2100 ml   Output 550 ml   Net 1550 ml       CURRENT MEDS :  Scheduled Meds:   potassium & sodium phosphates  1 packet Oral TID    rifaximin  550 mg Oral BID    clindamycin (CLEOCIN) IV  600 mg Intravenous Q8H    QUEtiapine  25 mg Oral BID    propranolol  10 mg Oral BID    lactulose  20 g Oral BID    levetiracetam  500 mg Intravenous Q12H    pantoprazole  40 mg Intravenous BID    And    sodium chloride (PF)  10 mL Intravenous BID    escitalopram  10 mg Oral Daily    [Held by provider] gabapentin  100 mg Oral TID    sodium chloride flush  10 mL Intravenous 2 times per day    folic acid  1 mg Intravenous Daily    thiamine (VITAMIN B1) IVPB  250 mg Intravenous Daily       Continuous Infusions:      PRN Meds:  sodium chloride flush, polyethylene glycol, promethazine **OR** ondansetron      Physical Exam:  Physical Exam  Constitutional:       Appearance: She is ill-appearing. HENT:      Head: Normocephalic and atraumatic.    Eyes:      Conjunctiva/sclera: Conjunctivae normal.   Neck:      Musculoskeletal: Neck supple. Trachea: No tracheal deviation. Cardiovascular:      Rate and Rhythm: Normal rate and regular rhythm. Heart sounds: Normal heart sounds. Pulmonary:      Effort: Pulmonary effort is normal.      Breath sounds: Normal breath sounds. Abdominal:      General: Bowel sounds are normal.      Palpations: Abdomen is soft. Tenderness: There is no abdominal tenderness. Musculoskeletal:         General: No swelling. Lymphadenopathy:      Cervical: No cervical adenopathy. Skin:     General: Skin is warm and dry. Findings: No rash. Neurological:      General: No focal deficit present. Mental Status: She is alert and easily aroused. Psychiatric:         Behavior: Behavior normal.         Pertinent/ New Labs and Imaging Studies     Pulmonary Function Testing personally reviewed and interpreted. PERTINENT LAB RESULTS: Labs reviewed. DIAGNOSTICS: Pertinent imaging reviewed. Assessment:      1. Respiratory failure 2nd AMS, intubated 7/5 - 7/9  2. Aspiration pneumonia  3. Decompensated cirrhosis (ETOH +Autoimmune)  4. Hepatic encephalopathy  5. ETOH withdrawal seizure  6. UGIB with varices s/p banding        Plan:      Follow chest imaging   Continue abx with Clinda   Aspiration precautions   Speech eval, PT/OT   Keep pulse oximetry greater than 92%, currently on room air    Thank you for allowing me to participate in the care of [place]. Please feel free to call with questions. Electronically signed by RADHA Sheikh on 7/14/2020 at 11:46 AM     I personally saw, examined, and cared for the patient. Labs, medications, radiographs reviewed. I agree with history exam and plans detailed in NP note. Check repeat pro-krissy and CXR. Pending results can likely complete coarse of abx soon.     Electronically signed by Howie Rabago DO on 7/14/2020 at 10:35 PM

## 2020-07-14 NOTE — PROGRESS NOTES
Pt is A & O x 3  Sensation:  Pt denies numbness and tingling to extremities  Edema:  unremarkable    Vitals:  SPO2 90% on room air at rest, decreased to 88% supine>sit, increased to 90% with pursed lip breathing within 90 seconds. Pt maintained SPO2 >/= 90% on room air during ambulation. Patient education  Pt educated on safety with mobility, gait mechanics, activity pacing, use of walker    Patient response to education:   Pt verbalized understanding Pt demonstrated skill Pt requires further education in this area   yes yes yes     ASSESSMENT:    Comments:  Pt resting semi-supine upon arrival, agreeable to PT session with OT collaboration. Pt completed bed mobility with cues for improved hand placement to facilitate use of bed rail, light trunk lift assist required to achieve upright sitting on EOB. Pt reported dizziness with supine>sit and again with sit>stand but reporting symptoms subsiding within 1 minute of prolonged posture. Pt requesting use of restroom and was able to amb short distance to toilet in room; pt monitored while on toilet for safety. Pt completed ambulation in hallway with Mod A for balance, cues for walker management and widened LIZZIE. She demonstrates instability of R ankle and posterior listing. Pt fatigued and required standing rest break between final distances due to onset of feeling \"off\" described as dizzy and lightheaded; symptoms decreased with standing rest break. Pt seated in chair at bedside with OT remaining in room to complete additional activities. Treatment:  Patient practiced and was instructed in the following treatment:    · Bed mobility: cues for improved use of bed rails for sequencing, HOB remaining elevated >30 degrees, trunk lift assist to achieve upright sitting at EOB.   · Transfer training: max cues for safe hand placement and alignment prior to lowering, cues for use of walker during transfers, manual assist for balance and lift/lower  · Gait training: cues for anterior weight shift and widened LIZZIE, manual assistance for balance and walker management, cues for increased step length and walker approximation, cues for breathing to allow dizziness to subside during ambulation/standing. · Therapeutic activities: education for breathing technique, activity pacing, and standing balance. PLAN:    Patient is making good progress towards established goals. Will continue with current POC.         PLAN:    Time in  1045  Time out  1112    Total Treatment Time  27    CPT codes:  [x] Gait training 28711 10 minutes  [] Manual therapy 56759 0 minutes  [x] Therapeutic activities 52435 17 minutes  [] Therapeutic exercises 75521 0 minutes  [] Neuromuscular reeducation 40200 0 minutes      Delma Anderson, PT, DPT  [id number]

## 2020-07-14 NOTE — PLAN OF CARE
Problem: Confusion - Acute:  Goal: Absence of continued neurological deterioration signs and symptoms  Outcome: Met This Shift     Problem: Confusion - Acute:  Goal: Mental status will be restored to baseline  Outcome: Met This Shift

## 2020-07-14 NOTE — PROGRESS NOTES
and put hair up into a ponytail.     improve grooming/hygiene tasks to SBA while standing at sink    UB Dressing Mod A  Mod A;    Due to line management.       improve UB dressing to independent   LB Dressing Max A  To magda incontinence brief Min A;    Pt able to complete magda. doff of socks while in supine.        improve LB dressing to min A with AE PRN   Bathing Max A Mod A;    With verbal prompting to increase safety of NGT while washing UB. Assist to wash LB while pt sitting up in the chair.      improve UB/LB bathing to min A   Toileting DEP  Mod - transfer on/off low surface  Max A for hygiene.      improve toileting task and all clothing mgmt to min A   Bed Mobility  Supine to sit: Max A  Sit to supine: Max A x2 Supine <> sit: Min A improve bed mobility to Supervision in prep for EOB ADL tasks   Functional Transfers Sit to stand: Max A  Stand to sit: Max A Sit to stand: Mod A    Verbal prompting for proper hand placement to maximize safety.    improve all functional transfers to independent   Functional Mobility Max A x2 ww  For short steps to/from bedside chair; assistance for advancing LEs, max cues for sequencing  Mod A with use of w/w. Pt able to ambulate through room and hallway with minimal standing rest breaks and verbal prompting to increase proper breathing. improve functional mobility to Mod I/Ind with AE PRN   Balance Sitting:     Static:  Mod A  L lateral lean    Dynamic: Max A  Standing: Max A x2 Sitting:     Static: Mod A with a posterior lean. Dynamic: Mod A    Standing: Mod A   demo independent dynamic sitting balance EOB during ADL tasks   Endurance/Activity Tolerance Poor  poor+;    Pt quickly fatigues and requires verbal prompting to improve O2 level.     demo Good activity tolerance/endurance during 15-20 min ADL task    Visual/  Perceptual Glasses: none                    Hand dominance: R  UE ROM:        RUE: WFL                   LUE: WFL  Strength:        RUE: grossly 2/5 ADLs.  ? Cognitive/Perceptual training: retraining exercises to improve attention, mentation, and spatial awareness for ADLs & transfers.   ? Skilled positioning: Proper positioning to improve interaction with environment, overall functioning and decrease/prevent edema and contractures.     Treatment Time In: 1045           Treatment Time Out: 1216         Treatment Charges: Mins Units   Ther Ex  28067       Manual Therapy 89044       Thera Activities 26173 15 1   ADL/Home Mgt 33914 27 2   Neuro Re-ed 75970       Group Therapy        Orthotic manage/training  01971       Non-Billable Time       Total Timed Treatment 42 [address] [place], [address] [facility] [place]

## 2020-07-14 NOTE — PROGRESS NOTES
SPEECH/LANGUAGE PATHOLOGY  VIDEOFLUOROSCOPIC STUDY OF SWALLOWING (MBS)      PATIENT NAME:  Karlee Ahmadi      :  1980          TODAY'S DATE:  2020 ROOM:  9676/8493-B      SUMMARY OF EVALUATION     DYSPHAGIA DIAGNOSIS:  Mild-moderate pharyngeal dysphagia      DIET RECOMMENDATIONS:  Regular consistency solids with  honey consistency (moderately thick) liquids     FEEDING RECOMMENDATIONS:     Assistance level:  No assistance needed      Compensatory strategies recommended: No strategies are recommended at this time    THERAPY RECOMMENDATIONS:       Pt will complete laryngeal strength/ ROM therapeutic exercises to improve airway protection for the least restrictive PO diet with minimal verbal prompts                PROCEDURE     Consistencies Administered During the Evaluation   Liquids: nectar thick liquid and honey thick liquid   Solids:  pureed foods and solid foods      Method of Intake:   cup, spoon  Self fed, Fed by clinician      Position:   Seated, upright, Lateral plane    Current Respiratory Status   room air                RESULTS     ORAL STAGE       The oral stage of swallowing was within functional limits        PHARYNGEAL STAGE           ONSET TIME     Onset time of the pharyngeal swallow was adequate       PHARYNGEAL RESIDUALS          Vallecula/Pharyngeal Wall           No significant residuals were noted in the vallecula      Pyriform Sinuses      No significant residuals were noted in the pyriform sinuses    LARYNGEAL PENETRATION     Laryngeal penetration occurred prior to aspiration. Further details under aspiration section. ASPIRATION    Aspiration occurred DURING the swallow for nectar consistency liquid due to  delayed laryngeal closure . Aspiration was mild and occurred consistently . an absent cough/throat clear was noted         COMPENSATORY STRATEGIES       Compensatory strategies were not attempted      STRUCTURAL/FUNCTIONAL ANOMALIES       No structural/functional anomalies were noted      CERVICAL ESOPHAGEAL STAGE :        The cervical esophagus appeared adequate                            Prognosis for improvements is good  This plan will be re-evaluated and revised in 1 week  if warranted. Patient stated goals: Agreed with above  Treatment goals discussed with Patient   The Patient understand(s) the diagnosis, prognosis and plan of care       CPT code:  16842  dysphagia study      [x]The admitting diagnosis and active problem list, as listed below have been reviewed prior to initiation of this evaluation.      ADMITTING DIAGNOSIS: Hematemesis [K92.0]     ACTIVE PROBLEM LIST:   Patient Active Problem List   Diagnosis    Sepsis ([facility] [address]    Jaundice    Elevated liver enzymes    Hyperammonemia (HCC)    Liver metastases (HCC)    Malignant ascites    H/O malignant neoplasm of pancreas    Hematemesis

## 2020-07-14 NOTE — PLAN OF CARE
Problem: Pain:  Goal: Pain level will decrease  Outcome: Met This Shift     Problem: Falls - Risk of:  Goal: Will remain free from falls  Outcome: Met This Shift  Goal: Absence of physical injury  Outcome: Met This Shift     Problem: Skin Integrity:  Goal: Will show no infection signs and symptoms  Outcome: Met This Shift  Goal: Absence of new skin breakdown  Outcome: Met This Shift     Problem: Restraint Use - Nonviolent/Non-Self-Destructive Behavior:  Goal: Absence of restraint indications  Outcome: Met This Shift  Goal: Absence of restraint-related injury  Outcome: Met This Shift     Problem: Injury - Risk of, Physical Injury:  Goal: Will remain free from falls  Outcome: Met This Shift  Goal: Absence of physical injury  Outcome: Met This Shift

## 2020-07-15 ENCOUNTER — APPOINTMENT (OUTPATIENT)
Dept: GENERAL RADIOLOGY | Age: 40
DRG: 280 | End: 2020-07-15
Payer: MEDICAID

## 2020-07-15 LAB
ALBUMIN SERPL-MCNC: 2.3 G/DL (ref 3.5–5.2)
ALP BLD-CCNC: 174 U/L (ref 35–104)
ALT SERPL-CCNC: 62 U/L (ref 0–32)
ANION GAP SERPL CALCULATED.3IONS-SCNC: 9 MMOL/L (ref 7–16)
ANISOCYTOSIS: ABNORMAL
AST SERPL-CCNC: 134 U/L (ref 0–31)
BASOPHILS ABSOLUTE: 0.08 E9/L (ref 0–0.2)
BASOPHILS RELATIVE PERCENT: 0.5 % (ref 0–2)
BILIRUB SERPL-MCNC: 3.5 MG/DL (ref 0–1.2)
BILIRUBIN DIRECT: 2.4 MG/DL (ref 0–0.3)
BILIRUBIN, INDIRECT: 1.1 MG/DL (ref 0–1)
BUN BLDV-MCNC: 10 MG/DL (ref 6–20)
CALCIUM SERPL-MCNC: 7.8 MG/DL (ref 8.6–10.2)
CHLORIDE BLD-SCNC: 104 MMOL/L (ref 98–107)
CO2: 21 MMOL/L (ref 22–29)
CREAT SERPL-MCNC: 0.6 MG/DL (ref 0.5–1)
EOSINOPHILS ABSOLUTE: 0.32 E9/L (ref 0.05–0.5)
EOSINOPHILS RELATIVE PERCENT: 2.2 % (ref 0–6)
GFR AFRICAN AMERICAN: >60
GFR NON-AFRICAN AMERICAN: >60 ML/MIN/1.73
GLUCOSE BLD-MCNC: 78 MG/DL (ref 74–99)
HCT VFR BLD CALC: 24.6 % (ref 34–48)
HEMOGLOBIN: 8.1 G/DL (ref 11.5–15.5)
IMMATURE GRANULOCYTES #: 0.3 E9/L
IMMATURE GRANULOCYTES %: 2 % (ref 0–5)
LYMPHOCYTES ABSOLUTE: 3.45 E9/L (ref 1.5–4)
LYMPHOCYTES RELATIVE PERCENT: 23.4 % (ref 20–42)
MAGNESIUM: 1.4 MG/DL (ref 1.6–2.6)
MCH RBC QN AUTO: 30.9 PG (ref 26–35)
MCHC RBC AUTO-ENTMCNC: 32.9 % (ref 32–34.5)
MCV RBC AUTO: 93.9 FL (ref 80–99.9)
MONOCYTES ABSOLUTE: 1.92 E9/L (ref 0.1–0.95)
MONOCYTES RELATIVE PERCENT: 13 % (ref 2–12)
NEUTROPHILS ABSOLUTE: 8.7 E9/L (ref 1.8–7.3)
NEUTROPHILS RELATIVE PERCENT: 58.9 % (ref 43–80)
OVALOCYTES: ABNORMAL
PDW BLD-RTO: 18.6 FL (ref 11.5–15)
PHOSPHORUS: 3 MG/DL (ref 2.5–4.5)
PLATELET # BLD: 360 E9/L (ref 130–450)
PMV BLD AUTO: 10.2 FL (ref 7–12)
POIKILOCYTES: ABNORMAL
POLYCHROMASIA: ABNORMAL
POTASSIUM SERPL-SCNC: 3.9 MMOL/L (ref 3.5–5)
PROCALCITONIN: 0.42 NG/ML (ref 0–0.08)
RBC # BLD: 2.62 E12/L (ref 3.5–5.5)
SODIUM BLD-SCNC: 134 MMOL/L (ref 132–146)
TARGET CELLS: ABNORMAL
TOTAL PROTEIN: 5.4 G/DL (ref 6.4–8.3)
WBC # BLD: 14.8 E9/L (ref 4.5–11.5)

## 2020-07-15 PROCEDURE — 6370000000 HC RX 637 (ALT 250 FOR IP): Performed by: INTERNAL MEDICINE

## 2020-07-15 PROCEDURE — 6360000002 HC RX W HCPCS: Performed by: INTERNAL MEDICINE

## 2020-07-15 PROCEDURE — 85025 COMPLETE CBC W/AUTO DIFF WBC: CPT

## 2020-07-15 PROCEDURE — 2580000003 HC RX 258: Performed by: INTERNAL MEDICINE

## 2020-07-15 PROCEDURE — 71045 X-RAY EXAM CHEST 1 VIEW: CPT

## 2020-07-15 PROCEDURE — 80048 BASIC METABOLIC PNL TOTAL CA: CPT

## 2020-07-15 PROCEDURE — 83735 ASSAY OF MAGNESIUM: CPT

## 2020-07-15 PROCEDURE — 36415 COLL VENOUS BLD VENIPUNCTURE: CPT

## 2020-07-15 PROCEDURE — 84145 PROCALCITONIN (PCT): CPT

## 2020-07-15 PROCEDURE — 2500000003 HC RX 250 WO HCPCS: Performed by: INTERNAL MEDICINE

## 2020-07-15 PROCEDURE — 92526 ORAL FUNCTION THERAPY: CPT | Performed by: SPEECH-LANGUAGE PATHOLOGIST

## 2020-07-15 PROCEDURE — C9113 INJ PANTOPRAZOLE SODIUM, VIA: HCPCS | Performed by: INTERNAL MEDICINE

## 2020-07-15 PROCEDURE — 84100 ASSAY OF PHOSPHORUS: CPT

## 2020-07-15 PROCEDURE — 2060000000 HC ICU INTERMEDIATE R&B

## 2020-07-15 PROCEDURE — 80076 HEPATIC FUNCTION PANEL: CPT

## 2020-07-15 PROCEDURE — 6360000002 HC RX W HCPCS: Performed by: HOSPITALIST

## 2020-07-15 PROCEDURE — 6360000002 HC RX W HCPCS: Performed by: FAMILY MEDICINE

## 2020-07-15 RX ORDER — MAGNESIUM SULFATE IN WATER 40 MG/ML
2 INJECTION, SOLUTION INTRAVENOUS ONCE
Status: COMPLETED | OUTPATIENT
Start: 2020-07-15 | End: 2020-07-15

## 2020-07-15 RX ADMIN — PROPRANOLOL HYDROCHLORIDE 10 MG: 10 TABLET ORAL at 21:24

## 2020-07-15 RX ADMIN — CLINDAMYCIN IN 5 PERCENT DEXTROSE 600 MG: 12 INJECTION, SOLUTION INTRAVENOUS at 05:22

## 2020-07-15 RX ADMIN — PANTOPRAZOLE SODIUM 40 MG: 40 INJECTION, POWDER, FOR SOLUTION INTRAVENOUS at 09:09

## 2020-07-15 RX ADMIN — SODIUM CHLORIDE, PRESERVATIVE FREE 10 ML: 5 INJECTION INTRAVENOUS at 10:49

## 2020-07-15 RX ADMIN — FOLIC ACID 1 MG: 5 INJECTION, SOLUTION INTRAMUSCULAR; INTRAVENOUS; SUBCUTANEOUS at 10:06

## 2020-07-15 RX ADMIN — Medication 10 ML: at 21:25

## 2020-07-15 RX ADMIN — LEVETIRACETAM 500 MG: 5 INJECTION INTRAVENOUS at 10:06

## 2020-07-15 RX ADMIN — MAGNESIUM SULFATE 2 G: 2 INJECTION INTRAVENOUS at 14:02

## 2020-07-15 RX ADMIN — LEVETIRACETAM 500 MG: 5 INJECTION INTRAVENOUS at 21:24

## 2020-07-15 RX ADMIN — ESCITALOPRAM 10 MG: 10 TABLET, FILM COATED ORAL at 09:09

## 2020-07-15 RX ADMIN — SODIUM CHLORIDE, PRESERVATIVE FREE 10 ML: 5 INJECTION INTRAVENOUS at 09:09

## 2020-07-15 RX ADMIN — LACTULOSE 20 G: 20 SOLUTION ORAL at 09:09

## 2020-07-15 RX ADMIN — Medication 10 ML: at 09:09

## 2020-07-15 RX ADMIN — POTASSIUM & SODIUM PHOSPHATES POWDER PACK 280-160-250 MG 250 MG: 280-160-250 PACK at 09:09

## 2020-07-15 RX ADMIN — POTASSIUM & SODIUM PHOSPHATES POWDER PACK 280-160-250 MG 250 MG: 280-160-250 PACK at 12:52

## 2020-07-15 RX ADMIN — SODIUM CHLORIDE, PRESERVATIVE FREE 10 ML: 5 INJECTION INTRAVENOUS at 21:25

## 2020-07-15 RX ADMIN — QUETIAPINE FUMARATE 25 MG: 25 TABLET ORAL at 09:09

## 2020-07-15 RX ADMIN — MAGNESIUM SULFATE IN WATER 2 G: 40 INJECTION, SOLUTION INTRAVENOUS at 10:47

## 2020-07-15 RX ADMIN — QUETIAPINE FUMARATE 25 MG: 25 TABLET ORAL at 21:24

## 2020-07-15 RX ADMIN — RIFAXIMIN 550 MG: 550 TABLET ORAL at 09:09

## 2020-07-15 RX ADMIN — CLINDAMYCIN IN 5 PERCENT DEXTROSE 600 MG: 12 INJECTION, SOLUTION INTRAVENOUS at 16:15

## 2020-07-15 RX ADMIN — POTASSIUM & SODIUM PHOSPHATES POWDER PACK 280-160-250 MG 250 MG: 280-160-250 PACK at 21:24

## 2020-07-15 RX ADMIN — THIAMINE HYDROCHLORIDE 250 MG: 100 INJECTION, SOLUTION INTRAMUSCULAR; INTRAVENOUS at 11:34

## 2020-07-15 RX ADMIN — PROPRANOLOL HYDROCHLORIDE 10 MG: 10 TABLET ORAL at 12:37

## 2020-07-15 RX ADMIN — PANTOPRAZOLE SODIUM 40 MG: 40 INJECTION, POWDER, FOR SOLUTION INTRAVENOUS at 21:24

## 2020-07-15 RX ADMIN — DIPHENHYDRAMINE HYDROCHLORIDE 25 MG: 50 INJECTION, SOLUTION INTRAMUSCULAR; INTRAVENOUS at 23:13

## 2020-07-15 RX ADMIN — LACTULOSE 20 G: 20 SOLUTION ORAL at 21:24

## 2020-07-15 RX ADMIN — RIFAXIMIN 550 MG: 550 TABLET ORAL at 21:24

## 2020-07-15 ASSESSMENT — PAIN SCALES - GENERAL
PAINLEVEL_OUTOF10: 0

## 2020-07-15 NOTE — PROGRESS NOTES
HOSPITALIST PROGRESS NOTE  Date: 7/15/2020   Name: Darrold Mohs   MRN: [id number]   YOB: 1980      Subjective/Interval Hx:   Patient has the NG out states she is feeling a lot better is tolerating her food denies any abdominal pain nausea vomiting at this time.     Objective:   Physical Exam:   /74   Pulse 99   Temp 98.6 °F (37 °C) (Oral)   Resp 16   Ht 5' 4\" (1.626 m)   Wt 139 lb 5.3 oz (63.2 kg)   SpO2 93%   BMI 23.92 kg/m²   General: no acute distress, well nourished and well hydrated  HEENT: NCAT  Heart: S1S2 RRR  Lungs: Clear to ascultation bilaterally, respiratory effort normal  Abdomen: soft, NT/ND, positive bowel sounds  Extremities: no pitting edema, nontender   Neuro: patient is awake, alert and orientated times 3, no gross deficits  Skin: no rashes or ecchymosis        Meds:   Meds:    magnesium sulfate  2 g Intravenous Once    potassium & sodium phosphates  1 packet Oral TID    rifaximin  550 mg Oral BID    clindamycin (CLEOCIN) IV  600 mg Intravenous Q8H    QUEtiapine  25 mg Oral BID    propranolol  10 mg Oral BID    lactulose  20 g Oral BID    levetiracetam  500 mg Intravenous Q12H    pantoprazole  40 mg Intravenous BID    And    sodium chloride (PF)  10 mL Intravenous BID    escitalopram  10 mg Oral Daily    [Held by provider] gabapentin  100 mg Oral TID    sodium chloride flush  10 mL Intravenous 2 times per day    folic acid  1 mg Intravenous Daily    thiamine (VITAMIN B1) IVPB  250 mg Intravenous Daily      Infusions:   PRN Meds: diphenhydrAMINE, 25 mg, Q6H PRN  sodium chloride flush, 10 mL, PRN  polyethylene glycol, 17 g, Daily PRN  promethazine, 12.5 mg, Q6H PRN    Or  ondansetron, 4 mg, Q6H PRN        Data/Labs:     Recent Labs     07/13/20  0448 07/14/20  0419 07/15/20  0432   WBC 11.1 14.0* 14.8*   HGB 8.8* 8.6* 8.1*   HCT 27.2* 26.6* 24.6*    336 360      Recent Labs     07/13/20  0448 07/14/20  0419 07/15/20  0432    135 134   K 3. 3* 3.9 3.9    104 104   CO2 23 22 21*   PHOS 3.2 2.6 3.0   BUN 10 12 10   CREATININE 0.5 0.6 0.6     Recent Labs     07/13/20  0448 07/14/20  0419 07/15/20  0432   * 148* 134*   ALT 78* 70* 62*   BILIDIR 3.4* 2.6* 2.4*   BILITOT 4.6* 4.0* 3.5*   ALKPHOS 195* 194* 174*     No results for input(s): INR in the last 72 hours. No results for input(s): CKTOTAL, CKMB, CKMBINDEX, TROPONINT in the last 72 hours. I/O last 3 completed shifts: In: 6812 [I.V.:810; NG/GT:542; IV Piggyback:200]  Out: 575 [Urine:575]    Intake/Output Summary (Last 24 hours) at 7/15/2020 1432  Last data filed at 7/15/2020 0200  Gross per 24 hour   Intake 810 ml   Output 575 ml   Net 235 ml        Assessment/Plan:   1. Acute on chronic esophageal varices status post octreotide infusion and banding- patient remains with NG in her nares  No signs of active bleeding at this time, GI is following we will continue to monitor  07/15/2020- patient had video swallowing done yesterday successfully passes on a regular with thickened liquids and tolerating well denies any abdominal pain at this time. 2. Seizure precautions- Keppra for withdrawal seizures continue to monitor  3. Alcohol abuse with possible withdrawals- lactulose, folic acid and thiamine, monitor lab studies  4. Chronic depression- Lexapro, Seroquel  5.  Deconditioning/ambulatory dysfunction- due to prolonged hospitalization will get PT OT to evaluate and see if there is a need for rehab posthospitalization    DVT Prophylaxis: scd  Diet: DIET GENERAL; Moderately Thick (Honey)  Code Status: Full Code    Dispo: when stable    Electronically signed by Biju Lake MD on 7/15/2020 at 2:32 PM  [facility] Hospitalist

## 2020-07-15 NOTE — PROGRESS NOTES
Devon Paz M.D.,[facility], [place]  Luis Resendiz D.O., F.AMyriamC.OMyriamI., Sunny Thomas M.D. Vj John M.D., Izabella Haque M.D. Rankin Crigler, D.O. Daily Pulmonary Progress Note    Patient:  Carvel Nipper 42 y.o. female MRN: [id number]     Date of Service: 7/15/2020        Subjective   Following: Aspiration pneumonia       Patient was seen and examined. No family at bedside, passed swallow study , denies shortness of breath, does complain of nonproductive intermittent cough. I discussed with her follow-up in the office she reports she understands. Objective   Vitals: /70   Pulse 85   Temp 97.6 °F (36.4 °C) (Temporal)   Resp 18   Ht 5' 4\" (1.626 m)   Wt 139 lb 5.3 oz (63.2 kg)   SpO2 92%   BMI 23.92 kg/m²     I/O:    Intake/Output Summary (Last 24 hours) at 7/15/2020 1527  Last data filed at 7/15/2020 0200  Gross per 24 hour   Intake 810 ml   Output 325 ml   Net 485 ml       CURRENT MEDS :  Scheduled Meds:   magnesium sulfate  2 g Intravenous Once    potassium & sodium phosphates  1 packet Oral TID    rifaximin  550 mg Oral BID    clindamycin (CLEOCIN) IV  600 mg Intravenous Q8H    QUEtiapine  25 mg Oral BID    propranolol  10 mg Oral BID    lactulose  20 g Oral BID    levetiracetam  500 mg Intravenous Q12H    pantoprazole  40 mg Intravenous BID    And    sodium chloride (PF)  10 mL Intravenous BID    escitalopram  10 mg Oral Daily    [Held by provider] gabapentin  100 mg Oral TID    sodium chloride flush  10 mL Intravenous 2 times per day    folic acid  1 mg Intravenous Daily    thiamine (VITAMIN B1) IVPB  250 mg Intravenous Daily       Continuous Infusions:      PRN Meds:  diphenhydrAMINE, sodium chloride flush, polyethylene glycol, promethazine **OR** ondansetron      Physical Exam:  Physical Exam  Constitutional:       Appearance: She is ill-appearing. HENT:      Head: Normocephalic and atraumatic.    Eyes:      Conjunctiva/sclera: Conjunctivae normal.   Neck:      Musculoskeletal: Neck supple. Trachea: No tracheal deviation. Cardiovascular:      Rate and Rhythm: Normal rate and regular rhythm. Heart sounds: Normal heart sounds. Pulmonary:      Effort: Pulmonary effort is normal.      Breath sounds: Normal breath sounds. Abdominal:      General: Bowel sounds are normal.      Palpations: Abdomen is soft. Tenderness: There is no abdominal tenderness. Musculoskeletal:         General: No swelling. Lymphadenopathy:      Cervical: No cervical adenopathy. Skin:     General: Skin is warm and dry. Findings: No rash. Neurological:      General: No focal deficit present. Mental Status: She is alert and easily aroused. Psychiatric:         Behavior: Behavior normal.         Pertinent/ New Labs and Imaging Studies     Pulmonary Function Testing personally reviewed and interpreted. PERTINENT LAB RESULTS: Labs reviewed. DIAGNOSTICS: Pertinent imaging reviewed. Patient MRN: [id number]    : 1980    Age:  40 years    Gender: Female    Order Date: 7/15/2020 7:00 AM    Exam: XR CHEST PORTABLE    Number of Images: 1 view    Indication:   f/u pneumonia    f/u pneumonia    Comparison: 2020         Findings:         The heart is enlarged. The lung fields demonstrate evidence for airspace disease. The aorta is tortuous and ectatic.              Impression    Tortuous ectatic aorta    Cardiomegaly    Airspace disease compatible with pneumonia, at the left lung base    improved in the interval                Assessment:      1. Respiratory failure 2nd AMS, intubated  -   2. Aspiration pneumonia  3. Decompensated cirrhosis (ETOH +Autoimmune)  4. Hepatic encephalopathy  5. ETOH withdrawal seizure  6.  UGIB with varices s/p banding        Plan:      Continue abx with Clinda   Follow chest x-ray    PT/OT   Keep pulse oximetry greater than 92%, currently on room air   procalCalcitonin 0.42 today trending down, improvement in left lung base pneumonia   Will need follow-up in 2 weeks with chest x-ray, routed to office    Thank you for allowing me to participate in the care of Yelitza Mac. Please feel free to call with questions. Electronically signed by RADHA Rader on 7/15/2020 at 3:27 PM     I personally saw, examined, and cared for the patient. Labs, medications, radiographs reviewed. I agree with history exam and plans detailed in NP note. Clinically improved. D/C abx. Can be D/C'd from a pulmonary perspective. Pulmonary will sign off.     Electronically signed by Toni Reyes DO on 7/15/2020 at 10:39 PM

## 2020-07-15 NOTE — PLAN OF CARE
Problem: Pain:  Goal: Pain level will decrease  Description: Pain level will decrease  7/15/2020 0121 by [facility]  Outcome: Met This Shift  7/14/2020 2247 by [facility]  Outcome: Met This Shift  Goal: Control of acute pain  Description: Control of acute pain  7/15/2020 0121 by [facility]  Outcome: Met This Shift  7/14/2020 2247 by [facility]  Outcome: Met This Shift  Goal: Control of chronic pain  Description: Control of chronic pain  7/15/2020 0121 by [facility]  Outcome: Met This Shift  7/14/2020 2247 by [facility]  Outcome: Met This Shift     Problem: Falls - Risk of:  Goal: Will remain free from falls  Description: Will remain free from falls  7/15/2020 0121 by [facility]  Outcome: Met This Shift  7/14/2020 2247 by [facility]  Outcome: Met This Shift  Goal: Absence of physical injury  Description: Absence of physical injury  7/15/2020 0121 by [facility]  Outcome: Met This Shift  7/14/2020 2247 by [facility]  Outcome: Met This Shift     Problem: Skin Integrity:  Goal: Will show no infection signs and symptoms  Description: Will show no infection signs and symptoms  7/15/2020 0121 by [facility]  Outcome: Met This Shift  7/14/2020 2247 by [facility]  Outcome: Met This Shift  Goal: Absence of new skin breakdown  Description: Absence of new skin breakdown  7/15/2020 0121 by [facility]  Outcome: Met This Shift  7/14/2020 2247 by [facility]  Outcome: Met This Shift     Problem: Confusion - Acute:  Goal: Absence of continued neurological deterioration signs and symptoms  Description: Absence of continued neurological deterioration signs and symptoms  7/15/2020 0121 by [facility]  Outcome: Met This Shift  7/14/2020 2247 by [facility] AT [facility] KARLOS Nunez  Outcome: Met This Shift  Goal: Mental status will be restored to baseline  Description: Mental status will be restored to baseline  7/15/2020 0121 by Roc[facility] Nunez  Outcome: Met This Shift  7/14/2020 2247 by [facility]  Outcome: Met This Shift     Problem: Discharge Planning:  Goal: Ability to perform activities of daily living will improve  Description: Ability to perform activities of daily living will improve  7/15/2020 0121 by Vane Newman  Outcome: Met This Shift  7/14/2020 2247 by [facility]  Outcome: Met This Shift  Goal: Participates in care planning  Description: Participates in care planning  7/15/2020 0121 by Vane Newman  Outcome: Met This Shift  7/14/2020 2247 by [facility]  Outcome: Met This Shift     Problem: Injury - Risk of, Physical Injury:  Goal: Will remain free from falls  Description: Will remain free from falls  7/15/2020 0121 by [facility]  Outcome: Met This Shift  7/14/2020 2247 by [facility]  Outcome: Met This Shift  Goal: Absence of physical injury  Description: Absence of physical injury  7/15/2020 0121 by [facility]  Outcome: Met This Shift  7/14/2020 2247 by [facility]  Outcome: Met This Shift     Problem: Mood - Altered:  Goal: Mood stable  Description: Mood stable  [facility]/2020 [facility]  Outcome: Met This Shift  7/14/2020 2247 by [facility]  Outcome: Met This Shift  Goal: Absence of abusive behavior  Description: Absence of abusive behavior  7/15/2020 0121 by [facility]  Outcome: Met This Shift  7/14/2020 2247 by [facility]  Outcome: Met This Shift  Goal: Verbalizations of feeling emotionally comfortable while being cared for will increase  Description: Verbalizations of feeling emotionally comfortable while being cared for will increase  7/15/2020 0121 by [facility]  Outcome: Met This Shift  7/14/2020 2247 by [facility]  Outcome: Met This Shift     Problem: Sensory Perception - Impaired:  Goal: Demonstrations of improved sensory functioning will increase  Description: Demonstrations of improved sensory functioning will increase  7/15/2020 0121 by [facility]  Outcome: Met This Shift  7/14/2020 2247 by [facility]  Outcome: Met This Shift  Goal: Decrease in sensory misperception frequency  Description: Decrease in sensory misperception frequency  7/15/2020 0121 by [facility]  Outcome: Met This Shift  7/14/2020 2247 by [facility]  Outcome: Met This Shift  Goal: Able to refrain from responding to false sensory perceptions  Description: Able to refrain from responding to false sensory perceptions  7/15/2020 0121 by [facility]  Outcome: Met This Shift  7/14/2020 2247 by [facility]  Outcome: Met This Shift  Goal: Demonstrates accurate environmental perceptions  Description: Demonstrates accurate environmental perceptions  7/15/2020 0121 by [facility]  Outcome: Met This Shift  7/14/2020 2247 by [facility]  Outcome: Met This Shift  Goal: Able to distinguish between reality-based and nonreality-based thinking  Description: Able to distinguish between reality-based and nonreality-based thinking  7/15/2020 0121 by [facility]  Outcome: Met This Shift  7/14/2020 2247 by [facility]  Outcome: Met This Shift  Goal: Able to interrupt nonreality-based thinking  Description: Able to interrupt nonreality-based thinking  7/15/2020 0121 by [facility]  Outcome: Met This Shift  7/14/2020 2247 by [facility]  Outcome: Met This Shift     Problem: Psychomotor Activity - Altered:  Goal: Absence of psychomotor disturbance signs and symptoms  Description: Absence of psychomotor disturbance signs and symptoms  7/15/2020 0121 by [facility]  Outcome: Met This Shift  7/14/2020 2247 by [facility]  Outcome: Met This Shift     Problem: Sleep Pattern Disturbance:  Goal: Appears well-rested  Description: Appears well-rested  7/15/2020 0121 by [facility]  Outcome: Met This Shift  7/14/2020 2247 by [facility] AT [facility] KARLOS Nunez  Outcome: Met This Shift

## 2020-07-15 NOTE — PLAN OF CARE
Problem: Pain:  Goal: Pain level will decrease  Description: Pain level will decrease  Outcome: Met This Shift  Goal: Control of acute pain  Description: Control of acute pain  Outcome: Met This Shift  Goal: Control of chronic pain  Description: Control of chronic pain  Outcome: Met This Shift     Problem: Falls - Risk of:  Goal: Will remain free from falls  Description: Will remain free from falls  Outcome: Met This Shift  Goal: Absence of physical injury  Description: Absence of physical injury  Outcome: Met This Shift     Problem: Skin Integrity:  Goal: Will show no infection signs and symptoms  Description: Will show no infection signs and symptoms  Outcome: Met This Shift  Goal: Absence of new skin breakdown  Description: Absence of new skin breakdown  Outcome: Met This Shift     Problem: Confusion - Acute:  Goal: Absence of continued neurological deterioration signs and symptoms  Description: Absence of continued neurological deterioration signs and symptoms  Outcome: Met This Shift  Goal: Mental status will be restored to baseline  Description: Mental status will be restored to baseline  Outcome: Met This Shift     Problem: Discharge Planning:  Goal: Ability to perform activities of daily living will improve  Description: Ability to perform activities of daily living will improve  Outcome: Met This Shift  Goal: Participates in care planning  Description: Participates in care planning  Outcome: Met This Shift     Problem: Injury - Risk of, Physical Injury:  Goal: Will remain free from falls  Description: Will remain free from falls  Outcome: Met This Shift  Goal: Absence of physical injury  Description: Absence of physical injury  Outcome: Met This Shift     Problem: Mood - Altered:  Goal: Mood stable  Description: Mood stable  Outcome: Met This Shift  Goal: Absence of abusive behavior  Description: Absence of abusive behavior  Outcome: Met This Shift  Goal: Verbalizations of feeling emotionally comfortable while being cared for will increase  Description: Verbalizations of feeling emotionally comfortable while being cared for will increase  Outcome: Met This Shift     Problem: Psychomotor Activity - Altered:  Goal: Absence of psychomotor disturbance signs and symptoms  Description: Absence of psychomotor disturbance signs and symptoms  Outcome: Met This Shift     Problem: Sensory Perception - Impaired:  Goal: Demonstrations of improved sensory functioning will increase  Description: Demonstrations of improved sensory functioning will increase  Outcome: Met This Shift  Goal: Decrease in sensory misperception frequency  Description: Decrease in sensory misperception frequency  Outcome: Met This Shift  Goal: Able to refrain from responding to false sensory perceptions  Description: Able to refrain from responding to false sensory perceptions  Outcome: Met This Shift  Goal: Demonstrates accurate environmental perceptions  Description: Demonstrates accurate environmental perceptions  Outcome: Met This Shift  Goal: Able to distinguish between reality-based and nonreality-based thinking  Description: Able to distinguish between reality-based and nonreality-based thinking  Outcome: Met This Shift  Goal: Able to interrupt nonreality-based thinking  Description: Able to interrupt nonreality-based thinking  Outcome: Met This Shift     Problem: Sleep Pattern Disturbance:  Goal: Appears well-rested  Description: Appears well-rested  Outcome: Met This Shift

## 2020-07-16 VITALS
OXYGEN SATURATION: 93 % | TEMPERATURE: 98.3 F | HEART RATE: 87 BPM | WEIGHT: 139.33 LBS | RESPIRATION RATE: 18 BRPM | SYSTOLIC BLOOD PRESSURE: 128 MMHG | BODY MASS INDEX: 23.79 KG/M2 | DIASTOLIC BLOOD PRESSURE: 91 MMHG | HEIGHT: 64 IN

## 2020-07-16 LAB
ALBUMIN SERPL-MCNC: 2.5 G/DL (ref 3.5–5.2)
ALP BLD-CCNC: 178 U/L (ref 35–104)
ALT SERPL-CCNC: 61 U/L (ref 0–32)
ANION GAP SERPL CALCULATED.3IONS-SCNC: 11 MMOL/L (ref 7–16)
ANISOCYTOSIS: ABNORMAL
AST SERPL-CCNC: 130 U/L (ref 0–31)
BASOPHILS ABSOLUTE: 0.23 E9/L (ref 0–0.2)
BASOPHILS RELATIVE PERCENT: 1.7 % (ref 0–2)
BILIRUB SERPL-MCNC: 3.1 MG/DL (ref 0–1.2)
BILIRUBIN DIRECT: 1.9 MG/DL (ref 0–0.3)
BILIRUBIN, INDIRECT: 1.2 MG/DL (ref 0–1)
BUN BLDV-MCNC: 7 MG/DL (ref 6–20)
CALCIUM SERPL-MCNC: 8 MG/DL (ref 8.6–10.2)
CHLORIDE BLD-SCNC: 103 MMOL/L (ref 98–107)
CO2: 21 MMOL/L (ref 22–29)
CREAT SERPL-MCNC: 0.6 MG/DL (ref 0.5–1)
EOSINOPHILS ABSOLUTE: 0.23 E9/L (ref 0.05–0.5)
EOSINOPHILS RELATIVE PERCENT: 1.7 % (ref 0–6)
GFR AFRICAN AMERICAN: >60
GFR NON-AFRICAN AMERICAN: >60 ML/MIN/1.73
GLUCOSE BLD-MCNC: 84 MG/DL (ref 74–99)
HCT VFR BLD CALC: 24.8 % (ref 34–48)
HEMOGLOBIN: 8 G/DL (ref 11.5–15.5)
HYPOCHROMIA: ABNORMAL
LYMPHOCYTES ABSOLUTE: 2.31 E9/L (ref 1.5–4)
LYMPHOCYTES RELATIVE PERCENT: 17.4 % (ref 20–42)
MAGNESIUM: 1.7 MG/DL (ref 1.6–2.6)
MCH RBC QN AUTO: 30.5 PG (ref 26–35)
MCHC RBC AUTO-ENTMCNC: 32.3 % (ref 32–34.5)
MCV RBC AUTO: 94.7 FL (ref 80–99.9)
MONOCYTES ABSOLUTE: 1.5 E9/L (ref 0.1–0.95)
MONOCYTES RELATIVE PERCENT: 11.3 % (ref 2–12)
NEUTROPHILS ABSOLUTE: 9.25 E9/L (ref 1.8–7.3)
NEUTROPHILS RELATIVE PERCENT: 67.8 % (ref 43–80)
PDW BLD-RTO: 18.7 FL (ref 11.5–15)
PHOSPHORUS: 3.4 MG/DL (ref 2.5–4.5)
PLATELET # BLD: 425 E9/L (ref 130–450)
PMV BLD AUTO: 10.2 FL (ref 7–12)
POIKILOCYTES: ABNORMAL
POLYCHROMASIA: ABNORMAL
POTASSIUM SERPL-SCNC: 3.8 MMOL/L (ref 3.5–5)
RBC # BLD: 2.62 E12/L (ref 3.5–5.5)
SODIUM BLD-SCNC: 135 MMOL/L (ref 132–146)
TARGET CELLS: ABNORMAL
TOTAL PROTEIN: 5.4 G/DL (ref 6.4–8.3)
WBC # BLD: 13.6 E9/L (ref 4.5–11.5)

## 2020-07-16 PROCEDURE — 6370000000 HC RX 637 (ALT 250 FOR IP): Performed by: INTERNAL MEDICINE

## 2020-07-16 PROCEDURE — 2500000003 HC RX 250 WO HCPCS: Performed by: INTERNAL MEDICINE

## 2020-07-16 PROCEDURE — 2580000003 HC RX 258: Performed by: INTERNAL MEDICINE

## 2020-07-16 PROCEDURE — 6360000002 HC RX W HCPCS: Performed by: INTERNAL MEDICINE

## 2020-07-16 PROCEDURE — 83735 ASSAY OF MAGNESIUM: CPT

## 2020-07-16 PROCEDURE — C9113 INJ PANTOPRAZOLE SODIUM, VIA: HCPCS | Performed by: INTERNAL MEDICINE

## 2020-07-16 PROCEDURE — 85025 COMPLETE CBC W/AUTO DIFF WBC: CPT

## 2020-07-16 PROCEDURE — 36415 COLL VENOUS BLD VENIPUNCTURE: CPT

## 2020-07-16 PROCEDURE — 92526 ORAL FUNCTION THERAPY: CPT | Performed by: SPEECH-LANGUAGE PATHOLOGIST

## 2020-07-16 PROCEDURE — 80076 HEPATIC FUNCTION PANEL: CPT

## 2020-07-16 PROCEDURE — 84100 ASSAY OF PHOSPHORUS: CPT

## 2020-07-16 PROCEDURE — 80048 BASIC METABOLIC PNL TOTAL CA: CPT

## 2020-07-16 RX ORDER — THIAMINE MONONITRATE (VIT B1) 100 MG
100 TABLET ORAL DAILY
Qty: 30 TABLET | Refills: 0 | DISCHARGE
Start: 2020-07-16 | End: 2020-10-09 | Stop reason: ALTCHOICE

## 2020-07-16 RX ORDER — FOLIC ACID 5 MG/ML
1 INJECTION, SOLUTION INTRAMUSCULAR; INTRAVENOUS; SUBCUTANEOUS DAILY
Qty: 1 VIAL | Refills: 0 | DISCHARGE
Start: 2020-07-17 | End: 2020-07-16 | Stop reason: HOSPADM

## 2020-07-16 RX ORDER — QUETIAPINE FUMARATE 25 MG/1
25 TABLET, FILM COATED ORAL 2 TIMES DAILY
Qty: 60 TABLET | Refills: 0 | DISCHARGE
Start: 2020-07-16 | End: 2020-10-09 | Stop reason: ALTCHOICE

## 2020-07-16 RX ORDER — PROPRANOLOL HYDROCHLORIDE 10 MG/1
10 TABLET ORAL 2 TIMES DAILY
Qty: 90 TABLET | Refills: 0 | DISCHARGE
Start: 2020-07-16 | End: 2020-10-09 | Stop reason: ALTCHOICE

## 2020-07-16 RX ORDER — LACTULOSE 10 G/15ML
20 SOLUTION ORAL 2 TIMES DAILY
Qty: 1 BOTTLE | Refills: 0 | DISCHARGE
Start: 2020-07-16 | End: 2020-10-09

## 2020-07-16 RX ORDER — FOLIC ACID 1 MG/1
1 TABLET ORAL DAILY
Qty: 30 TABLET | Refills: 0 | DISCHARGE
Start: 2020-07-16 | End: 2020-10-09 | Stop reason: ALTCHOICE

## 2020-07-16 RX ADMIN — FOLIC ACID 1 MG: 5 INJECTION, SOLUTION INTRAMUSCULAR; INTRAVENOUS; SUBCUTANEOUS at 09:21

## 2020-07-16 RX ADMIN — Medication 10 ML: at 09:21

## 2020-07-16 RX ADMIN — THIAMINE HYDROCHLORIDE 250 MG: 100 INJECTION, SOLUTION INTRAMUSCULAR; INTRAVENOUS at 09:21

## 2020-07-16 RX ADMIN — PANTOPRAZOLE SODIUM 40 MG: 40 INJECTION, POWDER, FOR SOLUTION INTRAVENOUS at 09:20

## 2020-07-16 RX ADMIN — POTASSIUM & SODIUM PHOSPHATES POWDER PACK 280-160-250 MG 250 MG: 280-160-250 PACK at 09:20

## 2020-07-16 RX ADMIN — SODIUM CHLORIDE, PRESERVATIVE FREE 10 ML: 5 INJECTION INTRAVENOUS at 09:22

## 2020-07-16 RX ADMIN — LACTULOSE 20 G: 20 SOLUTION ORAL at 09:20

## 2020-07-16 RX ADMIN — ESCITALOPRAM 10 MG: 10 TABLET, FILM COATED ORAL at 09:20

## 2020-07-16 RX ADMIN — QUETIAPINE FUMARATE 25 MG: 25 TABLET ORAL at 09:20

## 2020-07-16 RX ADMIN — PROPRANOLOL HYDROCHLORIDE 10 MG: 10 TABLET ORAL at 09:20

## 2020-07-16 RX ADMIN — RIFAXIMIN 550 MG: 550 TABLET ORAL at 09:20

## 2020-07-16 RX ADMIN — LEVETIRACETAM 500 MG: 5 INJECTION INTRAVENOUS at 10:26

## 2020-07-16 ASSESSMENT — PAIN SCALES - GENERAL
PAINLEVEL_OUTOF10: 0

## 2020-07-16 NOTE — CARE COORDINATION
Patient for discharge to [facility] today at 1500. Mother Fran notified of discharge time. HENS and ambulette on soft chart. Ambulette arranged through facility for  at 1500. For questions I can be reached at [phone].  Rosa Isela Heredia, [employer]

## 2020-07-16 NOTE — PLAN OF CARE
Problem: Pain:  Goal: Pain level will decrease  Description: Pain level will decrease  Outcome: Met This Shift  Goal: Control of acute pain  Description: Control of acute pain  Outcome: Met This Shift  Goal: Control of chronic pain  Description: Control of chronic pain  Outcome: Met This Shift     Problem: Falls - Risk of:  Goal: Will remain free from falls  Description: Will remain free from falls  Outcome: Met This Shift  Goal: Absence of physical injury  Description: Absence of physical injury  Outcome: Met This Shift     Problem: Skin Integrity:  Goal: Will show no infection signs and symptoms  Description: Will show no infection signs and symptoms  Outcome: Met This Shift  Goal: Absence of new skin breakdown  Description: Absence of new skin breakdown  Outcome: Met This Shift     Problem: Confusion - Acute:  Goal: Absence of continued neurological deterioration signs and symptoms  Description: Absence of continued neurological deterioration signs and symptoms  Outcome: Met This Shift  Goal: Mental status will be restored to baseline  Description: Mental status will be restored to baseline  Outcome: Met This Shift     Problem: Discharge Planning:  Goal: Ability to perform activities of daily living will improve  Description: Ability to perform activities of daily living will improve  Outcome: Met This Shift  Goal: Participates in care planning  Description: Participates in care planning  Outcome: Met This Shift     Problem: Mood - Altered:  Goal: Mood stable  Description: Mood stable  Outcome: Met This Shift  Goal: Absence of abusive behavior  Description: Absence of abusive behavior  Outcome: Met This Shift  Goal: Verbalizations of feeling emotionally comfortable while being cared for will increase  Description: Verbalizations of feeling emotionally comfortable while being cared for will increase  Outcome: Met This Shift     Problem: Injury - Risk of, Physical Injury:  Goal: Will remain free from

## 2020-07-16 NOTE — DISCHARGE INSTR - COC
Continuity of Care Form    Patient Name: Le Lake   :  1980  MRN:  [id number]    Admit date:  2020  Discharge date:  20    Code Status Order: Full Code   Advance Directives:   Advance Care Flowsheet Documentation     Date/Time Healthcare Directive Type of Healthcare Directive Copy in [address] Agent's Name Healthcare Agent's Phone Number    20 1630  Yes, patient has an advance directive for healthcare treatment  Health care treatment directive;Durable power of  for health care  No, copy requested from clinic  Healthcare power of   PARENTS  --          Admitting Physician:  David Frey DO  PCP: Dolores Castellano DO    Discharging Nurse: [facility] Unit/Room#: 4158/8443-J  Discharging Unit Phone Number: [phone]    Emergency Contact:   Extended Emergency Contact Information  Primary Emergency Contact: Radha Lowery  Address: [address]  Phone: [phone]  Mobile Phone: [phone]  Relation: Parent  Secondary Emergency Contact: Billy Eldridge  Address: [address] Phone: [phone]  Relation: Parent    Past Surgical History:  Past Surgical History:   Procedure Laterality Date   [address]  2017    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 - 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    SPLENECTOMY, TOTAL  2017    along with partial pancreatectomy    UPPER GASTROINTESTINAL ENDOSCOPY N/A 2020    EGD CONTROL HEMORRHAGE performed by Gina George MD at [facility]  2020    EGD ESOPHAGOGASTRODUODENOSCOPY ENDOSCOPIC VARICEAL TREATMENT performed by Gina George MD at [facility] History:   Immunization None    Nutrition Therapy:  Current Nutrition Therapy:   - Oral Diet:  General    Routes of Feeding: Oral  Liquids: Honey Thick Liquids  Daily Fluid Restriction: no  Last Modified Barium Swallow with Video (Video Swallowing Test): done on 7/14/20    Treatments at the Time of Hospital Discharge:   Respiratory Treatments: none  Oxygen Therapy:  is not on home oxygen therapy. Ventilator:    - No ventilator support    Rehab Therapies: Physical Therapy, Occupational Therapy and Speech/Language Therapy  Weight Bearing Status/Restrictions: No weight bearing restirctions  Other Medical Equipment (for information only, NOT a DME order):  none  Other Treatments: none    Patient's personal belongings (please select all that are sent with patient):  None    RN SIGNATURE:  Electronically signed by Kulwant Conte RN on 7/16/20 at 12:44 PM EDT    CASE MANAGEMENT/SOCIAL WORK SECTION    Inpatient Status Date: ***    Readmission Risk Assessment Score:  Readmission Risk              Risk of Unplanned Readmission:        22           Discharging to Facility/ Agency   · Name:   · Address:  · Phone:  · Fax:    Dialysis Facility (if applicable)   · Name:  · Address:  · Dialysis Schedule:  · Phone:  · Fax:    / signature: {Esignature:[id number]}    PHYSICIAN SECTION    Prognosis: {Prognosis:7854704365}    Condition at Discharge: [address] Patient Condition:000024835}    Rehab Potential (if transferring to Rehab): {Prognosis:2737885678}    Recommended Labs or Other Treatments After Discharge: ***    Physician Certification: I certify the above information and transfer of Danya Coe  is necessary for the continuing treatment of the diagnosis listed and that she requires {Admit to Appropriate Level of Care:22031} for {GREATER/LESS:011211097} 30 days.      Update Admission H&P: {CHP DME Changes in LCILS:325464160}    PHYSICIAN SIGNATURE:  {Esignature:[id number]}

## 2020-07-16 NOTE — PROGRESS NOTES
SPEECH LANGUAGE PATHOLOGY  DAILY PROGRESS NOTE        PATIENT NAME:  Hector Monique      :  1980          TODAY'S DATE:  2020 ROOM:  6867/2742-V    Pt was seen in her room for dysphagia therapy. Pt was laying down in her bed when clinician entered the room. When asked if she had any trouble with swallowing in the previous day, pt reported she had some difficulties tolerating a thickened milkshake her father brought her. She reported to clinician when she took more time to swallowed and engaged in compensatory strategies she had fewer difficulties. Pt completed 15 reps of laryngeal exercises with good outcomes provided minimal prompt and cues. Pt completed 15 reps of tongue base exercises with good outcomes provided minimal prompt and cues. Pt reported she was completing exercises independently a few times throughout the day with good outcomes.        CPT code(s) 88319  dysphagia tx  Total minutes :  15 minutes

## 2020-07-16 NOTE — DISCHARGE SUMMARY
Resp 18   Ht 5' 4\" (1.626 m)   Wt 139 lb 5.3 oz (63.2 kg)   SpO2 93%   BMI 23.92 kg/m²   Neck: no JVD  Lungs: equal BS, clear   CV: RRR, normal S1S2, no significant murmur  Abdomen: soft, nontender, normally active BS, no masses or tenderness  Extremities: no edema or cords  Neurologic: alert, oriented, no focal CN or motor deficit        Medications: see computerized discharge medication list     Medication List      START taking these medications    folic acid 5 MG/ML injection  Infuse 0.2 mLs intravenously daily  Start taking on:  July 17, 2020     lactulose 10 GM/15ML solution  Commonly known as:  CHRONULAC  Take 30 mLs by mouth 2 times daily     potassium & sodium phosphates 280-160-250 MG Pack  Commonly known as:  PHOS-NAK  Take 1 packet by mouth three times daily     propranolol 10 MG tablet  Commonly known as:  INDERAL  Take 1 tablet by mouth 2 times daily     QUEtiapine 25 MG tablet  Commonly known as:  SEROQUEL  Take 1 tablet by mouth 2 times daily     rifaximin 550 MG tablet  Commonly known as:  XIFAXAN  Take 1 tablet by mouth 2 times daily        CONTINUE taking these medications    Creon 16706 units delayed release capsule  Generic drug:  lipase-protease-amylase     escitalopram 10 MG tablet  Commonly known as:  LEXAPRO     vitamin D 1.25 MG (08596 UT) Caps capsule  Commonly known as:  ERGOCALCIFEROL     vitamin D 250 MCG (38973 UT) Caps capsule  Commonly known as:  CHOLECALCIFEROL        STOP taking these medications    gabapentin 100 MG capsule  Commonly known as:  NEURONTIN     vitamin A 89895 units capsule           Where to Get Your Medications      Information about where to get these medications is not yet available    Ask your nurse or doctor about these medications  · folic acid 5 MG/ML injection  · lactulose 10 GM/15ML solution  · potassium & sodium phosphates 280-160-250 MG Pack  · propranolol 10 MG tablet  · QUEtiapine 25 MG tablet  · rifaximin 550 MG tablet       Patient Instructions: Resume home medications any changes while in the hospital      Discharged Condition: good  Disposition: SNF  Activity: activity as tolerated  Diet: regular diet  Wound Care: none needed    Follow-up: Dr. Kathy Dickey in 1-2 weeks        Electronically signed by Dao Henry MD on 7/16/2020 at 10:42 AM  [facility]   Time spent on discharge 44 minutes

## 2020-07-17 LAB
BLOOD CULTURE, ROUTINE: NORMAL
CULTURE, BLOOD 2: NORMAL

## 2020-08-03 ENCOUNTER — HOSPITAL ENCOUNTER (OUTPATIENT)
Dept: ULTRASOUND IMAGING | Age: 40
Discharge: HOME | End: 2020-08-05
Payer: MEDICAID

## 2020-08-03 ENCOUNTER — HOSPITAL ENCOUNTER (OUTPATIENT)
Age: 40
Discharge: HOME | End: 2020-08-03
Payer: MEDICAID

## 2020-08-03 PROCEDURE — 76705 ECHO EXAM OF ABDOMEN: CPT

## 2020-10-07 ENCOUNTER — HOSPITAL ENCOUNTER (OUTPATIENT)
Age: 40
Discharge: HOME | End: 2020-10-09
Payer: MEDICAID

## 2020-10-07 ENCOUNTER — HOSPITAL ENCOUNTER (OUTPATIENT)
Dept: GENERAL RADIOLOGY | Age: 40
Discharge: HOME | End: 2020-10-09
Payer: MEDICAID

## 2020-10-07 PROCEDURE — 71046 X-RAY EXAM CHEST 2 VIEWS: CPT

## 2020-11-26 ENCOUNTER — APPOINTMENT (OUTPATIENT)
Dept: GENERAL RADIOLOGY | Age: 40
End: 2020-11-26
Payer: MEDICAID

## 2020-11-26 ENCOUNTER — HOSPITAL ENCOUNTER (EMERGENCY)
Age: 40
Discharge: HOME | End: 2020-11-27
Attending: EMERGENCY MEDICINE
Payer: MEDICAID

## 2020-11-26 ENCOUNTER — APPOINTMENT (OUTPATIENT)
Dept: CT IMAGING | Age: 40
End: 2020-11-26
Payer: MEDICAID

## 2020-11-26 LAB
ACANTHOCYTES: ABNORMAL
ADENOVIRUS BY PCR: NOT DETECTED
ALBUMIN SERPL-MCNC: 3.3 G/DL (ref 3.5–5.2)
ALBUMIN SERPL-MCNC: 3.5 G/DL (ref 3.5–5.2)
ALP BLD-CCNC: 198 U/L (ref 35–104)
ALP BLD-CCNC: 210 U/L (ref 35–104)
ALT SERPL-CCNC: 41 U/L (ref 0–32)
ALT SERPL-CCNC: 50 U/L (ref 0–32)
ANION GAP SERPL CALCULATED.3IONS-SCNC: 15 MMOL/L (ref 7–16)
ANION GAP SERPL CALCULATED.3IONS-SCNC: 17 MMOL/L (ref 7–16)
ANISOCYTOSIS: ABNORMAL
APTT: 23.4 SEC (ref 24.5–35.1)
AST SERPL-CCNC: 175 U/L (ref 0–31)
AST SERPL-CCNC: 209 U/L (ref 0–31)
BASOPHILIC STIPPLING: ABNORMAL
BASOPHILS ABSOLUTE: 0.07 E9/L (ref 0–0.2)
BASOPHILS RELATIVE PERCENT: 0.9 % (ref 0–2)
BILIRUB SERPL-MCNC: 2.4 MG/DL (ref 0–1.2)
BILIRUB SERPL-MCNC: 2.5 MG/DL (ref 0–1.2)
BILIRUBIN DIRECT: 0.8 MG/DL (ref 0–0.3)
BILIRUBIN, INDIRECT: 1.7 MG/DL (ref 0–1)
BORDETELLA PARAPERTUSSIS BY PCR: NOT DETECTED
BORDETELLA PERTUSSIS BY PCR: NOT DETECTED
BUN BLDV-MCNC: 20 MG/DL (ref 6–20)
BUN BLDV-MCNC: 21 MG/DL (ref 6–20)
CALCIUM SERPL-MCNC: 8.1 MG/DL (ref 8.6–10.2)
CALCIUM SERPL-MCNC: 8.5 MG/DL (ref 8.6–10.2)
CHLAMYDOPHILIA PNEUMONIAE BY PCR: NOT DETECTED
CHLORIDE BLD-SCNC: 103 MMOL/L (ref 98–107)
CHLORIDE BLD-SCNC: 105 MMOL/L (ref 98–107)
CO2: 21 MMOL/L (ref 22–29)
CO2: 22 MMOL/L (ref 22–29)
CORONAVIRUS 229E BY PCR: NOT DETECTED
CORONAVIRUS HKU1 BY PCR: NOT DETECTED
CORONAVIRUS NL63 BY PCR: NOT DETECTED
CORONAVIRUS OC43 BY PCR: NOT DETECTED
CREAT SERPL-MCNC: 0.4 MG/DL (ref 0.5–1)
CREAT SERPL-MCNC: 0.5 MG/DL (ref 0.5–1)
EOSINOPHILS ABSOLUTE: 0 E9/L (ref 0.05–0.5)
EOSINOPHILS RELATIVE PERCENT: 0 % (ref 0–6)
GFR AFRICAN AMERICAN: >60
GFR AFRICAN AMERICAN: >60
GFR NON-AFRICAN AMERICAN: >60 ML/MIN/1.73
GFR NON-AFRICAN AMERICAN: >60 ML/MIN/1.73
GLUCOSE BLD-MCNC: 126 MG/DL (ref 74–99)
GLUCOSE BLD-MCNC: 131 MG/DL (ref 74–99)
HCG QUALITATIVE: NEGATIVE
HCG, URINE, POC: NEGATIVE
HCT VFR BLD CALC: 24.2 % (ref 34–48)
HCT VFR BLD CALC: 24.2 % (ref 34–48)
HCT VFR BLD CALC: 27.4 % (ref 34–48)
HEMOGLOBIN: 8.1 G/DL (ref 11.5–15.5)
HEMOGLOBIN: 8.2 G/DL (ref 11.5–15.5)
HEMOGLOBIN: 9 G/DL (ref 11.5–15.5)
HUMAN METAPNEUMOVIRUS BY PCR: NOT DETECTED
HUMAN RHINOVIRUS/ENTEROVIRUS BY PCR: NOT DETECTED
HYPOCHROMIA: ABNORMAL
INFLUENZA A BY PCR: NOT DETECTED
INFLUENZA B BY PCR: NOT DETECTED
INR BLD: 1.8
LACTIC ACID: 2.4 MMOL/L (ref 0.5–2.2)
LACTIC ACID: 5.5 MMOL/L (ref 0.5–2.2)
LIPASE: 18 U/L (ref 13–60)
LYMPHOCYTES ABSOLUTE: 1.18 E9/L (ref 1.5–4)
LYMPHOCYTES RELATIVE PERCENT: 15.7 % (ref 20–42)
Lab: NORMAL
MCH RBC QN AUTO: 28 PG (ref 26–35)
MCH RBC QN AUTO: 28.7 PG (ref 26–35)
MCHC RBC AUTO-ENTMCNC: 32.8 % (ref 32–34.5)
MCHC RBC AUTO-ENTMCNC: 33.9 % (ref 32–34.5)
MCV RBC AUTO: 84.6 FL (ref 80–99.9)
MCV RBC AUTO: 85.4 FL (ref 80–99.9)
MONOCYTES ABSOLUTE: 0.59 E9/L (ref 0.1–0.95)
MONOCYTES RELATIVE PERCENT: 7.8 % (ref 2–12)
MYCOPLASMA PNEUMONIAE BY PCR: NOT DETECTED
NEGATIVE QC PASS/FAIL: NORMAL
NEUTROPHILS ABSOLUTE: 5.62 E9/L (ref 1.8–7.3)
NEUTROPHILS RELATIVE PERCENT: 75.7 % (ref 43–80)
OVALOCYTES: ABNORMAL
PARAINFLUENZA VIRUS 1 BY PCR: NOT DETECTED
PARAINFLUENZA VIRUS 2 BY PCR: NOT DETECTED
PARAINFLUENZA VIRUS 3 BY PCR: NOT DETECTED
PARAINFLUENZA VIRUS 4 BY PCR: NOT DETECTED
PDW BLD-RTO: 20.7 FL (ref 11.5–15)
PDW BLD-RTO: 20.8 FL (ref 11.5–15)
PLATELET # BLD: 105 E9/L (ref 130–450)
PLATELET # BLD: 118 E9/L (ref 130–450)
PMV BLD AUTO: 10.8 FL (ref 7–12)
PMV BLD AUTO: 11.7 FL (ref 7–12)
POIKILOCYTES: ABNORMAL
POLYCHROMASIA: ABNORMAL
POSITIVE QC PASS/FAIL: NORMAL
POTASSIUM REFLEX MAGNESIUM: 4.5 MMOL/L (ref 3.5–5)
POTASSIUM SERPL-SCNC: 3.6 MMOL/L (ref 3.5–5)
PROTHROMBIN TIME: 20 SEC (ref 9.3–12.4)
RBC # BLD: 2.86 E12/L (ref 3.5–5.5)
RBC # BLD: 3.21 E12/L (ref 3.5–5.5)
REASON FOR REJECTION: NORMAL
REJECTED TEST: NORMAL
RESPIRATORY SYNCYTIAL VIRUS BY PCR: NOT DETECTED
SARS-COV-2, PCR: NOT DETECTED
SCHISTOCYTES: ABNORMAL
SODIUM BLD-SCNC: 141 MMOL/L (ref 132–146)
SODIUM BLD-SCNC: 142 MMOL/L (ref 132–146)
TARGET CELLS: ABNORMAL
TOTAL PROTEIN: 7.4 G/DL (ref 6.4–8.3)
TOTAL PROTEIN: 8 G/DL (ref 6.4–8.3)
WBC # BLD: 6.6 E9/L (ref 4.5–11.5)
WBC # BLD: 7.4 E9/L (ref 4.5–11.5)

## 2020-11-26 PROCEDURE — 96361 HYDRATE IV INFUSION ADD-ON: CPT

## 2020-11-26 PROCEDURE — 96375 TX/PRO/DX INJ NEW DRUG ADDON: CPT

## 2020-11-26 PROCEDURE — 74177 CT ABD & PELVIS W/CONTRAST: CPT

## 2020-11-26 PROCEDURE — 84703 CHORIONIC GONADOTROPIN ASSAY: CPT

## 2020-11-26 PROCEDURE — 83690 ASSAY OF LIPASE: CPT

## 2020-11-26 PROCEDURE — 99283 EMERGENCY DEPT VISIT LOW MDM: CPT

## 2020-11-26 PROCEDURE — 80053 COMPREHEN METABOLIC PANEL: CPT

## 2020-11-26 PROCEDURE — 6360000002 HC RX W HCPCS: Performed by: EMERGENCY MEDICINE

## 2020-11-26 PROCEDURE — 96372 THER/PROPH/DIAG INJ SC/IM: CPT

## 2020-11-26 PROCEDURE — 85025 COMPLETE CBC W/AUTO DIFF WBC: CPT

## 2020-11-26 PROCEDURE — 80076 HEPATIC FUNCTION PANEL: CPT

## 2020-11-26 PROCEDURE — 85014 HEMATOCRIT: CPT

## 2020-11-26 PROCEDURE — 36415 COLL VENOUS BLD VENIPUNCTURE: CPT

## 2020-11-26 PROCEDURE — 85610 PROTHROMBIN TIME: CPT

## 2020-11-26 PROCEDURE — 0202U NFCT DS 22 TRGT SARS-COV-2: CPT

## 2020-11-26 PROCEDURE — 6360000004 HC RX CONTRAST MEDICATION: Performed by: RADIOLOGY

## 2020-11-26 PROCEDURE — 93005 ELECTROCARDIOGRAM TRACING: CPT | Performed by: EMERGENCY MEDICINE

## 2020-11-26 PROCEDURE — 85027 COMPLETE CBC AUTOMATED: CPT

## 2020-11-26 PROCEDURE — 85730 THROMBOPLASTIN TIME PARTIAL: CPT

## 2020-11-26 PROCEDURE — 6360000002 HC RX W HCPCS: Performed by: STUDENT IN AN ORGANIZED HEALTH CARE EDUCATION/TRAINING PROGRAM

## 2020-11-26 PROCEDURE — 71045 X-RAY EXAM CHEST 1 VIEW: CPT

## 2020-11-26 PROCEDURE — 96374 THER/PROPH/DIAG INJ IV PUSH: CPT

## 2020-11-26 PROCEDURE — 2580000003 HC RX 258: Performed by: STUDENT IN AN ORGANIZED HEALTH CARE EDUCATION/TRAINING PROGRAM

## 2020-11-26 PROCEDURE — 2580000003 HC RX 258: Performed by: EMERGENCY MEDICINE

## 2020-11-26 PROCEDURE — 80048 BASIC METABOLIC PNL TOTAL CA: CPT

## 2020-11-26 PROCEDURE — 85018 HEMOGLOBIN: CPT

## 2020-11-26 PROCEDURE — 83605 ASSAY OF LACTIC ACID: CPT

## 2020-11-26 RX ORDER — SODIUM CHLORIDE 9 MG/ML
INJECTION, SOLUTION INTRAVENOUS CONTINUOUS
Status: DISCONTINUED | OUTPATIENT
Start: 2020-11-26 | End: 2020-11-27 | Stop reason: HOSPADM

## 2020-11-26 RX ORDER — LORAZEPAM 2 MG/ML
1 INJECTION INTRAMUSCULAR ONCE
Status: COMPLETED | OUTPATIENT
Start: 2020-11-26 | End: 2020-11-26

## 2020-11-26 RX ORDER — 0.9 % SODIUM CHLORIDE 0.9 %
1000 INTRAVENOUS SOLUTION INTRAVENOUS ONCE
Status: COMPLETED | OUTPATIENT
Start: 2020-11-26 | End: 2020-11-26

## 2020-11-26 RX ORDER — ONDANSETRON 2 MG/ML
4 INJECTION INTRAMUSCULAR; INTRAVENOUS ONCE
Status: COMPLETED | OUTPATIENT
Start: 2020-11-26 | End: 2020-11-26

## 2020-11-26 RX ORDER — PROMETHAZINE HYDROCHLORIDE 25 MG/ML
12.5 INJECTION, SOLUTION INTRAMUSCULAR; INTRAVENOUS ONCE
Status: COMPLETED | OUTPATIENT
Start: 2020-11-26 | End: 2020-11-26

## 2020-11-26 RX ORDER — ONDANSETRON 4 MG/1
4 TABLET, ORALLY DISINTEGRATING ORAL EVERY 8 HOURS PRN
Qty: 10 TABLET | Refills: 0 | Status: SHIPPED | OUTPATIENT
Start: 2020-11-26 | End: 2021-11-26

## 2020-11-26 RX ADMIN — SODIUM CHLORIDE 1000 ML: 9 INJECTION, SOLUTION INTRAVENOUS at 18:06

## 2020-11-26 RX ADMIN — SODIUM CHLORIDE 1000 ML: 9 INJECTION, SOLUTION INTRAVENOUS at 21:26

## 2020-11-26 RX ADMIN — LORAZEPAM 1 MG: 2 INJECTION INTRAMUSCULAR; INTRAVENOUS at 21:29

## 2020-11-26 RX ADMIN — ONDANSETRON 4 MG: 2 INJECTION INTRAMUSCULAR; INTRAVENOUS at 18:09

## 2020-11-26 RX ADMIN — IOPAMIDOL 90 ML: 755 INJECTION, SOLUTION INTRAVENOUS at 22:39

## 2020-11-26 RX ADMIN — PROMETHAZINE HYDROCHLORIDE 12.5 MG: 25 INJECTION INTRAMUSCULAR; INTRAVENOUS at 19:24

## 2020-11-26 ASSESSMENT — ENCOUNTER SYMPTOMS
WHEEZING: 0
SINUS PRESSURE: 0
NAUSEA: 1
EYE PAIN: 0
SHORTNESS OF BREATH: 0
ABDOMINAL PAIN: 0
VOMITING: 1
BACK PAIN: 0
DIARRHEA: 1
EYE REDNESS: 0
EYE DISCHARGE: 0
BLOOD IN STOOL: 0
ABDOMINAL DISTENTION: 0
COUGH: 0
SORE THROAT: 0

## 2020-11-26 NOTE — ED PROVIDER NOTES
Patient presents with nausea and vomiting that started this morning. Patient states that she was concerned because one episode she did have brown vomitus and she has a history of esophageal varices. The other episodes have been stomach contacts. Patient states that she had 3 episodes total.  She also had 5 episodes of diarrhea which have since resolved. She denies seeing any blood in either her vomitus or stool. Patient states her last drink was last night. She denies having any fevers or chills. She may have a Covid contact in her son. Patient is denying any chest pain, shortness of breath, abdominal pain, numbness, or weakness. The history is provided by the patient. No  was used. Nausea & Vomiting   Severity:  Moderate  Duration:  1 day  Timing:  Constant  Number of daily episodes:  3  Quality:  Stomach contents and coffee grounds  Progression:  Unchanged  Chronicity:  Recurrent  Relieved by:  Nothing  Worsened by:  Nothing  Associated symptoms: diarrhea    Associated symptoms: no abdominal pain, no arthralgias, no chills, no cough, no fever, no headaches, no myalgias and no sore throat    Risk factors: alcohol use         Review of Systems   Constitutional: Negative for chills and fever. HENT: Negative for ear pain, sinus pressure and sore throat. Eyes: Negative for pain, discharge and redness. Respiratory: Negative for cough, shortness of breath and wheezing. Cardiovascular: Positive for palpitations. Negative for chest pain. Gastrointestinal: Positive for diarrhea, nausea and vomiting. Negative for abdominal distention, abdominal pain and blood in stool. Genitourinary: Negative for dysuria and frequency. Musculoskeletal: Negative for arthralgias, back pain and myalgias. Skin: Negative for rash and wound. Neurological: Negative for weakness and headaches. Hematological: Negative for adenopathy. All other systems reviewed and are negative. ALT with AST greater than 2 times ALT. Patient has positive Hemoccult but there was no bright red blood. Stool was brown. CT of abdomen showed numerous chronic abnormalities found on previous CT and 2019 including changes to her liver, colon and small bowel, pancreas, and cholelithiasis. Patient is Covid negative. Patient informed of results. Patient continues to be tacky despite fluid resuscitation. Patient is requesting to be discharged. Patient instructed to follow-up with her PCP for further evaluation and treatment and was advised to get a colonoscopy scheduled. Patient was also educated on when to return to the ED for reevaluation. Patient expresses understanding and is agreeable. Patient be discharged home. Amount and/or Complexity of Data Reviewed  Clinical lab tests: reviewed  Tests in the radiology section of CPT®: reviewed  Decide to obtain previous medical records or to obtain history from someone other than the patient: yes         ED Course as of Nov 26 2355 Thu Nov 26, 2020 1752 EKG Interpretation    Interpreted by emergency department physician    Rhythm: sinus tachycardia  Rate: 107  Axis: normal  Ectopy: none  Conduction: normal  ST Segments: no acute change  T Waves: no acute change  Q Waves: none    Clinical Impression: sinus tachycardia    Juan C Vela      [BB]   2344 Patient's heart rate is still elevated. Patient is requesting to be discharged home. Patient states that she is nervous but would rather be home than stay overnight. We offered additional treatment including IV fluids however with shared decision making patient refused and requested to be discharged as she is tolerating p.o. and has not had any vomitus during her stay.     [BB]      ED Course User Index  [BB] Juan C Vela DO        ED Course as of Nov 26 2355 Thu Nov 26, 2020   1752 EKG Interpretation    Interpreted by emergency department physician    Rhythm: sinus tachycardia  Rate: 107  Axis: normal  Ectopy: none  Conduction: normal  ST Segments: no acute change  T Waves: no acute change  Q Waves: none    Clinical Impression: sinus tachycardia    Mikal Vigil      [BB]   0537 Patient's heart rate is still elevated. Patient is requesting to be discharged home. Patient states that she is nervous but would rather be home than stay overnight. We offered additional treatment including IV fluids however with shared decision making patient refused and requested to be discharged as she is tolerating p.o. and has not had any vomitus during her stay. [BB]      ED Course User Index  [BB] Mikal Vigil, DO       --------------------------------------------- PAST HISTORY ---------------------------------------------  Past Medical History:  has a past medical history of Anxiety, Hypertension, Hypothyroidism, and Pancreatic cyst.    Past Surgical History:  has a past surgical history that includes Arm Surgery (1983); Splenectomy, total (02/23/2017); Pancreas surgery (02/23/2017); Upper gastrointestinal endoscopy (N/A, 7/4/2020); and Upper gastrointestinal endoscopy (7/4/2020). Social History:  reports that she has never smoked. She has never used smokeless tobacco. She reports current alcohol use. She reports that she does not use drugs. Family History: family history includes Breast Cancer in an other family member; High Blood Pressure in her mother; Other in her father and mother. The patients home medications have been reviewed.     Allergies: Pcn [penicillins]    -------------------------------------------------- RESULTS -------------------------------------------------  Labs:  Results for orders placed or performed during the hospital encounter of 11/26/20   Respiratory Panel, Molecular, with COVID-19 (Restricted: peds pts or suitable admitted adults)    Specimen: Nasopharyngeal   Result Value Ref Range    Adenovirus by PCR Not Detected Not Detected    Bordetella parapertussis by PCR Not Detected Not Detected    Bordetella pertussis by PCR Not Detected Not Detected    Chlamydophilia pneumoniae by PCR Not Detected Not Detected    Coronavirus 229E by PCR Not Detected Not Detected    Coronavirus HKU1 by PCR Not Detected Not Detected    Coronavirus NL63 by PCR Not Detected Not Detected    Coronavirus OC43 by PCR Not Detected Not Detected    SARS-CoV-2, PCR Not Detected Not Detected    Human Metapneumovirus by PCR Not Detected Not Detected    Human Rhinovirus/Enterovirus by PCR Not Detected Not Detected    Influenza A by PCR Not Detected Not Detected    Influenza B by PCR Not Detected Not Detected    Mycoplasma pneumoniae by PCR Not Detected Not Detected    Parainfluenza Virus 1 by PCR Not Detected Not Detected    Parainfluenza Virus 2 by PCR Not Detected Not Detected    Parainfluenza Virus 3 by PCR Not Detected Not Detected    Parainfluenza Virus 4 by PCR Not Detected Not Detected    Respiratory Syncytial Virus by PCR Not Detected Not Detected   Basic Metabolic Panel w/ Reflex to MG   Result Value Ref Range    Sodium 141 132 - 146 mmol/L    Potassium reflex Magnesium 4.5 3.5 - 5.0 mmol/L    Chloride 103 98 - 107 mmol/L    CO2 21 (L) 22 - 29 mmol/L    Anion Gap 17 (H) 7 - 16 mmol/L    Glucose 131 (H) 74 - 99 mg/dL    BUN 21 (H) 6 - 20 mg/dL    CREATININE 0.5 0.5 - 1.0 mg/dL    GFR Non-African American >60 >=60 mL/min/1.73    GFR African American >60     Calcium 8.5 (L) 8.6 - 10.2 mg/dL   Hepatic Function Panel   Result Value Ref Range    Total Protein 8.0 6.4 - 8.3 g/dL    Alb 3.5 3.5 - 5.2 g/dL    Alkaline Phosphatase 210 (H) 35 - 104 U/L    ALT 50 (H) 0 - 32 U/L     (H) 0 - 31 U/L    Total Bilirubin 2.5 (H) 0.0 - 1.2 mg/dL    Bilirubin, Direct 0.8 (H) 0.0 - 0.3 mg/dL    Bilirubin, Indirect 1.7 (H) 0.0 - 1.0 mg/dL   Lactic Acid, Plasma   Result Value Ref Range    Lactic Acid 5.5 (HH) 0.5 - 2.2 mmol/L   Lipase   Result Value Ref Range    Lipase 18 13 - 60 U/L   Protime-INR   Result Value Ref Range Protime 20.0 (H) 9.3 - 12.4 sec    INR 1.8    APTT   Result Value Ref Range    aPTT 23.4 (L) 24.5 - 35.1 sec   SPECIMEN REJECTION   Result Value Ref Range    Rejected Test CBCWD     Reason for Rejection see below    CBC Auto Differential   Result Value Ref Range    WBC 7.4 4.5 - 11.5 E9/L    RBC 2.86 (L) 3.50 - 5.50 E12/L    Hemoglobin 8.2 (L) 11.5 - 15.5 g/dL    Hematocrit 24.2 (L) 34.0 - 48.0 %    MCV 84.6 80.0 - 99.9 fL    MCH 28.7 26.0 - 35.0 pg    MCHC 33.9 32.0 - 34.5 %    RDW 20.7 (H) 11.5 - 15.0 fL    Platelets 374 (L) 969 - 450 E9/L    MPV 10.8 7.0 - 12.0 fL    Neutrophils % 75.7 43.0 - 80.0 %    Lymphocytes % 15.7 (L) 20.0 - 42.0 %    Monocytes % 7.8 2.0 - 12.0 %    Eosinophils % 0.0 0.0 - 6.0 %    Basophils % 0.9 0.0 - 2.0 %    Neutrophils Absolute 5.62 1.80 - 7.30 E9/L    Lymphocytes Absolute 1.18 (L) 1.50 - 4.00 E9/L    Monocytes Absolute 0.59 0.10 - 0.95 E9/L    Eosinophils Absolute 0.00 (L) 0.05 - 0.50 E9/L    Basophils Absolute 0.07 0.00 - 0.20 E9/L    Anisocytosis 2+     Polychromasia 2+     Hypochromia 2+     Poikilocytes 2+     Schistocytes 1+     Acanthocytes 1+     Ovalocytes 1+     Target Cells 2+     Basophilic Stippling 1+    HCG Qualitative, Serum   Result Value Ref Range    hCG Qual NEGATIVE NEGATIVE   CBC   Result Value Ref Range    WBC 6.6 4.5 - 11.5 E9/L    RBC 3.21 (L) 3.50 - 5.50 E12/L    Hemoglobin 9.0 (L) 11.5 - 15.5 g/dL    Hematocrit 27.4 (L) 34.0 - 48.0 %    MCV 85.4 80.0 - 99.9 fL    MCH 28.0 26.0 - 35.0 pg    MCHC 32.8 32.0 - 34.5 %    RDW 20.8 (H) 11.5 - 15.0 fL    Platelets 481 (L) 937 - 450 E9/L    MPV 11.7 7.0 - 12.0 fL   Comprehensive Metabolic Panel   Result Value Ref Range    Sodium 142 132 - 146 mmol/L    Potassium 3.6 3.5 - 5.0 mmol/L    Chloride 105 98 - 107 mmol/L    CO2 22 22 - 29 mmol/L    Anion Gap 15 7 - 16 mmol/L    Glucose 126 (H) 74 - 99 mg/dL    BUN 20 6 - 20 mg/dL    CREATININE 0.4 (L) 0.5 - 1.0 mg/dL    GFR Non-African American >60 >=60 mL/min/1.73    GFR African American >60     Calcium 8.1 (L) 8.6 - 10.2 mg/dL    Total Protein 7.4 6.4 - 8.3 g/dL    Alb 3.3 (L) 3.5 - 5.2 g/dL    Total Bilirubin 2.4 (H) 0.0 - 1.2 mg/dL    Alkaline Phosphatase 198 (H) 35 - 104 U/L    ALT 41 (H) 0 - 32 U/L     (H) 0 - 31 U/L   Hemoglobin and hematocrit, blood   Result Value Ref Range    Hemoglobin 8.1 (L) 11.5 - 15.5 g/dL    Hematocrit 24.2 (L) 34.0 - 48.0 %   Lactic Acid, Plasma   Result Value Ref Range    Lactic Acid 2.4 (H) 0.5 - 2.2 mmol/L   POC Pregnancy Urine   Result Value Ref Range    HCG, Urine, POC Negative Negative    Lot Number [id number]     Positive QC Pass/Fail Pass     Negative QC Pass/Fail Pass        Radiology:  CT ABDOMEN PELVIS W IV CONTRAST Additional Contrast? None   Final Result   1. Edematous and thickened cecal/ascending colon wall and scattered small   bowel wall thickening. These appear slightly improved from prior CT scan in   August of 2019. Pericolonic fat stranding and edema is again identified in   the right hemiabdomen. These findings probably reflect some form of chronic   infection/inflammation. An acute on chronic pathology is not entirely   excluded. 2.  Cholelithiasis. Gallbladder is not significantly dilated on this exam.   There is a similar degree of surrounding edema. (Although the degree of distention is decreased when compared to the prior   exam, underlying chronic gallbladder pathology cannot be entirely excluded. Hepatobiliary scan may be useful if there is high concern for underlying   gallbladder pathology.)   3. Heterogeneous enhancement of the liver which is unchanged from prior   exam.  Suspect some form of infiltrative pathology. No evidence of   intrahepatic ductal dilatation. 4.  Postsurgical changes consistent with partial pancreatectomy. The spleen   is not present and is likely surgically absent. 5.  The appendix is well visualized and normal.  There is no obstructive   uropathy.    6.  Remainder of the exam is as above. XR CHEST PORTABLE   Final Result   No evidence of active cardiopulmonary pathology. ------------------------- NURSING NOTES AND VITALS REVIEWED ---------------------------  Date / Time Roomed:  11/26/2020  5:36 PM  ED Bed Assignment:  11/11    The nursing notes within the ED encounter and vital signs as below have been reviewed. BP (!) 145/101   Pulse 112   Temp 98.9 °F (37.2 °C) (Oral)   Resp 16   Ht 5' 4\" (1.626 m)   Wt 135 lb (61.2 kg)   SpO2 99%   BMI 23.17 kg/m²   Oxygen Saturation Interpretation: Normal      ------------------------------------------ PROGRESS NOTES ------------------------------------------  11:33 PM EST  I have spoken with the patient and discussed todays results, in addition to providing specific details for the plan of care and counseling regarding the diagnosis and prognosis. Their questions are answered at this time and they are agreeable with the plan. I discussed at length with them reasons for immediate return here for re evaluation. They will followup with their primary care physician by calling their office tomorrow. --------------------------------- ADDITIONAL PROVIDER NOTES ---------------------------------  At this time the patient is without objective evidence of an acute process requiring hospitalization or inpatient management. They have remained hemodynamically stable throughout their entire ED visit and are stable for discharge with outpatient follow-up. The plan has been discussed in detail and they are aware of the specific conditions for emergent return, as well as the importance of follow-up. New Prescriptions    ONDANSETRON (ZOFRAN ODT) 4 MG DISINTEGRATING TABLET    Take 1 tablet by mouth every 8 hours as needed for Nausea or Vomiting       Diagnosis:  1. Dehydration    2. Nausea vomiting and diarrhea        Disposition:  Patient's disposition: Discharge to home  Patient's condition is stable.     Patient was seen and evaluated by both myself and Tamika Diaz MD.           Donnie Siu, DO  Resident  11/26/20 2440

## 2020-11-27 VITALS
BODY MASS INDEX: 23.05 KG/M2 | TEMPERATURE: 98.9 F | OXYGEN SATURATION: 99 % | HEIGHT: 64 IN | HEART RATE: 98 BPM | RESPIRATION RATE: 16 BRPM | SYSTOLIC BLOOD PRESSURE: 140 MMHG | WEIGHT: 135 LBS | DIASTOLIC BLOOD PRESSURE: 95 MMHG

## 2020-11-27 LAB
EKG ATRIAL RATE: 107 BPM
EKG P AXIS: 51 DEGREES
EKG P-R INTERVAL: 150 MS
EKG Q-T INTERVAL: 374 MS
EKG QRS DURATION: 82 MS
EKG QTC CALCULATION (BAZETT): 499 MS
EKG R AXIS: 23 DEGREES
EKG T AXIS: 30 DEGREES
EKG VENTRICULAR RATE: 107 BPM

## 2020-11-27 PROCEDURE — 93010 ELECTROCARDIOGRAM REPORT: CPT | Performed by: INTERNAL MEDICINE

## 2021-05-11 ENCOUNTER — TELEPHONE (OUTPATIENT)
Dept: PRIMARY CARE CLINIC | Age: 41
End: 2021-05-11

## 2021-05-11 DIAGNOSIS — J02.0 STREP THROAT: Primary | ICD-10-CM

## 2021-05-11 RX ORDER — AZITHROMYCIN 250 MG/1
250 TABLET, FILM COATED ORAL SEE ADMIN INSTRUCTIONS
Qty: 6 TABLET | Refills: 0 | Status: SHIPPED | OUTPATIENT
Start: 2021-05-11 | End: 2021-05-16

## 2021-05-11 NOTE — TELEPHONE ENCOUNTER
[facility] was given antibiotics but has allergy to PCN and that's what she was given can you switch her to something else?

## 2021-05-17 ENCOUNTER — TELEPHONE (OUTPATIENT)
Dept: PRIMARY CARE CLINIC | Age: 41
End: 2021-05-17

## 2021-05-17 NOTE — TELEPHONE ENCOUNTER
Patient recently diagnosed with group B strep. Completed zpak but still has throat pain. Please advise. Thanks.

## 2021-05-20 ENCOUNTER — TELEPHONE (OUTPATIENT)
Dept: ADMINISTRATIVE | Age: 41
End: 2021-05-20

## 2021-05-20 NOTE — TELEPHONE ENCOUNTER
Estephanie asher called stating that she had just finished a Gearlean Lili for a vaginal strep. She isn't sure if it is gone and wanted to schedule an appt, though no available appts to her until next week (Thursday). She would like a call back from the office either today, or tomorrow after 1:00 - [phone].

## 2021-05-24 ENCOUNTER — VIRTUAL VISIT (OUTPATIENT)
Dept: PRIMARY CARE CLINIC | Age: 41
End: 2021-05-24
Payer: MEDICAID

## 2021-05-24 DIAGNOSIS — A49.1 GROUP B STREPTOCOCCAL INFECTION: Primary | ICD-10-CM

## 2021-05-24 PROCEDURE — G8427 DOCREV CUR MEDS BY ELIG CLIN: HCPCS | Performed by: INTERNAL MEDICINE

## 2021-05-24 PROCEDURE — 99213 OFFICE O/P EST LOW 20 MIN: CPT | Performed by: INTERNAL MEDICINE

## 2021-05-24 PROCEDURE — G8420 CALC BMI NORM PARAMETERS: HCPCS | Performed by: INTERNAL MEDICINE

## 2021-05-24 PROCEDURE — 1036F TOBACCO NON-USER: CPT | Performed by: INTERNAL MEDICINE

## 2021-05-24 RX ORDER — FUROSEMIDE 20 MG/1
20 TABLET ORAL DAILY
Status: ON HOLD | COMMUNITY
Start: 2021-05-07 | End: 2022-11-01 | Stop reason: HOSPADM

## 2021-05-24 RX ORDER — POTASSIUM CHLORIDE 1500 MG/1
20 TABLET, EXTENDED RELEASE ORAL DAILY
Status: ON HOLD | COMMUNITY
Start: 2021-03-30 | End: 2022-11-01 | Stop reason: HOSPADM

## 2021-05-24 RX ORDER — ZINC SULFATE 50(220)MG
CAPSULE ORAL DAILY
COMMUNITY
Start: 2021-02-22 | End: 2021-09-15 | Stop reason: ALTCHOICE

## 2021-05-24 RX ORDER — CARVEDILOL 3.12 MG/1
TABLET ORAL
COMMUNITY
Start: 2021-03-07 | End: 2021-09-15 | Stop reason: ALTCHOICE

## 2021-05-24 RX ORDER — OMEPRAZOLE 20 MG/1
20 CAPSULE, DELAYED RELEASE ORAL DAILY
Status: ON HOLD | COMMUNITY
Start: 2021-03-05 | End: 2022-11-01 | Stop reason: HOSPADM

## 2021-05-24 RX ORDER — FERROUS SULFATE 325(65) MG
TABLET ORAL
COMMUNITY
Start: 2021-04-28 | End: 2021-09-15 | Stop reason: ALTCHOICE

## 2021-05-24 RX ORDER — LACTULOSE 10 G/15ML
20 SOLUTION ORAL 3 TIMES DAILY PRN
Status: ON HOLD | COMMUNITY
Start: 2021-02-12 | End: 2022-11-01 | Stop reason: HOSPADM

## 2021-05-24 RX ORDER — SPIRONOLACTONE 50 MG/1
TABLET, FILM COATED ORAL
COMMUNITY
Start: 2021-05-07 | End: 2021-09-15 | Stop reason: ALTCHOICE

## 2021-05-24 RX ORDER — AMPICILLIN 500 MG/1
500 CAPSULE ORAL 4 TIMES DAILY
Qty: 20 CAPSULE | Refills: 0 | Status: SHIPPED | OUTPATIENT
Start: 2021-05-24 | End: 2021-05-29

## 2021-05-24 ASSESSMENT — PATIENT HEALTH QUESTIONNAIRE - PHQ9
SUM OF ALL RESPONSES TO PHQ9 QUESTIONS 1 & 2: 0
2. FEELING DOWN, DEPRESSED OR HOPELESS: 0
1. LITTLE INTEREST OR PLEASURE IN DOING THINGS: 0

## 2021-05-24 NOTE — PROGRESS NOTES
5/24/21    Sunil Fernandez, a female of 47 y.o. came to the office     Sunil Fernandez presents today for follow-up of a Streptococcus infection. She was treated with azithromycin. She presented to a [facility] clinic [facility] weeks ago. At that time she was having pain with urination. She was given a cephalosporin but cannot tolerate due to nausea. She called in and was then treated with azithromycin which improved her symptoms but she still does have some urinary discomfort. Patient Active Problem List   Diagnosis    Sepsis ([facility] [address]    Jaundice    Elevated liver enzymes    Hyperammonemia (HCC)    Liver metastases (HCC)    Malignant ascites    H/O malignant neoplasm of pancreas    Hematemesis      Allergies   Allergen Reactions    Pcn [Penicillins] Hives     Current Outpatient Medications on File Prior to Visit   Medication Sig Dispense Refill    ferrous sulfate (IRON 325) 325 (65 Fe) MG tablet TAKE 1 TABLET BY MOUTH TWICE A DAY      carvedilol (COREG) 3.125 MG tablet TAKE 1 TABLET BY ORAL/FEEDING TUBE ROUTE TWICE DAILY WITH MEALS.  furosemide (LASIX) 20 MG tablet TAKE 2 TABLETS BY MOUTH EVERY DAY      lactulose (CHRONULAC) 10 GM/15ML solution TAKE 30 ML BY MOUTH THREE TIMES DAILY.  omeprazole (PRILOSEC) 20 MG delayed release capsule TAKE 1 CAPSULE BY MOUTH EVERY DAY      spironolactone (ALDACTONE) 50 MG tablet TAKE 2 TABLETS BY MOUTH EVERY DAY      KLOR-CON M20 20 MEQ extended release tablet TAKE 1 TABLET BY MOUTH EVERY DAY      zinc sulfate (ZINCATE) 220 (50 Zn) MG capsule Take by mouth daily      ondansetron (ZOFRAN ODT) 4 MG disintegrating tablet Take 1 tablet by mouth every 8 hours as needed for Nausea or Vomiting 10 tablet 0    vitamin A 3 MG (22982 UT) capsule Take 10,000 Units by mouth daily      gabapentin (NEURONTIN) 100 MG capsule Take 100 mg by mouth as needed.        Acetaminophen (TYLENOL 8 HOUR PO) Take by mouth as needed      vitamin D (CHOLECALCIFEROL) 250 MCG

## 2021-05-28 ENCOUNTER — TELEPHONE (OUTPATIENT)
Dept: PRIMARY CARE CLINIC | Age: 41
End: 2021-05-28

## 2021-05-28 NOTE — TELEPHONE ENCOUNTER
This was discussed with [address] [place] [address] Carey during her appointment. She has a questionable drug allergy remotely.   It was decided to proceed with ampicillin

## 2021-08-12 ENCOUNTER — TELEPHONE (OUTPATIENT)
Dept: PRIMARY CARE CLINIC | Age: 41
End: 2021-08-12

## 2021-08-12 ENCOUNTER — NURSE TRIAGE (OUTPATIENT)
Dept: OTHER | Facility: CLINIC | Age: 41
End: 2021-08-12

## 2021-08-12 NOTE — TELEPHONE ENCOUNTER
Received call from Donald Lucas at [facility] with Red Flag Complaint. Brief description of triage: Patient calling for concerns for throat and chest tightness since Sunday. Denies any shortness of breath. Has been been experiencing mild runny nose, cough, watery eyes and voice hoarseness. Travel screening completed:  Trip to [facility] two weeks ago. Uncle tested positive for COVID-19 but she is unsure what day she was exposed (outside, within 6 feet of each other). Triage indicates for patient to go to office now. If no appointments available, go to [facility] for evaluation. Care advice provided, patient verbalizes understanding; denies any other questions or concerns; instructed to call back for any new or worsening symptoms. Writer provided warm transfer to Og Dave at [facility] for appointment scheduling. Recommended sick visit/virtual visit/walk-in if available due to patient's exposure and symptoms. If no appointments available, patient will go to urgent care for evaluation/ possible testing. Attention Provider: Thank you for allowing me to participate in the care of your patient. The patient was connected to triage in response to information provided to the [facility]. Please do not respond through this encounter as the response is not directed to a shared pool. Reason for Disposition   Patient wants to be seen    Answer Assessment - Initial Assessment Questions  1. RESPIRATORY STATUS: \"Describe your breathing? \" (e.g., wheezing, shortness of breath, unable to speak, severe coughing)       Tightness in throat and chest    2. ONSET: \"When did this breathing problem begin? \"       Since Sunday    3. PATTERN \"Does the difficult breathing come and go, or has it been constant since it started? \"       Comes and goes, worse in the morning and evenings, sometimes during the day it goes away    4. SEVERITY: \"How bad is your breathing? \" (e.g., mild, moderate, severe)     - MILD: No SOB at rest, mild SOB with walking, speaks normally in sentences, can lay down, no retractions, pulse < 100.     - MODERATE: SOB at rest, SOB with minimal exertion and prefers to sit, cannot lie down flat, speaks in phrases, mild retractions, audible wheezing, pulse 100-120.     - SEVERE: Very SOB at rest, speaks in single words, struggling to breathe, sitting hunched forward, retractions, pulse > 120       No shortness of breath    5. RECURRENT SYMPTOM: \"Have you had difficulty breathing before? \" If so, ask: \"When was the last time? \" and \"What happened that time? \"       No, was intubated last year for a GI bleed    6. CARDIAC HISTORY: \"Do you have any history of heart disease? \" (e.g., heart attack, angina, bypass surgery, angioplasty)       None    7. LUNG HISTORY: \"Do you have any history of lung disease? \"  (e.g., pulmonary embolus, asthma, emphysema)      Lung scan in September 2021 after intubation:  Fine    8. CAUSE: \"What do you think is causing the breathing problem? \"       Allergies, irritant from being around some smokers since Saturday     9. OTHER SYMPTOMS: \"Do you have any other symptoms? (e.g., dizziness, runny nose, cough, chest pain, fever)      Eyes puffiness/watering, voice hoarseness, cough, mild runny nose. No dizziness, no fever, no chest pain. 10. PREGNANCY: \"Is there any chance you are pregnant? \" \"When was your last menstrual period? \"        No    11. TRAVEL: \"Have you traveled out of the country in the last month? \" (e.g., travel history, exposures)        [facility] about two weeks ago    Protocols used: BREATHING DIFFICULTY-ADULT-[place]

## 2021-09-14 ENCOUNTER — HOSPITAL ENCOUNTER (INPATIENT)
Age: 41
LOS: 5 days | Discharge: HOME | DRG: 280 | End: 2021-09-19
Attending: EMERGENCY MEDICINE | Admitting: INTERNAL MEDICINE
Payer: MEDICAID

## 2021-09-14 DIAGNOSIS — E83.42 HYPOMAGNESEMIA: ICD-10-CM

## 2021-09-14 DIAGNOSIS — E87.6 HYPOKALEMIA: ICD-10-CM

## 2021-09-14 DIAGNOSIS — K92.2 GASTROINTESTINAL HEMORRHAGE, UNSPECIFIED GASTROINTESTINAL HEMORRHAGE TYPE: Primary | ICD-10-CM

## 2021-09-14 LAB
ABO/RH: NORMAL
ALBUMIN SERPL-MCNC: 3.2 G/DL (ref 3.5–5.2)
ALP BLD-CCNC: 114 U/L (ref 35–104)
ALT SERPL-CCNC: 31 U/L (ref 0–32)
ANION GAP SERPL CALCULATED.3IONS-SCNC: 14 MMOL/L (ref 7–16)
ANTIBODY SCREEN: NORMAL
APTT: 29.5 SEC (ref 24.5–35.1)
AST SERPL-CCNC: 142 U/L (ref 0–31)
BILIRUB SERPL-MCNC: 4.6 MG/DL (ref 0–1.2)
BILIRUBIN DIRECT: 2.1 MG/DL (ref 0–0.3)
BILIRUBIN, INDIRECT: 2.5 MG/DL (ref 0–1)
BUN BLDV-MCNC: 25 MG/DL (ref 6–20)
CALCIUM SERPL-MCNC: 8.6 MG/DL (ref 8.6–10.2)
CHLORIDE BLD-SCNC: 100 MMOL/L (ref 98–107)
CO2: 28 MMOL/L (ref 22–29)
CREAT SERPL-MCNC: 0.5 MG/DL (ref 0.5–1)
GFR AFRICAN AMERICAN: >60
GFR NON-AFRICAN AMERICAN: >60 ML/MIN/1.73
GLUCOSE BLD-MCNC: 200 MG/DL (ref 74–99)
INR BLD: 1.8
LIPASE: 18 U/L (ref 13–60)
MAGNESIUM: 1.1 MG/DL (ref 1.6–2.6)
POTASSIUM REFLEX MAGNESIUM: 2.7 MMOL/L (ref 3.5–5)
PROTHROMBIN TIME: 20.1 SEC (ref 9.3–12.4)
SODIUM BLD-SCNC: 142 MMOL/L (ref 132–146)
TOTAL PROTEIN: 6.8 G/DL (ref 6.4–8.3)

## 2021-09-14 PROCEDURE — P9040 RBC LEUKOREDUCED IRRADIATED: HCPCS

## 2021-09-14 PROCEDURE — 96375 TX/PRO/DX INJ NEW DRUG ADDON: CPT

## 2021-09-14 PROCEDURE — 86900 BLOOD TYPING SEROLOGIC ABO: CPT

## 2021-09-14 PROCEDURE — 80048 BASIC METABOLIC PNL TOTAL CA: CPT

## 2021-09-14 PROCEDURE — 85730 THROMBOPLASTIN TIME PARTIAL: CPT

## 2021-09-14 PROCEDURE — 6360000002 HC RX W HCPCS: Performed by: STUDENT IN AN ORGANIZED HEALTH CARE EDUCATION/TRAINING PROGRAM

## 2021-09-14 PROCEDURE — P9016 RBC LEUKOCYTES REDUCED: HCPCS

## 2021-09-14 PROCEDURE — 80076 HEPATIC FUNCTION PANEL: CPT

## 2021-09-14 PROCEDURE — C9113 INJ PANTOPRAZOLE SODIUM, VIA: HCPCS | Performed by: STUDENT IN AN ORGANIZED HEALTH CARE EDUCATION/TRAINING PROGRAM

## 2021-09-14 PROCEDURE — 99284 EMERGENCY DEPT VISIT MOD MDM: CPT

## 2021-09-14 PROCEDURE — 85610 PROTHROMBIN TIME: CPT

## 2021-09-14 PROCEDURE — 83690 ASSAY OF LIPASE: CPT

## 2021-09-14 PROCEDURE — 86850 RBC ANTIBODY SCREEN: CPT

## 2021-09-14 PROCEDURE — 85025 COMPLETE CBC W/AUTO DIFF WBC: CPT

## 2021-09-14 PROCEDURE — 2580000003 HC RX 258: Performed by: STUDENT IN AN ORGANIZED HEALTH CARE EDUCATION/TRAINING PROGRAM

## 2021-09-14 PROCEDURE — 86901 BLOOD TYPING SEROLOGIC RH(D): CPT

## 2021-09-14 PROCEDURE — 86923 COMPATIBILITY TEST ELECTRIC: CPT

## 2021-09-14 PROCEDURE — 2060000000 HC ICU INTERMEDIATE R&B

## 2021-09-14 PROCEDURE — 82140 ASSAY OF AMMONIA: CPT

## 2021-09-14 PROCEDURE — 83735 ASSAY OF MAGNESIUM: CPT

## 2021-09-14 RX ORDER — MAGNESIUM SULFATE IN WATER 40 MG/ML
4000 INJECTION, SOLUTION INTRAVENOUS ONCE
Status: COMPLETED | OUTPATIENT
Start: 2021-09-14 | End: 2021-09-15

## 2021-09-14 RX ORDER — POTASSIUM CHLORIDE 7.45 MG/ML
10 INJECTION INTRAVENOUS ONCE
Status: COMPLETED | OUTPATIENT
Start: 2021-09-14 | End: 2021-09-15

## 2021-09-14 RX ORDER — 0.9 % SODIUM CHLORIDE 0.9 %
500 INTRAVENOUS SOLUTION INTRAVENOUS ONCE
Status: COMPLETED | OUTPATIENT
Start: 2021-09-14 | End: 2021-09-14

## 2021-09-14 RX ORDER — PANTOPRAZOLE SODIUM 40 MG/10ML
INJECTION, POWDER, LYOPHILIZED, FOR SOLUTION INTRAVENOUS
Status: DISPENSED
Start: 2021-09-14 | End: 2021-09-15

## 2021-09-14 RX ORDER — ONDANSETRON 2 MG/ML
4 INJECTION INTRAMUSCULAR; INTRAVENOUS ONCE
Status: COMPLETED | OUTPATIENT
Start: 2021-09-14 | End: 2021-09-14

## 2021-09-14 RX ADMIN — OCTREOTIDE ACETATE 25 MCG/HR: 500 INJECTION, SOLUTION INTRAVENOUS; SUBCUTANEOUS at 23:10

## 2021-09-14 RX ADMIN — SODIUM CHLORIDE 500 ML: 9 INJECTION, SOLUTION INTRAVENOUS at 22:20

## 2021-09-14 RX ADMIN — SODIUM CHLORIDE 80 MG: 9 INJECTION, SOLUTION INTRAVENOUS at 22:36

## 2021-09-14 RX ADMIN — CEFTRIAXONE 1000 MG: 1 INJECTION, POWDER, FOR SOLUTION INTRAMUSCULAR; INTRAVENOUS at 22:49

## 2021-09-14 RX ADMIN — ONDANSETRON 4 MG: 2 INJECTION INTRAMUSCULAR; INTRAVENOUS at 22:22

## 2021-09-14 ASSESSMENT — ENCOUNTER SYMPTOMS
ABDOMINAL PAIN: 0
BLOOD IN STOOL: 1
BACK PAIN: 0
EYE PAIN: 0
VOMITING: 1
EYE REDNESS: 0
NAUSEA: 1
ABDOMINAL DISTENTION: 1
EYE DISCHARGE: 0
DIARRHEA: 1
SORE THROAT: 0
WHEEZING: 0
SHORTNESS OF BREATH: 0
COUGH: 0
SINUS PRESSURE: 0

## 2021-09-14 NOTE — Clinical Note
Patient Class: Inpatient [101]   REQUIRED: Diagnosis: GI bleed [[id number]]   Estimated Length of Stay: Estimated stay of more than 2 midnights   Admitting Provider: Alejandro Martinez [[id number]]   Telemetry/Cardiac Monitoring Required?: Yes

## 2021-09-14 NOTE — ED PROVIDER NOTES
FIRST PROVIDER CONTACT ASSESSMENT NOTE                                                                                                Department of Emergency Medicine                                                      First Provider Note  21  7:12 PM EDT  NAME: Gelacio Prieto  : 1980  MRN: [id number]    Chief Complaint: Hematemesis ( onset 1000 today, hx liver cirrhosis ) and Rectal Bleeding      History of Present Illness:   Gelacio Prieto is a 47 y.o. female who presents to the ED for N/V with blood. History of pancreatic cyst but NOT cancer. States she has hx of alcohol abuse and esophageal varices. Pt reports diarrhea with dark/black stools as well. Hx anemia. Has been here to be seen for the same. Focused Physical Exam:  VS:    ED Triage Vitals   BP Temp Temp Source Pulse Resp SpO2 Height Weight   21   (!) 137/93 97.6 °F (36.4 °C) Oral 109 16 95 % 5' 4\" (1.626 m) 118 lb (53.5 kg)        General: Alert and in no apparent distress. Medical History:  has a past medical history of Anxiety, Hypertension, Hypothyroidism, and Pancreatic cyst.    Surgical History:  has a past surgical history that includes Arm Surgery (); Splenectomy, total (2017); Pancreas surgery (2017); Upper gastrointestinal endoscopy (N/A, 2020); and Upper gastrointestinal endoscopy (2020). Social History:  reports that she has never smoked. She has never used smokeless tobacco. She reports current alcohol use. She reports that she does not use drugs. Family History: family history includes Breast Cancer in an other family member; High Blood Pressure in her mother; Other in her father and mother.     Allergies: Pcn [penicillins]     Initial Plan of Care:  Initiate Treatment-Testing, Proceed toTreatment Area When Bed Available for ED Attending/MLP to Continue

## 2021-09-15 PROBLEM — K92.2 UPPER GI BLEED: Status: ACTIVE | Noted: 2021-09-15

## 2021-09-15 LAB
AMMONIA: 78.8 UMOL/L (ref 11–51)
ANION GAP SERPL CALCULATED.3IONS-SCNC: 10 MMOL/L (ref 7–16)
ANION GAP SERPL CALCULATED.3IONS-SCNC: 11 MMOL/L (ref 7–16)
ANISOCYTOSIS: ABNORMAL
BASOPHILS ABSOLUTE: 0.07 E9/L (ref 0–0.2)
BASOPHILS RELATIVE PERCENT: 0.6 % (ref 0–2)
BILIRUBIN URINE: NEGATIVE
BLOOD, URINE: NEGATIVE
BUN BLDV-MCNC: 16 MG/DL (ref 6–20)
BUN BLDV-MCNC: 23 MG/DL (ref 6–20)
CALCIUM SERPL-MCNC: 7 MG/DL (ref 8.6–10.2)
CALCIUM SERPL-MCNC: 8.2 MG/DL (ref 8.6–10.2)
CHLORIDE BLD-SCNC: 102 MMOL/L (ref 98–107)
CHLORIDE BLD-SCNC: 105 MMOL/L (ref 98–107)
CLARITY: CLEAR
CO2: 24 MMOL/L (ref 22–29)
CO2: 29 MMOL/L (ref 22–29)
COLOR: YELLOW
CREAT SERPL-MCNC: 0.5 MG/DL (ref 0.5–1)
CREAT SERPL-MCNC: 0.6 MG/DL (ref 0.5–1)
EKG ATRIAL RATE: 117 BPM
EKG P AXIS: 43 DEGREES
EKG P-R INTERVAL: 196 MS
EKG Q-T INTERVAL: 278 MS
EKG QRS DURATION: 92 MS
EKG QTC CALCULATION (BAZETT): 387 MS
EKG R AXIS: 21 DEGREES
EKG T AXIS: -150 DEGREES
EKG VENTRICULAR RATE: 117 BPM
EOSINOPHILS ABSOLUTE: 0 E9/L (ref 0.05–0.5)
EOSINOPHILS RELATIVE PERCENT: 0 % (ref 0–6)
ETHANOL: <10 MG/DL (ref 0–0.08)
GFR AFRICAN AMERICAN: >60
GFR AFRICAN AMERICAN: >60
GFR NON-AFRICAN AMERICAN: >60 ML/MIN/1.73
GFR NON-AFRICAN AMERICAN: >60 ML/MIN/1.73
GLUCOSE BLD-MCNC: 138 MG/DL (ref 74–99)
GLUCOSE BLD-MCNC: 141 MG/DL (ref 74–99)
GLUCOSE URINE: NEGATIVE MG/DL
HCT VFR BLD CALC: 22.5 % (ref 34–48)
HCT VFR BLD CALC: 23.9 % (ref 34–48)
HCT VFR BLD CALC: 24.1 % (ref 34–48)
HCT VFR BLD CALC: 25.1 % (ref 34–48)
HEMOGLOBIN: 7.6 G/DL (ref 11.5–15.5)
HEMOGLOBIN: 7.9 G/DL (ref 11.5–15.5)
HEMOGLOBIN: 8 G/DL (ref 11.5–15.5)
HEMOGLOBIN: 8.3 G/DL (ref 11.5–15.5)
IMMATURE GRANULOCYTES #: 0.11 E9/L
IMMATURE GRANULOCYTES %: 0.9 % (ref 0–5)
KETONES, URINE: NEGATIVE MG/DL
LEUKOCYTE ESTERASE, URINE: NEGATIVE
LYMPHOCYTES ABSOLUTE: 0.84 E9/L (ref 1.5–4)
LYMPHOCYTES RELATIVE PERCENT: 7.2 % (ref 20–42)
MAGNESIUM: 2.2 MG/DL (ref 1.6–2.6)
MCH RBC QN AUTO: 35.5 PG (ref 26–35)
MCHC RBC AUTO-ENTMCNC: 33.8 % (ref 32–34.5)
MCV RBC AUTO: 105.1 FL (ref 80–99.9)
MONOCYTES ABSOLUTE: 1.46 E9/L (ref 0.1–0.95)
MONOCYTES RELATIVE PERCENT: 12.4 % (ref 2–12)
NEUTROPHILS ABSOLUTE: 9.26 E9/L (ref 1.8–7.3)
NEUTROPHILS RELATIVE PERCENT: 78.9 % (ref 43–80)
NITRITE, URINE: NEGATIVE
PDW BLD-RTO: 16 FL (ref 11.5–15)
PH UA: 7 (ref 5–9)
PLATELET # BLD: 84 E9/L (ref 130–450)
PLATELET CONFIRMATION: NORMAL
PMV BLD AUTO: 11.5 FL (ref 7–12)
POIKILOCYTES: ABNORMAL
POLYCHROMASIA: ABNORMAL
POTASSIUM REFLEX MAGNESIUM: 2.7 MMOL/L (ref 3.5–5)
POTASSIUM SERPL-SCNC: 3.3 MMOL/L (ref 3.5–5)
PROTEIN UA: NEGATIVE MG/DL
RBC # BLD: 2.14 E12/L (ref 3.5–5.5)
SODIUM BLD-SCNC: 140 MMOL/L (ref 132–146)
SODIUM BLD-SCNC: 141 MMOL/L (ref 132–146)
SPECIFIC GRAVITY UA: 1.01 (ref 1–1.03)
TARGET CELLS: ABNORMAL
UROBILINOGEN, URINE: 1 E.U./DL
WBC # BLD: 11.7 E9/L (ref 4.5–11.5)

## 2021-09-15 PROCEDURE — 6370000000 HC RX 637 (ALT 250 FOR IP): Performed by: INTERNAL MEDICINE

## 2021-09-15 PROCEDURE — 85018 HEMOGLOBIN: CPT

## 2021-09-15 PROCEDURE — 2060000000 HC ICU INTERMEDIATE R&B

## 2021-09-15 PROCEDURE — 36415 COLL VENOUS BLD VENIPUNCTURE: CPT

## 2021-09-15 PROCEDURE — 82077 ASSAY SPEC XCP UR&BREATH IA: CPT

## 2021-09-15 PROCEDURE — 96366 THER/PROPH/DIAG IV INF ADDON: CPT

## 2021-09-15 PROCEDURE — C9113 INJ PANTOPRAZOLE SODIUM, VIA: HCPCS | Performed by: INTERNAL MEDICINE

## 2021-09-15 PROCEDURE — 6370000000 HC RX 637 (ALT 250 FOR IP): Performed by: STUDENT IN AN ORGANIZED HEALTH CARE EDUCATION/TRAINING PROGRAM

## 2021-09-15 PROCEDURE — 96368 THER/DIAG CONCURRENT INF: CPT

## 2021-09-15 PROCEDURE — 83735 ASSAY OF MAGNESIUM: CPT

## 2021-09-15 PROCEDURE — 99223 1ST HOSP IP/OBS HIGH 75: CPT | Performed by: INTERNAL MEDICINE

## 2021-09-15 PROCEDURE — 81003 URINALYSIS AUTO W/O SCOPE: CPT

## 2021-09-15 PROCEDURE — 85014 HEMATOCRIT: CPT

## 2021-09-15 PROCEDURE — 93010 ELECTROCARDIOGRAM REPORT: CPT | Performed by: INTERNAL MEDICINE

## 2021-09-15 PROCEDURE — 6360000002 HC RX W HCPCS: Performed by: INTERNAL MEDICINE

## 2021-09-15 PROCEDURE — 80048 BASIC METABOLIC PNL TOTAL CA: CPT

## 2021-09-15 PROCEDURE — 96365 THER/PROPH/DIAG IV INF INIT: CPT

## 2021-09-15 PROCEDURE — 2580000003 HC RX 258: Performed by: INTERNAL MEDICINE

## 2021-09-15 PROCEDURE — 93005 ELECTROCARDIOGRAM TRACING: CPT | Performed by: STUDENT IN AN ORGANIZED HEALTH CARE EDUCATION/TRAINING PROGRAM

## 2021-09-15 PROCEDURE — 36430 TRANSFUSION BLD/BLD COMPNT: CPT

## 2021-09-15 PROCEDURE — 96367 TX/PROPH/DG ADDL SEQ IV INF: CPT

## 2021-09-15 PROCEDURE — 6360000002 HC RX W HCPCS: Performed by: STUDENT IN AN ORGANIZED HEALTH CARE EDUCATION/TRAINING PROGRAM

## 2021-09-15 RX ORDER — ONDANSETRON 2 MG/ML
4 INJECTION INTRAMUSCULAR; INTRAVENOUS EVERY 6 HOURS PRN
Status: DISCONTINUED | OUTPATIENT
Start: 2021-09-15 | End: 2021-09-19 | Stop reason: HOSPADM

## 2021-09-15 RX ORDER — SODIUM CHLORIDE 9 MG/ML
25 INJECTION, SOLUTION INTRAVENOUS PRN
Status: DISCONTINUED | OUTPATIENT
Start: 2021-09-15 | End: 2021-09-19 | Stop reason: HOSPADM

## 2021-09-15 RX ORDER — SODIUM CHLORIDE 9 MG/ML
10 INJECTION INTRAVENOUS 2 TIMES DAILY
Status: DISCONTINUED | OUTPATIENT
Start: 2021-09-15 | End: 2021-09-18

## 2021-09-15 RX ORDER — SODIUM CHLORIDE 0.9 % (FLUSH) 0.9 %
5-40 SYRINGE (ML) INJECTION EVERY 12 HOURS SCHEDULED
Status: DISCONTINUED | OUTPATIENT
Start: 2021-09-15 | End: 2021-09-19 | Stop reason: HOSPADM

## 2021-09-15 RX ORDER — SODIUM CHLORIDE 0.9 % (FLUSH) 0.9 %
5-40 SYRINGE (ML) INJECTION PRN
Status: DISCONTINUED | OUTPATIENT
Start: 2021-09-15 | End: 2021-09-19 | Stop reason: HOSPADM

## 2021-09-15 RX ORDER — PANTOPRAZOLE SODIUM 40 MG/10ML
40 INJECTION, POWDER, LYOPHILIZED, FOR SOLUTION INTRAVENOUS 2 TIMES DAILY
Status: DISCONTINUED | OUTPATIENT
Start: 2021-09-15 | End: 2021-09-18

## 2021-09-15 RX ORDER — ONDANSETRON 4 MG/1
4 TABLET, ORALLY DISINTEGRATING ORAL EVERY 8 HOURS PRN
Status: DISCONTINUED | OUTPATIENT
Start: 2021-09-15 | End: 2021-09-19 | Stop reason: HOSPADM

## 2021-09-15 RX ORDER — LORAZEPAM 2 MG/ML
1 INJECTION INTRAMUSCULAR
Status: DISCONTINUED | OUTPATIENT
Start: 2021-09-15 | End: 2021-09-19 | Stop reason: HOSPADM

## 2021-09-15 RX ORDER — LORAZEPAM 2 MG/ML
4 INJECTION INTRAMUSCULAR
Status: DISCONTINUED | OUTPATIENT
Start: 2021-09-15 | End: 2021-09-19 | Stop reason: HOSPADM

## 2021-09-15 RX ORDER — ACETAMINOPHEN 650 MG/1
650 SUPPOSITORY RECTAL EVERY 6 HOURS PRN
Status: DISCONTINUED | OUTPATIENT
Start: 2021-09-15 | End: 2021-09-19 | Stop reason: HOSPADM

## 2021-09-15 RX ORDER — MAGNESIUM SULFATE IN WATER 40 MG/ML
2000 INJECTION, SOLUTION INTRAVENOUS PRN
Status: DISCONTINUED | OUTPATIENT
Start: 2021-09-15 | End: 2021-09-19 | Stop reason: HOSPADM

## 2021-09-15 RX ORDER — LORAZEPAM 1 MG/1
1 TABLET ORAL
Status: DISCONTINUED | OUTPATIENT
Start: 2021-09-15 | End: 2021-09-19 | Stop reason: HOSPADM

## 2021-09-15 RX ORDER — POTASSIUM CHLORIDE 7.45 MG/ML
10 INJECTION INTRAVENOUS PRN
Status: DISCONTINUED | OUTPATIENT
Start: 2021-09-15 | End: 2021-09-19 | Stop reason: HOSPADM

## 2021-09-15 RX ORDER — PREDNISONE 20 MG/1
40 TABLET ORAL DAILY
Status: DISCONTINUED | OUTPATIENT
Start: 2021-09-15 | End: 2021-09-19 | Stop reason: HOSPADM

## 2021-09-15 RX ORDER — LANOLIN ALCOHOL/MO/W.PET/CERES
100 CREAM (GRAM) TOPICAL DAILY
Status: DISCONTINUED | OUTPATIENT
Start: 2021-09-15 | End: 2021-09-19 | Stop reason: HOSPADM

## 2021-09-15 RX ORDER — LORAZEPAM 2 MG/ML
3 INJECTION INTRAMUSCULAR
Status: DISCONTINUED | OUTPATIENT
Start: 2021-09-15 | End: 2021-09-19 | Stop reason: HOSPADM

## 2021-09-15 RX ORDER — SODIUM CHLORIDE 9 MG/ML
INJECTION, SOLUTION INTRAVENOUS CONTINUOUS
Status: DISCONTINUED | OUTPATIENT
Start: 2021-09-15 | End: 2021-09-18

## 2021-09-15 RX ORDER — ACETAMINOPHEN 325 MG/1
650 TABLET ORAL EVERY 6 HOURS PRN
Status: ON HOLD | COMMUNITY
End: 2021-09-19 | Stop reason: HOSPADM

## 2021-09-15 RX ORDER — LORAZEPAM 1 MG/1
2 TABLET ORAL
Status: DISCONTINUED | OUTPATIENT
Start: 2021-09-15 | End: 2021-09-19 | Stop reason: HOSPADM

## 2021-09-15 RX ORDER — LORAZEPAM 1 MG/1
4 TABLET ORAL
Status: DISCONTINUED | OUTPATIENT
Start: 2021-09-15 | End: 2021-09-19 | Stop reason: HOSPADM

## 2021-09-15 RX ORDER — POLYETHYLENE GLYCOL 3350 17 G/17G
17 POWDER, FOR SOLUTION ORAL DAILY PRN
Status: DISCONTINUED | OUTPATIENT
Start: 2021-09-15 | End: 2021-09-19 | Stop reason: HOSPADM

## 2021-09-15 RX ORDER — ACETAMINOPHEN 325 MG/1
650 TABLET ORAL EVERY 6 HOURS PRN
Status: DISCONTINUED | OUTPATIENT
Start: 2021-09-15 | End: 2021-09-19 | Stop reason: HOSPADM

## 2021-09-15 RX ORDER — LORAZEPAM 1 MG/1
3 TABLET ORAL
Status: DISCONTINUED | OUTPATIENT
Start: 2021-09-15 | End: 2021-09-19 | Stop reason: HOSPADM

## 2021-09-15 RX ORDER — POTASSIUM CHLORIDE 7.45 MG/ML
10 INJECTION INTRAVENOUS
Status: COMPLETED | OUTPATIENT
Start: 2021-09-15 | End: 2021-09-15

## 2021-09-15 RX ORDER — CIPROFLOXACIN 2 MG/ML
400 INJECTION, SOLUTION INTRAVENOUS EVERY 12 HOURS
Status: DISCONTINUED | OUTPATIENT
Start: 2021-09-15 | End: 2021-09-18

## 2021-09-15 RX ORDER — SODIUM CHLORIDE 9 MG/ML
INJECTION, SOLUTION INTRAVENOUS PRN
Status: DISCONTINUED | OUTPATIENT
Start: 2021-09-15 | End: 2021-09-19 | Stop reason: HOSPADM

## 2021-09-15 RX ORDER — LORAZEPAM 2 MG/ML
2 INJECTION INTRAMUSCULAR
Status: DISCONTINUED | OUTPATIENT
Start: 2021-09-15 | End: 2021-09-19 | Stop reason: HOSPADM

## 2021-09-15 RX ADMIN — MAGNESIUM SULFATE HEPTAHYDRATE 4000 MG: 40 INJECTION, SOLUTION INTRAVENOUS at 00:21

## 2021-09-15 RX ADMIN — ONDANSETRON 4 MG: 2 INJECTION INTRAMUSCULAR; INTRAVENOUS at 19:44

## 2021-09-15 RX ADMIN — POTASSIUM & SODIUM PHOSPHATES POWDER PACK 280-160-250 MG 250 MG: 280-160-250 PACK at 21:07

## 2021-09-15 RX ADMIN — CIPROFLOXACIN 400 MG: 2 INJECTION, SOLUTION INTRAVENOUS at 21:07

## 2021-09-15 RX ADMIN — ENOXAPARIN SODIUM 40 MG: 40 INJECTION SUBCUTANEOUS at 09:32

## 2021-09-15 RX ADMIN — SODIUM CHLORIDE, PRESERVATIVE FREE 10 ML: 5 INJECTION INTRAVENOUS at 09:36

## 2021-09-15 RX ADMIN — PREDNISONE 40 MG: 20 TABLET ORAL at 09:33

## 2021-09-15 RX ADMIN — POTASSIUM CHLORIDE 10 MEQ: 7.46 INJECTION, SOLUTION INTRAVENOUS at 00:21

## 2021-09-15 RX ADMIN — POTASSIUM CHLORIDE 10 MEQ: 7.45 INJECTION INTRAVENOUS at 09:12

## 2021-09-15 RX ADMIN — SODIUM CHLORIDE, PRESERVATIVE FREE 10 ML: 5 INJECTION INTRAVENOUS at 19:45

## 2021-09-15 RX ADMIN — Medication 100 MG: at 09:33

## 2021-09-15 RX ADMIN — PANTOPRAZOLE SODIUM 40 MG: 40 INJECTION, POWDER, FOR SOLUTION INTRAVENOUS at 19:44

## 2021-09-15 RX ADMIN — Medication 10 ML: at 09:37

## 2021-09-15 RX ADMIN — LORAZEPAM 2 MG: 2 INJECTION INTRAMUSCULAR; INTRAVENOUS at 22:47

## 2021-09-15 RX ADMIN — POTASSIUM CHLORIDE 10 MEQ: 7.45 INJECTION INTRAVENOUS at 05:28

## 2021-09-15 RX ADMIN — CIPROFLOXACIN 400 MG: 2 INJECTION, SOLUTION INTRAVENOUS at 09:35

## 2021-09-15 RX ADMIN — POTASSIUM CHLORIDE 10 MEQ: 7.45 INJECTION INTRAVENOUS at 06:35

## 2021-09-15 RX ADMIN — POTASSIUM CHLORIDE 10 MEQ: 7.46 INJECTION, SOLUTION INTRAVENOUS at 13:23

## 2021-09-15 RX ADMIN — OCTREOTIDE ACETATE 25 MCG/HR: 500 INJECTION, SOLUTION INTRAVENOUS; SUBCUTANEOUS at 03:41

## 2021-09-15 RX ADMIN — POTASSIUM & SODIUM PHOSPHATES POWDER PACK 280-160-250 MG 250 MG: 280-160-250 PACK at 09:33

## 2021-09-15 RX ADMIN — Medication 10 ML: at 22:48

## 2021-09-15 RX ADMIN — POTASSIUM CHLORIDE 10 MEQ: 7.46 INJECTION, SOLUTION INTRAVENOUS at 10:58

## 2021-09-15 RX ADMIN — POTASSIUM & SODIUM PHOSPHATES POWDER PACK 280-160-250 MG 250 MG: 280-160-250 PACK at 00:21

## 2021-09-15 RX ADMIN — SODIUM CHLORIDE, PRESERVATIVE FREE 10 ML: 5 INJECTION INTRAVENOUS at 09:35

## 2021-09-15 RX ADMIN — SODIUM CHLORIDE: 9 INJECTION, SOLUTION INTRAVENOUS at 06:37

## 2021-09-15 RX ADMIN — OCTREOTIDE ACETATE 25 MCG/HR: 500 INJECTION, SOLUTION INTRAVENOUS; SUBCUTANEOUS at 19:12

## 2021-09-15 RX ADMIN — SODIUM CHLORIDE, PRESERVATIVE FREE 10 ML: 5 INJECTION INTRAVENOUS at 09:34

## 2021-09-15 RX ADMIN — POTASSIUM CHLORIDE 10 MEQ: 7.46 INJECTION, SOLUTION INTRAVENOUS at 12:22

## 2021-09-15 RX ADMIN — LORAZEPAM 1 MG: 2 INJECTION INTRAMUSCULAR; INTRAVENOUS at 00:28

## 2021-09-15 RX ADMIN — SODIUM CHLORIDE: 9 INJECTION, SOLUTION INTRAVENOUS at 03:40

## 2021-09-15 ASSESSMENT — PAIN SCALES - GENERAL
PAINLEVEL_OUTOF10: 0
PAINLEVEL_OUTOF10: 0

## 2021-09-15 NOTE — H&P
Hospital Medicine  History and Physical    Patient:  Gelacio Prieto  MRN: [id number]    CHIEF COMPLAINT:    Chief Complaint   Patient presents with    Hematemesis      onset 1000 today, hx liver cirrhosis     Rectal Bleeding       Primary Care Physician: Braden Day DO    HISTORY OF PRESENT ILLNESS:   The patient is a 47 y.o. female with history of alcoholic liver cirrhosis/esophageal varices and upper GI bleed in the past who presented to the hospital today complaining of upper GI bleed setting she has been throwing up blood all daily long today, the patient denied having any alcohol for the last year and she stated that she has been compliant with her follow-ups and medications.   Here in the hospital the patient seems to be a little bit lethargic/somnolent however she is easily arousable, the patient seems to be jaundiced with icteric conjunctiva bilaterally, she has been found to be tachycardic, hemoglobin 7.6, hypokalemic and hypomagnesemic as well as elevated LFTs, the patient was given Protonix and started on Sandostatin drip and will be admitted to the hospital for further evaluation management    Past Medical History:      Diagnosis Date    Anxiety     not on any meds for it    Hypertension     has not required any med a few years as of 8/2019    Hypothyroidism     in her early 25s - pt was on levothyroxine for a while and then hypothyroidism apparently resolved and pt has been off med since her mid-20s    Pancreatic cyst 01/2017    Pt underwent partial pancreatectomy and splenectomy at [facility] 2/23/2017 and was told pathology showed MCN (mucinous cystic neoplasm) of pancreas with clean margins at surgery       Past Surgical History:      Procedure Laterality Date    [facility]  02/23/2017    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 - 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 Authorizing Provider   ferrous sulfate (IRON 325) 325 (65 Fe) MG tablet TAKE 1 TABLET BY MOUTH TWICE A DAY 4/28/21   Historical Provider, MD   carvedilol (COREG) 3.125 MG tablet TAKE 1 TABLET BY ORAL/FEEDING TUBE ROUTE TWICE DAILY WITH MEALS. 3/7/21   Historical Provider, MD   furosemide (LASIX) 20 MG tablet TAKE 2 TABLETS BY MOUTH EVERY DAY 5/7/21   Historical Provider, MD   lactulose (CHRONULAC) 10 GM/15ML solution TAKE 30 ML BY MOUTH THREE TIMES DAILY. 2/12/21   Historical Provider, MD   omeprazole (PRILOSEC) 20 MG delayed release capsule TAKE 1 CAPSULE BY MOUTH EVERY DAY 3/5/21   Historical Provider, MD   spironolactone (ALDACTONE) 50 MG tablet TAKE 2 TABLETS BY MOUTH EVERY DAY 5/7/21   Historical Provider, MD   KLOR-CON M20 20 MEQ extended release tablet TAKE 1 TABLET BY MOUTH EVERY DAY 3/30/21   Historical Provider, MD   zinc sulfate (ZINCATE) 220 (50 Zn) MG capsule Take by mouth daily 2/22/21   Historical Provider, MD   ondansetron (ZOFRAN ODT) 4 MG disintegrating tablet Take 1 tablet by mouth every 8 hours as needed for Nausea or Vomiting 11/26/20 11/26/21  Lewis Cabrera MD   vitamin A 3 MG (80262 UT) capsule Take 10,000 Units by mouth daily 10/1/20   Historical Provider, MD   gabapentin (NEURONTIN) 100 MG capsule Take 100 mg by mouth as needed.      Historical Provider, MD   Acetaminophen (TYLENOL 8 HOUR PO) Take by mouth as needed    Historical Provider, MD   albuterol sulfate HFA (PROAIR HFA) 108 (90 Base) MCG/ACT inhaler Inhale 2 puffs into the lungs every 6 hours as needed for Wheezing  Patient not taking: Reported on 5/24/2021 10/9/20   Alanna Moreno APRN - CNP   vitamin D (CHOLECALCIFEROL) 250 MCG (92337 UT) CAPS capsule Take 1,000 Units by mouth daily 11/20/19   Historical Provider, MD   vitamin D (ERGOCALCIFEROL) 1.25 MG (81321 UT) CAPS capsule TAKE 1 CAPSULE BY MOUTH ONE TIME A WEEK. 2/11/20   Historical Provider, MD   escitalopram (LEXAPRO) 10 MG tablet TAKE 1 TABLET BY MOUTH EVERY DAY 3/3/20   Historical Provider, MD   CREON 95065 units delayed release capsule TAKE 1 CAPSULE BY MOUTH WITH MEALS AND AT BEDTIME. 2/12/20   Historical Provider, MD       Allergies:  Pcn [penicillins]    Social History:   TOBACCO:   reports that she has never smoked. She has never used smokeless tobacco.  ETOH:   reports current alcohol use.     Family History:       Problem Relation Age of Onset    Other Mother         lymphoma    High Blood Pressure Mother     Other Father         ms    Breast Cancer Other         AUNT       REVIEW OF SYSTEMS:  Ten systems reviewed and negative except for as mentioned in the HPI  Review of Systems     Physical Exam:      Vitals: BP (!) 137/93   Pulse 109   Temp 97.6 °F (36.4 °C) (Oral)   Resp 16   Ht 5' 4\" (1.626 m)   Wt 118 lb (53.5 kg)   SpO2 95%   BMI 20.25 kg/m²     Physical Exam     General appearance: Alert and oriented x3, cooperative with no distress   mental Status: Patient seems to be slow to respond to my questions  Lungs: clear to auscultation bilaterally, normal effort  Heart: Sinus tachycardia with a murmurs or gallops  Abdomen: soft, nontender, nondistended, bowel sounds present, no masses  Extremities: no edema or redness  Skin: no gross lesions, rashes    Recent Labs     09/14/21  2240   WBC 11.7*   HGB 7.6*   PLT 84*     Recent Labs     09/14/21  2204      K 2.7*      CO2 28   BUN 25*   CREATININE 0.5   GLUCOSE 200*   *   ALT 31   BILITOT 4.6*   ALKPHOS 114*     Troponin T: No results for input(s): TROPONINI in the last 72 hours. ABGs: No results found for: PHART, PO2ART, OIF0SUR  INR:   Recent Labs     09/14/21  2204   INR 1.8     URINALYSIS:No results for input(s): NITRITE, COLORU, PHUR, LABCAST, WBCUA, RBCUA, MUCUS, TRICHOMONAS, YEAST, BACTERIA, CLARITYU, SPECGRAV, LEUKOCYTESUR, UROBILINOGEN, BILIRUBINUR, BLOODU, GLUCOSEU, AMORPHOUS in the last 72 hours. Invalid input(s): Stephanie Carnes  -----------------------------------------------------------------   No results found.         Assessment and Plan     *Blood loss anemia secondary to acute upper GI bleed  *History of esophageal varices in light of alcoholic liver cirrhosis  *Elevated LFTs with high suspicion of alcoholic hepatitis  *History of alcohol dependence  *Hypokalemia/hypomagnesemia secondary to alcoholism  *Macrocytic anemia  *History of depression    -Admit to stepdown  -Protonix 40 mg IV twice daily and Sandostatin drip  -IV fluids with normal saline 100 mL an hour  -H&H every 8  -Keeping patient n.p.o.  -GI consult for an EGD in the morning  -Start patient on ciprofloxacin 400 mg IV twice daily, the patient is allergic to Rocephin  -We will check alcohol level  -Her discriminant factor is 40, will start patient on prednisone 40 mg p.o. daily  -Start patient on CIWA protocol with thiamine and folic acid  -Ativan as needed per CIWA  -Patient has potassium and magnesium replaced in the ER, repeat in the morning  -Reinitiate home medications once p.o. is established  Brianda Arce MD, MD  Admitting Hospitalist

## 2021-09-15 NOTE — ED NOTES
Please call father, Deonte Hernandez, at [phone] with updates, inpatient bed assignment, or condition changes     Kimo Richard RN  09/15/21 [id number]

## 2021-09-15 NOTE — ED PROVIDER NOTES
37-year-old female presenting emergency department for 1 day of hematemesis and melena. Onset was 10 AM today, history of liver cirrhosis in the past and has required banding of varices multiple times. She follows with [facility] clinic, but is seen Dr. Loretta Martinez in the past.  She has fatigue, as well as nausea, vomiting, diarrhea, there is blood present in her stool and vomit, symptoms been persistent, nothing makes them better, nothing make them worse, they are constant, were sudden onset, moderate in severity. She is a history of alcohol induced cirrhosis. Review of Systems   Constitutional: Positive for fatigue. Negative for chills and fever. HENT: Negative for ear pain, sinus pressure and sore throat. Eyes: Negative for pain, discharge and redness. Respiratory: Negative for cough, shortness of breath and wheezing. Cardiovascular: Negative for chest pain. Gastrointestinal: Positive for abdominal distention, blood in stool, diarrhea, nausea and vomiting. Negative for abdominal pain. Genitourinary: Negative for dysuria and frequency. Musculoskeletal: Negative for arthralgias and back pain. Skin: Negative for rash and wound. Neurological: Negative for weakness and headaches. Hematological: Negative for adenopathy. All other systems reviewed and are negative. Physical Exam  Vitals and nursing note reviewed. Constitutional:       Appearance: Normal appearance. She is ill-appearing (Chronically ill-appearing). HENT:      Head: Normocephalic and atraumatic. Right Ear: External ear normal.      Left Ear: External ear normal.      Nose: Nose normal.      Mouth/Throat:      Mouth: Mucous membranes are dry. Eyes:      General: Scleral icterus present. Extraocular Movements: Extraocular movements intact. Pupils: Pupils are equal, round, and reactive to light. Cardiovascular:      Rate and Rhythm: Regular rhythm. Tachycardia present. Pulses: Normal pulses. Heart sounds: Normal heart sounds. Pulmonary:      Effort: Pulmonary effort is normal.      Breath sounds: Normal breath sounds. Abdominal:      General: Abdomen is flat. Bowel sounds are normal. There is distension. Palpations: Abdomen is soft. Musculoskeletal:         General: Normal range of motion. Cervical back: Normal range of motion and neck supple. Skin:     General: Skin is warm and dry. Capillary Refill: Capillary refill takes 2 to 3 seconds. Coloration: Skin is jaundiced. Neurological:      General: No focal deficit present. Mental Status: She is alert and oriented to person, place, and time. Cranial Nerves: No cranial nerve deficit. Sensory: No sensory deficit. Motor: No weakness. Procedures     MDM     ED Course as of Sep 15 0141   Tue Sep 14, 2021   2242 Patient remains stable    [JG]   2259 Patient is hypokalemic with a potassium of 2.7 and a mag of 1.1, will replete both    [JG]   2300 Hemoglobin is low, but compared to previous of 8.2, 7.6 is within her baseline    [JG]      ED Course User Index  [JG] Fany Voss MD      ED Course as of Sep 15 0141   Tue Sep 14, 2021   2242 Patient remains stable    [JG]   [phone] Patient is hypokalemic with a potassium of 2.7 and a mag of 1.1, will replete both    [JG]   2300 Hemoglobin is low, but compared to previous of 8.2, 7.6 is within her baseline    [JG]      ED Course User Index  [JG] Fany Voss MD     Patient presented the emergency department for hematemesis and melena. Was found to tachycardic, but normotensive on arrival.  She was given fluids for this. Laboratory work showed hypokalemia and hypomagnesemia, both of which were replaced. She was started on Rocephin, octreotide, and Protonix due to her history of varices. Did not require blood as her hemoglobin is less than 7, and was within her baseline.   She had seen GI here in the past.  She was admitted to hospital for further work-up and management.  --------------------------------------------- PAST HISTORY ---------------------------------------------  Past Medical History:  has a past medical history of Anxiety, Hypertension, Hypothyroidism, and Pancreatic cyst.    Past Surgical History:  has a past surgical history that includes Arm Surgery (1983); Splenectomy, total (02/23/2017); Pancreas surgery (02/23/2017); Upper gastrointestinal endoscopy (N/A, 7/4/2020); and Upper gastrointestinal endoscopy (7/4/2020). Social History:  reports that she has never smoked. She has never used smokeless tobacco. She reports current alcohol use. She reports that she does not use drugs. Family History: family history includes Breast Cancer in an other family member; High Blood Pressure in her mother; Other in her father and mother. The patients home medications have been reviewed.     Allergies: Pcn [penicillins]    -------------------------------------------------- RESULTS -------------------------------------------------    LABS:  Results for orders placed or performed during the hospital encounter of 09/14/21   CBC Auto Differential   Result Value Ref Range    WBC 11.7 (H) 4.5 - 11.5 E9/L    RBC 2.14 (L) 3.50 - 5.50 E12/L    Hemoglobin 7.6 (L) 11.5 - 15.5 g/dL    Hematocrit 22.5 (L) 34.0 - 48.0 %    .1 (H) 80.0 - 99.9 fL    MCH 35.5 (H) 26.0 - 35.0 pg    MCHC 33.8 32.0 - 34.5 %    RDW 16.0 (H) 11.5 - 15.0 fL    Platelets 84 (L) 064 - 450 E9/L    MPV 11.5 7.0 - 12.0 fL    Neutrophils % 78.9 43.0 - 80.0 %    Immature Granulocytes % 0.9 0.0 - 5.0 %    Lymphocytes % 7.2 (L) 20.0 - 42.0 %    Monocytes % 12.4 (H) 2.0 - 12.0 %    Eosinophils % 0.0 0.0 - 6.0 %    Basophils % 0.6 0.0 - 2.0 %    Neutrophils Absolute 9.26 (H) 1.80 - 7.30 E9/L    Immature Granulocytes # 0.11 E9/L    Lymphocytes Absolute 0.84 (L) 1.50 - 4.00 E9/L    Monocytes Absolute 1.46 (H) 0.10 - 0.95 E9/L    Eosinophils Absolute 0.00 (L) 0.05 - 0.50 E9/L    Basophils Absolute 0.07 0.00 - 0.20 E9/L   Lipase   Result Value Ref Range    Lipase 18 13 - 60 U/L   Hepatic Function Panel   Result Value Ref Range    Total Protein 6.8 6.4 - 8.3 g/dL    Albumin 3.2 (L) 3.5 - 5.2 g/dL    Alkaline Phosphatase 114 (H) 35 - 104 U/L    ALT 31 0 - 32 U/L     (H) 0 - 31 U/L    Total Bilirubin 4.6 (H) 0.0 - 1.2 mg/dL    Bilirubin, Direct 2.1 (H) 0.0 - 0.3 mg/dL    Bilirubin, Indirect 2.5 (H) 0.0 - 1.0 mg/dL   Basic Metabolic Panel w/ Reflex to MG   Result Value Ref Range    Sodium 142 132 - 146 mmol/L    Potassium reflex Magnesium 2.7 (LL) 3.5 - 5.0 mmol/L    Chloride 100 98 - 107 mmol/L    CO2 28 22 - 29 mmol/L    Anion Gap 14 7 - 16 mmol/L    Glucose 200 (H) 74 - 99 mg/dL    BUN 25 (H) 6 - 20 mg/dL    CREATININE 0.5 0.5 - 1.0 mg/dL    GFR Non-African American >60 >=60 mL/min/1.73    GFR African American >60     Calcium 8.6 8.6 - 10.2 mg/dL   Protime-INR   Result Value Ref Range    Protime 20.1 (H) 9.3 - 12.4 sec    INR 1.8    APTT   Result Value Ref Range    aPTT 29.5 24.5 - 35.1 sec   Ammonia   Result Value Ref Range    Ammonia 78.8 (H) 11.0 - 51.0 umol/L   Magnesium   Result Value Ref Range    Magnesium 1.1 (L) 1.6 - 2.6 mg/dL   TYPE AND SCREEN   Result Value Ref Range    ABO/Rh O NEG     Antibody Screen NEG        RADIOLOGY:  No orders to display             ------------------------- NURSING NOTES AND VITALS REVIEWED ---------------------------  Date / Time Roomed:  9/14/2021  9:07 PM  ED Bed Assignment:  25/25    The nursing notes within the ED encounter and vital signs as below have been reviewed.      Patient Vitals for the past 24 hrs:   BP Temp Temp src Pulse Resp SpO2 Height Weight   09/14/21 1911 (!) 137/93 -- -- 109 -- 95 % -- --   09/14/21 1910 -- 97.6 °F (36.4 °C) Oral -- 16 -- 5' 4\" (1.626 m) 118 lb (53.5 kg)       Oxygen Saturation Interpretation: Normal    ------------------------------------------ PROGRESS NOTES ------------------------------------------      Counseling:  I have spoken with the patient and discussed todays results, in addition to providing specific details for the plan of care and counseling regarding the diagnosis and prognosis. Their questions are answered at this time and they are agreeable with the plan of admission.    --------------------------------- ADDITIONAL PROVIDER NOTES ---------------------------------  Consultations:  Time: 2300. Spoke with Dr. Bolivar Roman. Discussed case. They will admit the patient. This patient's ED course included: a personal history and physicial examination, re-evaluation prior to disposition, multiple bedside re-evaluations, IV medications, cardiac monitoring and continuous pulse oximetry    This patient has remained hemodynamically stable during their ED course. Diagnosis:  1. Gastrointestinal hemorrhage, unspecified gastrointestinal hemorrhage type    2. Hypomagnesemia    3. Hypokalemia        Disposition:  Patient's disposition: Admit to telemetry  Patient's condition is stable.               Farideh Reyes MD  Resident  09/15/21 [id number]

## 2021-09-15 NOTE — ED NOTES
Pt aware we need urine sample. Pt states unable to go at this time. Will notify staff when able to produce a specimen.         Nieves Briceño RN  09/15/21 5100

## 2021-09-16 LAB
ALBUMIN SERPL-MCNC: 2.7 G/DL (ref 3.5–5.2)
ALP BLD-CCNC: 94 U/L (ref 35–104)
ALT SERPL-CCNC: 26 U/L (ref 0–32)
ANION GAP SERPL CALCULATED.3IONS-SCNC: 8 MMOL/L (ref 7–16)
ANISOCYTOSIS: ABNORMAL
AST SERPL-CCNC: 120 U/L (ref 0–31)
BASOPHILS ABSOLUTE: 0.3 E9/L (ref 0–0.2)
BASOPHILS RELATIVE PERCENT: 2.6 % (ref 0–2)
BILIRUB SERPL-MCNC: 4.6 MG/DL (ref 0–1.2)
BLOOD BANK DISPENSE STATUS: NORMAL
BLOOD BANK DISPENSE STATUS: NORMAL
BLOOD BANK PRODUCT CODE: NORMAL
BLOOD BANK PRODUCT CODE: NORMAL
BPU ID: NORMAL
BPU ID: NORMAL
BUN BLDV-MCNC: 13 MG/DL (ref 6–20)
CALCIUM SERPL-MCNC: 6.8 MG/DL (ref 8.6–10.2)
CHLORIDE BLD-SCNC: 107 MMOL/L (ref 98–107)
CO2: 24 MMOL/L (ref 22–29)
CREAT SERPL-MCNC: 0.5 MG/DL (ref 0.5–1)
DESCRIPTION BLOOD BANK: NORMAL
DESCRIPTION BLOOD BANK: NORMAL
EOSINOPHILS ABSOLUTE: 0 E9/L (ref 0.05–0.5)
EOSINOPHILS RELATIVE PERCENT: 0 % (ref 0–6)
GFR AFRICAN AMERICAN: >60
GFR NON-AFRICAN AMERICAN: >60 ML/MIN/1.73
GLUCOSE BLD-MCNC: 116 MG/DL (ref 74–99)
HCT VFR BLD CALC: 23.1 % (ref 34–48)
HCT VFR BLD CALC: 30.3 % (ref 34–48)
HEMOGLOBIN: 7.5 G/DL (ref 11.5–15.5)
HEMOGLOBIN: 9.8 G/DL (ref 11.5–15.5)
HOWELL-JOLLY BODIES: ABNORMAL
INR BLD: 1.9
LYMPHOCYTES ABSOLUTE: 0.92 E9/L (ref 1.5–4)
LYMPHOCYTES RELATIVE PERCENT: 7.9 % (ref 20–42)
MAGNESIUM: 1.4 MG/DL (ref 1.6–2.6)
MCH RBC QN AUTO: 34.7 PG (ref 26–35)
MCHC RBC AUTO-ENTMCNC: 32.5 % (ref 32–34.5)
MCV RBC AUTO: 106.9 FL (ref 80–99.9)
MONOCYTES ABSOLUTE: 0.34 E9/L (ref 0.1–0.95)
MONOCYTES RELATIVE PERCENT: 2.6 % (ref 2–12)
NEUTROPHILS ABSOLUTE: 10.01 E9/L (ref 1.8–7.3)
NEUTROPHILS RELATIVE PERCENT: 86.9 % (ref 43–80)
NUCLEATED RED BLOOD CELLS: 0.9 /100 WBC
PDW BLD-RTO: 18.2 FL (ref 11.5–15)
PHOSPHORUS: 2.5 MG/DL (ref 2.5–4.5)
PLATELET # BLD: 103 E9/L (ref 130–450)
PMV BLD AUTO: 11.5 FL (ref 7–12)
POIKILOCYTES: ABNORMAL
POLYCHROMASIA: ABNORMAL
POTASSIUM REFLEX MAGNESIUM: 3 MMOL/L (ref 3.5–5)
PROTHROMBIN TIME: 21.1 SEC (ref 9.3–12.4)
RBC # BLD: 2.16 E12/L (ref 3.5–5.5)
SODIUM BLD-SCNC: 139 MMOL/L (ref 132–146)
TARGET CELLS: ABNORMAL
TOTAL PROTEIN: 5.9 G/DL (ref 6.4–8.3)
WBC # BLD: 11.5 E9/L (ref 4.5–11.5)

## 2021-09-16 PROCEDURE — 6360000002 HC RX W HCPCS: Performed by: INTERNAL MEDICINE

## 2021-09-16 PROCEDURE — 80053 COMPREHEN METABOLIC PANEL: CPT

## 2021-09-16 PROCEDURE — 2060000000 HC ICU INTERMEDIATE R&B

## 2021-09-16 PROCEDURE — 85025 COMPLETE CBC W/AUTO DIFF WBC: CPT

## 2021-09-16 PROCEDURE — 99233 SBSQ HOSP IP/OBS HIGH 50: CPT | Performed by: INTERNAL MEDICINE

## 2021-09-16 PROCEDURE — 36415 COLL VENOUS BLD VENIPUNCTURE: CPT

## 2021-09-16 PROCEDURE — 6370000000 HC RX 637 (ALT 250 FOR IP): Performed by: STUDENT IN AN ORGANIZED HEALTH CARE EDUCATION/TRAINING PROGRAM

## 2021-09-16 PROCEDURE — C9113 INJ PANTOPRAZOLE SODIUM, VIA: HCPCS | Performed by: INTERNAL MEDICINE

## 2021-09-16 PROCEDURE — 2580000003 HC RX 258: Performed by: INTERNAL MEDICINE

## 2021-09-16 PROCEDURE — 84100 ASSAY OF PHOSPHORUS: CPT

## 2021-09-16 PROCEDURE — 83735 ASSAY OF MAGNESIUM: CPT

## 2021-09-16 PROCEDURE — 36430 TRANSFUSION BLD/BLD COMPNT: CPT

## 2021-09-16 PROCEDURE — 85610 PROTHROMBIN TIME: CPT

## 2021-09-16 PROCEDURE — 6370000000 HC RX 637 (ALT 250 FOR IP): Performed by: INTERNAL MEDICINE

## 2021-09-16 PROCEDURE — 85018 HEMOGLOBIN: CPT

## 2021-09-16 PROCEDURE — 85014 HEMATOCRIT: CPT

## 2021-09-16 RX ORDER — SODIUM CHLORIDE 9 MG/ML
INJECTION, SOLUTION INTRAVENOUS PRN
Status: DISCONTINUED | OUTPATIENT
Start: 2021-09-16 | End: 2021-09-19 | Stop reason: HOSPADM

## 2021-09-16 RX ADMIN — OCTREOTIDE ACETATE 50 MCG/HR: 500 INJECTION, SOLUTION INTRAVENOUS; SUBCUTANEOUS at 09:25

## 2021-09-16 RX ADMIN — SODIUM CHLORIDE, PRESERVATIVE FREE 10 ML: 5 INJECTION INTRAVENOUS at 20:13

## 2021-09-16 RX ADMIN — POTASSIUM & SODIUM PHOSPHATES POWDER PACK 280-160-250 MG 250 MG: 280-160-250 PACK at 20:13

## 2021-09-16 RX ADMIN — CIPROFLOXACIN 400 MG: 2 INJECTION, SOLUTION INTRAVENOUS at 09:25

## 2021-09-16 RX ADMIN — LORAZEPAM 1 MG: 1 TABLET ORAL at 14:35

## 2021-09-16 RX ADMIN — PANTOPRAZOLE SODIUM 40 MG: 40 INJECTION, POWDER, FOR SOLUTION INTRAVENOUS at 09:27

## 2021-09-16 RX ADMIN — LORAZEPAM 2 MG: 2 INJECTION INTRAMUSCULAR; INTRAVENOUS at 00:20

## 2021-09-16 RX ADMIN — LORAZEPAM 1 MG: 1 TABLET ORAL at 20:24

## 2021-09-16 RX ADMIN — SODIUM CHLORIDE: 9 INJECTION, SOLUTION INTRAVENOUS at 05:20

## 2021-09-16 RX ADMIN — POTASSIUM & SODIUM PHOSPHATES POWDER PACK 280-160-250 MG 250 MG: 280-160-250 PACK at 14:21

## 2021-09-16 RX ADMIN — POTASSIUM & SODIUM PHOSPHATES POWDER PACK 280-160-250 MG 250 MG: 280-160-250 PACK at 17:51

## 2021-09-16 RX ADMIN — ENOXAPARIN SODIUM 40 MG: 40 INJECTION SUBCUTANEOUS at 09:29

## 2021-09-16 RX ADMIN — Medication 10 ML: at 20:16

## 2021-09-16 RX ADMIN — POTASSIUM CHLORIDE 10 MEQ: 7.45 INJECTION INTRAVENOUS at 09:23

## 2021-09-16 RX ADMIN — SODIUM CHLORIDE, PRESERVATIVE FREE 10 ML: 5 INJECTION INTRAVENOUS at 09:27

## 2021-09-16 RX ADMIN — CIPROFLOXACIN 400 MG: 2 INJECTION, SOLUTION INTRAVENOUS at 20:13

## 2021-09-16 RX ADMIN — MAGNESIUM SULFATE HEPTAHYDRATE 2000 MG: 40 INJECTION, SOLUTION INTRAVENOUS at 05:20

## 2021-09-16 RX ADMIN — POTASSIUM CHLORIDE 10 MEQ: 7.45 INJECTION INTRAVENOUS at 14:36

## 2021-09-16 RX ADMIN — PANTOPRAZOLE SODIUM 40 MG: 40 INJECTION, POWDER, FOR SOLUTION INTRAVENOUS at 20:13

## 2021-09-16 RX ADMIN — POTASSIUM CHLORIDE 10 MEQ: 7.45 INJECTION INTRAVENOUS at 10:28

## 2021-09-16 RX ADMIN — OCTREOTIDE ACETATE 50 MCG/HR: 500 INJECTION, SOLUTION INTRAVENOUS; SUBCUTANEOUS at 19:00

## 2021-09-16 ASSESSMENT — PAIN SCALES - GENERAL
PAINLEVEL_OUTOF10: 0

## 2021-09-16 NOTE — PROGRESS NOTES
Spoke with Irina Quevedo, pt is known to Dr. Mikey Camarillo, will switch consult over to his services.              Dr. Mikey Camarillo notified of consult

## 2021-09-16 NOTE — PROGRESS NOTES
[facility] Hospitalist   Progress Note    Admitting Date and Time: 9/14/2021  9:07 PM  Admit Dx: Hypokalemia [E87.6]  Hypomagnesemia [E83.42]  GI bleed [K92.2]  Upper GI bleed [K92.2]  Gastrointestinal hemorrhage, unspecified gastrointestinal hemorrhage type [K92.2]    Subjective: Admitted on 15th, patient with cirrhosis, esophageal varices, who came with upper GI bleed, came with hemoglobin of 7.6, hypokalemia and hypomagnesemia, started on Sandostatin drip and admitted. Initial impression of anemia due to upper GI blood loss. Patient with alcohol dependence. Patient was admitted with Hypokalemia [E87.6]  Hypomagnesemia [E83.42]  GI bleed [K92.2]  Upper GI bleed [K92.2]  Gastrointestinal hemorrhage, unspecified gastrointestinal hemorrhage type [K92.2]. Patient feels comfortable, at this time awake, alert, resting in bed, in no distress. Per RN: Patient did receive 2 g of mag supplement, also has received 60 M EQ of KCl. GI consult was changed to different group. ROS: denies fever, chills, cp, sob, n/v, HA unless stated above.      sodium chloride flush  5-40 mL IntraVENous 2 times per day    enoxaparin  40 mg SubCUTAneous Daily    pantoprazole  40 mg IntraVENous BID    And    sodium chloride (PF)  10 mL IntraVENous BID    ciprofloxacin  400 mg IntraVENous Q12H    predniSONE  40 mg Oral Daily    sodium chloride flush  5-40 mL IntraVENous 2 times per day    thiamine  100 mg Oral Daily    potassium & sodium phosphates  1 packet Oral 4x Daily     sodium chloride, , PRN  sodium chloride flush, 5-40 mL, PRN  sodium chloride, 25 mL, PRN  ondansetron, 4 mg, Q8H PRN   Or  ondansetron, 4 mg, Q6H PRN  polyethylene glycol, 17 g, Daily PRN  acetaminophen, 650 mg, Q6H PRN   Or  acetaminophen, 650 mg, Q6H PRN  potassium chloride, 10 mEq, PRN  magnesium sulfate, 2,000 mg, PRN  sodium chloride, , PRN  sodium chloride flush, 5-40 mL, PRN  sodium chloride, 25 mL, PRN  LORazepam, 1 mg, Q1H PRN Or  LORazepam, 1 mg, Q1H PRN   Or  LORazepam, 2 mg, Q1H PRN   Or  LORazepam, 2 mg, Q1H PRN   Or  LORazepam, 3 mg, Q1H PRN   Or  LORazepam, 3 mg, Q1H PRN   Or  LORazepam, 4 mg, Q1H PRN   Or  LORazepam, 4 mg, Q1H PRN         Objective:    BP (!) 133/92   Pulse 102   Temp 98.5 °F (36.9 °C) (Oral)   Resp 18   Ht 5' 4\" (1.626 m)   Wt 118 lb (53.5 kg)   SpO2 98%   BMI 20.25 kg/m²   General Appearance: alert and oriented to person, place and time, well-developed and well-nourished, in no acute distress  Skin: warm and dry, no rash or erythema  Head: normocephalic and atraumatic  Eyes: pupils equal, round, and reactive to light, extraocular eye movements intact, conjunctivae normal  ENT: tympanic membrane, external ear and ear canal normal bilaterally, oropharynx clear and moist with normal mucous membranes  Neck: neck supple and non tender without mass, no thyromegaly or thyroid nodules, no cervical lymphadenopathy   Pulmonary/Chest: clear to auscultation bilaterally- no wheezes, rales or rhonchi, normal air movement, no respiratory distress  Cardiovascular: normal rate, normal S1 and S2, no gallops, intact distal pulses and no carotid bruits  Abdomen: soft, non-tender, non-distended, normal bowel sounds, no masses or organomegaly      Recent Labs     09/15/21  0227 09/15/21  1628 09/16/21  0415    140 139   K 2.7* 3.3* 3.0*    105 107   CO2 29 24 24   BUN 23* 16 13   CREATININE 0.5 0.6 0.5   GLUCOSE 138* 141* 116*   CALCIUM 8.2* 7.0* 6.8*       Recent Labs     09/14/21  2240 09/15/21  1050 09/15/21  1628 09/15/21  2245 09/16/21  0415   WBC 11.7*  --   --   --  11.5   RBC 2.14*  --   --   --  2.16*   HGB 7.6*   < > 8.3* 8.0* 7.5*   HCT 22.5*   < > 25.1* 23.9* 23.1*   .1*  --   --   --  106.9*   MCH 35.5*  --   --   --  34.7   MCHC 33.8  --   --   --  32.5   RDW 16.0*  --   --   --  18.2*   PLT 84*  --   --   --  103*   MPV 11.5  --   --   --  11.5    < > = values in this interval not displayed. K of 3.0  Mag 1.4  Bilirubin 4.6  Hemoglobin 7.5  Calcium 6.8    Radiology:   No orders to display       Assessment:    Active Problems:    GI bleed    Upper GI bleed  Resolved Problems:    * No resolved hospital problems. *      Plan:  1. Upper GI bleed, on Sandostatin drip, on PPI, GI evaluation pending  2. Alcoholic hepatitis, recheck LFTs  3. Hypokalemia, supplemented  4. Hypomagnesemia, supplemented  5. Phosphorus level has been added to today's labs  6. Alcohol dependence, as per patient who is , has been off alcohol for 8 months, first use of alcohol at the age of 27, was alcoholic for over 6 months before stopping 8 months ago, lives with parents. On thiamine supplements. 7. On Lovenox.         Electronically signed by Kylie Echeverria MD on 9/16/2021 at 4:23 PM

## 2021-09-16 NOTE — CARE COORDINATION
9/16/2021  Social Work Discharge Planning: This worker met with Pt to discuss  role and transition of care/discharge planning. Pt resides with her parents and is employed as an  at [employer]. Pt is currently on room air and IV ATB. Pt is on CIWA. SW discussed [facility] and Pt is interested in speaking to someone. SW made a referral to [facility] with PRS. Pts plan is to return home with her parents. Pharmacy is [facility] in [place] and PCP is Dr. Elisa Marlow.  Electronically signed by TOMI Platt on 9/16/2021 at 10:31 AM

## 2021-09-17 ENCOUNTER — ANESTHESIA EVENT (OUTPATIENT)
Dept: OPERATING ROOM | Age: 41
DRG: 280 | End: 2021-09-17
Payer: MEDICAID

## 2021-09-17 ENCOUNTER — ANESTHESIA (OUTPATIENT)
Dept: OPERATING ROOM | Age: 41
DRG: 280 | End: 2021-09-17
Payer: MEDICAID

## 2021-09-17 VITALS — SYSTOLIC BLOOD PRESSURE: 130 MMHG | OXYGEN SATURATION: 93 % | DIASTOLIC BLOOD PRESSURE: 98 MMHG

## 2021-09-17 LAB
ALBUMIN SERPL-MCNC: 2.9 G/DL (ref 3.5–5.2)
ALP BLD-CCNC: 109 U/L (ref 35–104)
ALT SERPL-CCNC: 32 U/L (ref 0–32)
ANION GAP SERPL CALCULATED.3IONS-SCNC: 10 MMOL/L (ref 7–16)
AST SERPL-CCNC: 159 U/L (ref 0–31)
BILIRUB SERPL-MCNC: 6.6 MG/DL (ref 0–1.2)
BUN BLDV-MCNC: 6 MG/DL (ref 6–20)
CALCIUM SERPL-MCNC: 6.7 MG/DL (ref 8.6–10.2)
CHLORIDE BLD-SCNC: 104 MMOL/L (ref 98–107)
CO2: 22 MMOL/L (ref 22–29)
CREAT SERPL-MCNC: 0.5 MG/DL (ref 0.5–1)
GFR AFRICAN AMERICAN: >60
GFR NON-AFRICAN AMERICAN: >60 ML/MIN/1.73
GLUCOSE BLD-MCNC: 93 MG/DL (ref 74–99)
HCG(URINE) PREGNANCY TEST: NEGATIVE
HCT VFR BLD CALC: 28.8 % (ref 34–48)
HEMOGLOBIN: 9.7 G/DL (ref 11.5–15.5)
POTASSIUM SERPL-SCNC: 2.9 MMOL/L (ref 3.5–5)
SODIUM BLD-SCNC: 136 MMOL/L (ref 132–146)
TOTAL PROTEIN: 6 G/DL (ref 6.4–8.3)

## 2021-09-17 PROCEDURE — 2060000000 HC ICU INTERMEDIATE R&B

## 2021-09-17 PROCEDURE — 6360000002 HC RX W HCPCS: Performed by: INTERNAL MEDICINE

## 2021-09-17 PROCEDURE — 2709999900 HC NON-CHARGEABLE SUPPLY: Performed by: INTERNAL MEDICINE

## 2021-09-17 PROCEDURE — 6360000002 HC RX W HCPCS: Performed by: NURSE ANESTHETIST, CERTIFIED REGISTERED

## 2021-09-17 PROCEDURE — 3600007511: Performed by: INTERNAL MEDICINE

## 2021-09-17 PROCEDURE — 3700000001 HC ADD 15 MINUTES (ANESTHESIA): Performed by: INTERNAL MEDICINE

## 2021-09-17 PROCEDURE — 2580000003 HC RX 258: Performed by: NURSE ANESTHETIST, CERTIFIED REGISTERED

## 2021-09-17 PROCEDURE — 36415 COLL VENOUS BLD VENIPUNCTURE: CPT

## 2021-09-17 PROCEDURE — C9113 INJ PANTOPRAZOLE SODIUM, VIA: HCPCS | Performed by: INTERNAL MEDICINE

## 2021-09-17 PROCEDURE — 80053 COMPREHEN METABOLIC PANEL: CPT

## 2021-09-17 PROCEDURE — 2580000003 HC RX 258: Performed by: INTERNAL MEDICINE

## 2021-09-17 PROCEDURE — 81025 URINE PREGNANCY TEST: CPT

## 2021-09-17 PROCEDURE — 99233 SBSQ HOSP IP/OBS HIGH 50: CPT | Performed by: INTERNAL MEDICINE

## 2021-09-17 PROCEDURE — 2720000010 HC SURG SUPPLY STERILE: Performed by: INTERNAL MEDICINE

## 2021-09-17 PROCEDURE — 6370000000 HC RX 637 (ALT 250 FOR IP): Performed by: STUDENT IN AN ORGANIZED HEALTH CARE EDUCATION/TRAINING PROGRAM

## 2021-09-17 PROCEDURE — 7100000011 HC PHASE II RECOVERY - ADDTL 15 MIN: Performed by: INTERNAL MEDICINE

## 2021-09-17 PROCEDURE — 85014 HEMATOCRIT: CPT

## 2021-09-17 PROCEDURE — 06L38CZ OCCLUSION OF ESOPHAGEAL VEIN WITH EXTRALUMINAL DEVICE, VIA NATURAL OR ARTIFICIAL OPENING ENDOSCOPIC: ICD-10-PCS | Performed by: INTERNAL MEDICINE

## 2021-09-17 PROCEDURE — 7100000010 HC PHASE II RECOVERY - FIRST 15 MIN: Performed by: INTERNAL MEDICINE

## 2021-09-17 PROCEDURE — 85018 HEMOGLOBIN: CPT

## 2021-09-17 PROCEDURE — 6370000000 HC RX 637 (ALT 250 FOR IP): Performed by: INTERNAL MEDICINE

## 2021-09-17 PROCEDURE — 3700000000 HC ANESTHESIA ATTENDED CARE: Performed by: INTERNAL MEDICINE

## 2021-09-17 PROCEDURE — 2500000003 HC RX 250 WO HCPCS: Performed by: NURSE ANESTHETIST, CERTIFIED REGISTERED

## 2021-09-17 PROCEDURE — 3600007501: Performed by: INTERNAL MEDICINE

## 2021-09-17 RX ORDER — HYDROCODONE BITARTRATE AND ACETAMINOPHEN 5; 325 MG/1; MG/1
1 TABLET ORAL PRN
Status: DISCONTINUED | OUTPATIENT
Start: 2021-09-17 | End: 2021-09-17 | Stop reason: HOSPADM

## 2021-09-17 RX ORDER — MORPHINE SULFATE 2 MG/ML
1 INJECTION, SOLUTION INTRAMUSCULAR; INTRAVENOUS EVERY 5 MIN PRN
Status: DISCONTINUED | OUTPATIENT
Start: 2021-09-17 | End: 2021-09-17 | Stop reason: HOSPADM

## 2021-09-17 RX ORDER — HALOPERIDOL 5 MG/ML
3 INJECTION INTRAMUSCULAR ONCE
Status: DISCONTINUED | OUTPATIENT
Start: 2021-09-17 | End: 2021-09-19 | Stop reason: HOSPADM

## 2021-09-17 RX ORDER — HYDROCODONE BITARTRATE AND ACETAMINOPHEN 5; 325 MG/1; MG/1
2 TABLET ORAL PRN
Status: DISCONTINUED | OUTPATIENT
Start: 2021-09-17 | End: 2021-09-17 | Stop reason: HOSPADM

## 2021-09-17 RX ORDER — PROMETHAZINE HYDROCHLORIDE 25 MG/ML
6.25 INJECTION, SOLUTION INTRAMUSCULAR; INTRAVENOUS EVERY 10 MIN PRN
Status: DISCONTINUED | OUTPATIENT
Start: 2021-09-17 | End: 2021-09-17 | Stop reason: HOSPADM

## 2021-09-17 RX ORDER — PROPOFOL 10 MG/ML
INJECTION, EMULSION INTRAVENOUS PRN
Status: DISCONTINUED | OUTPATIENT
Start: 2021-09-17 | End: 2021-09-17 | Stop reason: SDUPTHER

## 2021-09-17 RX ORDER — SODIUM CHLORIDE, SODIUM LACTATE, POTASSIUM CHLORIDE, CALCIUM CHLORIDE 600; 310; 30; 20 MG/100ML; MG/100ML; MG/100ML; MG/100ML
INJECTION, SOLUTION INTRAVENOUS CONTINUOUS PRN
Status: DISCONTINUED | OUTPATIENT
Start: 2021-09-17 | End: 2021-09-17 | Stop reason: SDUPTHER

## 2021-09-17 RX ORDER — MEPERIDINE HYDROCHLORIDE 25 MG/ML
12.5 INJECTION INTRAMUSCULAR; INTRAVENOUS; SUBCUTANEOUS EVERY 5 MIN PRN
Status: DISCONTINUED | OUTPATIENT
Start: 2021-09-17 | End: 2021-09-17 | Stop reason: HOSPADM

## 2021-09-17 RX ORDER — GLYCOPYRROLATE 1 MG/5 ML
SYRINGE (ML) INTRAVENOUS PRN
Status: DISCONTINUED | OUTPATIENT
Start: 2021-09-17 | End: 2021-09-17 | Stop reason: SDUPTHER

## 2021-09-17 RX ORDER — LIDOCAINE HYDROCHLORIDE 20 MG/ML
INJECTION, SOLUTION EPIDURAL; INFILTRATION; INTRACAUDAL; PERINEURAL PRN
Status: DISCONTINUED | OUTPATIENT
Start: 2021-09-17 | End: 2021-09-17 | Stop reason: SDUPTHER

## 2021-09-17 RX ORDER — POTASSIUM CHLORIDE 7.45 MG/ML
10 INJECTION INTRAVENOUS
Status: DISCONTINUED | OUTPATIENT
Start: 2021-09-17 | End: 2021-09-17

## 2021-09-17 RX ORDER — MORPHINE SULFATE 2 MG/ML
2 INJECTION, SOLUTION INTRAMUSCULAR; INTRAVENOUS EVERY 5 MIN PRN
Status: DISCONTINUED | OUTPATIENT
Start: 2021-09-17 | End: 2021-09-17 | Stop reason: HOSPADM

## 2021-09-17 RX ORDER — POTASSIUM CHLORIDE 7.45 MG/ML
40 INJECTION INTRAVENOUS ONCE
Status: DISCONTINUED | OUTPATIENT
Start: 2021-09-17 | End: 2021-09-17 | Stop reason: SDUPTHER

## 2021-09-17 RX ADMIN — PANTOPRAZOLE SODIUM 40 MG: 40 INJECTION, POWDER, FOR SOLUTION INTRAVENOUS at 11:47

## 2021-09-17 RX ADMIN — PANTOPRAZOLE SODIUM 40 MG: 40 INJECTION, POWDER, FOR SOLUTION INTRAVENOUS at 20:01

## 2021-09-17 RX ADMIN — POTASSIUM CHLORIDE 10 MEQ: 7.45 INJECTION INTRAVENOUS at 20:03

## 2021-09-17 RX ADMIN — POTASSIUM CHLORIDE 10 MEQ: 7.45 INJECTION INTRAVENOUS at 18:00

## 2021-09-17 RX ADMIN — LORAZEPAM 2 MG: 1 TABLET ORAL at 00:23

## 2021-09-17 RX ADMIN — LORAZEPAM 1 MG: 1 TABLET ORAL at 02:36

## 2021-09-17 RX ADMIN — SODIUM CHLORIDE, PRESERVATIVE FREE 10 ML: 5 INJECTION INTRAVENOUS at 20:04

## 2021-09-17 RX ADMIN — OCTREOTIDE ACETATE 50 MCG/HR: 500 INJECTION, SOLUTION INTRAVENOUS; SUBCUTANEOUS at 18:16

## 2021-09-17 RX ADMIN — CALCIUM GLUCONATE 2000 MG: 98 INJECTION, SOLUTION INTRAVENOUS at 16:05

## 2021-09-17 RX ADMIN — Medication 100 MG: at 11:47

## 2021-09-17 RX ADMIN — POTASSIUM & SODIUM PHOSPHATES POWDER PACK 280-160-250 MG 250 MG: 280-160-250 PACK at 11:48

## 2021-09-17 RX ADMIN — PREDNISONE 40 MG: 20 TABLET ORAL at 11:47

## 2021-09-17 RX ADMIN — LORAZEPAM 2 MG: 1 TABLET ORAL at 04:58

## 2021-09-17 RX ADMIN — Medication 10 ML: at 11:48

## 2021-09-17 RX ADMIN — CIPROFLOXACIN 400 MG: 2 INJECTION, SOLUTION INTRAVENOUS at 12:41

## 2021-09-17 RX ADMIN — ENOXAPARIN SODIUM 40 MG: 40 INJECTION SUBCUTANEOUS at 11:48

## 2021-09-17 RX ADMIN — OCTREOTIDE ACETATE 50 MCG/HR: 500 INJECTION, SOLUTION INTRAVENOUS; SUBCUTANEOUS at 06:18

## 2021-09-17 RX ADMIN — LIDOCAINE HYDROCHLORIDE 60 MG: 20 INJECTION, SOLUTION EPIDURAL; INFILTRATION; INTRACAUDAL; PERINEURAL at 17:13

## 2021-09-17 RX ADMIN — SODIUM CHLORIDE, POTASSIUM CHLORIDE, SODIUM LACTATE AND CALCIUM CHLORIDE: 600; 310; 30; 20 INJECTION, SOLUTION INTRAVENOUS at 16:54

## 2021-09-17 RX ADMIN — LORAZEPAM 1 MG: 1 TABLET ORAL at 16:39

## 2021-09-17 RX ADMIN — POTASSIUM CHLORIDE 10 MEQ: 7.45 INJECTION INTRAVENOUS at 16:04

## 2021-09-17 RX ADMIN — Medication 0.1 MG: at 17:08

## 2021-09-17 RX ADMIN — LORAZEPAM 1 MG: 1 TABLET ORAL at 01:23

## 2021-09-17 RX ADMIN — SODIUM CHLORIDE: 9 INJECTION, SOLUTION INTRAVENOUS at 16:04

## 2021-09-17 RX ADMIN — POTASSIUM CHLORIDE 10 MEQ: 7.45 INJECTION INTRAVENOUS at 17:00

## 2021-09-17 RX ADMIN — POTASSIUM CHLORIDE 10 MEQ: 7.45 INJECTION INTRAVENOUS at 14:00

## 2021-09-17 RX ADMIN — Medication 10 ML: at 20:05

## 2021-09-17 RX ADMIN — LORAZEPAM 4 MG: 2 INJECTION INTRAMUSCULAR; INTRAVENOUS at 08:12

## 2021-09-17 RX ADMIN — POTASSIUM CHLORIDE 10 MEQ: 7.45 INJECTION INTRAVENOUS at 15:00

## 2021-09-17 RX ADMIN — PROPOFOL 250 MG: 10 INJECTION, EMULSION INTRAVENOUS at 17:13

## 2021-09-17 ASSESSMENT — PULMONARY FUNCTION TESTS
PIF_VALUE: 0
PIF_VALUE: 1
PIF_VALUE: 0
PIF_VALUE: 1
PIF_VALUE: 0
PIF_VALUE: 1
PIF_VALUE: 0
PIF_VALUE: 0

## 2021-09-17 ASSESSMENT — PAIN SCALES - GENERAL: PAINLEVEL_OUTOF10: 0

## 2021-09-17 NOTE — CONSULTS
[address] [address]                                  CONSULTATION    PATIENT NAME: Remberto Mckay                   :        1980  MED REC NO:   [id number]                            ROOM:       0410  ACCOUNT NO:   [de-identified]                           ADMIT DATE: 2021  PROVIDER:     Hannah Powell MD    CONSULT DATE:  2021    REASON FOR CONSULTATION:  GI bleeding in the setting of cirrhosis and a  history of variceal hemorrhage. HISTORY OF PRESENT ILLNESS:  She is 40. Liver disease related to  alcohol. Current status of alcohol use cannot be clearly ascertained at  this time. She presented with a history of hematemesis and dark stools. She arrived in the emergency room on the evening of  with a history  of hematemesis and melena. She generally follows through [facility]. Over the course of this hospital stay, she had a hemoglobin of 7.6 on  arrival.  She has a most recent hemoglobin of 9.8 ; she has received  2 units of packed red blood cells. She has been on the fourth floor  since last evening. She has been on octreotide, pantoprazole as well as  antibiotics. Clinically, she does not have ascites. She is not  encephalopathic and she has not shown evidence of withdrawal.  She was  placed on prednisone, presumably because of a presumption of alcoholic  hepatitis. Her current INR is 1.9. Her bilirubin 4.6. She has been evaluated endoscopically on multiple occasions. She  presented with GI bleeding in 2020. She had been evaluated  endoscopically in [facility], [facility] in the past and on one occasion, she had two  bands placed and a followup endoscopy not long before presentation here  in 2020, she was found to have grade 1 varices. She had actively  bleeding varices when she was endoscoped at [facility] in July of last yr. That  was controlled with banding.   She followed up through [place]. Her  next endoscopic evaluation was completed there in 02/2021. Bands x2. Subsequent endoscopic evaluation was completed with the last in March  of this year, where only grade 1 varices were reported and a followup in  one year was planned. PAST MEDICAL HISTORY:  Somewhat more complex than just cirrhosis. She  had a distal pancreatectomy and splenectomy at the [facility] for  a mucinous cystic neoplasm with high-grade dysplasia. She has a history  of hypertension, hypothyroidism, and substance abuse. PAST SURGICAL HISTORY:  Includes distal pancreatectomy, splenectomy,  multiple endoscopic procedures as well as a colonoscopy. HOME MEDICATIONS:  20 mg of furosemide, lactulose, omeprazole,  potassium, Zofran, Neurontin, albuterol, vitamin D, Lexapro, and Creon. SOCIAL HISTORY:  Single. Never smoked. I cannot really comment on  alcohol consumption currently. OBJECTIVE:  VITAL SIGNS:  Perhaps minimally jaundiced. Blood pressure is 142/99. Temperature is 99.8, pulse has been running 90 to 100 throughout. Room  air saturations 98% to 100%. GENERAL:  She is alert and oriented. NECK:  She is without neck vein elevation or adenopathy. LUNGS:  Clear. HEART:  Has a regular rate and rhythm with no audible murmur or gallop. ABDOMEN:  She has a soft abdomen without tenderness and no organomegaly  is appreciated nor ascites. RECTAL:  Exam was not done, but the room has an odor of melena. ASSESSMENT:  She has bled from varices before. Small ones, but she has  bled from them. I banded her once when she was bleeding. She was  banded once before that and once since, the varices have always been  small. I do not know if I would have recognized the bleeding site last  time if she was not actively bleeding at the time. PLAN:  EGD tomorrow.         Jie Holguin MD    D: 09/16/2021 20:28:36       T: 09/16/2021 20:32:17     [email or web address]  Job#: [id number]     Doc#: [id number]    CC:

## 2021-09-17 NOTE — BRIEF OP NOTE
Brief Postoperative Note      Patient: Michelle Guerin  YOB: 1980  MRN: [id number]    Date of Procedure: 9/17/2021    Pre-Op Diagnosis: GI Bleed, varices    Post-Op Diagnosis: Same and Grade 3 varices, not bleeding, 4 bands       Procedure(s):  EGD, BANDING OF VARICES    Surgeon(s):  Ashish Jensen MD    Assistant:  * No surgical staff found *    Anesthesia: Monitor Anesthesia Care    Estimated Blood Loss (mL): Minimal    Complications: None immediate    Specimens:   * No specimens in log *    Implants:  * No implants in log *      Drains:   [REMOVED] NG/OG/NJ/NE Tube Orogastric 14 fr Center mouth (Removed)       Findings: varices w/o other potential site    Electronically signed by Ashish Jensen MD on 9/17/2021 at 5:31 PM

## 2021-09-17 NOTE — PROGRESS NOTES
[facility] Hospitalist   Progress Note    Admitting Date and Time: 9/14/2021  9:07 PM  Admit Dx: Hypokalemia [E87.6]  Hypomagnesemia [E83.42]  GI bleed [K92.2]  Upper GI bleed [K92.2]  Gastrointestinal hemorrhage, unspecified gastrointestinal hemorrhage type [K92.2]    Subjective: Admitted on 15th, patient with cirrhosis, esophageal varices, who came with upper GI bleed, came with hemoglobin of 7.6, hypokalemia and hypomagnesemia, started on Sandostatin drip and admitted. Initial impression of anemia due to upper GI blood loss. Patient with alcohol dependence. GI is planning EGD today. Patient was admitted with Hypokalemia [E87.6]  Hypomagnesemia [E83.42]  GI bleed [K92.2]  Upper GI bleed [K92.2]  Gastrointestinal hemorrhage, unspecified gastrointestinal hemorrhage type [K92.2]. Patient is awake, agitated, withdrawing. Wants her to be taken to bill. Nurses say now that her last drink was few weeks ago. Per RN: Received call from nursing that patient is agitated, restraints were oked, received another call that patient already got out of 1 restraint, Haldol 3 mg was ordered. ROS: denies fever, chills, cp, sob, n/v, HA unless stated above.      haloperidol lactate  3 mg IntraMUSCular Once    sodium chloride flush  5-40 mL IntraVENous 2 times per day    enoxaparin  40 mg SubCUTAneous Daily    pantoprazole  40 mg IntraVENous BID    And    sodium chloride (PF)  10 mL IntraVENous BID    ciprofloxacin  400 mg IntraVENous Q12H    predniSONE  40 mg Oral Daily    sodium chloride flush  5-40 mL IntraVENous 2 times per day    thiamine  100 mg Oral Daily    potassium & sodium phosphates  1 packet Oral 4x Daily     sodium chloride, , PRN  sodium chloride flush, 5-40 mL, PRN  sodium chloride, 25 mL, PRN  ondansetron, 4 mg, Q8H PRN   Or  ondansetron, 4 mg, Q6H PRN  polyethylene glycol, 17 g, Daily PRN  acetaminophen, 650 mg, Q6H PRN   Or  acetaminophen, 650 mg, Q6H PRN  potassium chloride, --   --  32.5  --    RDW 16.0*  --   --  18.2*  --    PLT 84*  --   --  103*  --    MPV 11.5  --   --  11.5  --     < > = values in this interval not displayed. K of 3.0  Mag 1.4  Bilirubin 4.6  Hemoglobin 7.5 /9.8  Calcium 6.8  Labs pending from today    Radiology:   No orders to display       Assessment:    Active Problems:    GI bleed    Upper GI bleed  Resolved Problems:    * No resolved hospital problems. *      Plan:  1. Upper GI bleed, on Sandostatin drip, on PPI, GI is planned for EGD. Pending at this time. 2. Alcoholic hepatitis, recheck LFTs  3. Hypokalemia, supplemented  4. Hypomagnesemia, supplemented  5. Phosphorus level has been added to today's labs   6. Hypocalcemia, supplemented. 7. Alcohol dependence, as per patient who is , has been off alcohol for 8 months, first use of alcohol at the age of 27, was alcoholic for over 6 months before stopping 8 months ago, lives with parents. On thiamine supplements. 8. On Lovenox.         Electronically signed by Gabriel Stinson MD on 9/17/2021 at 8:36 AM

## 2021-09-17 NOTE — ANESTHESIA PRE PROCEDURE
Department of Anesthesiology  Preprocedure Note       Name:  Petros Riddle   Age:  47 y.o.  :  1980                                          MRN:  [id number]         Date:  2021      Surgeon: Ruth Mosley):  Phu Kauffman MD    Procedure: Procedure(s):  EGD BIOPSY    Medications prior to admission:   Prior to Admission medications    Medication Sig Start Date End Date Taking? Authorizing Provider   acetaminophen (TYLENOL) 325 MG tablet Take 650 mg by mouth every 6 hours as needed for Pain   Yes Historical Provider, MD   furosemide (LASIX) 20 MG tablet Take 20 mg by mouth daily as needed  21  Yes Historical Provider, MD   lactulose (CHRONULAC) 10 GM/15ML solution Take 20 g by mouth 3 times daily as needed  21  Yes Historical Provider, MD   omeprazole (PRILOSEC) 20 MG delayed release capsule Take 20 mg by mouth Daily  3/5/21  Yes Historical Provider, MD   KLOR-CON M20 20 MEQ extended release tablet Take 20 mEq by mouth daily  3/30/21  Yes Historical Provider, MD   ondansetron (ZOFRAN ODT) 4 MG disintegrating tablet Take 1 tablet by mouth every 8 hours as needed for Nausea or Vomiting 20 Yes Nadeem Lewis MD   vitamin A 3 MG (95567 UT) capsule Take 10,000 Units by mouth daily 10/1/20  Yes Historical Provider, MD   gabapentin (NEURONTIN) 100 MG capsule Take 100 mg by mouth as needed.     Yes Historical Provider, MD   albuterol sulfate HFA (PROAIR HFA) 108 (90 Base) MCG/ACT inhaler Inhale 2 puffs into the lungs every 6 hours as needed for Wheezing 10/9/20  Yes Marely Kirkpatrick APRN - CNP   vitamin D (CHOLECALCIFEROL) 250 MCG (96470 UT) CAPS capsule Take 1,000 Units by mouth daily 19  Yes Historical Provider, MD   vitamin D (ERGOCALCIFEROL) 1.25 MG (22296 UT) CAPS capsule Take 50,000 Units by mouth once a week  20  Yes Historical Provider, MD   escitalopram (LEXAPRO) 10 MG tablet Take 10 mg by mouth daily  3/3/20  Yes Historical Provider, MD   CREON 51055 units delayed release capsule Take 12,000 Units by mouth 4 times daily (before meals and nightly)  2/12/20  Yes Historical Provider, MD       Current medications:    Current Facility-Administered Medications   Medication Dose Route Frequency Provider Last Rate Last Admin    haloperidol lactate (HALDOL) injection 3 mg  3 mg IntraMUSCular Once Delfino Galvan MD        0.9 % sodium chloride infusion   IntraVENous PRN Delfino Galvan MD        sodium chloride flush 0.9 % injection 5-40 mL  5-40 mL IntraVENous 2 times per day Pepe Kong MD   10 mL at 09/17/21 1148    sodium chloride flush 0.9 % injection 5-40 mL  5-40 mL IntraVENous PRN Little Sinha MD   10 mL at 09/15/21 0936    0.9 % sodium chloride infusion  25 mL IntraVENous PRN Little Sinha MD        enoxaparin (LOVENOX) injection 40 mg  40 mg SubCUTAneous Daily Little Sinha MD   40 mg at 09/17/21 1148    ondansetron (ZOFRAN-ODT) disintegrating tablet 4 mg  4 mg Oral Q8H PRN Little Perea MD        Or    ondansetron [facility]) injection 4 mg  4 mg IntraVENous Q6H PRN Little Sinha MD   4 mg at 09/15/21 1944    polyethylene glycol (GLYCOLAX) packet 17 g  17 g Oral Daily PRN Little Sinha MD        acetaminophen (TYLENOL) tablet 650 mg  650 mg Oral Q6H PRN Little Perea MD        Or    acetaminophen (TYLENOL) suppository 650 mg  650 mg Rectal Q6H PRN Little Perea MD        0.9 % sodium chloride infusion   IntraVENous Continuous Little Sinha  mL/hr at 09/16/21 0520 New Bag at 09/16/21 0520    potassium chloride 10 mEq/100 mL IVPB (Peripheral Line)  10 mEq IntraVENous PRN Little Perea  mL/hr at 09/17/21 1400 10 mEq at 09/17/21 1400    magnesium sulfate 2000 mg in 50 mL IVPB premix  2,000 mg IntraVENous PRN Pepe Kong MD   Stopped at 09/16/21 0809    pantoprazole (PROTONIX) injection 40 mg  40 mg IntraVENous BID Little Rosario Jose Tavarez MD   40 mg at 09/17/21 1147    And    sodium chloride (PF) 0.9 % injection 10 mL  10 mL IntraVENous BID Little Ríos MD   10 mL at 09/16/21 2013    octreotide (SANDOSTATIN) 500 mcg in sodium chloride 0.9 % 100 mL infusion  50 mcg/hr IntraVENous Continuous Errol Larios MD 10 mL/hr at 09/17/21 0618 50 mcg/hr at 09/17/21 0618    0.9 % sodium chloride infusion   IntraVENous PRN [facility] Real Mckeon MD        ciprofloxacin (CIPRO) IVPB 400 mg  400 mg IntraVENous Q12H Vaughn Edmond MD   Stopped at 09/17/21 1358    predniSONE (DELTASONE) tablet 40 mg  40 mg Oral Daily Little Ríos MD   40 mg at 09/17/21 1147    sodium chloride flush 0.9 % injection 5-40 mL  5-40 mL IntraVENous 2 times per day Vaughn Edmond MD   10 mL at 09/15/21 1945    sodium chloride flush 0.9 % injection 5-40 mL  5-40 mL IntraVENous PRN Vaughn Edmond MD   10 mL at 09/15/21 0935    0.9 % sodium chloride infusion  25 mL IntraVENous PRN Vaughn Edmond MD        thiamine tablet 100 mg  100 mg Oral Daily Little Ríos MD   100 mg at 09/17/21 1147    LORazepam (ATIVAN) tablet 1 mg  1 mg Oral Q1H PRN [facility] Real Mckeon MD   1 mg at 09/17/21 0236    Or    LORazepam (ATIVAN) injection 1 mg  1 mg IntraVENous Q1H PRN [facility] Real Mckeon MD   1 mg at 09/15/21 0028    Or    LORazepam (ATIVAN) tablet 2 mg  2 mg Oral Q1H PRN [facility] Real Mckeon MD   2 mg at 09/17/21 0458    Or    LORazepam (ATIVAN) injection 2 mg  2 mg IntraVENous Q1H PRN Little Ríos MD   2 mg at 09/16/21 0020    Or    LORazepam (ATIVAN) tablet 3 mg  3 mg Oral Q1H PRN Little Ríos MD        Or    LORazepam (ATIVAN) injection 3 mg  3 mg IntraVENous Q1H PRN Little Perea MD        Or    LORazepam (ATIVAN) tablet 4 mg  4 mg Oral Q1H PRN Little Perea MD        Or    LORazepam (ATIVAN) injection 4 mg  4 mg IntraVENous Q1H PRN Vaughn Edmond MD   4 mg at 09/17/21 0812    potassium & sodium phosphates (PHOS-NAK) 280-160-250 MG packet 250 mg  1 packet Oral 4x Daily Shy Taylor MD   250 mg at 09/17/21 1148       Allergies:     Allergies   Allergen Reactions    Pcn [Penicillins] Hives, Itching, Swelling and Rash       Problem List:    Patient Active Problem List   Diagnosis Code    Sepsis ([facility] A41.9    Jaundice R17    Elevated liver enzymes R74.8    Hyperammonemia (HCC) E72.20    Liver metastases ([facility] C78.7    Malignant ascites R18.0    H/O malignant neoplasm of pancreas Z85.07    Hematemesis K92.0    GI bleed K92.2    Upper GI bleed K92.2       Past Medical History:        Diagnosis Date    Alcohol abuse     Alcoholic cirrhosis ([facility]     Anxiety     not on any meds for it    Hypertension     has not required any med a few years as of 8/2019    Hypothyroidism     in her early 25s - pt was on levothyroxine for a while and then hypothyroidism apparently resolved and pt has been off med since her mid-20s    Pancreatic cyst 01/2017    Pt underwent partial pancreatectomy and splenectomy at [facility] 2/23/2017 and was told pathology showed MCN (mucinous cystic neoplasm) of pancreas with clean margins at surgery       Past Surgical History:        Procedure Laterality Date    [facility]  02/23/2017    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 - 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    SPLENECTOMY, TOTAL  02/23/2017    along with partial pancreatectomy    UPPER GASTROINTESTINAL ENDOSCOPY N/A 7/4/2020    EGD CONTROL HEMORRHAGE performed by Gee Solorzano MD at [facility]  7/4/2020    EGD ESOPHAGOGASTRODUODENOSCOPY ENDOSCOPIC VARICEAL TREATMENT performed by Gee Solorzano MD at [address] [place] [address]       Social History:    Social History     Tobacco Use    Smoking status: Never Smoker    Smokeless tobacco: Never Used   Substance Use Topics    Alcohol use: Yes     Comment: daily wine or beer                                Counseling given: Not Answered      Vital Signs (Current):   Vitals:    09/17/21 0230 09/17/21 0344 09/17/21 0456 09/17/21 1119   BP: 136/78 131/79 (!) 142/89 106/75   Pulse: 102 91 95 86   Resp: 20 16   Temp:    36.9 °C (98.5 °F)   TempSrc:    Axillary   SpO2: 97%   93%   Weight:       Height:                                                  BP Readings from Last 3 Encounters:   09/17/21 106/75   11/27/20 (!) 140/95   10/09/20 (!) 134/97       NPO Status:                            Last liquid 1147 9-                           Last solid 2200 9-                                                    BMI:   Wt Readings from Last 3 Encounters:   09/14/21 118 lb (53.5 kg)   11/26/20 135 lb (61.2 kg)   10/09/20 134 lb 6.4 oz (61 kg)     Body mass index is 20.25 kg/m². CBC:   Lab Results   Component Value Date    WBC 11.5 09/16/2021    RBC 2.16 09/16/2021    RBC 3.55 07/08/2019    HGB 9.7 09/17/2021    HCT 28.8 09/17/2021    .9 09/16/2021    RDW 18.2 09/16/2021     09/16/2021       CMP:   Lab Results   Component Value Date     09/17/2021    K 2.9 09/17/2021    K 3.0 09/16/2021     09/17/2021    CO2 22 09/17/2021    BUN 6 09/17/2021    CREATININE 0.5 09/17/2021    GFRAA >60 09/17/2021    LABGLOM >60 09/17/2021    GLUCOSE 93 09/17/2021    GLUCOSE 102 07/08/2019    PROT 6.0 09/17/2021    CALCIUM 6.7 09/17/2021    BILITOT 6.6 09/17/2021    ALKPHOS 109 09/17/2021     09/17/2021    ALT 32 09/17/2021       POC Tests: No results for input(s): POCGLU, POCNA, POCK, POCCL, POCBUN, POCHEMO, POCHCT in the last 72 hours.     Coags:   Lab Results   Component Value Date    PROTIME 21.1 09/16/2021    INR 1.9 09/16/2021    APTT 29.5 09/14/2021       HCG (If Applicable):   Lab Results   Component Value Date    PREGTESTUR neg 07/08/2019        ABGs:   Lab Results   Component Value Date    KHX7WPD 21.8 07/06/2020        Type & Screen (If Applicable):  No results found for: LABABO, LABRH    Drug/Infectious Status (If Applicable):  No results found for: HIV, HEPCAB    COVID-19 Screening (If Applicable):   Lab Results   Component Value Date    COVID19 Not Detected 11/26/2020     EKG 9-    Sinus tachycardia Nonspecific ST and T wave abnormality Abnormal ECG When compared with ECG of 09-AUG-2019 16:00, No significant change was found Confirmed by Renetta Sampson ([id number]) on 9/15/2021 4:23:39 PM    CHEST XRAY 11-  No evidence of active cardiopulmonary pathology. Anesthesia Evaluation  Patient summary reviewed and Nursing notes reviewed  Airway: Mallampati: Unable to assess / NA        Dental:          Pulmonary:Negative Pulmonary ROS breath sounds clear to auscultation                             Cardiovascular:  Exercise tolerance: good (>4 METS),   (+) hypertension:,       ECG reviewed  Rhythm: regular             Beta Blocker:  Not on Beta Blocker         Neuro/Psych:   (+) psychiatric history: stable with treatment            GI/Hepatic/Renal:   (+) liver disease (Alcoholic cirrhosis, Liver metastases ):,           Endo/Other:    (+) hypothyroidism::., malignancy/cancer (malignant neoplasm of pancreas). ROS comment: Alcohol abuse Abdominal:       Abdomen: soft. Vascular: negative vascular ROS. Other Findings:           Anesthesia Plan      MAC     ASA 3       Induction: intravenous. MIPS: Prophylactic antiemetics administered. Anesthetic plan and risks discussed with mother. Use of blood products discussed with mother whom consented to blood products. Plan discussed with CRNA and attending. Kostas Pagan RN   9/17/2021      Pt seen, examined, chart reviewed, plan discussed.   Vikki Wilson MD  9/17/2021  4:15 PM

## 2021-09-17 NOTE — CARE COORDINATION
9/17/2021  Social Work Discharge Planning:Pt has a sitter. CIWA. Room air. [facility] is following. Pt resides with her parents and is employed as an  at [employer]. Electronically signed by TOMI Mari on 9/17/2021 at 9:39 AM'

## 2021-09-18 LAB
ALBUMIN SERPL-MCNC: 2.8 G/DL (ref 3.5–5.2)
ALP BLD-CCNC: 101 U/L (ref 35–104)
ALT SERPL-CCNC: 31 U/L (ref 0–32)
ANION GAP SERPL CALCULATED.3IONS-SCNC: 12 MMOL/L (ref 7–16)
ANISOCYTOSIS: ABNORMAL
AST SERPL-CCNC: 119 U/L (ref 0–31)
BASOPHILS ABSOLUTE: 0.04 E9/L (ref 0–0.2)
BASOPHILS RELATIVE PERCENT: 0.4 % (ref 0–2)
BILIRUB SERPL-MCNC: 6.9 MG/DL (ref 0–1.2)
BUN BLDV-MCNC: 6 MG/DL (ref 6–20)
BURR CELLS: ABNORMAL
CALCIUM SERPL-MCNC: 6.8 MG/DL (ref 8.6–10.2)
CHLORIDE BLD-SCNC: 104 MMOL/L (ref 98–107)
CO2: 19 MMOL/L (ref 22–29)
CREAT SERPL-MCNC: 0.5 MG/DL (ref 0.5–1)
EOSINOPHILS ABSOLUTE: 0.05 E9/L (ref 0.05–0.5)
EOSINOPHILS RELATIVE PERCENT: 0.5 % (ref 0–6)
GFR AFRICAN AMERICAN: >60
GFR NON-AFRICAN AMERICAN: >60 ML/MIN/1.73
GLUCOSE BLD-MCNC: 154 MG/DL (ref 74–99)
HCT VFR BLD CALC: 30.2 % (ref 34–48)
HEMOGLOBIN: 10.2 G/DL (ref 11.5–15.5)
HOWELL-JOLLY BODIES: ABNORMAL
IMMATURE GRANULOCYTES #: 0.09 E9/L
IMMATURE GRANULOCYTES %: 0.9 % (ref 0–5)
LYMPHOCYTES ABSOLUTE: 0.47 E9/L (ref 1.5–4)
LYMPHOCYTES RELATIVE PERCENT: 4.9 % (ref 20–42)
MAGNESIUM: 1.4 MG/DL (ref 1.6–2.6)
MCH RBC QN AUTO: 35.2 PG (ref 26–35)
MCHC RBC AUTO-ENTMCNC: 33.8 % (ref 32–34.5)
MCV RBC AUTO: 104.1 FL (ref 80–99.9)
MONOCYTES ABSOLUTE: 0.44 E9/L (ref 0.1–0.95)
MONOCYTES RELATIVE PERCENT: 4.6 % (ref 2–12)
NEUTROPHILS ABSOLUTE: 8.51 E9/L (ref 1.8–7.3)
NEUTROPHILS RELATIVE PERCENT: 88.7 % (ref 43–80)
PAPPENHEIMER BODIES: ABNORMAL
PDW BLD-RTO: 19.5 FL (ref 11.5–15)
PLATELET # BLD: 151 E9/L (ref 130–450)
PMV BLD AUTO: 11.2 FL (ref 7–12)
POIKILOCYTES: ABNORMAL
POLYCHROMASIA: ABNORMAL
POTASSIUM SERPL-SCNC: 3 MMOL/L (ref 3.5–5)
RBC # BLD: 2.9 E12/L (ref 3.5–5.5)
SCHISTOCYTES: ABNORMAL
SODIUM BLD-SCNC: 135 MMOL/L (ref 132–146)
TOTAL PROTEIN: 5.8 G/DL (ref 6.4–8.3)
WBC # BLD: 9.6 E9/L (ref 4.5–11.5)

## 2021-09-18 PROCEDURE — 6370000000 HC RX 637 (ALT 250 FOR IP): Performed by: INTERNAL MEDICINE

## 2021-09-18 PROCEDURE — 6370000000 HC RX 637 (ALT 250 FOR IP): Performed by: STUDENT IN AN ORGANIZED HEALTH CARE EDUCATION/TRAINING PROGRAM

## 2021-09-18 PROCEDURE — 80053 COMPREHEN METABOLIC PANEL: CPT

## 2021-09-18 PROCEDURE — C9113 INJ PANTOPRAZOLE SODIUM, VIA: HCPCS | Performed by: INTERNAL MEDICINE

## 2021-09-18 PROCEDURE — 85025 COMPLETE CBC W/AUTO DIFF WBC: CPT

## 2021-09-18 PROCEDURE — 2060000000 HC ICU INTERMEDIATE R&B

## 2021-09-18 PROCEDURE — 6360000002 HC RX W HCPCS: Performed by: INTERNAL MEDICINE

## 2021-09-18 PROCEDURE — 2580000003 HC RX 258: Performed by: INTERNAL MEDICINE

## 2021-09-18 PROCEDURE — 36415 COLL VENOUS BLD VENIPUNCTURE: CPT

## 2021-09-18 PROCEDURE — 83735 ASSAY OF MAGNESIUM: CPT

## 2021-09-18 PROCEDURE — 99233 SBSQ HOSP IP/OBS HIGH 50: CPT | Performed by: INTERNAL MEDICINE

## 2021-09-18 RX ADMIN — POTASSIUM CHLORIDE 10 MEQ: 7.45 INJECTION INTRAVENOUS at 16:27

## 2021-09-18 RX ADMIN — POTASSIUM CHLORIDE 10 MEQ: 7.45 INJECTION INTRAVENOUS at 20:33

## 2021-09-18 RX ADMIN — POTASSIUM CHLORIDE 10 MEQ: 7.45 INJECTION INTRAVENOUS at 18:04

## 2021-09-18 RX ADMIN — POTASSIUM & SODIUM PHOSPHATES POWDER PACK 280-160-250 MG 250 MG: 280-160-250 PACK at 15:16

## 2021-09-18 RX ADMIN — PANTOPRAZOLE SODIUM 40 MG: 40 INJECTION, POWDER, FOR SOLUTION INTRAVENOUS at 09:11

## 2021-09-18 RX ADMIN — MAGNESIUM SULFATE HEPTAHYDRATE 2000 MG: 40 INJECTION, SOLUTION INTRAVENOUS at 15:53

## 2021-09-18 RX ADMIN — POTASSIUM CHLORIDE 10 MEQ: 7.45 INJECTION INTRAVENOUS at 19:20

## 2021-09-18 RX ADMIN — PREDNISONE 40 MG: 20 TABLET ORAL at 09:10

## 2021-09-18 RX ADMIN — Medication 10 ML: at 09:11

## 2021-09-18 RX ADMIN — POTASSIUM & SODIUM PHOSPHATES POWDER PACK 280-160-250 MG 250 MG: 280-160-250 PACK at 09:10

## 2021-09-18 RX ADMIN — CALCIUM GLUCONATE 1000 MG: 98 INJECTION, SOLUTION INTRAVENOUS at 15:11

## 2021-09-18 RX ADMIN — POTASSIUM & SODIUM PHOSPHATES POWDER PACK 280-160-250 MG 250 MG: 280-160-250 PACK at 20:33

## 2021-09-18 RX ADMIN — CIPROFLOXACIN 400 MG: 2 INJECTION, SOLUTION INTRAVENOUS at 00:08

## 2021-09-18 RX ADMIN — POTASSIUM CHLORIDE 10 MEQ: 7.45 INJECTION INTRAVENOUS at 23:38

## 2021-09-18 RX ADMIN — CIPROFLOXACIN 400 MG: 2 INJECTION, SOLUTION INTRAVENOUS at 09:12

## 2021-09-18 RX ADMIN — LORAZEPAM 1 MG: 1 TABLET ORAL at 15:57

## 2021-09-18 RX ADMIN — POTASSIUM CHLORIDE 10 MEQ: 7.45 INJECTION INTRAVENOUS at 15:12

## 2021-09-18 RX ADMIN — POTASSIUM CHLORIDE 10 MEQ: 7.45 INJECTION INTRAVENOUS at 22:17

## 2021-09-18 RX ADMIN — ENOXAPARIN SODIUM 40 MG: 40 INJECTION SUBCUTANEOUS at 09:10

## 2021-09-18 RX ADMIN — LORAZEPAM 1 MG: 1 TABLET ORAL at 22:17

## 2021-09-18 RX ADMIN — SODIUM CHLORIDE, PRESERVATIVE FREE 10 ML: 5 INJECTION INTRAVENOUS at 20:34

## 2021-09-18 RX ADMIN — LORAZEPAM 1 MG: 2 INJECTION INTRAMUSCULAR; INTRAVENOUS at 00:08

## 2021-09-18 RX ADMIN — Medication 100 MG: at 09:10

## 2021-09-18 ASSESSMENT — PAIN SCALES - GENERAL: PAINLEVEL_OUTOF10: 0

## 2021-09-18 NOTE — PROGRESS NOTES
[facility] Hospitalist   Progress Note    Admitting Date and Time: 9/14/2021  9:07 PM  Admit Dx: Hypokalemia [E87.6]  Hypomagnesemia [E83.42]  GI bleed [K92.2]  Upper GI bleed [K92.2]  Gastrointestinal hemorrhage, unspecified gastrointestinal hemorrhage type [K92.2]    Subjective: Admitted on 15th, patient with cirrhosis, esophageal varices, who came with upper GI bleed, came with hemoglobin of 7.6, hypokalemia and hypomagnesemia, started on Sandostatin drip and admitted. Initial impression of anemia due to upper GI blood loss. Patient with alcohol dependence. GI did EGD on 17th, patient did undergo banding of esophageal varices. Four bands. No gastric varices. Evidence of portal hypertensive gastropathy. No active blood loss. Patient being followed by [facility]. Patient was admitted with Hypokalemia [E87.6]  Hypomagnesemia [E83.42]  GI bleed [K92.2]  Upper GI bleed [K92.2]  Gastrointestinal hemorrhage, unspecified gastrointestinal hemorrhage type [K92.2]. Patient is awake, alert, comfortable, sitting comfortably, does communicate well, does not know this is month of September, also answers basic questions appropriately. Per RN: Received total of 6 mg of Ativan in last 24 hours. ROS: denies fever, chills, cp, sob, n/v, HA unless stated above.      haloperidol lactate  3 mg IntraMUSCular Once    sodium chloride flush  5-40 mL IntraVENous 2 times per day    enoxaparin  40 mg SubCUTAneous Daily    pantoprazole  40 mg IntraVENous BID    And    sodium chloride (PF)  10 mL IntraVENous BID    ciprofloxacin  400 mg IntraVENous Q12H    predniSONE  40 mg Oral Daily    sodium chloride flush  5-40 mL IntraVENous 2 times per day    thiamine  100 mg Oral Daily    potassium & sodium phosphates  1 packet Oral 4x Daily     sodium chloride, , PRN  sodium chloride flush, 5-40 mL, PRN  sodium chloride, 25 mL, PRN  ondansetron, 4 mg, Q8H PRN   Or  ondansetron, 4 mg, Q6H PRN  polyethylene

## 2021-09-18 NOTE — OP NOTE
[address] [address]                                OPERATIVE REPORT    PATIENT NAME: Marina Medel                   :        1980  MED REC NO:   [id number]                            ROOM:       0410  ACCOUNT NO:   [de-identified]                           ADMIT DATE: 2021  PROVIDER:     Libertad Christina MD    DATE OF PROCEDURE:  2021    PROCEDURE PERFORMED:  Esophagogastroduodenoscopy plus banding of  esophageal varices. PREOPERATIVE DIAGNOSES:  Gastrointestinal bleeding, chronic liver  disease with cirrhosis, history of esophageal varices. POSTOPERATIVE DIAGNOSES:  1. Nonbleeding esophageal varices, four bands applied. 2.  No evidence of gastric varices or evidence of significant portal  hypertensive gastropathy. 3.  Unremarkable duodenum. INDICATIONS:  A [id number] alcoholic with cirrhosis. History of  varices. At least one episode of bleeding. Numerous episodes of  endoscopic evaluation with banding on several occasions including an  episode of a banding at the [facility] on  and a followup  endoscopy at that institution in March of this year with only grade 1  varices. She was due for a followup endoscopy apparently the day of  admission here. Preop is monitored anesthesia care. DESCRIPTION OF PROCEDURE:  With the patient in the left lateral, bite  block in place, she was sedated. The Olympus GIF-H190 video endoscope  was guided into the cervical esophagus and down to the stomach. Proximally, there were no abnormalities. Distally, there were at least  two grade 3 varices present without overlying red spots. No stigmata of  recent bleeding. The stomach was empty. There was very minimal portal  hypertensive change and no gastric varices in the area of the fundus or  cardia. The antrum was a bit red, but without erosion.   The pylorus,  bulb, and postbulbar duodenum normal without evidence of ulcer disease  or duodenal varices. The endoscope was retrieved and the stomach  decompressed. A Yoni-Cook band ligator was attached and reintroduced into the  cervical esophagus and passed down to the gastroesophageal junction. Four bands were applied. There were no other bandable targets. Procedure was terminated. Well tolerated. EBL: 0        IMPRESSION:  Bleeding by default from varices. Larger than they were  when I did her evaluation a year ago. At that time when she was  actively bleeding. On this occasion, she is not.         Jayne Alfaro MD    D: 09/17/2021 21:17:41       T: 09/17/2021 21:19:58     [id number]  Job#: [id number]     Doc#: [id number]    CC:

## 2021-09-19 VITALS
RESPIRATION RATE: 16 BRPM | TEMPERATURE: 97.8 F | HEIGHT: 64 IN | DIASTOLIC BLOOD PRESSURE: 73 MMHG | BODY MASS INDEX: 23.8 KG/M2 | OXYGEN SATURATION: 99 % | WEIGHT: 139.4 LBS | SYSTOLIC BLOOD PRESSURE: 115 MMHG | HEART RATE: 91 BPM

## 2021-09-19 LAB
ALBUMIN SERPL-MCNC: 2.8 G/DL (ref 3.5–5.2)
ALP BLD-CCNC: 113 U/L (ref 35–104)
ALT SERPL-CCNC: 34 U/L (ref 0–32)
ANION GAP SERPL CALCULATED.3IONS-SCNC: 7 MMOL/L (ref 7–16)
AST SERPL-CCNC: 109 U/L (ref 0–31)
BASOPHILS ABSOLUTE: 0.03 E9/L (ref 0–0.2)
BASOPHILS RELATIVE PERCENT: 0.3 % (ref 0–2)
BILIRUB SERPL-MCNC: 6.1 MG/DL (ref 0–1.2)
BILIRUBIN DIRECT: 3.8 MG/DL (ref 0–0.3)
BILIRUBIN, INDIRECT: 2.3 MG/DL (ref 0–1)
BUN BLDV-MCNC: 5 MG/DL (ref 6–20)
CALCIUM SERPL-MCNC: 7.2 MG/DL (ref 8.6–10.2)
CHLORIDE BLD-SCNC: 107 MMOL/L (ref 98–107)
CO2: 21 MMOL/L (ref 22–29)
CREAT SERPL-MCNC: 0.5 MG/DL (ref 0.5–1)
EOSINOPHILS ABSOLUTE: 0.02 E9/L (ref 0.05–0.5)
EOSINOPHILS RELATIVE PERCENT: 0.2 % (ref 0–6)
GFR AFRICAN AMERICAN: >60
GFR NON-AFRICAN AMERICAN: >60 ML/MIN/1.73
GLUCOSE BLD-MCNC: 121 MG/DL (ref 74–99)
HCT VFR BLD CALC: 27.2 % (ref 34–48)
HEMOGLOBIN: 9.1 G/DL (ref 11.5–15.5)
IMMATURE GRANULOCYTES #: 0.11 E9/L
IMMATURE GRANULOCYTES %: 1.1 % (ref 0–5)
LYMPHOCYTES ABSOLUTE: 0.86 E9/L (ref 1.5–4)
LYMPHOCYTES RELATIVE PERCENT: 8.2 % (ref 20–42)
MAGNESIUM: 1.9 MG/DL (ref 1.6–2.6)
MCH RBC QN AUTO: 34.9 PG (ref 26–35)
MCHC RBC AUTO-ENTMCNC: 33.5 % (ref 32–34.5)
MCV RBC AUTO: 104.2 FL (ref 80–99.9)
MONOCYTES ABSOLUTE: 0.95 E9/L (ref 0.1–0.95)
MONOCYTES RELATIVE PERCENT: 9.1 % (ref 2–12)
NEUTROPHILS ABSOLUTE: 8.5 E9/L (ref 1.8–7.3)
NEUTROPHILS RELATIVE PERCENT: 81.1 % (ref 43–80)
PDW BLD-RTO: 19.2 FL (ref 11.5–15)
PLATELET # BLD: 160 E9/L (ref 130–450)
PMV BLD AUTO: 11.2 FL (ref 7–12)
POTASSIUM SERPL-SCNC: 4.1 MMOL/L (ref 3.5–5)
RBC # BLD: 2.61 E12/L (ref 3.5–5.5)
SODIUM BLD-SCNC: 135 MMOL/L (ref 132–146)
TOTAL PROTEIN: 6 G/DL (ref 6.4–8.3)
WBC # BLD: 10.5 E9/L (ref 4.5–11.5)

## 2021-09-19 PROCEDURE — 36415 COLL VENOUS BLD VENIPUNCTURE: CPT

## 2021-09-19 PROCEDURE — 80048 BASIC METABOLIC PNL TOTAL CA: CPT

## 2021-09-19 PROCEDURE — 6360000002 HC RX W HCPCS: Performed by: INTERNAL MEDICINE

## 2021-09-19 PROCEDURE — 80076 HEPATIC FUNCTION PANEL: CPT

## 2021-09-19 PROCEDURE — 6370000000 HC RX 637 (ALT 250 FOR IP): Performed by: INTERNAL MEDICINE

## 2021-09-19 PROCEDURE — 99239 HOSP IP/OBS DSCHRG MGMT >30: CPT | Performed by: INTERNAL MEDICINE

## 2021-09-19 PROCEDURE — 83735 ASSAY OF MAGNESIUM: CPT

## 2021-09-19 PROCEDURE — 2580000003 HC RX 258: Performed by: INTERNAL MEDICINE

## 2021-09-19 PROCEDURE — 6370000000 HC RX 637 (ALT 250 FOR IP): Performed by: STUDENT IN AN ORGANIZED HEALTH CARE EDUCATION/TRAINING PROGRAM

## 2021-09-19 PROCEDURE — 85025 COMPLETE CBC W/AUTO DIFF WBC: CPT

## 2021-09-19 RX ADMIN — SODIUM CHLORIDE, PRESERVATIVE FREE 10 ML: 5 INJECTION INTRAVENOUS at 09:18

## 2021-09-19 RX ADMIN — ENOXAPARIN SODIUM 40 MG: 40 INJECTION SUBCUTANEOUS at 09:18

## 2021-09-19 RX ADMIN — PREDNISONE 40 MG: 20 TABLET ORAL at 09:18

## 2021-09-19 RX ADMIN — Medication 100 MG: at 09:18

## 2021-09-19 RX ADMIN — POTASSIUM & SODIUM PHOSPHATES POWDER PACK 280-160-250 MG 250 MG: 280-160-250 PACK at 09:18

## 2021-09-19 NOTE — PROGRESS NOTES
No bleeding, no complaints  Bilirubin 4.6 on admission, currently 6.9  She waas never hypotensive  Did receive 2 units and may be consequence of the transfused blood and a component of indirect hyperbilirubinemia  Alert and oriented, there were concerns re: withdrawal with agitation brief restraints. Not consistent with hx of no alcohol for 8 months or the one month she told me  Her father asked me if she was drinking. He was opptomistic given employment and boyfreind. She was living at home but had moved out to live with her boyfriend. There seem not to have any discussions about transplant in [facility]. MELD score 22  No clinical ascites    She was scheduled for EGD in [facility] for elective follow up of varices and the EGD last March  She will need to schedule there for follow up in 4 to 6 weeks  Evidence suggests she is actively drinking    Stop Octreotide and IVF.    OK home  No need antibiotics or PPI  Would not send home on steroids at this time

## 2021-09-19 NOTE — DISCHARGE SUMMARY
[facility] Physician Discharge Summary       [address] Carine so [place] [phone]    Schedule an appointment as soon as possible for a visit in 1 week      Ghada Sweet [facility]  [phone]    Schedule an appointment as soon as possible for a visit        Activity level: As tolerated     Dispo: Home    Condition on discharge: Stable     Patient ID:  Colby Hurd  [id number]  51 y.o.  1980    Admit date: 9/14/2021    Discharge date and time:  9/19/2021  12:53 PM    Admission Diagnoses: Active Problems:    GI bleed    Upper GI bleed  Resolved Problems:    * No resolved hospital problems. *      Discharge Diagnoses: Active Problems:    GI bleed    Upper GI bleed  Resolved Problems:    * No resolved hospital problems. *      Consults:  IP CONSULT TO SOCIAL WORK  IP CONSULT TO GI  IP CONSULT TO IV TEAM  IP CONSULT TO IV TEAM    Procedures: EGD which showed grade 3 varices status post banding x4    Hospital Course:   Patient Colby Hurd is a 47 y.o. presented with Hypokalemia [E87.6]  Hypomagnesemia [E83.42]  GI bleed [K92.2]  Upper GI bleed [K92.2]  Gastrointestinal hemorrhage, unspecified gastrointestinal hemorrhage type [K92.2]    This is a 72-year-old female presents to the hospital with upper GI bleed. Patient has a history of alcoholic liver cirrhosis as well as esophageal varices. Patient was initially made n.p.o. and started on a Protonix and octreotide drip. Hemoglobin was monitored closely. Patient received 1 unit PRBC. She was seen by GI who did an EGD. Varices were banded. Patient's hemoglobin remained stable. Patient is currently medically stable for discharge.     Discharge Exam:    General Appearance: alert and oriented to person, place and time and in no acute distress  Skin: warm and dry  Head: normocephalic and atraumatic  Eyes: pupils equal, round, and reactive to light, extraocular eye movements intact, conjunctivae normal  Neck: neck supple and non tender without mass   Pulmonary/Chest: clear to auscultation bilaterally- no wheezes, rales or rhonchi, normal air movement, no respiratory distress  Cardiovascular: normal rate, normal S1 and S2 and no carotid bruits  Abdomen: soft, non-tender, non-distended, normal bowel sounds, no masses or organomegaly  Extremities: no cyanosis, no clubbing and no edema  Neurologic: no cranial nerve deficit and speech normal    I/O last 3 completed shifts: In: 6145 [P.O.:1580]  Out: 400 [Urine:400]  No intake/output data recorded. LABS:  Recent Labs     09/17/21  1205 09/18/21  1050 09/19/21  0355    135 135   K 2.9* 3.0* 4.1    104 107   CO2 22 19* 21*   BUN 6 6 5*   CREATININE 0.5 0.5 0.5   GLUCOSE 93 154* 121*   CALCIUM 6.7* 6.8* 7.2*       Recent Labs     09/17/21  1205 09/18/21  1525 09/19/21  0355   WBC  --  9.6 10.5   RBC  --  2.90* 2.61*   HGB 9.7* 10.2* 9.1*   HCT 28.8* 30.2* 27.2*   MCV  --  104.1* 104.2*   MCH  --  35.2* 34.9   MCHC  --  33.8 33.5   RDW  --  19.5* 19.2*   PLT  --  151 160   MPV  --  11.2 11.2       No results for input(s): POCGLU in the last 72 hours. Imaging:  No results found.     Patient Instructions:      Medication List      CONTINUE taking these medications    albuterol sulfate  (90 Base) MCG/ACT inhaler  Commonly known as: ProAir HFA  Inhale 2 puffs into the lungs every 6 hours as needed for Wheezing     Creon [id number] units delayed release capsule  Generic drug: lipase-protease-amylase     escitalopram 10 MG tablet  Commonly known as: LEXAPRO     furosemide 20 MG tablet  Commonly known as: LASIX     gabapentin 100 MG capsule  Commonly known as: NEURONTIN     Klor-Con M20 20 MEQ extended release tablet  Generic drug: potassium chloride     lactulose 10 GM/15ML solution  Commonly known as: CHRONULAC     omeprazole 20 MG delayed release capsule  Commonly known as: PRILOSEC ondansetron 4 MG disintegrating tablet  Commonly known as: Zofran ODT  Take 1 tablet by mouth every 8 hours as needed for Nausea or Vomiting     vitamin A 3 MG (65054 UT) capsule     vitamin D 1.25 MG (44843 UT) Caps capsule  Commonly known as: ERGOCALCIFEROL     vitamin D 250 MCG (10385 UT) Caps capsule  Commonly known as: CHOLECALCIFEROL        STOP taking these medications    acetaminophen 325 MG tablet  Commonly known as: TYLENOL              Note that more than 30 minutes was spent in preparing discharge papers, discussing discharge with patient, medication review, etc.    Signed:  Electronically signed by Macy Gutiérrez DO on 9/19/2021 at 12:53 PM

## 2021-12-21 ENCOUNTER — HOSPITAL ENCOUNTER (EMERGENCY)
Age: 41
Discharge: HOME | End: 2021-12-22
Attending: EMERGENCY MEDICINE
Payer: MEDICAID

## 2021-12-21 DIAGNOSIS — E87.6 HYPOKALEMIA: Primary | ICD-10-CM

## 2021-12-21 DIAGNOSIS — E83.42 HYPOMAGNESEMIA: ICD-10-CM

## 2021-12-21 DIAGNOSIS — D64.9 ANEMIA, UNSPECIFIED TYPE: ICD-10-CM

## 2021-12-21 LAB
ALBUMIN SERPL-MCNC: 3.3 G/DL (ref 3.5–5.2)
ALP BLD-CCNC: 146 U/L (ref 35–104)
ALT SERPL-CCNC: 25 U/L (ref 0–32)
ANION GAP SERPL CALCULATED.3IONS-SCNC: 13 MMOL/L (ref 7–16)
ANION GAP SERPL CALCULATED.3IONS-SCNC: 17 MMOL/L (ref 7–16)
AST SERPL-CCNC: 121 U/L (ref 0–31)
BASOPHILS ABSOLUTE: 0.14 E9/L (ref 0–0.2)
BASOPHILS RELATIVE PERCENT: 1.8 % (ref 0–2)
BILIRUB SERPL-MCNC: 3 MG/DL (ref 0–1.2)
BILIRUBIN URINE: NEGATIVE
BLOOD, URINE: NEGATIVE
BUN BLDV-MCNC: 13 MG/DL (ref 6–20)
BUN BLDV-MCNC: 18 MG/DL (ref 6–20)
CALCIUM SERPL-MCNC: 8.3 MG/DL (ref 8.6–10.2)
CALCIUM SERPL-MCNC: 8.7 MG/DL (ref 8.6–10.2)
CHLORIDE BLD-SCNC: 100 MMOL/L (ref 98–107)
CHLORIDE BLD-SCNC: 102 MMOL/L (ref 98–107)
CLARITY: CLEAR
CO2: 24 MMOL/L (ref 22–29)
CO2: 24 MMOL/L (ref 22–29)
COLOR: YELLOW
CREAT SERPL-MCNC: 0.5 MG/DL (ref 0.5–1)
CREAT SERPL-MCNC: 0.5 MG/DL (ref 0.5–1)
EOSINOPHILS ABSOLUTE: 0.01 E9/L (ref 0.05–0.5)
EOSINOPHILS RELATIVE PERCENT: 0.1 % (ref 0–6)
GFR AFRICAN AMERICAN: >60
GFR AFRICAN AMERICAN: >60
GFR NON-AFRICAN AMERICAN: >60 ML/MIN/1.73
GFR NON-AFRICAN AMERICAN: >60 ML/MIN/1.73
GLUCOSE BLD-MCNC: 114 MG/DL (ref 74–99)
GLUCOSE BLD-MCNC: 155 MG/DL (ref 74–99)
GLUCOSE URINE: NEGATIVE MG/DL
HCG(URINE) PREGNANCY TEST: NEGATIVE
HCT VFR BLD CALC: 27.2 % (ref 34–48)
HEMOGLOBIN: 8.8 G/DL (ref 11.5–15.5)
IMMATURE GRANULOCYTES #: 0.12 E9/L
IMMATURE GRANULOCYTES %: 1.5 % (ref 0–5)
INFLUENZA A BY PCR: NOT DETECTED
INFLUENZA B BY PCR: NOT DETECTED
KETONES, URINE: ABNORMAL MG/DL
LACTIC ACID: 2.1 MMOL/L (ref 0.5–2.2)
LACTIC ACID: 5.6 MMOL/L (ref 0.5–2.2)
LEUKOCYTE ESTERASE, URINE: NEGATIVE
LIPASE: 18 U/L (ref 13–60)
LYMPHOCYTES ABSOLUTE: 1.19 E9/L (ref 1.5–4)
LYMPHOCYTES RELATIVE PERCENT: 15.2 % (ref 20–42)
MAGNESIUM: 1.3 MG/DL (ref 1.6–2.6)
MAGNESIUM: 2 MG/DL (ref 1.6–2.6)
MCH RBC QN AUTO: 30.9 PG (ref 26–35)
MCHC RBC AUTO-ENTMCNC: 32.4 % (ref 32–34.5)
MCV RBC AUTO: 95.4 FL (ref 80–99.9)
MONOCYTES ABSOLUTE: 0.9 E9/L (ref 0.1–0.95)
MONOCYTES RELATIVE PERCENT: 11.5 % (ref 2–12)
NEUTROPHILS ABSOLUTE: 5.45 E9/L (ref 1.8–7.3)
NEUTROPHILS RELATIVE PERCENT: 69.9 % (ref 43–80)
NITRITE, URINE: NEGATIVE
PDW BLD-RTO: 18.8 FL (ref 11.5–15)
PH UA: 7 (ref 5–9)
PLATELET # BLD: 105 E9/L (ref 130–450)
PMV BLD AUTO: 11.8 FL (ref 7–12)
POTASSIUM SERPL-SCNC: 2.5 MMOL/L (ref 3.5–5)
POTASSIUM SERPL-SCNC: 2.5 MMOL/L (ref 3.5–5)
PROTEIN UA: NEGATIVE MG/DL
RBC # BLD: 2.85 E12/L (ref 3.5–5.5)
SARS-COV-2, NAAT: NOT DETECTED
SODIUM BLD-SCNC: 139 MMOL/L (ref 132–146)
SODIUM BLD-SCNC: 141 MMOL/L (ref 132–146)
SPECIFIC GRAVITY UA: 1.01 (ref 1–1.03)
TOTAL PROTEIN: 8.6 G/DL (ref 6.4–8.3)
UROBILINOGEN, URINE: 1 E.U./DL
WBC # BLD: 7.8 E9/L (ref 4.5–11.5)

## 2021-12-21 PROCEDURE — 36415 COLL VENOUS BLD VENIPUNCTURE: CPT

## 2021-12-21 PROCEDURE — 81003 URINALYSIS AUTO W/O SCOPE: CPT

## 2021-12-21 PROCEDURE — 83735 ASSAY OF MAGNESIUM: CPT

## 2021-12-21 PROCEDURE — 83690 ASSAY OF LIPASE: CPT

## 2021-12-21 PROCEDURE — 96367 TX/PROPH/DG ADDL SEQ IV INF: CPT

## 2021-12-21 PROCEDURE — 80053 COMPREHEN METABOLIC PANEL: CPT

## 2021-12-21 PROCEDURE — 6360000002 HC RX W HCPCS: Performed by: PHYSICIAN ASSISTANT

## 2021-12-21 PROCEDURE — 80048 BASIC METABOLIC PNL TOTAL CA: CPT

## 2021-12-21 PROCEDURE — 6370000000 HC RX 637 (ALT 250 FOR IP): Performed by: PHYSICIAN ASSISTANT

## 2021-12-21 PROCEDURE — 83605 ASSAY OF LACTIC ACID: CPT

## 2021-12-21 PROCEDURE — 87635 SARS-COV-2 COVID-19 AMP PRB: CPT

## 2021-12-21 PROCEDURE — 2580000003 HC RX 258: Performed by: PHYSICIAN ASSISTANT

## 2021-12-21 PROCEDURE — 96365 THER/PROPH/DIAG IV INF INIT: CPT

## 2021-12-21 PROCEDURE — 96375 TX/PRO/DX INJ NEW DRUG ADDON: CPT

## 2021-12-21 PROCEDURE — 99285 EMERGENCY DEPT VISIT HI MDM: CPT

## 2021-12-21 PROCEDURE — 96366 THER/PROPH/DIAG IV INF ADDON: CPT

## 2021-12-21 PROCEDURE — 81025 URINE PREGNANCY TEST: CPT

## 2021-12-21 PROCEDURE — 85025 COMPLETE CBC W/AUTO DIFF WBC: CPT

## 2021-12-21 PROCEDURE — 87502 INFLUENZA DNA AMP PROBE: CPT

## 2021-12-21 PROCEDURE — 6360000002 HC RX W HCPCS: Performed by: EMERGENCY MEDICINE

## 2021-12-21 RX ORDER — POTASSIUM CHLORIDE 7.45 MG/ML
10 INJECTION INTRAVENOUS ONCE
Status: COMPLETED | OUTPATIENT
Start: 2021-12-21 | End: 2021-12-21

## 2021-12-21 RX ORDER — POTASSIUM CHLORIDE 20 MEQ/1
40 TABLET, EXTENDED RELEASE ORAL ONCE
Status: COMPLETED | OUTPATIENT
Start: 2021-12-21 | End: 2021-12-21

## 2021-12-21 RX ORDER — ONDANSETRON 2 MG/ML
4 INJECTION INTRAMUSCULAR; INTRAVENOUS ONCE
Status: COMPLETED | OUTPATIENT
Start: 2021-12-21 | End: 2021-12-21

## 2021-12-21 RX ORDER — 0.9 % SODIUM CHLORIDE 0.9 %
1000 INTRAVENOUS SOLUTION INTRAVENOUS ONCE
Status: COMPLETED | OUTPATIENT
Start: 2021-12-21 | End: 2021-12-21

## 2021-12-21 RX ORDER — LORAZEPAM 1 MG/1
1 TABLET ORAL EVERY 6 HOURS PRN
Qty: 12 TABLET | Refills: 0 | Status: SHIPPED | OUTPATIENT
Start: 2021-12-21 | End: 2021-12-24

## 2021-12-21 RX ORDER — POTASSIUM CHLORIDE 7.45 MG/ML
10 INJECTION INTRAVENOUS ONCE
Status: COMPLETED | OUTPATIENT
Start: 2021-12-21 | End: 2021-12-22

## 2021-12-21 RX ORDER — LORAZEPAM 2 MG/ML
1 INJECTION INTRAMUSCULAR ONCE
Status: COMPLETED | OUTPATIENT
Start: 2021-12-21 | End: 2021-12-21

## 2021-12-21 RX ORDER — MAGNESIUM SULFATE IN WATER 40 MG/ML
2000 INJECTION, SOLUTION INTRAVENOUS ONCE
Status: COMPLETED | OUTPATIENT
Start: 2021-12-21 | End: 2021-12-21

## 2021-12-21 RX ADMIN — SODIUM CHLORIDE 1000 ML: 9 INJECTION, SOLUTION INTRAVENOUS at 14:15

## 2021-12-21 RX ADMIN — LORAZEPAM 1 MG: 2 INJECTION INTRAMUSCULAR; INTRAVENOUS at 16:47

## 2021-12-21 RX ADMIN — ONDANSETRON 4 MG: 2 INJECTION INTRAMUSCULAR; INTRAVENOUS at 13:28

## 2021-12-21 RX ADMIN — POTASSIUM CHLORIDE 10 MEQ: 10 INJECTION, SOLUTION INTRAVENOUS at 20:23

## 2021-12-21 RX ADMIN — POTASSIUM CHLORIDE 10 MEQ: 10 INJECTION, SOLUTION INTRAVENOUS at 23:29

## 2021-12-21 RX ADMIN — POTASSIUM CHLORIDE 10 MEQ: 7.46 INJECTION, SOLUTION INTRAVENOUS at 14:12

## 2021-12-21 RX ADMIN — POTASSIUM CHLORIDE 40 MEQ: 20 TABLET, EXTENDED RELEASE ORAL at 14:11

## 2021-12-21 RX ADMIN — SODIUM CHLORIDE 1000 ML: 9 INJECTION, SOLUTION INTRAVENOUS at 13:27

## 2021-12-21 RX ADMIN — MAGNESIUM SULFATE HEPTAHYDRATE 2000 MG: 40 INJECTION, SOLUTION INTRAVENOUS at 16:51

## 2021-12-21 ASSESSMENT — PAIN DESCRIPTION - PAIN TYPE: TYPE: ACUTE PAIN

## 2021-12-21 ASSESSMENT — PAIN DESCRIPTION - LOCATION: LOCATION: HEAD

## 2021-12-21 ASSESSMENT — PAIN DESCRIPTION - ONSET: ONSET: SUDDEN

## 2021-12-21 ASSESSMENT — PAIN SCALES - GENERAL: PAINLEVEL_OUTOF10: 5

## 2021-12-21 ASSESSMENT — PAIN - FUNCTIONAL ASSESSMENT: PAIN_FUNCTIONAL_ASSESSMENT: PREVENTS OR INTERFERES SOME ACTIVE ACTIVITIES AND ADLS

## 2021-12-21 ASSESSMENT — PAIN DESCRIPTION - FREQUENCY: FREQUENCY: CONTINUOUS

## 2021-12-21 ASSESSMENT — PAIN DESCRIPTION - DESCRIPTORS: DESCRIPTORS: DULL;ACHING

## 2021-12-21 NOTE — ED PROVIDER NOTES
ED Attending  CC: Abby      [facility]  Department of Emergency Medicine   ED  Encounter Note  Admit Date/RoomTime: 2021 11:56 AM  ED Room:     NAME: Martin Gamez  : 1980  MRN: [id number]     Chief Complaint:  Emesis (onset last night, x1 last night and x2 today), Fatigue (x\"few hours\"), Pharyngitis (x1 week), Otalgia (bilat x1 week), and Headache    History of Present Illness       Martin Gamez is a 46 y.o. old female who presents to the emergency department by private vehicle, for multiple episodes of vomiting since last night. Patient states she is not been able to keep anything down. Patient states prior to the symptoms she has had a sore throat, bilateral ear pain, sinus congestion which had been improving. Patient states her symptoms are mild in severity and denies abdominal pain or pain anywhere else. Patient denies anything make it better or worse. Patient denies known exposure to sick contacts. Denies fever/chills, headache, vision change, dizziness, hemoptysis, chest pain, dyspnea, abdominal pain, diarrhea, hematemesis, melena, bloody stool, urinary symptoms, hematuria, numbness/weakness. ROS   Pertinent positives and negatives are stated within HPI, all other systems reviewed and are negative. Past Medical History:  has a past medical history of Alcohol abuse, Alcoholic cirrhosis ([address] Anxiety, Hypertension, Hypothyroidism, and Pancreatic cyst.    Surgical History has a past surgical history that includes Arm Surgery (); Splenectomy, total (2017); Pancreas surgery (2017); Upper gastrointestinal endoscopy (N/A, 2020); Upper gastrointestinal endoscopy (2020); and Upper gastrointestinal endoscopy (N/A, 2021). Social History:  reports that she has never smoked. She has never used smokeless tobacco. She reports current alcohol use. She reports that she does not use drugs.     Family History: family history includes Breast fL    MCH 30.9 26.0 - 35.0 pg    MCHC 32.4 32.0 - 34.5 %    RDW 18.8 (H) 11.5 - 15.0 fL    Platelets 346 (L) 363 - 450 E9/L    MPV 11.8 7.0 - 12.0 fL    Neutrophils % 69.9 43.0 - 80.0 %    Immature Granulocytes % 1.5 0.0 - 5.0 %    Lymphocytes % 15.2 (L) 20.0 - 42.0 %    Monocytes % 11.5 2.0 - 12.0 %    Eosinophils % 0.1 0.0 - 6.0 %    Basophils % 1.8 0.0 - 2.0 %    Neutrophils Absolute 5.45 1.80 - 7.30 E9/L    Immature Granulocytes # 0.12 E9/L    Lymphocytes Absolute 1.19 (L) 1.50 - 4.00 E9/L    Monocytes Absolute 0.90 0.10 - 0.95 E9/L    Eosinophils Absolute 0.01 (L) 0.05 - 0.50 E9/L    Basophils Absolute 0.14 0.00 - 0.20 E9/L   Comprehensive Metabolic Panel   Result Value Ref Range    Sodium 141 132 - 146 mmol/L    Potassium 2.5 (LL) 3.5 - 5.0 mmol/L    Chloride 100 98 - 107 mmol/L    CO2 24 22 - 29 mmol/L    Anion Gap 17 (H) 7 - 16 mmol/L    Glucose 155 (H) 74 - 99 mg/dL    BUN 18 6 - 20 mg/dL    CREATININE 0.5 0.5 - 1.0 mg/dL    GFR Non-African American >60 >=60 mL/min/1.73    GFR African American >60     Calcium 8.7 8.6 - 10.2 mg/dL    Total Protein 8.6 (H) 6.4 - 8.3 g/dL    Albumin 3.3 (L) 3.5 - 5.2 g/dL    Total Bilirubin 3.0 (H) 0.0 - 1.2 mg/dL    Alkaline Phosphatase 146 (H) 35 - 104 U/L    ALT 25 0 - 32 U/L     (H) 0 - 31 U/L   Lipase   Result Value Ref Range    Lipase 18 13 - 60 U/L   Lactic Acid, Plasma   Result Value Ref Range    Lactic Acid 2.1 0.5 - 2.2 mmol/L   Lactic Acid, Plasma   Result Value Ref Range    Lactic Acid 5.6 (HH) 0.5 - 2.2 mmol/L   Urinalysis   Result Value Ref Range    Color, UA Yellow Straw/Yellow    Clarity, UA Clear Clear    Glucose, Ur Negative Negative mg/dL    Bilirubin Urine Negative Negative    Ketones, Urine TRACE (A) Negative mg/dL    Specific Gravity, UA 1.015 1.005 - 1.030    Blood, Urine Negative Negative    pH, UA 7.0 5.0 - 9.0    Protein, UA Negative Negative mg/dL    Urobilinogen, Urine 1.0 <2.0 E.U./dL    Nitrite, Urine Negative Negative    Leukocyte Esterase, Urine Negative Negative   Magnesium   Result Value Ref Range    Magnesium 1.3 (L) 1.6 - 2.6 mg/dL   Pregnancy, Urine   Result Value Ref Range    HCG(Urine) Pregnancy Test NEGATIVE NEGATIVE   Basic Metabolic Panel   Result Value Ref Range    Sodium 139 132 - 146 mmol/L    Potassium 2.5 (LL) 3.5 - 5.0 mmol/L    Chloride 102 98 - 107 mmol/L    CO2 24 22 - 29 mmol/L    Anion Gap 13 7 - 16 mmol/L    Glucose 114 (H) 74 - 99 mg/dL    BUN 13 6 - 20 mg/dL    CREATININE 0.5 0.5 - 1.0 mg/dL    GFR Non-African American >60 >=60 mL/min/1.73    GFR African American >60     Calcium 8.3 (L) 8.6 - 10.2 mg/dL   Magnesium   Result Value Ref Range    Magnesium 2.0 1.6 - 2.6 mg/dL     Imaging: All Radiology results interpreted by Radiologist unless otherwise noted. No orders to display     ED Course / Medical Decision Making     Medications   ondansetron [facility]) injection 4 mg (4 mg IntraVENous Given 12/21/21 1328)   0.9 % sodium chloride bolus (0 mLs IntraVENous Stopped 12/21/21 1457)   potassium chloride (KLOR-CON M) extended release tablet 40 mEq (40 mEq Oral Given 12/21/21 1411)   potassium chloride 10 mEq/100 mL IVPB (Peripheral Line) (0 mEq IntraVENous Stopped 12/21/21 1533)   0.9 % sodium chloride bolus (0 mLs IntraVENous Stopped 12/21/21 1644)   magnesium sulfate 2000 mg in 50 mL IVPB premix (0 mg IntraVENous Stopped 12/21/21 1910)   LORazepam (ATIVAN) injection 1 mg (1 mg IntraVENous Given 12/21/21 1647)   potassium chloride 10 mEq/100 mL IVPB (Peripheral Line) (0 mEq IntraVENous Stopped 12/22/21 0208)   potassium chloride 10 mEq/100 mL IVPB (Peripheral Line) (0 mEq IntraVENous Stopped 12/22/21 0030)   potassium chloride 10 mEq/100 mL IVPB (Peripheral Line) (0 mEq IntraVENous Stopped 12/21/21 2329)     ED Course as of 12/22/21 0819   Tue Dec 21, 2021   1414 Patient feeling better. No nausea at this time. Denies abdominal pain.   [KL]   2228 Patient feeling much better after repeat potassium infusions and Ativan for nausea. Patient is given IV magnesium. [JN]      ED Course User Index  [JN] Tere Hendricks DO  [KL] Jack Johnson PA-C       Consultations:             None    Procedures:   none    MDM: Patient presenting with vomiting. Patient's hemoglobin is 8.8 appears stable for her. Patient's potassium is 2.5. Patient's LFTs are slightly elevated which appears stable for her. Patient's lactic acid is elevated. Patient's magnesium is 1.3. Patient's labs will be repeated and if improved she will be discharged. Patient's lactic and magnesium improved to normal.  Patient's potassium was still low so she was given another dose in the ED. Patient feeling much better after these medications were given. Patient to follow-up with PCP. Recommend patient return to the ED with new or worsening of symptoms. Plan of Care/Counseling:  Jack Johnson PA-C reviewed today's visit with the patient in addition to providing specific details for the plan of care and counseling regarding the diagnosis and prognosis. Questions are answered at this time and are agreeable with the plan. Assessment      1. Hypokalemia    2. Hypomagnesemia    3. Anemia, unspecified type      This patient's ED course included: a personal history and physicial examination and multiple bedside re-evaluations  This patient has remained hemodynamically stable during their ED course. Plan   Discharged home  Patient condition is stable. New Medications     Discharge Medication List as of 12/21/2021 11:33 PM      START taking these medications    Details   LORazepam (ATIVAN) 1 MG tablet Take 1 tablet by mouth every 6 hours as needed for Anxiety (NV) for up to 3 days. , Disp-12 tablet, R-0Print           Electronically signed by Jack Johnson PA-C   DD: 12/21/21  **This report was transcribed using voice recognition software. Every effort was made to ensure accuracy; however, inadvertent computerized transcription errors may be present.   END OF PROVIDER NOTE     Author SHALA Weldon  12/22/21 0898

## 2021-12-22 VITALS
TEMPERATURE: 97.8 F | BODY MASS INDEX: 20.49 KG/M2 | OXYGEN SATURATION: 96 % | HEART RATE: 85 BPM | SYSTOLIC BLOOD PRESSURE: 160 MMHG | HEIGHT: 64 IN | DIASTOLIC BLOOD PRESSURE: 80 MMHG | WEIGHT: 120 LBS | RESPIRATION RATE: 16 BRPM

## 2021-12-22 PROCEDURE — 99285 EMERGENCY DEPT VISIT HI MDM: CPT

## 2021-12-22 PROCEDURE — 6360000002 HC RX W HCPCS: Performed by: EMERGENCY MEDICINE

## 2021-12-22 RX ADMIN — POTASSIUM CHLORIDE 10 MEQ: 10 INJECTION, SOLUTION INTRAVENOUS at 00:37

## 2022-01-01 ENCOUNTER — APPOINTMENT (OUTPATIENT)
Dept: CT IMAGING | Age: 42
End: 2022-01-01
Payer: MEDICAID

## 2022-01-01 ENCOUNTER — APPOINTMENT (OUTPATIENT)
Dept: GENERAL RADIOLOGY | Age: 42
DRG: 044 | End: 2022-01-01
Attending: INTERNAL MEDICINE
Payer: MEDICAID

## 2022-01-01 ENCOUNTER — APPOINTMENT (OUTPATIENT)
Dept: GENERAL RADIOLOGY | Age: 42
End: 2022-01-01
Payer: MEDICAID

## 2022-01-01 ENCOUNTER — HOSPITAL ENCOUNTER (INPATIENT)
Age: 42
LOS: 2 days | DRG: 044 | End: 2022-11-01
Attending: INTERNAL MEDICINE | Admitting: INTERNAL MEDICINE
Payer: MEDICAID

## 2022-01-01 ENCOUNTER — HOSPITAL ENCOUNTER (EMERGENCY)
Age: 42
Discharge: ANOTHER HOSPITAL | End: 2022-10-30
Attending: EMERGENCY MEDICINE
Payer: MEDICAID

## 2022-01-01 VITALS
HEART RATE: 126 BPM | RESPIRATION RATE: 17 BRPM | BODY MASS INDEX: 23.69 KG/M2 | DIASTOLIC BLOOD PRESSURE: 67 MMHG | TEMPERATURE: 96.3 F | OXYGEN SATURATION: 99 % | WEIGHT: 138 LBS | SYSTOLIC BLOOD PRESSURE: 116 MMHG

## 2022-01-01 DIAGNOSIS — E87.20 METABOLIC ACIDOSIS: ICD-10-CM

## 2022-01-01 DIAGNOSIS — R79.1 SUPRATHERAPEUTIC INR: ICD-10-CM

## 2022-01-01 DIAGNOSIS — I62.9 INTRACRANIAL HEMORRHAGE (HCC): Primary | ICD-10-CM

## 2022-01-01 DIAGNOSIS — E87.6 HYPOKALEMIA: ICD-10-CM

## 2022-01-01 DIAGNOSIS — M62.82 NON-TRAUMATIC RHABDOMYOLYSIS: ICD-10-CM

## 2022-01-01 DIAGNOSIS — K72.10 END STAGE LIVER DISEASE (HCC): ICD-10-CM

## 2022-01-01 DIAGNOSIS — D64.9 ANEMIA, UNSPECIFIED TYPE: ICD-10-CM

## 2022-01-01 LAB
AADO2: 130.9 MMHG
AADO2: 219.2 MMHG
AADO2: 220.7 MMHG
AADO2: 222.1 MMHG
AADO2: 328 MMHG
AADO2: 332.2 MMHG
AADO2: 383 MMHG
ABO/RH: NORMAL
ABO/RH: NORMAL
ACANTHOCYTES: ABNORMAL
ACANTHOCYTES: ABNORMAL
ACETAMINOPHEN LEVEL: <5 MCG/ML (ref 10–30)
ALBUMIN SERPL-MCNC: 2 G/DL (ref 3.5–5.2)
ALBUMIN SERPL-MCNC: 2 G/DL (ref 3.5–5.2)
ALBUMIN SERPL-MCNC: 2.3 G/DL (ref 3.5–5.2)
ALBUMIN SERPL-MCNC: 2.5 G/DL (ref 3.5–5.2)
ALBUMIN SERPL-MCNC: 2.5 G/DL (ref 3.5–5.2)
ALBUMIN SERPL-MCNC: 2.6 G/DL (ref 3.5–5.2)
ALBUMIN SERPL-MCNC: 2.6 G/DL (ref 3.5–5.2)
ALBUMIN SERPL-MCNC: 2.7 G/DL (ref 3.5–5.2)
ALP BLD-CCNC: 102 U/L (ref 35–104)
ALP BLD-CCNC: 116 U/L (ref 35–104)
ALP BLD-CCNC: 116 U/L (ref 35–104)
ALP BLD-CCNC: 119 U/L (ref 35–104)
ALP BLD-CCNC: 135 U/L (ref 35–104)
ALP BLD-CCNC: 183 U/L (ref 35–104)
ALP BLD-CCNC: 188 U/L (ref 35–104)
ALP BLD-CCNC: 97 U/L (ref 35–104)
ALT SERPL-CCNC: 37 U/L (ref 0–32)
ALT SERPL-CCNC: 43 U/L (ref 0–32)
ALT SERPL-CCNC: 44 U/L (ref 0–32)
ALT SERPL-CCNC: 48 U/L (ref 0–32)
ALT SERPL-CCNC: 58 U/L (ref 0–32)
ALT SERPL-CCNC: 59 U/L (ref 0–32)
AMMONIA: 75.8 UMOL/L (ref 11–51)
AMPHETAMINE SCREEN, URINE: NOT DETECTED
AMYLASE: 62 U/L (ref 20–100)
ANGLE (CLOT STRENGTH): 62.5 DEGREE (ref 59–74)
ANION GAP SERPL CALCULATED.3IONS-SCNC: 11 MMOL/L (ref 7–16)
ANION GAP SERPL CALCULATED.3IONS-SCNC: 11 MMOL/L (ref 7–16)
ANION GAP SERPL CALCULATED.3IONS-SCNC: 13 MMOL/L (ref 7–16)
ANION GAP SERPL CALCULATED.3IONS-SCNC: 14 MMOL/L (ref 7–16)
ANION GAP SERPL CALCULATED.3IONS-SCNC: 16 MMOL/L (ref 7–16)
ANION GAP SERPL CALCULATED.3IONS-SCNC: 20 MMOL/L (ref 7–16)
ANION GAP SERPL CALCULATED.3IONS-SCNC: 6 MMOL/L (ref 7–16)
ANION GAP SERPL CALCULATED.3IONS-SCNC: 9 MMOL/L (ref 7–16)
ANISOCYTOSIS: ABNORMAL
ANTIBODY SCREEN: NORMAL
ANTIBODY SCREEN: NORMAL
APTT: 41.6 SEC (ref 24.5–35.1)
AST SERPL-CCNC: 178 U/L (ref 0–31)
AST SERPL-CCNC: 195 U/L (ref 0–31)
AST SERPL-CCNC: 203 U/L (ref 0–31)
AST SERPL-CCNC: 210 U/L (ref 0–31)
AST SERPL-CCNC: 234 U/L (ref 0–31)
AST SERPL-CCNC: 238 U/L (ref 0–31)
AST SERPL-CCNC: 248 U/L (ref 0–31)
AST SERPL-CCNC: 249 U/L (ref 0–31)
B.E.: -0.3 MMOL/L (ref -3–3)
B.E.: -1.7 MMOL/L (ref -3–3)
B.E.: -11 MMOL/L (ref -3–3)
B.E.: -2 MMOL/L (ref -3–3)
B.E.: -7.9 MMOL/L (ref -3–3)
B.E.: 0.6 MMOL/L (ref -3–3)
B.E.: 0.9 MMOL/L (ref -3–3)
B.E.: 1 MMOL/L (ref -3–3)
B.E.: 1.1 MMOL/L (ref -3–3)
B.E.: 3 MMOL/L (ref -3–3)
B.E.: 5 MMOL/L (ref -3–3)
BACTERIA: ABNORMAL /HPF
BACTERIA: ABNORMAL /HPF
BARBITURATE SCREEN URINE: NOT DETECTED
BASOPHILS ABSOLUTE: 0 E9/L (ref 0–0.2)
BASOPHILS ABSOLUTE: 0.03 E9/L (ref 0–0.2)
BASOPHILS ABSOLUTE: 0.05 E9/L (ref 0–0.2)
BASOPHILS ABSOLUTE: 0.06 E9/L (ref 0–0.2)
BASOPHILS ABSOLUTE: 0.09 E9/L (ref 0–0.2)
BASOPHILS RELATIVE PERCENT: 0.4 % (ref 0–2)
BASOPHILS RELATIVE PERCENT: 0.6 % (ref 0–2)
BASOPHILS RELATIVE PERCENT: 0.9 % (ref 0–2)
BENZODIAZEPINE SCREEN, URINE: NOT DETECTED
BILIRUB SERPL-MCNC: 10.2 MG/DL (ref 0–1.2)
BILIRUB SERPL-MCNC: 10.3 MG/DL (ref 0–1.2)
BILIRUB SERPL-MCNC: 10.8 MG/DL (ref 0–1.2)
BILIRUB SERPL-MCNC: 10.9 MG/DL (ref 0–1.2)
BILIRUB SERPL-MCNC: 8.5 MG/DL (ref 0–1.2)
BILIRUB SERPL-MCNC: 8.8 MG/DL (ref 0–1.2)
BILIRUB SERPL-MCNC: 9.4 MG/DL (ref 0–1.2)
BILIRUB SERPL-MCNC: 9.7 MG/DL (ref 0–1.2)
BILIRUBIN DIRECT: 3.6 MG/DL (ref 0–0.3)
BILIRUBIN DIRECT: 4.2 MG/DL (ref 0–0.3)
BILIRUBIN DIRECT: 4.2 MG/DL (ref 0–0.3)
BILIRUBIN DIRECT: 4.7 MG/DL (ref 0–0.3)
BILIRUBIN DIRECT: 5.1 MG/DL (ref 0–0.3)
BILIRUBIN URINE: ABNORMAL
BILIRUBIN URINE: ABNORMAL
BILIRUBIN, INDIRECT: 3.4 MG/DL (ref 0–1)
BILIRUBIN, INDIRECT: 5.5 MG/DL (ref 0–1)
BILIRUBIN, INDIRECT: 6 MG/DL (ref 0–1)
BILIRUBIN, INDIRECT: 6.2 MG/DL (ref 0–1)
BLOOD BANK DISPENSE STATUS: NORMAL
BLOOD BANK PRODUCT CODE: NORMAL
BLOOD, URINE: ABNORMAL
BLOOD, URINE: ABNORMAL
BPU ID: NORMAL
BUN BLDV-MCNC: 11 MG/DL (ref 6–20)
BUN BLDV-MCNC: 11 MG/DL (ref 6–20)
BUN BLDV-MCNC: 12 MG/DL (ref 6–20)
BUN BLDV-MCNC: 13 MG/DL (ref 6–20)
BUN BLDV-MCNC: 7 MG/DL (ref 6–20)
BUN BLDV-MCNC: 8 MG/DL (ref 6–20)
BURR CELLS: ABNORMAL
C-REACTIVE PROTEIN: 0.8 MG/DL (ref 0–0.4)
CALCIUM IONIZED: 1.11 MMOL/L (ref 1.15–1.33)
CALCIUM IONIZED: 1.13 MMOL/L (ref 1.15–1.33)
CALCIUM IONIZED: 1.14 MMOL/L (ref 1.15–1.33)
CALCIUM IONIZED: 1.24 MMOL/L (ref 1.15–1.33)
CALCIUM IONIZED: 1.28 MMOL/L (ref 1.15–1.33)
CALCIUM SERPL-MCNC: 7.4 MG/DL (ref 8.6–10.2)
CALCIUM SERPL-MCNC: 7.9 MG/DL (ref 8.6–10.2)
CALCIUM SERPL-MCNC: 7.9 MG/DL (ref 8.6–10.2)
CALCIUM SERPL-MCNC: 8.1 MG/DL (ref 8.6–10.2)
CALCIUM SERPL-MCNC: 8.3 MG/DL (ref 8.6–10.2)
CALCIUM SERPL-MCNC: 8.4 MG/DL (ref 8.6–10.2)
CALCIUM SERPL-MCNC: 9.2 MG/DL (ref 8.6–10.2)
CALCIUM SERPL-MCNC: 9.3 MG/DL (ref 8.6–10.2)
CANNABINOID SCREEN URINE: NOT DETECTED
CHLORIDE BLD-SCNC: 103 MMOL/L (ref 98–107)
CHLORIDE BLD-SCNC: 107 MMOL/L (ref 98–107)
CHLORIDE BLD-SCNC: 109 MMOL/L (ref 98–107)
CHLORIDE BLD-SCNC: 110 MMOL/L (ref 98–107)
CLARITY: ABNORMAL
CLARITY: CLEAR
CO2: 14 MMOL/L (ref 22–29)
CO2: 17 MMOL/L (ref 22–29)
CO2: 19 MMOL/L (ref 22–29)
CO2: 21 MMOL/L (ref 22–29)
CO2: 23 MMOL/L (ref 22–29)
CO2: 25 MMOL/L (ref 22–29)
CO2: 25 MMOL/L (ref 22–29)
CO2: 28 MMOL/L (ref 22–29)
COCAINE METABOLITE SCREEN URINE: NOT DETECTED
COHB: 2 % (ref 0–1.5)
COHB: 2.1 % (ref 0–1.5)
COHB: 2.2 % (ref 0–1.5)
COHB: 2.3 % (ref 0–1.5)
COHB: 2.4 % (ref 0–1.5)
COHB: 2.5 % (ref 0–1.5)
COHB: 2.5 % (ref 0–1.5)
COHB: 2.6 % (ref 0–1.5)
COHB: 3 % (ref 0–1.5)
COHB: 3.5 % (ref 0–1.5)
COLOR: ABNORMAL
COLOR: YELLOW
COMMENT: ABNORMAL
CORTISOL TOTAL: 2.72 MCG/DL (ref 2.68–18.4)
CREAT SERPL-MCNC: 0.6 MG/DL (ref 0.5–1)
CREAT SERPL-MCNC: 0.7 MG/DL (ref 0.5–1)
CREAT SERPL-MCNC: 0.8 MG/DL (ref 0.5–1)
CRITICAL: ABNORMAL
DATE ANALYZED: ABNORMAL
DATE OF COLLECTION: ABNORMAL
DELIVERY SYSTEMS: ABNORMAL
DESCRIPTION BLOOD BANK: NORMAL
DEVICE: ABNORMAL
EOSINOPHILS ABSOLUTE: 0.01 E9/L (ref 0.05–0.5)
EOSINOPHILS ABSOLUTE: 0.17 E9/L (ref 0.05–0.5)
EOSINOPHILS ABSOLUTE: 0.17 E9/L (ref 0.05–0.5)
EOSINOPHILS ABSOLUTE: 0.36 E9/L (ref 0.05–0.5)
EOSINOPHILS ABSOLUTE: 0.57 E9/L (ref 0.05–0.5)
EOSINOPHILS RELATIVE PERCENT: 0.1 % (ref 0–6)
EOSINOPHILS RELATIVE PERCENT: 1.8 % (ref 0–6)
EOSINOPHILS RELATIVE PERCENT: 2.6 % (ref 0–6)
EOSINOPHILS RELATIVE PERCENT: 3.5 % (ref 0–6)
EOSINOPHILS RELATIVE PERCENT: 6.3 % (ref 0–6)
EPITHELIAL CELLS, UA: ABNORMAL /HPF
EPL-TEG: 0 % (ref 0–15)
ETHANOL: 146 MG/DL (ref 0–0.08)
FENTANYL SCREEN, URINE: NOT DETECTED
FIO2: 100 %
FIO2: 100 %
FIO2: 40 %
FIO2: 50 %
FIO2: 60
FIO2: 70 %
FIO2: 70 %
G-TEG: 4.4 K D/SC (ref 4.5–11)
GFR SERPL CREATININE-BSD FRML MDRD: >60 ML/MIN/1.73
GLUCOSE BLD-MCNC: 105 MG/DL (ref 74–99)
GLUCOSE BLD-MCNC: 106 MG/DL (ref 74–99)
GLUCOSE BLD-MCNC: 109 MG/DL (ref 74–99)
GLUCOSE BLD-MCNC: 111 MG/DL (ref 74–99)
GLUCOSE BLD-MCNC: 141 MG/DL (ref 74–99)
GLUCOSE BLD-MCNC: 148 MG/DL (ref 74–99)
GLUCOSE BLD-MCNC: 188 MG/DL (ref 74–99)
GLUCOSE BLD-MCNC: 98 MG/DL (ref 74–99)
GLUCOSE URINE: NEGATIVE MG/DL
GLUCOSE URINE: NEGATIVE MG/DL
GRAM STAIN ORDERABLE: NORMAL
HBA1C MFR BLD: 4.2 % (ref 4–5.6)
HCG QUALITATIVE: NEGATIVE
HCO3: 14.4 MMOL/L (ref 22–26)
HCO3: 16.2 MMOL/L (ref 22–26)
HCO3: 21.7 MMOL/L (ref 22–26)
HCO3: 22.5 MMOL/L (ref 22–26)
HCO3: 23.7 MMOL/L (ref 22–26)
HCO3: 23.8 MMOL/L (ref 22–26)
HCO3: 24.8 MMOL/L (ref 22–26)
HCO3: 25.3 MMOL/L (ref 22–26)
HCO3: 26.2 MMOL/L (ref 22–26)
HCO3: 26.6 MMOL/L (ref 22–26)
HCO3: 26.7 MMOL/L (ref 22–26)
HCT VFR BLD CALC: 17.9 % (ref 34–48)
HCT VFR BLD CALC: 17.9 % (ref 34–48)
HCT VFR BLD CALC: 21.3 % (ref 34–48)
HCT VFR BLD CALC: 21.4 % (ref 34–48)
HCT VFR BLD CALC: 21.4 % (ref 34–48)
HEMOGLOBIN: 5.8 G/DL (ref 11.5–15.5)
HEMOGLOBIN: 5.9 G/DL (ref 11.5–15.5)
HEMOGLOBIN: 6.4 G/DL (ref 11.5–15.5)
HEMOGLOBIN: 6.6 G/DL (ref 11.5–15.5)
HEMOGLOBIN: 7.1 G/DL (ref 11.5–15.5)
HHB: 0.1 % (ref 0–5)
HHB: 0.2 % (ref 0–5)
HHB: 0.5 % (ref 0–5)
HHB: 1.2 % (ref 0–5)
HHB: 1.6 % (ref 0–5)
HHB: 16.8 % (ref 0–5)
HHB: 2 % (ref 0–5)
HHB: 2.8 % (ref 0–5)
HHB: 4 % (ref 0–5)
HHB: 4.7 % (ref 0–5)
HOWELL-JOLLY BODIES: ABNORMAL
HYALINE CASTS: ABNORMAL /LPF (ref 0–2)
HYPOCHROMIA: ABNORMAL
HYPOCHROMIA: ABNORMAL
IMMATURE GRANULOCYTES #: 0.04 E9/L
IMMATURE GRANULOCYTES #: 0.06 E9/L
IMMATURE GRANULOCYTES #: 0.09 E9/L
IMMATURE GRANULOCYTES %: 0.5 % (ref 0–5)
IMMATURE GRANULOCYTES %: 0.7 % (ref 0–5)
IMMATURE GRANULOCYTES %: 1.4 % (ref 0–5)
INR BLD: 2.4
INR BLD: 3.1
INR BLD: 3.6
INR BLD: 3.6
K (CLOTTING TIME): 2.4 MIN (ref 1–3)
KETONES, URINE: NEGATIVE MG/DL
KETONES, URINE: NEGATIVE MG/DL
LAB: ABNORMAL
LACTIC ACID, SEPSIS: 10.7 MMOL/L (ref 0.5–1.9)
LACTIC ACID: 1.7 MMOL/L (ref 0.5–2.2)
LACTIC ACID: 12.6 MMOL/L (ref 0.5–2.2)
LACTIC ACID: 2.5 MMOL/L (ref 0.5–2.2)
LACTIC ACID: 3.6 MMOL/L (ref 0.5–2.2)
LACTIC ACID: 3.6 MMOL/L (ref 0.5–2.2)
LACTIC ACID: 5.2 MMOL/L (ref 0.5–2.2)
LEUKOCYTE ESTERASE, URINE: NEGATIVE
LEUKOCYTE ESTERASE, URINE: NEGATIVE
LIPASE: 30 U/L (ref 13–60)
LIPASE: 40 U/L (ref 13–60)
LY30 (FIBRINOLYSIS): 0 % (ref 0–8)
LYMPHOCYTES ABSOLUTE: 0.62 E9/L (ref 1.5–4)
LYMPHOCYTES ABSOLUTE: 0.72 E9/L (ref 1.5–4)
LYMPHOCYTES ABSOLUTE: 0.75 E9/L (ref 1.5–4)
LYMPHOCYTES ABSOLUTE: 0.84 E9/L (ref 1.5–4)
LYMPHOCYTES ABSOLUTE: 0.97 E9/L (ref 1.5–4)
LYMPHOCYTES RELATIVE PERCENT: 14.6 % (ref 20–42)
LYMPHOCYTES RELATIVE PERCENT: 6.1 % (ref 20–42)
LYMPHOCYTES RELATIVE PERCENT: 7.9 % (ref 20–42)
LYMPHOCYTES RELATIVE PERCENT: 8.8 % (ref 20–42)
LYMPHOCYTES RELATIVE PERCENT: 9.5 % (ref 20–42)
Lab: ABNORMAL
Lab: NORMAL
MA (MAX AMPLITUDE): 46.8 MM (ref 50–70)
MAGNESIUM: 1.3 MG/DL (ref 1.6–2.6)
MAGNESIUM: 1.7 MG/DL (ref 1.6–2.6)
MAGNESIUM: 1.7 MG/DL (ref 1.6–2.6)
MAGNESIUM: 2 MG/DL (ref 1.6–2.6)
MAGNESIUM: 2.5 MG/DL (ref 1.6–2.6)
MCH RBC QN AUTO: 33.1 PG (ref 26–35)
MCH RBC QN AUTO: 33.5 PG (ref 26–35)
MCH RBC QN AUTO: 34.1 PG (ref 26–35)
MCH RBC QN AUTO: 34.6 PG (ref 26–35)
MCH RBC QN AUTO: 35.1 PG (ref 26–35)
MCHC RBC AUTO-ENTMCNC: 29.9 % (ref 32–34.5)
MCHC RBC AUTO-ENTMCNC: 31 % (ref 32–34.5)
MCHC RBC AUTO-ENTMCNC: 32.4 % (ref 32–34.5)
MCHC RBC AUTO-ENTMCNC: 33 % (ref 32–34.5)
MCHC RBC AUTO-ENTMCNC: 33.2 % (ref 32–34.5)
MCV RBC AUTO: 100.6 FL (ref 80–99.9)
MCV RBC AUTO: 100.9 FL (ref 80–99.9)
MCV RBC AUTO: 105.3 FL (ref 80–99.9)
MCV RBC AUTO: 113.3 FL (ref 80–99.9)
MCV RBC AUTO: 115.7 FL (ref 80–99.9)
METAMYELOCYTES RELATIVE PERCENT: 0.9 % (ref 0–1)
METER GLUCOSE: 134 MG/DL (ref 74–99)
METER GLUCOSE: 198 MG/DL (ref 74–99)
METHADONE SCREEN, URINE: NOT DETECTED
METHB: 0.3 % (ref 0–1.5)
METHB: 0.4 % (ref 0–1.5)
METHB: 0.7 % (ref 0–1.5)
METHB: 0.9 % (ref 0–1.5)
METHB: 0.9 % (ref 0–1.5)
METHB: 1 % (ref 0–1.5)
MODE: ABNORMAL
MODE: AC
MONOCYTES ABSOLUTE: 0.37 E9/L (ref 0.1–0.95)
MONOCYTES ABSOLUTE: 0.52 E9/L (ref 0.1–0.95)
MONOCYTES ABSOLUTE: 0.52 E9/L (ref 0.1–0.95)
MONOCYTES ABSOLUTE: 0.62 E9/L (ref 0.1–0.95)
MONOCYTES ABSOLUTE: 0.88 E9/L (ref 0.1–0.95)
MONOCYTES RELATIVE PERCENT: 11.1 % (ref 2–12)
MONOCYTES RELATIVE PERCENT: 3.5 % (ref 2–12)
MONOCYTES RELATIVE PERCENT: 5.3 % (ref 2–12)
MONOCYTES RELATIVE PERCENT: 5.7 % (ref 2–12)
MONOCYTES RELATIVE PERCENT: 9.3 % (ref 2–12)
MV: 3.9
MYELOCYTE PERCENT: 0.9 % (ref 0–0)
NEUTROPHILS ABSOLUTE: 4.73 E9/L (ref 1.8–7.3)
NEUTROPHILS ABSOLUTE: 6.19 E9/L (ref 1.8–7.3)
NEUTROPHILS ABSOLUTE: 7.16 E9/L (ref 1.8–7.3)
NEUTROPHILS ABSOLUTE: 8 E9/L (ref 1.8–7.3)
NEUTROPHILS ABSOLUTE: 8.53 E9/L (ref 1.8–7.3)
NEUTROPHILS RELATIVE PERCENT: 71.2 % (ref 43–80)
NEUTROPHILS RELATIVE PERCENT: 78.4 % (ref 43–80)
NEUTROPHILS RELATIVE PERCENT: 78.8 % (ref 43–80)
NEUTROPHILS RELATIVE PERCENT: 81.6 % (ref 43–80)
NEUTROPHILS RELATIVE PERCENT: 84.1 % (ref 43–80)
NITRITE, URINE: NEGATIVE
NITRITE, URINE: NEGATIVE
NUCLEATED RED BLOOD CELLS: 1.8 /100 WBC
O2 CONTENT: 10.4 ML/DL
O2 CONTENT: 11.8 ML/DL
O2 SATURATION: 82.6 % (ref 92–98.5)
O2 SATURATION: 91.5 % (ref 92–98.5)
O2 SATURATION: 95.1 % (ref 92–98.5)
O2 SATURATION: 95.9 % (ref 92–98.5)
O2 SATURATION: 97.1 % (ref 92–98.5)
O2 SATURATION: 97.9 % (ref 92–98.5)
O2 SATURATION: 98.3 % (ref 92–98.5)
O2 SATURATION: 98.8 % (ref 92–98.5)
O2 SATURATION: 99.5 % (ref 92–98.5)
O2 SATURATION: 99.8 % (ref 92–98.5)
O2 SATURATION: 99.9 % (ref 92–98.5)
O2HB: 79.9 % (ref 94–97)
O2HB: 91.8 % (ref 94–97)
O2HB: 92.4 % (ref 94–97)
O2HB: 94.1 % (ref 94–97)
O2HB: 95.2 % (ref 94–97)
O2HB: 95.3 % (ref 94–97)
O2HB: 95.3 % (ref 94–97)
O2HB: 95.6 % (ref 94–97)
O2HB: 96.5 % (ref 94–97)
O2HB: 96.7 % (ref 94–97)
OPERATOR ID: 2593
OPERATOR ID: 2962
OPERATOR ID: 2962
OPERATOR ID: 359
OPERATOR ID: ABNORMAL
OPIATE SCREEN URINE: NOT DETECTED
OSMOLALITY: 350 MOSM/KG (ref 285–310)
OVALOCYTES: ABNORMAL
OXYCODONE URINE: NOT DETECTED
PATIENT TEMP: 37
PATIENT TEMP: 37 C
PCO2 (TEMP CORRECTED): 37.1 MMHG (ref 35–45)
PCO2: 27.3 MMHG (ref 35–45)
PCO2: 27.3 MMHG (ref 35–45)
PCO2: 28.8 MMHG (ref 35–45)
PCO2: 29.9 MMHG (ref 35–45)
PCO2: 30.1 MMHG (ref 35–45)
PCO2: 32.4 MMHG (ref 35–45)
PCO2: 33.8 MMHG (ref 35–45)
PCO2: 35.4 MMHG (ref 35–45)
PCO2: 37.5 MMHG (ref 35–45)
PCO2: 58.7 MMHG (ref 35–45)
PDW BLD-RTO: 19.7 FL (ref 11.5–15)
PDW BLD-RTO: 20.3 FL (ref 11.5–15)
PDW BLD-RTO: 21.6 FL (ref 11.5–15)
PDW BLD-RTO: 22.7 FL (ref 11.5–15)
PDW BLD-RTO: 23.4 FL (ref 11.5–15)
PEEP/CPAP: 0 CMH2O
PEEP/CPAP: 0 CMH2O
PEEP/CPAP: 10 CMH2O
PEEP/CPAP: 5 CMH2O
PFO2: 0.97 MMHG/%
PFO2: 1.59 MMHG/%
PFO2: 1.74 MMHG/%
PFO2: 1.74 MMHG/%
PFO2: 1.85 MMHG/%
PFO2: 2.67 MMHG/%
PFO2: 3.2 MMHG/%
PFO2: 3.73 MMHG/%
PH (TEMPERATURE CORRECTED): 7.44 (ref 7.35–7.45)
PH BLOOD GAS: 7.28 (ref 7.35–7.45)
PH BLOOD GAS: 7.3 (ref 7.35–7.45)
PH BLOOD GAS: 7.39 (ref 7.35–7.45)
PH BLOOD GAS: 7.42 (ref 7.35–7.45)
PH BLOOD GAS: 7.44 (ref 7.35–7.45)
PH BLOOD GAS: 7.44 (ref 7.35–7.45)
PH BLOOD GAS: 7.51 (ref 7.35–7.45)
PH BLOOD GAS: 7.52 (ref 7.35–7.45)
PH BLOOD GAS: 7.53 (ref 7.35–7.45)
PH BLOOD GAS: 7.61 (ref 7.35–7.45)
PH UA: 7 (ref 5–9)
PH UA: 7 (ref 5–9)
PHENCYCLIDINE SCREEN URINE: NOT DETECTED
PHOSPHORUS: 1.5 MG/DL (ref 2.5–4.5)
PHOSPHORUS: 3.3 MG/DL (ref 2.5–4.5)
PIP: 14 CMH2O
PIP: 22 CMH2O
PLATELET # BLD: 100 E9/L (ref 130–450)
PLATELET # BLD: 122 E9/L (ref 130–450)
PLATELET # BLD: 149 E9/L (ref 130–450)
PLATELET # BLD: 166 E9/L (ref 130–450)
PLATELET # BLD: 89 E9/L (ref 130–450)
PLATELET CONFIRMATION: NORMAL
PMV BLD AUTO: 10.4 FL (ref 7–12)
PMV BLD AUTO: 10.6 FL (ref 7–12)
PMV BLD AUTO: 10.7 FL (ref 7–12)
PMV BLD AUTO: 11 FL (ref 7–12)
PMV BLD AUTO: 11.1 FL (ref 7–12)
PO2 (TEMP CORRECTED): 59.7 MMHG (ref 60–100)
PO2: 107 MMHG (ref 75–100)
PO2: 121.5 MMHG (ref 75–100)
PO2: 126.5 MMHG (ref 75–100)
PO2: 320.4 MMHG (ref 75–100)
PO2: 373.4 MMHG (ref 75–100)
PO2: 54.7 MMHG (ref 75–100)
PO2: 67.6 MMHG (ref 75–100)
PO2: 79.7 MMHG (ref 75–100)
PO2: 86.8 MMHG (ref 75–100)
PO2: 92.6 MMHG (ref 75–100)
POC SOURCE: ABNORMAL
POIKILOCYTES: ABNORMAL
POLYCHROMASIA: ABNORMAL
POSITIVE END EXP PRESS: 5 CMH2O
POTASSIUM REFLEX MAGNESIUM: 8.5 MMOL/L (ref 3.5–5)
POTASSIUM SERPL-SCNC: 2.1 MMOL/L (ref 3.5–5)
POTASSIUM SERPL-SCNC: 3.2 MMOL/L (ref 3.5–5)
POTASSIUM SERPL-SCNC: 3.6 MMOL/L (ref 3.5–5)
POTASSIUM SERPL-SCNC: 3.7 MMOL/L (ref 3.5–5)
POTASSIUM SERPL-SCNC: 4.2 MMOL/L (ref 3.5–5)
POTASSIUM SERPL-SCNC: 4.5 MMOL/L (ref 3.5–5)
PRO-BNP: 76 PG/ML (ref 0–125)
PROCALCITONIN: 0.8 NG/ML (ref 0–0.08)
PROMYELOCYTES PERCENT: 2.6 % (ref 0–0)
PROTEIN UA: ABNORMAL MG/DL
PROTEIN UA: NEGATIVE MG/DL
PROTHROMBIN TIME: 25.9 SEC (ref 9.3–12.4)
PROTHROMBIN TIME: 34.4 SEC (ref 9.3–12.4)
PROTHROMBIN TIME: 38.3 SEC (ref 9.3–12.4)
PROTHROMBIN TIME: 38.4 SEC (ref 9.3–12.4)
R (REACTION TIME): 6.2 MIN (ref 5–10)
RBC # BLD: 1.7 E12/L (ref 3.5–5.5)
RBC # BLD: 1.78 E12/L (ref 3.5–5.5)
RBC # BLD: 1.85 E12/L (ref 3.5–5.5)
RBC # BLD: 1.88 E12/L (ref 3.5–5.5)
RBC # BLD: 2.12 E12/L (ref 3.5–5.5)
RBC UA: >20 /HPF (ref 0–2)
RBC UA: ABNORMAL /HPF (ref 0–2)
RESPIRATORY RATE: 14 B/MIN
RI(T): 1.04
RI(T): 1.22
RI(T): 2.38
RI(T): 2.53
RI(T): 2.7
RI(T): 2.79
RI(T): 5.67
RR MECHANICAL: 10 B/MIN
RR MECHANICAL: 10 B/MIN
RR MECHANICAL: 14 B/MIN
RR MECHANICAL: 15 B/MIN
RR MECHANICAL: 18 B/MIN
RR MECHANICAL: 18 B/MIN
SALICYLATE, SERUM: <0.3 MG/DL (ref 0–30)
SARS-COV-2, NAAT: NOT DETECTED
SCHISTOCYTES: ABNORMAL
SEDIMENTATION RATE, ERYTHROCYTE: 28 MM/HR (ref 0–20)
SODIUM BLD-SCNC: 139 MMOL/L (ref 132–146)
SODIUM BLD-SCNC: 140 MMOL/L (ref 132–146)
SODIUM BLD-SCNC: 143 MMOL/L (ref 132–146)
SODIUM BLD-SCNC: 144 MMOL/L (ref 132–146)
SODIUM BLD-SCNC: 144 MMOL/L (ref 132–146)
SOURCE, BLOOD GAS: ABNORMAL
SPECIFIC GRAVITY UA: 1.01 (ref 1–1.03)
SPECIFIC GRAVITY UA: <=1.005 (ref 1–1.03)
T4 FREE: 2.71 NG/DL (ref 0.93–1.7)
TARGET CELLS: ABNORMAL
THB: 6.3 G/DL (ref 11.5–16.5)
THB: 6.4 G/DL (ref 11.5–16.5)
THB: 6.5 G/DL (ref 11.5–16.5)
THB: 7.2 G/DL (ref 11.5–16.5)
THB: 7.2 G/DL (ref 11.5–16.5)
THB: 7.5 G/DL (ref 11.5–16.5)
THB: 7.5 G/DL (ref 11.5–16.5)
THB: 7.8 G/DL (ref 11.5–16.5)
THB: 7.8 G/DL (ref 11.5–16.5)
THB: 7.9 G/DL (ref 11.5–16.5)
TIDAL VOLUME: 350 ML
TIME ANALYZED: 1231
TIME ANALYZED: 1352
TIME ANALYZED: 1459
TIME ANALYZED: 1706
TIME ANALYZED: 1852
TIME ANALYZED: 2229
TIME ANALYZED: 2322
TIME ANALYZED: 55
TIME ANALYZED: 552
TIME ANALYZED: 728
TOTAL CK: 1098 U/L (ref 20–180)
TOTAL CK: 1506 U/L (ref 20–180)
TOTAL PROTEIN: 5.9 G/DL (ref 6.4–8.3)
TOTAL PROTEIN: 6.2 G/DL (ref 6.4–8.3)
TOTAL PROTEIN: 6.5 G/DL (ref 6.4–8.3)
TOTAL PROTEIN: 6.6 G/DL (ref 6.4–8.3)
TOTAL PROTEIN: 6.6 G/DL (ref 6.4–8.3)
TOTAL PROTEIN: 7 G/DL (ref 6.4–8.3)
TOTAL PROTEIN: 7.3 G/DL (ref 6.4–8.3)
TOTAL PROTEIN: 8.3 G/DL (ref 6.4–8.3)
TRICYCLIC ANTIDEPRESSANTS SCREEN SERUM: NEGATIVE NG/ML
TROPONIN, HIGH SENSITIVITY: 100 NG/L (ref 0–9)
TROPONIN, HIGH SENSITIVITY: 179 NG/L (ref 0–9)
TSH SERPL DL<=0.05 MIU/L-ACNC: 1.55 UIU/ML (ref 0.27–4.2)
TSH SERPL DL<=0.05 MIU/L-ACNC: 2.37 UIU/ML (ref 0.27–4.2)
UROBILINOGEN, URINE: 1 E.U./DL
UROBILINOGEN, URINE: >=8 E.U./DL
VT MECHANICAL: 350 ML
VT MECHANICAL: 350 ML
VT MECHANICAL: 400 ML
VT MECHANICAL: 400 ML
WBC # BLD: 10.4 E9/L (ref 4.5–11.5)
WBC # BLD: 6.6 E9/L (ref 4.5–11.5)
WBC # BLD: 7.9 E9/L (ref 4.5–11.5)
WBC # BLD: 9.1 E9/L (ref 4.5–11.5)
WBC # BLD: 9.3 E9/L (ref 4.5–11.5)
WBC UA: ABNORMAL /HPF (ref 0–5)
WBC UA: ABNORMAL /HPF (ref 0–5)

## 2022-01-01 PROCEDURE — 2500000003 HC RX 250 WO HCPCS

## 2022-01-01 PROCEDURE — 96367 TX/PROPH/DG ADDL SEQ IV INF: CPT

## 2022-01-01 PROCEDURE — 85651 RBC SED RATE NONAUTOMATED: CPT

## 2022-01-01 PROCEDURE — 6360000002 HC RX W HCPCS: Performed by: EMERGENCY MEDICINE

## 2022-01-01 PROCEDURE — 94640 AIRWAY INHALATION TREATMENT: CPT

## 2022-01-01 PROCEDURE — 84703 CHORIONIC GONADOTROPIN ASSAY: CPT

## 2022-01-01 PROCEDURE — 80307 DRUG TEST PRSMV CHEM ANLYZR: CPT

## 2022-01-01 PROCEDURE — 82330 ASSAY OF CALCIUM: CPT

## 2022-01-01 PROCEDURE — 6360000004 HC RX CONTRAST MEDICATION: Performed by: RADIOLOGY

## 2022-01-01 PROCEDURE — 5A1935Z RESPIRATORY VENTILATION, LESS THAN 24 CONSECUTIVE HOURS: ICD-10-PCS | Performed by: INTERNAL MEDICINE

## 2022-01-01 PROCEDURE — 82805 BLOOD GASES W/O2 SATURATION: CPT

## 2022-01-01 PROCEDURE — 86850 RBC ANTIBODY SCREEN: CPT

## 2022-01-01 PROCEDURE — 87635 SARS-COV-2 COVID-19 AMP PRB: CPT

## 2022-01-01 PROCEDURE — 85025 COMPLETE CBC W/AUTO DIFF WBC: CPT

## 2022-01-01 PROCEDURE — 84484 ASSAY OF TROPONIN QUANT: CPT

## 2022-01-01 PROCEDURE — 86900 BLOOD TYPING SEROLOGIC ABO: CPT

## 2022-01-01 PROCEDURE — 86923 COMPATIBILITY TEST ELECTRIC: CPT

## 2022-01-01 PROCEDURE — 85347 COAGULATION TIME ACTIVATED: CPT

## 2022-01-01 PROCEDURE — A4216 STERILE WATER/SALINE, 10 ML: HCPCS

## 2022-01-01 PROCEDURE — P9012 CRYOPRECIPITATE EACH UNIT: HCPCS

## 2022-01-01 PROCEDURE — 85610 PROTHROMBIN TIME: CPT

## 2022-01-01 PROCEDURE — 37799 UNLISTED PX VASCULAR SURGERY: CPT

## 2022-01-01 PROCEDURE — 84443 ASSAY THYROID STIM HORMONE: CPT

## 2022-01-01 PROCEDURE — 2580000003 HC RX 258

## 2022-01-01 PROCEDURE — 87088 URINE BACTERIA CULTURE: CPT

## 2022-01-01 PROCEDURE — 87077 CULTURE AEROBIC IDENTIFY: CPT

## 2022-01-01 PROCEDURE — 99254 IP/OBS CNSLTJ NEW/EST MOD 60: CPT | Performed by: INTERNAL MEDICINE

## 2022-01-01 PROCEDURE — 80076 HEPATIC FUNCTION PANEL: CPT

## 2022-01-01 PROCEDURE — 2580000003 HC RX 258: Performed by: NURSE PRACTITIONER

## 2022-01-01 PROCEDURE — 82077 ASSAY SPEC XCP UR&BREATH IA: CPT

## 2022-01-01 PROCEDURE — 86920 COMPATIBILITY TEST SPIN: CPT

## 2022-01-01 PROCEDURE — 2500000003 HC RX 250 WO HCPCS: Performed by: EMERGENCY MEDICINE

## 2022-01-01 PROCEDURE — 2580000003 HC RX 258: Performed by: EMERGENCY MEDICINE

## 2022-01-01 PROCEDURE — P9059 PLASMA, FRZ BETWEEN 8-24HOUR: HCPCS

## 2022-01-01 PROCEDURE — 86140 C-REACTIVE PROTEIN: CPT

## 2022-01-01 PROCEDURE — 96368 THER/DIAG CONCURRENT INF: CPT

## 2022-01-01 PROCEDURE — 83605 ASSAY OF LACTIC ACID: CPT

## 2022-01-01 PROCEDURE — 80143 DRUG ASSAY ACETAMINOPHEN: CPT

## 2022-01-01 PROCEDURE — 36592 COLLECT BLOOD FROM PICC: CPT

## 2022-01-01 PROCEDURE — 82248 BILIRUBIN DIRECT: CPT

## 2022-01-01 PROCEDURE — 70498 CT ANGIOGRAPHY NECK: CPT

## 2022-01-01 PROCEDURE — 31500 INSERT EMERGENCY AIRWAY: CPT

## 2022-01-01 PROCEDURE — 0BH17EZ INSERTION OF ENDOTRACHEAL AIRWAY INTO TRACHEA, VIA NATURAL OR ARTIFICIAL OPENING: ICD-10-PCS | Performed by: INTERNAL MEDICINE

## 2022-01-01 PROCEDURE — P9016 RBC LEUKOCYTES REDUCED: HCPCS

## 2022-01-01 PROCEDURE — 36555 INSERT NON-TUNNEL CV CATH: CPT

## 2022-01-01 PROCEDURE — 82962 GLUCOSE BLOOD TEST: CPT

## 2022-01-01 PROCEDURE — 83036 HEMOGLOBIN GLYCOSYLATED A1C: CPT

## 2022-01-01 PROCEDURE — 72125 CT NECK SPINE W/O DYE: CPT

## 2022-01-01 PROCEDURE — 6360000002 HC RX W HCPCS: Performed by: STUDENT IN AN ORGANIZED HEALTH CARE EDUCATION/TRAINING PROGRAM

## 2022-01-01 PROCEDURE — 0B9D8ZX DRAINAGE OF RIGHT MIDDLE LUNG LOBE, VIA NATURAL OR ARTIFICIAL OPENING ENDOSCOPIC, DIAGNOSTIC: ICD-10-PCS | Performed by: INTERNAL MEDICINE

## 2022-01-01 PROCEDURE — 82150 ASSAY OF AMYLASE: CPT

## 2022-01-01 PROCEDURE — 84132 ASSAY OF SERUM POTASSIUM: CPT

## 2022-01-01 PROCEDURE — 80179 DRUG ASSAY SALICYLATE: CPT

## 2022-01-01 PROCEDURE — 99285 EMERGENCY DEPT VISIT HI MDM: CPT

## 2022-01-01 PROCEDURE — 83735 ASSAY OF MAGNESIUM: CPT

## 2022-01-01 PROCEDURE — 80048 BASIC METABOLIC PNL TOTAL CA: CPT

## 2022-01-01 PROCEDURE — P9073 PLATELETS PHERESIS PATH REDU: HCPCS

## 2022-01-01 PROCEDURE — 0042T CT BRAIN PERFUSION: CPT

## 2022-01-01 PROCEDURE — 70450 CT HEAD/BRAIN W/O DYE: CPT

## 2022-01-01 PROCEDURE — 94003 VENT MGMT INPAT SUBQ DAY: CPT

## 2022-01-01 PROCEDURE — 83690 ASSAY OF LIPASE: CPT

## 2022-01-01 PROCEDURE — 6360000002 HC RX W HCPCS

## 2022-01-01 PROCEDURE — C9113 INJ PANTOPRAZOLE SODIUM, VIA: HCPCS | Performed by: EMERGENCY MEDICINE

## 2022-01-01 PROCEDURE — 96375 TX/PRO/DX INJ NEW DRUG ADDON: CPT

## 2022-01-01 PROCEDURE — 81001 URINALYSIS AUTO W/SCOPE: CPT

## 2022-01-01 PROCEDURE — 94664 DEMO&/EVAL PT USE INHALER: CPT

## 2022-01-01 PROCEDURE — 87040 BLOOD CULTURE FOR BACTERIA: CPT

## 2022-01-01 PROCEDURE — 80053 COMPREHEN METABOLIC PANEL: CPT

## 2022-01-01 PROCEDURE — 94002 VENT MGMT INPAT INIT DAY: CPT

## 2022-01-01 PROCEDURE — 83930 ASSAY OF BLOOD OSMOLALITY: CPT

## 2022-01-01 PROCEDURE — 86901 BLOOD TYPING SEROLOGIC RH(D): CPT

## 2022-01-01 PROCEDURE — 71045 X-RAY EXAM CHEST 1 VIEW: CPT

## 2022-01-01 PROCEDURE — 74177 CT ABD & PELVIS W/CONTRAST: CPT

## 2022-01-01 PROCEDURE — 51702 INSERT TEMP BLADDER CATH: CPT

## 2022-01-01 PROCEDURE — 99222 1ST HOSP IP/OBS MODERATE 55: CPT | Performed by: PSYCHIATRY & NEUROLOGY

## 2022-01-01 PROCEDURE — P9047 ALBUMIN (HUMAN), 25%, 50ML: HCPCS

## 2022-01-01 PROCEDURE — 87186 SC STD MICRODIL/AGAR DIL: CPT

## 2022-01-01 PROCEDURE — 85384 FIBRINOGEN ACTIVITY: CPT

## 2022-01-01 PROCEDURE — 82550 ASSAY OF CK (CPK): CPT

## 2022-01-01 PROCEDURE — 31622 DX BRONCHOSCOPE/WASH: CPT | Performed by: INTERNAL MEDICINE

## 2022-01-01 PROCEDURE — 36430 TRANSFUSION BLD/BLD COMPNT: CPT

## 2022-01-01 PROCEDURE — 85730 THROMBOPLASTIN TIME PARTIAL: CPT

## 2022-01-01 PROCEDURE — 36620 INSERTION CATHETER ARTERY: CPT | Performed by: INTERNAL MEDICINE

## 2022-01-01 PROCEDURE — 36415 COLL VENOUS BLD VENIPUNCTURE: CPT

## 2022-01-01 PROCEDURE — 84100 ASSAY OF PHOSPHORUS: CPT

## 2022-01-01 PROCEDURE — 71260 CT THORAX DX C+: CPT

## 2022-01-01 PROCEDURE — 2580000003 HC RX 258: Performed by: STUDENT IN AN ORGANIZED HEALTH CARE EDUCATION/TRAINING PROGRAM

## 2022-01-01 PROCEDURE — 82533 TOTAL CORTISOL: CPT

## 2022-01-01 PROCEDURE — 2000000000 HC ICU R&B

## 2022-01-01 PROCEDURE — 70496 CT ANGIOGRAPHY HEAD: CPT

## 2022-01-01 PROCEDURE — 87205 SMEAR GRAM STAIN: CPT

## 2022-01-01 PROCEDURE — 2500000003 HC RX 250 WO HCPCS: Performed by: STUDENT IN AN ORGANIZED HEALTH CARE EDUCATION/TRAINING PROGRAM

## 2022-01-01 PROCEDURE — 36620 INSERTION CATHETER ARTERY: CPT

## 2022-01-01 PROCEDURE — 96374 THER/PROPH/DIAG INJ IV PUSH: CPT

## 2022-01-01 PROCEDURE — 96366 THER/PROPH/DIAG IV INF ADDON: CPT

## 2022-01-01 PROCEDURE — 93005 ELECTROCARDIOGRAM TRACING: CPT

## 2022-01-01 PROCEDURE — 6370000000 HC RX 637 (ALT 250 FOR IP)

## 2022-01-01 PROCEDURE — 85576 BLOOD PLATELET AGGREGATION: CPT

## 2022-01-01 PROCEDURE — 96365 THER/PROPH/DIAG IV INF INIT: CPT

## 2022-01-01 PROCEDURE — 84145 PROCALCITONIN (PCT): CPT

## 2022-01-01 PROCEDURE — 83880 ASSAY OF NATRIURETIC PEPTIDE: CPT

## 2022-01-01 PROCEDURE — 31624 DX BRONCHOSCOPE/LAVAGE: CPT

## 2022-01-01 PROCEDURE — 36556 INSERT NON-TUNNEL CV CATH: CPT

## 2022-01-01 PROCEDURE — 82140 ASSAY OF AMMONIA: CPT

## 2022-01-01 PROCEDURE — 84439 ASSAY OF FREE THYROXINE: CPT

## 2022-01-01 PROCEDURE — 82803 BLOOD GASES ANY COMBINATION: CPT

## 2022-01-01 RX ORDER — 0.9 % SODIUM CHLORIDE 0.9 %
1000 INTRAVENOUS SOLUTION INTRAVENOUS ONCE
Status: COMPLETED | OUTPATIENT
Start: 2022-01-01 | End: 2022-01-01

## 2022-01-01 RX ORDER — MAGNESIUM SULFATE IN WATER 40 MG/ML
4000 INJECTION, SOLUTION INTRAVENOUS ONCE
Status: COMPLETED | OUTPATIENT
Start: 2022-01-01 | End: 2022-01-01

## 2022-01-01 RX ORDER — POTASSIUM CHLORIDE 29.8 MG/ML
20 INJECTION INTRAVENOUS
Status: COMPLETED | OUTPATIENT
Start: 2022-01-01 | End: 2022-01-01

## 2022-01-01 RX ORDER — MANNITOL 20 G/100ML
0.5 INJECTION, SOLUTION INTRAVENOUS ONCE
Status: COMPLETED | OUTPATIENT
Start: 2022-01-01 | End: 2022-01-01

## 2022-01-01 RX ORDER — SODIUM CHLORIDE 9 MG/ML
INJECTION, SOLUTION INTRAVENOUS PRN
Status: DISCONTINUED | OUTPATIENT
Start: 2022-01-01 | End: 2022-11-01 | Stop reason: HOSPADM

## 2022-01-01 RX ORDER — SODIUM CHLORIDE 9 MG/ML
INJECTION, SOLUTION INTRAVENOUS CONTINUOUS
Status: DISCONTINUED | OUTPATIENT
Start: 2022-01-01 | End: 2022-11-01 | Stop reason: HOSPADM

## 2022-01-01 RX ORDER — ONDANSETRON 2 MG/ML
4 INJECTION INTRAMUSCULAR; INTRAVENOUS EVERY 6 HOURS PRN
Status: DISCONTINUED | OUTPATIENT
Start: 2022-01-01 | End: 2022-11-01 | Stop reason: HOSPADM

## 2022-01-01 RX ORDER — FENTANYL CITRATE 50 UG/ML
100 INJECTION, SOLUTION INTRAMUSCULAR; INTRAVENOUS ONCE
Status: COMPLETED | OUTPATIENT
Start: 2022-01-01 | End: 2022-01-01

## 2022-01-01 RX ORDER — MAGNESIUM SULFATE IN WATER 40 MG/ML
2000 INJECTION, SOLUTION INTRAVENOUS ONCE
Status: COMPLETED | OUTPATIENT
Start: 2022-01-01 | End: 2022-01-01

## 2022-01-01 RX ORDER — NALOXONE HYDROCHLORIDE 0.4 MG/ML
2 INJECTION, SOLUTION INTRAMUSCULAR; INTRAVENOUS; SUBCUTANEOUS ONCE
Status: COMPLETED | OUTPATIENT
Start: 2022-01-01 | End: 2022-01-01

## 2022-01-01 RX ORDER — PANTOPRAZOLE SODIUM 40 MG/10ML
80 INJECTION, POWDER, LYOPHILIZED, FOR SOLUTION INTRAVENOUS ONCE
Status: COMPLETED | OUTPATIENT
Start: 2022-01-01 | End: 2022-01-01

## 2022-01-01 RX ORDER — CHLORHEXIDINE GLUCONATE 0.12 MG/ML
15 RINSE ORAL 2 TIMES DAILY
Status: DISCONTINUED | OUTPATIENT
Start: 2022-01-01 | End: 2022-11-01 | Stop reason: HOSPADM

## 2022-01-01 RX ORDER — ETOMIDATE 2 MG/ML
20 INJECTION INTRAVENOUS ONCE
Status: COMPLETED | OUTPATIENT
Start: 2022-01-01 | End: 2022-01-01

## 2022-01-01 RX ORDER — ACETAMINOPHEN 650 MG/1
650 SUPPOSITORY RECTAL EVERY 6 HOURS PRN
Status: DISCONTINUED | OUTPATIENT
Start: 2022-01-01 | End: 2022-01-01

## 2022-01-01 RX ORDER — SODIUM CHLORIDE 0.9 % (FLUSH) 0.9 %
5-40 SYRINGE (ML) INJECTION EVERY 12 HOURS SCHEDULED
Status: DISCONTINUED | OUTPATIENT
Start: 2022-01-01 | End: 2022-11-01 | Stop reason: HOSPADM

## 2022-01-01 RX ORDER — LEVETIRACETAM 15 MG/ML
1500 INJECTION INTRAVASCULAR ONCE
Status: COMPLETED | OUTPATIENT
Start: 2022-01-01 | End: 2022-01-01

## 2022-01-01 RX ORDER — ONDANSETRON 4 MG/1
4 TABLET, ORALLY DISINTEGRATING ORAL EVERY 8 HOURS PRN
Status: DISCONTINUED | OUTPATIENT
Start: 2022-01-01 | End: 2022-11-01 | Stop reason: HOSPADM

## 2022-01-01 RX ORDER — SODIUM CHLORIDE, SODIUM LACTATE, POTASSIUM CHLORIDE, CALCIUM CHLORIDE 600; 310; 30; 20 MG/100ML; MG/100ML; MG/100ML; MG/100ML
INJECTION, SOLUTION INTRAVENOUS CONTINUOUS
Status: DISCONTINUED | OUTPATIENT
Start: 2022-01-01 | End: 2022-11-01 | Stop reason: HOSPADM

## 2022-01-01 RX ORDER — PROPOFOL 10 MG/ML
5-50 INJECTION, EMULSION INTRAVENOUS CONTINUOUS
Status: DISCONTINUED | OUTPATIENT
Start: 2022-01-01 | End: 2022-01-01

## 2022-01-01 RX ORDER — SODIUM CHLORIDE 0.9 % (FLUSH) 0.9 %
5-40 SYRINGE (ML) INJECTION PRN
Status: DISCONTINUED | OUTPATIENT
Start: 2022-01-01 | End: 2022-11-01 | Stop reason: HOSPADM

## 2022-01-01 RX ORDER — THIAMINE HYDROCHLORIDE 100 MG/ML
100 INJECTION, SOLUTION INTRAMUSCULAR; INTRAVENOUS DAILY
Status: DISCONTINUED | OUTPATIENT
Start: 2022-01-01 | End: 2022-01-01 | Stop reason: HOSPADM

## 2022-01-01 RX ORDER — SODIUM CHLORIDE 9 MG/ML
INJECTION, SOLUTION INTRAVENOUS PRN
Status: DISCONTINUED | OUTPATIENT
Start: 2022-01-01 | End: 2022-01-01 | Stop reason: HOSPADM

## 2022-01-01 RX ORDER — ALBUMIN (HUMAN) 12.5 G/50ML
25 SOLUTION INTRAVENOUS EVERY 8 HOURS
Status: COMPLETED | OUTPATIENT
Start: 2022-01-01 | End: 2022-01-01

## 2022-01-01 RX ORDER — SODIUM CHLORIDE 0.9 % (FLUSH) 0.9 %
SYRINGE (ML) INJECTION
Status: COMPLETED
Start: 2022-01-01 | End: 2022-01-01

## 2022-01-01 RX ORDER — ACETAMINOPHEN 325 MG/1
650 TABLET ORAL EVERY 6 HOURS PRN
Status: DISCONTINUED | OUTPATIENT
Start: 2022-01-01 | End: 2022-01-01

## 2022-01-01 RX ORDER — ALBUTEROL SULFATE 2.5 MG/3ML
2.5 SOLUTION RESPIRATORY (INHALATION) EVERY 4 HOURS
Status: DISCONTINUED | OUTPATIENT
Start: 2022-01-01 | End: 2022-11-01 | Stop reason: HOSPADM

## 2022-01-01 RX ORDER — ROCURONIUM BROMIDE 10 MG/ML
1 INJECTION, SOLUTION INTRAVENOUS ONCE
Status: COMPLETED | OUTPATIENT
Start: 2022-01-01 | End: 2022-01-01

## 2022-01-01 RX ORDER — POTASSIUM CHLORIDE 29.8 MG/ML
20 INJECTION INTRAVENOUS
Status: DISPENSED | OUTPATIENT
Start: 2022-01-01 | End: 2022-01-01

## 2022-01-01 RX ORDER — FUROSEMIDE 10 MG/ML
20 INJECTION INTRAMUSCULAR; INTRAVENOUS ONCE
Status: COMPLETED | OUTPATIENT
Start: 2022-01-01 | End: 2022-01-01

## 2022-01-01 RX ORDER — POLYETHYLENE GLYCOL 3350 17 G/17G
17 POWDER, FOR SOLUTION ORAL DAILY PRN
Status: DISCONTINUED | OUTPATIENT
Start: 2022-01-01 | End: 2022-11-01 | Stop reason: HOSPADM

## 2022-01-01 RX ADMIN — PHYTONADIONE 10 MG: 10 INJECTION, EMULSION INTRAMUSCULAR; INTRAVENOUS; SUBCUTANEOUS at 08:57

## 2022-01-01 RX ADMIN — IOPAMIDOL 100 ML: 755 INJECTION, SOLUTION INTRAVENOUS at 09:50

## 2022-01-01 RX ADMIN — THIAMINE HYDROCHLORIDE 100 MG: 100 INJECTION, SOLUTION INTRAMUSCULAR; INTRAVENOUS at 08:45

## 2022-01-01 RX ADMIN — SODIUM CHLORIDE, POTASSIUM CHLORIDE, SODIUM LACTATE AND CALCIUM CHLORIDE: 600; 310; 30; 20 INJECTION, SOLUTION INTRAVENOUS at 17:42

## 2022-01-01 RX ADMIN — METHYLPREDNISOLONE SODIUM SUCCINATE 1000 MG: 1 INJECTION, POWDER, LYOPHILIZED, FOR SOLUTION INTRAMUSCULAR; INTRAVENOUS at 22:14

## 2022-01-01 RX ADMIN — PANTOPRAZOLE SODIUM 80 MG: 40 INJECTION, POWDER, FOR SOLUTION INTRAVENOUS at 08:42

## 2022-01-01 RX ADMIN — POTASSIUM CHLORIDE 20 MEQ: 29.8 INJECTION, SOLUTION INTRAVENOUS at 01:18

## 2022-01-01 RX ADMIN — LEVETIRACETAM 1500 MG: 1500 INJECTION, SOLUTION INTRAVENOUS at 08:47

## 2022-01-01 RX ADMIN — SODIUM CHLORIDE 5 MG/HR: 9 INJECTION, SOLUTION INTRAVENOUS at 14:10

## 2022-01-01 RX ADMIN — SODIUM CHLORIDE, POTASSIUM CHLORIDE, SODIUM LACTATE AND CALCIUM CHLORIDE: 600; 310; 30; 20 INJECTION, SOLUTION INTRAVENOUS at 08:01

## 2022-01-01 RX ADMIN — ROCURONIUM BROMIDE 63 MG: 10 INJECTION INTRAVENOUS at 07:52

## 2022-01-01 RX ADMIN — CALCIUM CHLORIDE 1000 MG: 100 INJECTION, SOLUTION INTRAVENOUS at 17:41

## 2022-01-01 RX ADMIN — ALBUMIN (HUMAN) 25 G: 12.5 SOLUTION INTRAVENOUS at 12:36

## 2022-01-01 RX ADMIN — MANNITOL 31.3 G: 20 INJECTION, SOLUTION INTRAVENOUS at 09:14

## 2022-01-01 RX ADMIN — ALBUTEROL SULFATE 2.5 MG: 2.5 SOLUTION RESPIRATORY (INHALATION) at 21:23

## 2022-01-01 RX ADMIN — FAMOTIDINE 20 MG: 10 INJECTION, SOLUTION INTRAVENOUS at 07:56

## 2022-01-01 RX ADMIN — ALBUMIN (HUMAN) 25 G: 12.5 SOLUTION INTRAVENOUS at 01:12

## 2022-01-01 RX ADMIN — CALCIUM GLUCONATE 1000 MG: 98 INJECTION, SOLUTION INTRAVENOUS at 03:23

## 2022-01-01 RX ADMIN — IOPAMIDOL 75 ML: 755 INJECTION, SOLUTION INTRAVENOUS at 08:11

## 2022-01-01 RX ADMIN — VASOPRESSIN 0.03 UNITS/MIN: 20 INJECTION INTRAVENOUS at 16:58

## 2022-01-01 RX ADMIN — CEFEPIME 2000 MG: 2 INJECTION, POWDER, FOR SOLUTION INTRAVENOUS at 15:00

## 2022-01-01 RX ADMIN — SODIUM CHLORIDE 1000 ML: 9 INJECTION, SOLUTION INTRAVENOUS at 01:11

## 2022-01-01 RX ADMIN — Medication 30 MCG/MIN: at 06:59

## 2022-01-01 RX ADMIN — SODIUM BICARBONATE: 84 INJECTION, SOLUTION INTRAVENOUS at 15:45

## 2022-01-01 RX ADMIN — POTASSIUM CHLORIDE 20 MEQ: 29.8 INJECTION, SOLUTION INTRAVENOUS at 10:11

## 2022-01-01 RX ADMIN — POTASSIUM CHLORIDE 20 MEQ: 29.8 INJECTION, SOLUTION INTRAVENOUS at 11:17

## 2022-01-01 RX ADMIN — POTASSIUM CHLORIDE 20 MEQ: 29.8 INJECTION, SOLUTION INTRAVENOUS at 12:21

## 2022-01-01 RX ADMIN — ALBUTEROL SULFATE 2.5 MG: 2.5 SOLUTION RESPIRATORY (INHALATION) at 23:46

## 2022-01-01 RX ADMIN — SODIUM CHLORIDE, POTASSIUM CHLORIDE, SODIUM LACTATE AND CALCIUM CHLORIDE: 600; 310; 30; 20 INJECTION, SOLUTION INTRAVENOUS at 00:22

## 2022-01-01 RX ADMIN — POTASSIUM CHLORIDE 20 MEQ: 29.8 INJECTION, SOLUTION INTRAVENOUS at 03:29

## 2022-01-01 RX ADMIN — CHLORHEXIDINE GLUCONATE 15 ML: 1.2 RINSE ORAL at 07:57

## 2022-01-01 RX ADMIN — VASOPRESSIN 0.03 UNITS/MIN: 20 INJECTION INTRAVENOUS at 22:41

## 2022-01-01 RX ADMIN — FUROSEMIDE 20 MG: 10 INJECTION, SOLUTION INTRAMUSCULAR; INTRAVENOUS at 21:52

## 2022-01-01 RX ADMIN — Medication 30 MCG/MIN: at 15:37

## 2022-01-01 RX ADMIN — SODIUM CHLORIDE 1000 ML: 9 INJECTION, SOLUTION INTRAVENOUS at 07:57

## 2022-01-01 RX ADMIN — NOREPINEPHRINE BITARTRATE 10 MCG/MIN: 1 SOLUTION INTRAVENOUS at 15:14

## 2022-01-01 RX ADMIN — FAMOTIDINE 20 MG: 10 INJECTION, SOLUTION INTRAVENOUS at 21:57

## 2022-01-01 RX ADMIN — CHLORHEXIDINE GLUCONATE 15 ML: 1.2 RINSE ORAL at 00:25

## 2022-01-01 RX ADMIN — POTASSIUM CHLORIDE 20 MEQ: 29.8 INJECTION, SOLUTION INTRAVENOUS at 04:31

## 2022-01-01 RX ADMIN — NALOXONE HYDROCHLORIDE 2 MG: 0.4 INJECTION, SOLUTION INTRAMUSCULAR; INTRAVENOUS; SUBCUTANEOUS at 08:40

## 2022-01-01 RX ADMIN — SODIUM CHLORIDE, PRESERVATIVE FREE 10 ML: 5 INJECTION INTRAVENOUS at 21:57

## 2022-01-01 RX ADMIN — FENTANYL CITRATE 100 MCG: 50 INJECTION, SOLUTION INTRAMUSCULAR; INTRAVENOUS at 14:36

## 2022-01-01 RX ADMIN — SODIUM CHLORIDE: 9 INJECTION, SOLUTION INTRAVENOUS at 21:46

## 2022-01-01 RX ADMIN — ALBUMIN (HUMAN) 25 G: 12.5 SOLUTION INTRAVENOUS at 20:44

## 2022-01-01 RX ADMIN — SODIUM CHLORIDE, PRESERVATIVE FREE 10 ML: 5 INJECTION INTRAVENOUS at 22:14

## 2022-01-01 RX ADMIN — MAGNESIUM SULFATE HEPTAHYDRATE 2000 MG: 40 INJECTION, SOLUTION INTRAVENOUS at 07:49

## 2022-01-01 RX ADMIN — MAGNESIUM SULFATE HEPTAHYDRATE 4000 MG: 40 INJECTION, SOLUTION INTRAVENOUS at 08:21

## 2022-01-01 RX ADMIN — FAMOTIDINE 20 MG: 10 INJECTION, SOLUTION INTRAVENOUS at 00:33

## 2022-01-01 RX ADMIN — ETOMIDATE 20 MG: 2 INJECTION INTRAVENOUS at 07:52

## 2022-01-01 RX ADMIN — VASOPRESSIN 0.03 UNITS/MIN: 20 INJECTION INTRAVENOUS at 10:40

## 2022-01-01 RX ADMIN — SODIUM BICARBONATE: 84 INJECTION, SOLUTION INTRAVENOUS at 22:36

## 2022-01-01 RX ADMIN — Medication 35.09 MCG/MIN: at 22:12

## 2022-01-01 RX ADMIN — SODIUM PHOSPHATE, MONOBASIC, MONOHYDRATE AND SODIUM PHOSPHATE, DIBASIC, ANHYDROUS 15 MMOL: 142; 276 INJECTION, SOLUTION INTRAVENOUS at 11:26

## 2022-01-01 RX ADMIN — VASOPRESSIN 0.03 UNITS/MIN: 20 INJECTION INTRAVENOUS at 21:54

## 2022-01-01 RX ADMIN — POTASSIUM CHLORIDE 20 MEQ: 29.8 INJECTION, SOLUTION INTRAVENOUS at 02:23

## 2022-01-01 RX ADMIN — CHLORHEXIDINE GLUCONATE 15 ML: 1.2 RINSE ORAL at 19:33

## 2022-01-01 RX ADMIN — CALCIUM GLUCONATE 1000 MG: 98 INJECTION, SOLUTION INTRAVENOUS at 08:22

## 2022-01-01 RX ADMIN — SODIUM BICARBONATE 100 MEQ: 84 INJECTION, SOLUTION INTRAVENOUS at 08:44

## 2022-01-01 RX ADMIN — SODIUM CHLORIDE, PRESERVATIVE FREE 10 ML: 5 INJECTION INTRAVENOUS at 07:58

## 2022-01-01 ASSESSMENT — PULMONARY FUNCTION TESTS
PIF_VALUE: 20
PIF_VALUE: 21
PIF_VALUE: 19
PIF_VALUE: 22
PIF_VALUE: 24
PIF_VALUE: 26
PIF_VALUE: 26
PIF_VALUE: 18
PIF_VALUE: 22
PIF_VALUE: 19
PIF_VALUE: 24
PIF_VALUE: 20
PIF_VALUE: 23
PIF_VALUE: 25
PIF_VALUE: 19
PIF_VALUE: 21
PIF_VALUE: 18
PIF_VALUE: 17
PIF_VALUE: 18
PIF_VALUE: 22
PIF_VALUE: 18
PIF_VALUE: 18
PIF_VALUE: 22
PIF_VALUE: 27
PIF_VALUE: 21
PIF_VALUE: 20
PIF_VALUE: 17
PIF_VALUE: 18
PIF_VALUE: 19
PIF_VALUE: 24
PIF_VALUE: 22

## 2022-01-01 ASSESSMENT — PAIN - FUNCTIONAL ASSESSMENT: PAIN_FUNCTIONAL_ASSESSMENT: CRITICAL CARE PAIN OBSERVATION TOOL (CPOT)

## 2022-02-08 ENCOUNTER — TELEPHONE (OUTPATIENT)
Dept: PRIMARY CARE CLINIC | Age: 42
End: 2022-02-08

## 2022-02-08 NOTE — TELEPHONE ENCOUNTER
----- Message from [address] sent at 2/8/2022  9:07 AM EST -----  Subject: Message to Provider    QUESTIONS  Information for Provider? Patient is experiencing muscle spasms that are   radiating into her neck. She is taking Tylenol every 4hrs and does take   gabapentin as needed but nothing is doing the trick. She is going to try   ice/heat next. Is there anything else she can do for this or does she need   to be seen? Please reach out to patient to advise. Patient is in school   everyday until 1:30 so please contact after 1:30  ---------------------------------------------------------------------------  --------------  [address]  What is the best way for the office to contact you? OK to leave message on   voicemail  Preferred Call Back Phone Number? [phone]  ---------------------------------------------------------------------------  --------------  SCRIPT ANSWERS  Relationship to Patient? Parent  Representative Name? Jovany Palmer  Patient is under 25 and the Parent has custody? No  Is the Representative on the appropriate HIPAA document in Epic?  Yes

## 2022-02-08 NOTE — TELEPHONE ENCOUNTER
Please schedule for an appointment. Please note that the above documentation was prepared using voice recognition software. Every attempt was made to ensure accuracy but there may be spelling, grammatical, and contextual errors.   Electronically signed by Taj Tavarez DO on 2/8/2022 at 12:21 PM

## 2022-02-10 ENCOUNTER — OFFICE VISIT (OUTPATIENT)
Dept: PRIMARY CARE CLINIC | Age: 42
End: 2022-02-10
Payer: MEDICAID

## 2022-02-10 VITALS
SYSTOLIC BLOOD PRESSURE: 122 MMHG | WEIGHT: 129 LBS | TEMPERATURE: 97.5 F | OXYGEN SATURATION: 98 % | BODY MASS INDEX: 22.14 KG/M2 | DIASTOLIC BLOOD PRESSURE: 68 MMHG | HEART RATE: 85 BPM

## 2022-02-10 DIAGNOSIS — M54.12 CERVICAL RADICULITIS: Primary | ICD-10-CM

## 2022-02-10 PROCEDURE — G8420 CALC BMI NORM PARAMETERS: HCPCS | Performed by: INTERNAL MEDICINE

## 2022-02-10 PROCEDURE — 99213 OFFICE O/P EST LOW 20 MIN: CPT | Performed by: INTERNAL MEDICINE

## 2022-02-10 PROCEDURE — 1036F TOBACCO NON-USER: CPT | Performed by: INTERNAL MEDICINE

## 2022-02-10 PROCEDURE — G8484 FLU IMMUNIZE NO ADMIN: HCPCS | Performed by: INTERNAL MEDICINE

## 2022-02-10 PROCEDURE — G8427 DOCREV CUR MEDS BY ELIG CLIN: HCPCS | Performed by: INTERNAL MEDICINE

## 2022-02-10 RX ORDER — METHYLPREDNISOLONE 4 MG/1
TABLET ORAL
Qty: 1 KIT | Refills: 0 | Status: ON HOLD
Start: 2022-02-10 | End: 2022-02-25 | Stop reason: HOSPADM

## 2022-02-10 RX ORDER — SPIRONOLACTONE 50 MG/1
50 TABLET, FILM COATED ORAL DAILY
Status: ON HOLD | COMMUNITY
Start: 2021-09-13 | End: 2022-11-01 | Stop reason: HOSPADM

## 2022-02-10 RX ORDER — TIZANIDINE 2 MG/1
2 TABLET ORAL 4 TIMES DAILY PRN
Qty: 40 TABLET | Refills: 0 | Status: ON HOLD
Start: 2022-02-10 | End: 2022-02-25 | Stop reason: HOSPADM

## 2022-02-10 NOTE — PROGRESS NOTES
2/10/22    Marty Markham, a female of 46 y.o. presents today for Neck Pain    At last appointment,   she was seen for streptococcal infection. At that time she was treated with ampicillin. She was most recently seen in the ER for emesis in December. She woke up with neck pain. It radiates from her neck to her fingers. This started this past Saturday. She denies any trauma or injury. There is nothing that makes it better. There is nothing that makes it worse. She has tried heat ice and tylenol. She took some gabapentin. Allergies   Allergen Reactions    Pcn [Penicillins] Hives, Itching, Swelling and Rash     Current Outpatient Medications on File Prior to Visit   Medication Sig Dispense Refill    spironolactone (ALDACTONE) 50 MG tablet Take 50 mg by mouth daily      furosemide (LASIX) 20 MG tablet Take 20 mg by mouth daily as needed       lactulose (CHRONULAC) 10 GM/15ML solution Take 20 g by mouth 3 times daily as needed       omeprazole (PRILOSEC) 20 MG delayed release capsule Take 20 mg by mouth Daily       KLOR-CON M20 20 MEQ extended release tablet Take 20 mEq by mouth daily       vitamin A 3 MG (68878 UT) capsule Take 10,000 Units by mouth daily      gabapentin (NEURONTIN) 100 MG capsule Take 100 mg by mouth as needed.  vitamin D (ERGOCALCIFEROL) 1.25 MG (19842 UT) CAPS capsule Take 50,000 Units by mouth once a week       CREON 75057 units delayed release capsule Take 12,000 Units by mouth 4 times daily (before meals and nightly)        No current facility-administered medications on file prior to visit.      Patient Active Problem List    Diagnosis Date Noted    Upper GI bleed 09/15/2021    GI bleed 09/14/2021    Hematemesis 07/04/2020    Sepsis (HCC)     Jaundice     Elevated liver enzymes     Hyperammonemia (HCC)     Liver metastases (HCC)     Malignant ascites     H/O malignant neoplasm of pancreas        Review of Systems  Constitutional:Negative for activity change, appetite change, chills, fatigue and fever. Respiratory: Negative for choking, chest tightness, shortness of breath and wheezing. Cardiovascular: Negative for chest pain, palpitations and leg swelling. Gastrointestinal: Negative for abdominal distention, constipation, diarrhea, nausea and vomiting. Musculoskeletal: Negative for arthralgias, back pain, gait problem and joint swelling. Neurological: Negative for dizziness, weakness,numbness and headaches. /68   Pulse 85   Temp 97.5 °F (36.4 °C)   Wt 129 lb (58.5 kg)   SpO2 98%   BMI 22.14 kg/m²      Physical Exam   Constitutional:  Oriented to person, place, and time. Appears well-developed and well-nourished. No acute distress. HENT: No sinus tenderness or lymphadenopathy  Head: Normocephalic and atraumatic. Eyes: Eyes exhibits no discharge. No scleral icterus present. Neck: No tracheal deviation present. No thyromegaly present. Cardiovascular: Normal rate, regular rhythm, normal heart sounds and intact distal pulses. Exam reveals no gallop nor friction rub. No murmur heard. Pulmonary: Effort normal and breath sounds normal. No respiratory distress. No wheezes or rales. Abdomen: No signs of rigidity rebound or organomegaly  Musculoskeletal:  No tenderness to palpation  Neurological:Alert and oriented to person, place, and time. Skin: No diaphoresis. Psychiatric: Normal mood and affect. Behavior is Normal.     ASSESSMENT AND PLAN:    Assessment     1. Cervical radiculitis    Plan    1. Start medrol and Zanaflex  2. I will give her literature on cervical spine stretches    Return if symptoms worsen or fail to improve.     Electronically signed by Renata Severe, DO on 2/10/2022 at 3:25 PM    Renata Severe, DO

## 2022-02-10 NOTE — PATIENT INSTRUCTIONS
Neck: Exercises  Introduction  Here are some examples of exercises for you to try. The exercises may be suggested for a condition or for rehabilitation. Start each exercise slowly. Ease off the exercises if you start to have pain. You will be told when to start these exercises and which ones will work best for you. How to do the exercises      Neck stretch   1. This stretch works best if you keep your shoulder down as you lean away from it. To help you remember to do this, start by relaxing your shoulders and lightly holding on to your thighs or your chair. 2. Tilt your head toward your shoulder and hold for 15 to 30 seconds. Let the weight of your head stretch your muscles. 3. If you would like a little added stretch, use your hand to gently and steadily pull your head toward your shoulder. For example, keeping your right shoulder down, lean your head to the left. 4. Repeat 2 to 4 times toward each shoulder. Diagonal neck stretch   1. Turn your head slightly toward the direction you will be stretching, and tilt your head diagonally toward your chest and hold for 15 to 30 seconds. 2. If you would like a little added stretch, use your hand to gently and steadily pull your head forward on the diagonal.  3. Repeat 2 to 4 times toward each side. Dorsal glide stretch   The dorsal glide stretches the back of the neck. If you feel pain, do not glide so far back. Some people find this exercise easier to do while lying on their backs with an ice pack on the neck. 1. Sit or stand tall and look straight ahead. 2. Slowly tuck your chin as you glide your head backward over your body  3. Hold for a count of 6, and then relax for up to 10 seconds. 4. Repeat 8 to 12 times. Chest and shoulder stretch   1. Sit or stand tall and glide your head backward as in the dorsal glide stretch. 2. Raise both arms so that your hands are next to your ears.   3. Take a deep breath, and as you breathe out, lower your elbows down and behind your back. You will feel your shoulder blades slide down and together, and at the same time you will feel a stretch across your chest and the front of your shoulders. 4. Hold for about 6 seconds, and then relax for up to 10 seconds. 5. Repeat 8 to 12 times. Strengthening: Hands on head   1. Move your head backward, forward, and side to side against gentle pressure from your hands, holding each position for about 6 seconds. 2. Repeat 8 to 12 times. Follow-up care is a key part of your treatment and safety. Be sure to make and go to all appointments, and call your doctor if you are having problems. It's also a good idea to know your test results and keep a list of the medicines you take. Where can you learn more? Go to [email or web address] org and sign in to your [employer] account. Enter P975 in the [employer] box to learn more about \"Neck: Exercises. \"     If you do not have an account, please click on the \"Sign Up Now\" link. Current as of: November 16, 2020               Content Version: 12.8  © 7892-4249 Healthwise, Incorporated. Care instructions adapted under license by [facility] ([facility]). If you have questions about a medical condition or this instruction, always ask your healthcare professional. [address] any warranty or liability for your use of this information.

## 2022-02-16 ENCOUNTER — TELEPHONE (OUTPATIENT)
Dept: PRIMARY CARE CLINIC | Age: 42
End: 2022-02-16

## 2022-02-16 ENCOUNTER — HOSPITAL ENCOUNTER (OUTPATIENT)
Dept: GENERAL RADIOLOGY | Age: 42
Discharge: HOME | End: 2022-02-18
Payer: MEDICAID

## 2022-02-16 ENCOUNTER — HOSPITAL ENCOUNTER (OUTPATIENT)
Age: 42
Discharge: HOME | End: 2022-02-18
Payer: MEDICAID

## 2022-02-16 DIAGNOSIS — M54.2 NECK PAIN: ICD-10-CM

## 2022-02-16 DIAGNOSIS — M54.2 NECK PAIN: Primary | ICD-10-CM

## 2022-02-16 PROCEDURE — 72050 X-RAY EXAM NECK SPINE 4/5VWS: CPT

## 2022-02-16 NOTE — TELEPHONE ENCOUNTER
----- Message from Estelle Hernandez sent at 2/16/2022  8:50 AM EST -----  Subject: Message to Provider    QUESTIONS  Information for Provider? Pt called and stated that after finishing the   steroids she was given, she is still experiencing nerve pain in her neck   and arms and down her back. She also said it is difficult for her to get   out of bed on her own. Please advise.  ---------------------------------------------------------------------------  --------------  CALL BACK INFO  What is the best way for the office to contact you? OK to leave message on   voicemail  Preferred Call Back Phone Number? [phone]  ---------------------------------------------------------------------------  --------------  SCRIPT ANSWERS  Relationship to Patient?  Self

## 2022-02-17 ENCOUNTER — TELEPHONE (OUTPATIENT)
Dept: PRIMARY CARE CLINIC | Age: 42
End: 2022-02-17

## 2022-02-17 NOTE — TELEPHONE ENCOUNTER
----- Message from Matias Macario sent at 2/17/2022 11:39 AM EST -----  Subject: Results Request    QUESTIONS  Which lab or imaging result is the patient calling about? neck x-ray  Which provider ordered the test? Carry Jasen   At what location was the test performed? Date the test was performed? 2022-02-16  Additional Information for Provider? Pt's mom Jovany Palmer is calling to check   on the results of the pt's neck x-ray concerning a pinched nerve, pls give   pt a call today after 1:30pm   ---------------------------------------------------------------------------  --------------  CALL BACK INFO  What is the best way for the office to contact you? OK to leave message on   voicemail  Preferred Call Back Phone Number?  [phone]

## 2022-02-18 ENCOUNTER — NURSE ONLY (OUTPATIENT)
Dept: PRIMARY CARE CLINIC | Age: 42
End: 2022-02-18
Payer: MEDICAID

## 2022-02-18 ENCOUNTER — TELEPHONE (OUTPATIENT)
Dept: PRIMARY CARE CLINIC | Age: 42
End: 2022-02-18

## 2022-02-18 DIAGNOSIS — M54.2 NECK PAIN: Primary | ICD-10-CM

## 2022-02-18 PROCEDURE — 96372 THER/PROPH/DIAG INJ SC/IM: CPT | Performed by: INTERNAL MEDICINE

## 2022-02-18 RX ORDER — METHYLPREDNISOLONE ACETATE 80 MG/ML
80 INJECTION, SUSPENSION INTRA-ARTICULAR; INTRALESIONAL; INTRAMUSCULAR; SOFT TISSUE ONCE
Status: COMPLETED | OUTPATIENT
Start: 2022-02-18 | End: 2022-02-18

## 2022-02-18 RX ADMIN — METHYLPREDNISOLONE ACETATE 80 MG: 80 INJECTION, SUSPENSION INTRA-ARTICULAR; INTRALESIONAL; INTRAMUSCULAR; SOFT TISSUE at 14:39

## 2022-02-18 NOTE — TELEPHONE ENCOUNTER
pt called since in a large amount of pain cant get out of bed can only take muscle relaxer at night since she has to drive to and from work during day please advise on what she can do waiting to hear back from pt on appt.

## 2022-02-19 ENCOUNTER — NURSE TRIAGE (OUTPATIENT)
Dept: OTHER | Facility: CLINIC | Age: 42
End: 2022-02-19

## 2022-02-19 NOTE — TELEPHONE ENCOUNTER
Received call from [facility] with [email or web address]. Initially, mother, answering questions with pt present     Subjective: Caller states \"She has been dealing with a pinched nerve in her neck for a week. He gave her cortisone tablets and muscle relaxer. Cortisone finished, She had an x-ray done. Everything was okay. Yesterday, they called and said they would like for her to have cortisone shot. About 0430/0500 this morning -she  was very light-headed. She was laying in bed and I checked her BP. BP reading was 90/60. Last night, she took two muscle relaxer was taken at 330. \"     Pt takes over the call:   \"It's tania weird - I had to grab onto [address] and lay back down and felt out of my mind. I was seeing spots, so I closed my eyes and fell asleep for 1.5 hours - by 830 this morning, I sat up and tried again to get up stairs to my room - had to go up the stairs on my butt because I was scared to fall.  I was able to go to bathroom and doing stuff by my desk, but then I almost felt like I was blacking out\"    Current Symptoms:   BP checked 20 minutes ago, reading was 90/60 using automatic blood pressure - pt has history of cirrhosis and already taken diuretics this morning (prescribed Lasix and Spironolactone) - PB reading checked again during call /56 HR 92 using automatic blood pressure on left arm (normal BP runs 120's)    Lightheaded currently during call     Pt speech is clear and answering questions appropriately     Normal appetite and adequate fluid intake     Left neck/shoulder pain improving since seen in the office for injection     Denies any loss of consciousness, N/V, diarrhea     Onset: several hours ago; improving    Associated Symptoms: NA    Pain Severity: 3/10; dull; constant    Temperature: 100 yesterday , but was \"under a lot of blankets, but I don't feel like I have a fever\"    What has been tried: Rest    LMP: 3 years ago Pregnant: No    Recommended disposition: GO TO ED NOW (OR PCP TRIAGE): Pt refused disposition despite reviewing risks of delaying care  - Warm transfer to [facility], to transfer pt on-call provider for further instruction. Care advice provided, patient verbalizes understanding; denies any other questions or concerns; instructed to call back for any new or worsening symptoms. [facility], to transfer pt on-call provider for further instruction. Attention Provider: Thank you for allowing me to participate in the care of your patient. The patient was connected to triage in response to information provided to the ECC/PSC. Please do not respond through this encounter as the response is not directed to a shared pool.     Reason for Disposition   [0] Fall in systolic BP > 20 mm Hg from normal AND [2] dizzy, lightheaded, or weak    Protocols used: BLOOD PRESSURE - LOW-ADULT-AH

## 2022-02-20 ENCOUNTER — HOSPITAL ENCOUNTER (INPATIENT)
Age: 42
LOS: 6 days | Discharge: HOME | DRG: 280 | End: 2022-02-26
Attending: STUDENT IN AN ORGANIZED HEALTH CARE EDUCATION/TRAINING PROGRAM | Admitting: FAMILY MEDICINE
Payer: MEDICAID

## 2022-02-20 ENCOUNTER — APPOINTMENT (OUTPATIENT)
Dept: CT IMAGING | Age: 42
DRG: 280 | End: 2022-02-20
Payer: MEDICAID

## 2022-02-20 DIAGNOSIS — D64.9 ACUTE ANEMIA: ICD-10-CM

## 2022-02-20 DIAGNOSIS — M00.9 PYOGENIC ARTHRITIS OF LEFT SHOULDER REGION, DUE TO UNSPECIFIED ORGANISM (HCC): ICD-10-CM

## 2022-02-20 DIAGNOSIS — K92.0 COFFEE GROUND EMESIS: Primary | ICD-10-CM

## 2022-02-20 DIAGNOSIS — K80.20 GALL STONES: ICD-10-CM

## 2022-02-20 DIAGNOSIS — K70.30 ALCOHOLIC CIRRHOSIS, UNSPECIFIED WHETHER ASCITES PRESENT (HCC): ICD-10-CM

## 2022-02-20 DIAGNOSIS — K52.9 COLITIS: ICD-10-CM

## 2022-02-20 DIAGNOSIS — E87.20 LACTIC ACIDOSIS: ICD-10-CM

## 2022-02-20 DIAGNOSIS — E87.6 HYPOKALEMIA: ICD-10-CM

## 2022-02-20 PROBLEM — K92.2 UGI BLEED: Status: ACTIVE | Noted: 2022-01-01

## 2022-02-20 LAB
ABO/RH: NORMAL
ALBUMIN SERPL-MCNC: 2.8 G/DL (ref 3.5–5.2)
ALP BLD-CCNC: 167 U/L (ref 35–104)
ALT SERPL-CCNC: 23 U/L (ref 0–32)
AMMONIA: 84.5 UMOL/L (ref 11–51)
ANION GAP SERPL CALCULATED.3IONS-SCNC: 15 MMOL/L (ref 7–16)
ANION GAP SERPL CALCULATED.3IONS-SCNC: 21 MMOL/L (ref 7–16)
ANISOCYTOSIS: ABNORMAL
ANTIBODY SCREEN: NORMAL
APTT: 33.4 SEC (ref 24.5–35.1)
AST SERPL-CCNC: 76 U/L (ref 0–31)
BASOPHILS ABSOLUTE: 0 E9/L (ref 0–0.2)
BASOPHILS RELATIVE PERCENT: 0 % (ref 0–2)
BILIRUB SERPL-MCNC: 4.8 MG/DL (ref 0–1.2)
BILIRUBIN URINE: NEGATIVE
BLOOD BANK DISPENSE STATUS: NORMAL
BLOOD BANK DISPENSE STATUS: NORMAL
BLOOD BANK PRODUCT CODE: NORMAL
BLOOD BANK PRODUCT CODE: NORMAL
BLOOD, URINE: NEGATIVE
BPU ID: NORMAL
BPU ID: NORMAL
BUN BLDV-MCNC: 63 MG/DL (ref 6–20)
BUN BLDV-MCNC: 65 MG/DL (ref 6–20)
BURR CELLS: ABNORMAL
CALCIUM SERPL-MCNC: 7.8 MG/DL (ref 8.6–10.2)
CALCIUM SERPL-MCNC: 8.2 MG/DL (ref 8.6–10.2)
CHLORIDE BLD-SCNC: 85 MMOL/L (ref 98–107)
CHLORIDE BLD-SCNC: 93 MMOL/L (ref 98–107)
CLARITY: CLEAR
CO2: 22 MMOL/L (ref 22–29)
CO2: 25 MMOL/L (ref 22–29)
COLOR: YELLOW
CREAT SERPL-MCNC: 0.9 MG/DL (ref 0.5–1)
CREAT SERPL-MCNC: 1 MG/DL (ref 0.5–1)
DESCRIPTION BLOOD BANK: NORMAL
DESCRIPTION BLOOD BANK: NORMAL
EOSINOPHILS ABSOLUTE: 0 E9/L (ref 0.05–0.5)
EOSINOPHILS RELATIVE PERCENT: 0 % (ref 0–6)
GFR AFRICAN AMERICAN: >60
GFR AFRICAN AMERICAN: >60
GFR NON-AFRICAN AMERICAN: >60 ML/MIN/1.73
GFR NON-AFRICAN AMERICAN: >60 ML/MIN/1.73
GLUCOSE BLD-MCNC: 125 MG/DL (ref 74–99)
GLUCOSE BLD-MCNC: 157 MG/DL (ref 74–99)
GLUCOSE URINE: NEGATIVE MG/DL
HCG, URINE, POC: NEGATIVE
HCT VFR BLD CALC: 17.9 % (ref 34–48)
HCT VFR BLD CALC: 23.4 % (ref 34–48)
HEMOGLOBIN: 5.6 G/DL (ref 11.5–15.5)
HEMOGLOBIN: 7.6 G/DL (ref 11.5–15.5)
HYPOCHROMIA: ABNORMAL
INR BLD: 2.1
KETONES, URINE: NEGATIVE MG/DL
LACTIC ACID: 3.8 MMOL/L (ref 0.5–2.2)
LACTIC ACID: 8.5 MMOL/L (ref 0.5–2.2)
LEUKOCYTE ESTERASE, URINE: NEGATIVE
LIPASE: 12 U/L (ref 13–60)
LYMPHOCYTES ABSOLUTE: 0.74 E9/L (ref 1.5–4)
LYMPHOCYTES RELATIVE PERCENT: 3.4 % (ref 20–42)
Lab: NORMAL
MAGNESIUM: 1.2 MG/DL (ref 1.6–2.6)
MAGNESIUM: 2 MG/DL (ref 1.6–2.6)
MCH RBC QN AUTO: 28.1 PG (ref 26–35)
MCHC RBC AUTO-ENTMCNC: 31.3 % (ref 32–34.5)
MCV RBC AUTO: 89.9 FL (ref 80–99.9)
MONOCYTES ABSOLUTE: 0.74 E9/L (ref 0.1–0.95)
MONOCYTES RELATIVE PERCENT: 3.4 % (ref 2–12)
MYELOCYTE PERCENT: 0.9 % (ref 0–0)
NEGATIVE QC PASS/FAIL: NORMAL
NEUTROPHILS ABSOLUTE: 22.79 E9/L (ref 1.8–7.3)
NEUTROPHILS RELATIVE PERCENT: 92.3 % (ref 43–80)
NITRITE, URINE: NEGATIVE
NUCLEATED RED BLOOD CELLS: 0 /100 WBC
OVALOCYTES: ABNORMAL
PDW BLD-RTO: 24.6 FL (ref 11.5–15)
PH UA: 5.5 (ref 5–9)
PHOSPHORUS: 3.1 MG/DL (ref 2.5–4.5)
PLATELET # BLD: 192 E9/L (ref 130–450)
PMV BLD AUTO: 11.8 FL (ref 7–12)
POIKILOCYTES: ABNORMAL
POLYCHROMASIA: ABNORMAL
POSITIVE QC PASS/FAIL: NORMAL
POTASSIUM REFLEX MAGNESIUM: 2.7 MMOL/L (ref 3.5–5)
POTASSIUM REFLEX MAGNESIUM: 3.2 MMOL/L (ref 3.5–5)
PROTEIN UA: NEGATIVE MG/DL
PROTHROMBIN TIME: 23.3 SEC (ref 9.3–12.4)
RBC # BLD: 1.99 E12/L (ref 3.5–5.5)
SARS-COV-2, NAAT: NOT DETECTED
SCHISTOCYTES: ABNORMAL
SODIUM BLD-SCNC: 128 MMOL/L (ref 132–146)
SODIUM BLD-SCNC: 133 MMOL/L (ref 132–146)
SPECIFIC GRAVITY UA: <=1.005 (ref 1–1.03)
TARGET CELLS: ABNORMAL
TOTAL PROTEIN: 7.2 G/DL (ref 6.4–8.3)
UROBILINOGEN, URINE: 1 E.U./DL
WBC # BLD: 24.5 E9/L (ref 4.5–11.5)

## 2022-02-20 PROCEDURE — 82140 ASSAY OF AMMONIA: CPT

## 2022-02-20 PROCEDURE — 96374 THER/PROPH/DIAG INJ IV PUSH: CPT

## 2022-02-20 PROCEDURE — A4216 STERILE WATER/SALINE, 10 ML: HCPCS | Performed by: FAMILY MEDICINE

## 2022-02-20 PROCEDURE — 86900 BLOOD TYPING SEROLOGIC ABO: CPT

## 2022-02-20 PROCEDURE — 87040 BLOOD CULTURE FOR BACTERIA: CPT

## 2022-02-20 PROCEDURE — 2580000003 HC RX 258: Performed by: FAMILY MEDICINE

## 2022-02-20 PROCEDURE — 99283 EMERGENCY DEPT VISIT LOW MDM: CPT

## 2022-02-20 PROCEDURE — 6360000002 HC RX W HCPCS: Performed by: FAMILY MEDICINE

## 2022-02-20 PROCEDURE — 86850 RBC ANTIBODY SCREEN: CPT

## 2022-02-20 PROCEDURE — 86923 COMPATIBILITY TEST ELECTRIC: CPT

## 2022-02-20 PROCEDURE — 6360000004 HC RX CONTRAST MEDICATION: Performed by: RADIOLOGY

## 2022-02-20 PROCEDURE — 84100 ASSAY OF PHOSPHORUS: CPT

## 2022-02-20 PROCEDURE — 80048 BASIC METABOLIC PNL TOTAL CA: CPT

## 2022-02-20 PROCEDURE — 96375 TX/PRO/DX INJ NEW DRUG ADDON: CPT

## 2022-02-20 PROCEDURE — C9113 INJ PANTOPRAZOLE SODIUM, VIA: HCPCS | Performed by: FAMILY MEDICINE

## 2022-02-20 PROCEDURE — 83605 ASSAY OF LACTIC ACID: CPT

## 2022-02-20 PROCEDURE — 6360000002 HC RX W HCPCS: Performed by: STUDENT IN AN ORGANIZED HEALTH CARE EDUCATION/TRAINING PROGRAM

## 2022-02-20 PROCEDURE — C9113 INJ PANTOPRAZOLE SODIUM, VIA: HCPCS | Performed by: STUDENT IN AN ORGANIZED HEALTH CARE EDUCATION/TRAINING PROGRAM

## 2022-02-20 PROCEDURE — 85018 HEMOGLOBIN: CPT

## 2022-02-20 PROCEDURE — 74177 CT ABD & PELVIS W/CONTRAST: CPT

## 2022-02-20 PROCEDURE — 96361 HYDRATE IV INFUSION ADD-ON: CPT

## 2022-02-20 PROCEDURE — 2580000003 HC RX 258: Performed by: STUDENT IN AN ORGANIZED HEALTH CARE EDUCATION/TRAINING PROGRAM

## 2022-02-20 PROCEDURE — 85730 THROMBOPLASTIN TIME PARTIAL: CPT

## 2022-02-20 PROCEDURE — 83735 ASSAY OF MAGNESIUM: CPT

## 2022-02-20 PROCEDURE — P9016 RBC LEUKOCYTES REDUCED: HCPCS

## 2022-02-20 PROCEDURE — 2000000000 HC ICU R&B

## 2022-02-20 PROCEDURE — 83690 ASSAY OF LIPASE: CPT

## 2022-02-20 PROCEDURE — 87081 CULTURE SCREEN ONLY: CPT

## 2022-02-20 PROCEDURE — 85610 PROTHROMBIN TIME: CPT

## 2022-02-20 PROCEDURE — 86901 BLOOD TYPING SEROLOGIC RH(D): CPT

## 2022-02-20 PROCEDURE — 87635 SARS-COV-2 COVID-19 AMP PRB: CPT

## 2022-02-20 PROCEDURE — 85025 COMPLETE CBC W/AUTO DIFF WBC: CPT

## 2022-02-20 PROCEDURE — 71260 CT THORAX DX C+: CPT

## 2022-02-20 PROCEDURE — 80053 COMPREHEN METABOLIC PANEL: CPT

## 2022-02-20 PROCEDURE — 99223 1ST HOSP IP/OBS HIGH 75: CPT | Performed by: FAMILY MEDICINE

## 2022-02-20 PROCEDURE — 85014 HEMATOCRIT: CPT

## 2022-02-20 PROCEDURE — 51702 INSERT TEMP BLADDER CATH: CPT

## 2022-02-20 PROCEDURE — 81003 URINALYSIS AUTO W/O SCOPE: CPT

## 2022-02-20 PROCEDURE — A4216 STERILE WATER/SALINE, 10 ML: HCPCS | Performed by: STUDENT IN AN ORGANIZED HEALTH CARE EDUCATION/TRAINING PROGRAM

## 2022-02-20 PROCEDURE — 36415 COLL VENOUS BLD VENIPUNCTURE: CPT

## 2022-02-20 RX ORDER — MORPHINE SULFATE 4 MG/ML
4 INJECTION, SOLUTION INTRAMUSCULAR; INTRAVENOUS ONCE
Status: COMPLETED | OUTPATIENT
Start: 2022-02-20 | End: 2022-02-20

## 2022-02-20 RX ORDER — SODIUM CHLORIDE 9 MG/ML
INJECTION, SOLUTION INTRAVENOUS CONTINUOUS
Status: DISCONTINUED | OUTPATIENT
Start: 2022-02-20 | End: 2022-02-21

## 2022-02-20 RX ORDER — ONDANSETRON 4 MG/1
4 TABLET, ORALLY DISINTEGRATING ORAL EVERY 8 HOURS PRN
Status: DISCONTINUED | OUTPATIENT
Start: 2022-02-20 | End: 2022-02-26 | Stop reason: HOSPADM

## 2022-02-20 RX ORDER — OCTREOTIDE ACETATE 50 UG/ML
50 INJECTION, SOLUTION INTRAVENOUS; SUBCUTANEOUS ONCE
Status: DISCONTINUED | OUTPATIENT
Start: 2022-02-20 | End: 2022-02-22

## 2022-02-20 RX ORDER — SODIUM CHLORIDE 9 MG/ML
10 INJECTION INTRAVENOUS DAILY
Status: DISCONTINUED | OUTPATIENT
Start: 2022-02-20 | End: 2022-02-24

## 2022-02-20 RX ORDER — MAGNESIUM SULFATE IN WATER 40 MG/ML
2000 INJECTION, SOLUTION INTRAVENOUS ONCE
Status: COMPLETED | OUTPATIENT
Start: 2022-02-20 | End: 2022-02-20

## 2022-02-20 RX ORDER — SODIUM CHLORIDE 0.9 % (FLUSH) 0.9 %
5-40 SYRINGE (ML) INJECTION PRN
Status: DISCONTINUED | OUTPATIENT
Start: 2022-02-20 | End: 2022-02-22

## 2022-02-20 RX ORDER — POLYETHYLENE GLYCOL 3350 17 G/17G
17 POWDER, FOR SOLUTION ORAL DAILY PRN
Status: DISCONTINUED | OUTPATIENT
Start: 2022-02-20 | End: 2022-02-26 | Stop reason: HOSPADM

## 2022-02-20 RX ORDER — ONDANSETRON 2 MG/ML
4 INJECTION INTRAMUSCULAR; INTRAVENOUS EVERY 6 HOURS PRN
Status: DISCONTINUED | OUTPATIENT
Start: 2022-02-20 | End: 2022-02-26 | Stop reason: HOSPADM

## 2022-02-20 RX ORDER — 0.9 % SODIUM CHLORIDE 0.9 %
1000 INTRAVENOUS SOLUTION INTRAVENOUS ONCE
Status: COMPLETED | OUTPATIENT
Start: 2022-02-20 | End: 2022-02-20

## 2022-02-20 RX ORDER — POTASSIUM CHLORIDE 7.45 MG/ML
10 INJECTION INTRAVENOUS ONCE
Status: COMPLETED | OUTPATIENT
Start: 2022-02-20 | End: 2022-02-20

## 2022-02-20 RX ORDER — POTASSIUM CHLORIDE 20 MEQ/1
40 TABLET, EXTENDED RELEASE ORAL ONCE
Status: DISCONTINUED | OUTPATIENT
Start: 2022-02-20 | End: 2022-02-22

## 2022-02-20 RX ORDER — SODIUM CHLORIDE 9 MG/ML
20 INJECTION INTRAVENOUS ONCE
Status: COMPLETED | OUTPATIENT
Start: 2022-02-20 | End: 2022-02-20

## 2022-02-20 RX ORDER — ONDANSETRON 2 MG/ML
4 INJECTION INTRAMUSCULAR; INTRAVENOUS ONCE
Status: COMPLETED | OUTPATIENT
Start: 2022-02-20 | End: 2022-02-20

## 2022-02-20 RX ORDER — PANTOPRAZOLE SODIUM 40 MG/10ML
40 INJECTION, POWDER, LYOPHILIZED, FOR SOLUTION INTRAVENOUS 2 TIMES DAILY
Status: DISCONTINUED | OUTPATIENT
Start: 2022-02-20 | End: 2022-02-24

## 2022-02-20 RX ORDER — CEFTRIAXONE 1 G/1
INJECTION, POWDER, FOR SOLUTION INTRAMUSCULAR; INTRAVENOUS
Status: DISPENSED
Start: 2022-02-20 | End: 2022-02-20

## 2022-02-20 RX ORDER — ACETAMINOPHEN 325 MG/1
650 TABLET ORAL EVERY 6 HOURS PRN
Status: DISCONTINUED | OUTPATIENT
Start: 2022-02-20 | End: 2022-02-26 | Stop reason: HOSPADM

## 2022-02-20 RX ORDER — SODIUM CHLORIDE 9 MG/ML
INJECTION, SOLUTION INTRAVENOUS PRN
Status: DISCONTINUED | OUTPATIENT
Start: 2022-02-20 | End: 2022-02-22

## 2022-02-20 RX ORDER — MORPHINE SULFATE 2 MG/ML
1 INJECTION, SOLUTION INTRAMUSCULAR; INTRAVENOUS
Status: DISCONTINUED | OUTPATIENT
Start: 2022-02-20 | End: 2022-02-26 | Stop reason: HOSPADM

## 2022-02-20 RX ORDER — SODIUM CHLORIDE 0.9 % (FLUSH) 0.9 %
5-40 SYRINGE (ML) INJECTION EVERY 12 HOURS SCHEDULED
Status: DISCONTINUED | OUTPATIENT
Start: 2022-02-20 | End: 2022-02-26 | Stop reason: HOSPADM

## 2022-02-20 RX ORDER — PANTOPRAZOLE SODIUM 40 MG/10ML
80 INJECTION, POWDER, LYOPHILIZED, FOR SOLUTION INTRAVENOUS ONCE
Status: COMPLETED | OUTPATIENT
Start: 2022-02-20 | End: 2022-02-20

## 2022-02-20 RX ORDER — ACETAMINOPHEN 650 MG/1
650 SUPPOSITORY RECTAL EVERY 6 HOURS PRN
Status: DISCONTINUED | OUTPATIENT
Start: 2022-02-20 | End: 2022-02-26 | Stop reason: HOSPADM

## 2022-02-20 RX ORDER — SODIUM CHLORIDE 9 MG/ML
25 INJECTION, SOLUTION INTRAVENOUS PRN
Status: DISCONTINUED | OUTPATIENT
Start: 2022-02-20 | End: 2022-02-26 | Stop reason: HOSPADM

## 2022-02-20 RX ADMIN — OCTREOTIDE ACETATE 25 MCG/HR: 500 INJECTION, SOLUTION INTRAVENOUS; SUBCUTANEOUS at 18:52

## 2022-02-20 RX ADMIN — SODIUM CHLORIDE 1000 ML: 9 INJECTION, SOLUTION INTRAVENOUS at 11:45

## 2022-02-20 RX ADMIN — ONDANSETRON 4 MG: 2 INJECTION INTRAMUSCULAR; INTRAVENOUS at 17:27

## 2022-02-20 RX ADMIN — SODIUM CHLORIDE, PRESERVATIVE FREE 10 ML: 5 INJECTION INTRAVENOUS at 20:43

## 2022-02-20 RX ADMIN — MORPHINE SULFATE 4 MG: 4 INJECTION, SOLUTION INTRAMUSCULAR; INTRAVENOUS at 14:33

## 2022-02-20 RX ADMIN — IOPAMIDOL 75 ML: 755 INJECTION, SOLUTION INTRAVENOUS at 14:18

## 2022-02-20 RX ADMIN — PANTOPRAZOLE SODIUM 40 MG: 40 INJECTION, POWDER, FOR SOLUTION INTRAVENOUS at 20:44

## 2022-02-20 RX ADMIN — ONDANSETRON 4 MG: 2 INJECTION INTRAMUSCULAR; INTRAVENOUS at 11:39

## 2022-02-20 RX ADMIN — MORPHINE SULFATE 1 MG: 2 INJECTION, SOLUTION INTRAMUSCULAR; INTRAVENOUS at 20:44

## 2022-02-20 RX ADMIN — MAGNESIUM SULFATE HEPTAHYDRATE 2000 MG: 40 INJECTION, SOLUTION INTRAVENOUS at 18:52

## 2022-02-20 RX ADMIN — CEFTRIAXONE 1000 MG: 1 INJECTION, POWDER, FOR SOLUTION INTRAMUSCULAR; INTRAVENOUS at 13:01

## 2022-02-20 RX ADMIN — SODIUM CHLORIDE: 9 INJECTION, SOLUTION INTRAVENOUS at 18:55

## 2022-02-20 RX ADMIN — PANTOPRAZOLE SODIUM 80 MG: 40 INJECTION, POWDER, FOR SOLUTION INTRAVENOUS at 11:45

## 2022-02-20 RX ADMIN — POTASSIUM CHLORIDE 10 MEQ: 7.46 INJECTION, SOLUTION INTRAVENOUS at 18:53

## 2022-02-20 RX ADMIN — SODIUM CHLORIDE 20 ML: 9 INJECTION, SOLUTION INTRAMUSCULAR; INTRAVENOUS; SUBCUTANEOUS at 11:44

## 2022-02-20 ASSESSMENT — PAIN DESCRIPTION - ORIENTATION: ORIENTATION: LEFT

## 2022-02-20 ASSESSMENT — ENCOUNTER SYMPTOMS
NAUSEA: 1
VOMITING: 1

## 2022-02-20 ASSESSMENT — PAIN SCALES - GENERAL
PAINLEVEL_OUTOF10: 9
PAINLEVEL_OUTOF10: 10
PAINLEVEL_OUTOF10: 9

## 2022-02-20 ASSESSMENT — PAIN DESCRIPTION - PAIN TYPE: TYPE: ACUTE PAIN

## 2022-02-20 ASSESSMENT — PAIN DESCRIPTION - LOCATION: LOCATION: ABDOMEN;ARM;SHOULDER

## 2022-02-20 NOTE — LETTER
[facility] [address]  [address] [facility] [address]  Phone: [phone]        February 26, 2022     Patient: Genet Glover   YOB: 1980   Date of Visit: 2/20/2022       To Whom It May Concern:    Cirilo Khan has been hospitalized at [address] [place] [address] from Sunday, February 20, 2022 until Saturday, February 26, 2022. It is my medical opinion that Cirilo Khan is free to return to work unrestricted on Monday, February 28, 2022. If you have any questions or concerns, please don't hesitate to call.     Sincerely,            Dr. Mario Fernandez MD

## 2022-02-20 NOTE — H&P
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 [place]  7/4/2020    EGD ESOPHAGOGASTRODUODENOSCOPY ENDOSCOPIC VARICEAL TREATMENT performed by Shay Valdez MD at [address] [place] N/A 9/17/2021    EGD, BANDING OF VARICES performed by Shay Valdez MD at [facility] OR       Medications Prior to Admission:    Prior to Admission medications    Medication Sig Start Date End Date Taking? Authorizing Provider   spironolactone (ALDACTONE) 50 MG tablet Take 50 mg by mouth daily 9/13/21   Historical Provider, MD   methylPREDNISolone (MEDROL DOSEPACK) 4 MG tablet Take by mouth.  2/10/22   Lieutenant Chris DO   tiZANidine (ZANAFLEX) 2 MG tablet Take 1 tablet by mouth 4 times daily as needed (back pain/ spasm) 2/10/22   Ekaterina Lawrence DO   furosemide (LASIX) 20 MG tablet Take 20 mg by mouth daily as needed  5/7/21   Historical Provider, MD   lactulose (CHRONULAC) 10 GM/15ML solution Take 20 g by mouth 3 times daily as needed  2/12/21   Historical Provider, MD   omeprazole (PRILOSEC) 20 MG delayed release capsule Take 20 mg by mouth Daily  3/5/21   Historical Provider, MD   KLOR-CON M20 20 MEQ extended release tablet Take 20 mEq by mouth daily  3/30/21   Historical Provider, MD   vitamin A 3 MG (22669 UT) capsule Take 10,000 Units by mouth daily 10/1/20   Historical Provider, MD   gabapentin (NEURONTIN) 100 MG capsule Take 100 mg by mouth as needed. Historical Provider, MD   vitamin D (ERGOCALCIFEROL) 1.25 MG (96845 UT) CAPS capsule Take 50,000 Units by mouth once a week  2/11/20   Historical Provider, MD   CREON 18425 units delayed release capsule Take 12,000 Units by mouth 4 times daily (before meals and nightly)  2/12/20   Historical Provider, MD       Allergies:    Pcn [penicillins] -> [place]    Social History:    reports that she has never smoked. She has never used smokeless tobacco. She reports current alcohol use. She reports that she does not use drugs. Lives with her parents and has a boxer dog. Family History:   family history includes Breast Cancer in an other family member; High Blood Pressure in her mother; Other in her father and mother. PHYSICAL EXAM:  Vitals:  /71   Pulse 110   Temp 98.8 °F (37.1 °C) (Oral)   Resp 16   Ht 5' 4\" (1.626 m)   Wt 125 lb (56.7 kg)   SpO2 97%   BMI 21.46 kg/m²     General Appearance: alert and oriented to person, place and time and in mild acute abdominal distress  Skin: warm and dry  Head: normocephalic and atraumatic  Eyes: pupils equal, round, and reactive to light, extraocular eye movements intact, conjunctivae normal.  Icterus noted bilaterally  Neck: neck supple and non tender without mass.   Left sternoclavicular joint is tender and swollen  Pulmonary/Chest: clear to auscultation bilaterally- no wheezes, rales or rhonchi, normal air movement, no respiratory distress  Cardiovascular: normal rate, normal S1 and S2 and no carotid bruits  Abdomen: soft, non-tender, slightly distended, normal bowel sounds, no masses or organomegaly  Extremities: no cyanosis, no clubbing and no edema  Neurologic: no cranial nerve deficit and speech normal    LABS:  Recent Labs     02/20/22  1136   *   K 2.7*   CL 85*   CO2 22   BUN 65*   CREATININE 1.0   GLUCOSE 157*   CALCIUM 8.2*       Recent Labs     02/20/22  1136   WBC 24.5*   RBC 1.99*   HGB 5.6*   HCT 17.9*   MCV 89.9   MCH 28.1 MCHC 31.3*   RDW 24.6*      MPV 11.8       Magnesium:    Lab Results   Component Value Date    MG 1.2 02/20/2022     PT/INR:    Lab Results   Component Value Date    PROTIME 23.3 02/20/2022    INR 2.1 02/20/2022     LIPASE:    Lab Results   Component Value Date    LIPASE 12 02/20/2022       Radiology:   CT CHEST W CONTRAST   Preliminary Result   Significant inflammatory changes as well as a focal fluid collection   surrounding the left sternoclavicular joint. Septic arthritis is suspected. And adjacent calcium density linear focus is appreciated slightly inferior   and lateral to the left sternoclavicular joint. A foreign body this location   cannot be excluded. Consider sampling of the fluid collection anterior and   slightly cranial to the left sternoclavicular joint. A more inferior portion   of the collection leads to at least moderate compression of the left   brachiocephalic vein which passes posterior to the collection. Multifocal segmental wall thickening of the distal ascending colon, hepatic   flexure and transverse colon. An infectious or inflammatory pneumonitis is   suspected. Ischemic colitis cannot be completely excluded although is felt   to be less likely. There is no obvious high-grade stenosis involving the   superior mesenteric artery or its main branches. Diffusely abnormal appearance of the liver with a cirrhotic appearing   morphology and innumerable hypodense hepatic lesions scattered throughout the   left and right lobes. The portal vein is patent and there is no definite   intrahepatic biliary ductal dilatation. Dilation of the gallbladder with either pericholecystic fluid and or   gallbladder wall thickening along with cholelithiasis. Findings may be   related to intrinsic liver disease and are similar in appearance to the   November 2020 exam.  Acute cholecystitis cannot completely be excluded.       Postsurgical changes related to partial pancreatectomy and splenectomy,   grossly stable. Additional, incidental findings as above. CT ABDOMEN PELVIS W IV CONTRAST Additional Contrast? None   Preliminary Result   Significant inflammatory changes as well as a focal fluid collection   surrounding the left sternoclavicular joint. Septic arthritis is suspected. And adjacent calcium density linear focus is appreciated slightly inferior   and lateral to the left sternoclavicular joint. A foreign body this location   cannot be excluded. Consider sampling of the fluid collection anterior and   slightly cranial to the left sternoclavicular joint. A more inferior portion   of the collection leads to at least moderate compression of the left   brachiocephalic vein which passes posterior to the collection. Multifocal segmental wall thickening of the distal ascending colon, hepatic   flexure and transverse colon. An infectious or inflammatory pneumonitis is   suspected. Ischemic colitis cannot be completely excluded although is felt   to be less likely. There is no obvious high-grade stenosis involving the   superior mesenteric artery or its main branches. Diffusely abnormal appearance of the liver with a cirrhotic appearing   morphology and innumerable hypodense hepatic lesions scattered throughout the   left and right lobes. The portal vein is patent and there is no definite   intrahepatic biliary ductal dilatation. Dilation of the gallbladder with either pericholecystic fluid and or   gallbladder wall thickening along with cholelithiasis. Findings may be   related to intrinsic liver disease and are similar in appearance to the   November 2020 exam.  Acute cholecystitis cannot completely be excluded. Postsurgical changes related to partial pancreatectomy and splenectomy,   grossly stable. Additional, incidental findings as above.              EKG:   N/A    ASSESSMENT:      Principal Problem:    UGI bleed  Resolved Problems:    * No resolved hospital problems. *      PLAN:    1. Acute blood loss anemia - most likely from recurrent esophageal variceal bleed. Admit to the ICU. Receiving 2 U of PRBCs for Hgb of 5.6. IV Protonix bid and IV Octreotide. Occult blood stools. Consults with Intensivist and GI. Monitor H/H and treat accordingly. Defer need for continuous PPI to GI and/or Intensivist.  2.  Septic arthritis of Left sternoclavicular joint - patient with tenderness and elevated white count. Did receive steroid injection recently. CT evidence of septic joint involvement. Consult with Orthopedics. ER gave a dose of Rocephin IV. Blood cultures ordered. 3.   Liver cirrhosis with esophageal varices - consult with GI.  IV PPI and Octreotide ordered. Serial H/Hs. Correction of electrolyte abnormalities. INR 2.1. Bilirubin 4.8. Lipase 12  4. Hypomagnesemia - Mg2+ 1.2; received 2 gm IV bolus in ER. May need a 2nd IV bolus. Repeat Mg2+ level with next blood check. Treat accordingly. 5.   Hypokalemia - K+ 2.7 . IV replacement  Done. Trend labs and treat accordingly. Code Status: Full  DVT prophylaxis: deferred due to GI bleeding. NOTE: This report was transcribed using voice recognition software. Every effort was made to ensure accuracy; however, inadvertent computerized transcription errors may be present.     Electronically signed by Shagufta Torres MD on 2/20/2022 at 4:29 PM

## 2022-02-20 NOTE — ED NOTES
Ultrasound-guided IV 18-gauge placed in the left AC fossa by this provider. Patient tolerated the procedure well. Needle visualized within vessel, flash observed, and IV flushes without difficulty.      Leslye Polanco MD  02/20/22 1125

## 2022-02-20 NOTE — ED PROVIDER NOTES
Name: Hailey Baez   MRN: [id number]     --------------------------------------------- History of Present Illness ---------------------------------------------  2/20/22, Time: 10:41 AM EST   Chief Complaint   Patient presents with    Emesis     x 3 this am      HPI    Hailey Baez is a 46 y.o. female, with hx of alcoholic cirrhosis (Dr. Angy Benitez, GI in [address] [place] [address] and Dr. Fuad Clark), who presents to the ED today for vomiting, which began today. She states she had 4 episodes of vomiting relates having coffee ground colored emesis. Denies any bright red blood however. Pt feels a bit nauseated now. The patient describes this as intermittent and moderate in severity. Has not taken anything for the nausea this morning. Nothing makes it better or worse. The pt denies any associated fever, HA, chest pain, shortness of breath, abd pain, GI or  complaints. Pt is here with mom. Denies any vaginal bleeding or discharge. Pt relates she still drinks a few times a week, drinks white claws.  Allg: Pcn [penicillins] PCP: Soni Lawrence DO.    Meds:   Current Facility-Administered Medications:     0.9 % sodium chloride infusion, , IntraVENous, PRN, Sher Bolaños, DO    octreotide (SANDOSTATIN) injection 50 mcg, 50 mcg, IntraVENous, Once, Sher Bolaños, DO    potassium chloride (KLOR-CON M) extended release tablet 40 mEq, 40 mEq, Oral, Once, Sher Bolaños, DO    sodium chloride flush 0.9 % injection 5-40 mL, 5-40 mL, IntraVENous, 2 times per day, Dayanara Zendejas MD    sodium chloride flush 0.9 % injection 5-40 mL, 5-40 mL, IntraVENous, PRN, Dayanara Zendejas MD    0.9 % sodium chloride infusion, 25 mL, IntraVENous, PRN, Dayanara Zendejas MD    ondansetron (ZOFRAN-ODT) disintegrating tablet 4 mg, 4 mg, Oral, Q8H PRN **OR** ondansetron (ZOFRAN) injection 4 mg, 4 mg, IntraVENous, Q6H PRN, Dayanara Zendejas MD, 4 mg at 02/20/22 1727    polyethylene glycol (GLYCOLAX) packet 17 g, 17 g, Oral, Daily PRN, Corrine Colmenares MD    acetaminophen (TYLENOL) tablet 650 mg, 650 mg, Oral, Q6H PRN **OR** acetaminophen (TYLENOL) suppository 650 mg, 650 mg, Rectal, Q6H PRN, Corrine Colmenares MD    pantoprazole (PROTONIX) injection 40 mg, 40 mg, IntraVENous, BID, 40 mg at 02/20/22 2044 **AND** sodium chloride (PF) 0.9 % injection 10 mL, 10 mL, IntraVENous, Daily, Corrine Colmenares MD, 10 mL at 02/20/22 2043    morphine (PF) injection 1 mg, 1 mg, IntraVENous, Q3H PRN, Corrine Colmenares MD, 1 mg at 02/20/22 2044    0.9 % sodium chloride infusion, , IntraVENous, Continuous, Corrine Colmenares MD, Last Rate: 125 mL/hr at 02/20/22 1855, New Bag at 02/20/22 1855    octreotide (SANDOSTATIN) 500 mcg in sodium chloride 0.9 % 100 mL infusion, 50 mcg/hr, IntraVENous, Continuous, Griselda Yu MD, Last Rate: 10 mL/hr at 02/20/22 2351, 50 mcg/hr at 02/20/22 2351     Review of Systems   Gastrointestinal: Positive for nausea and vomiting. Physical Exam  Constitutional:       General: She is not in acute distress. Appearance: Normal appearance. She is normal weight. She is ill-appearing. She is not toxic-appearing or diaphoretic. HENT:      Head: Normocephalic and atraumatic. Right Ear: External ear normal.      Left Ear: External ear normal.      Nose: Nose normal. No rhinorrhea. Mouth/Throat:      Pharynx: Oropharynx is clear. Eyes:      General:         Right eye: No discharge. Left eye: No discharge. Conjunctiva/sclera: Conjunctivae normal.      Pupils: Pupils are equal, round, and reactive to light. Cardiovascular:      Rate and Rhythm: Regular rhythm. Tachycardia present. Pulses: Normal pulses. Pulmonary:      Effort: Pulmonary effort is normal. No respiratory distress. Breath sounds: No stridor. No wheezing. Abdominal:      General: Bowel sounds are normal. There is no distension. Palpations: Abdomen is soft. Tenderness: There is no abdominal tenderness.  There is no guarding. Musculoskeletal:         General: No swelling or tenderness. Normal range of motion. Cervical back: Normal range of motion. Right lower leg: No edema. Left lower leg: No edema. Skin:     General: Skin is warm and dry. Capillary Refill: Capillary refill takes less than 2 seconds. Coloration: Skin is jaundiced. Findings: No erythema or rash. Neurological:      General: No focal deficit present. Mental Status: She is alert and oriented to person, place, and time. Psychiatric:         Mood and Affect: Mood normal.         Behavior: Behavior normal.          Procedures     MDM  Number of Diagnoses or Management Options  Acute anemia  Alcoholic cirrhosis, unspecified whether ascites present (HCC)  Coffee ground emesis  Colitis  Gall stones  Hypokalemia  Lactic acidosis  Pyogenic arthritis of left shoulder region, due to unspecified organism [facility])  Diagnosis management comments: Mrs. Rhina Ledezma 82year-old female patient with history of cirrhosis as well as esophageal varices that have not needed previous banding procedures who presents today with coffee-ground emesis since this morning. Physical exam, patient is alert, somewhat ill-appearing, jaundice. Heart rate 106, other vitals within normal limits. Lung sounds clear and equal, heart rate tacky, regular. Abdomen mildly distended however nontender to palpation. Skin is warm and dry. Concern this time for esophageal variceal bleeding, worsening cirrhosis, hepatorenal syndrome, July-Rodas, aspiration pneumonia. Lab work as well as CT chest and abdomen were ordered. Rocephin, protonix, iv fluids and zofran given. Hemoglobin 5.6, decreased 3 points from 2 months ago. Leukocytosis 24.5. 2 units PRBC ordered. Lactate 8.5. Potassium 2.7, potassium replacement ordered. Mag given. Second IV started. Octreotide ordered.  CT findings showed possible septic arthritis of the left AC joint, gallbladder wall thickening and gallstones (present on previous scans however), and colitis. Darek Luciano Spoke with Dr. Daniel Manning, intensivist, and discussed pt. [place] best for pt to go to ICU. Spoke w/ Dr. Eleazar Martines, admitting doc, will admit pt to ICU for further care. Page placed for ortho consult. Pt amenable to plan. Amount and/or Complexity of Data Reviewed  Decide to obtain previous medical records or to obtain history from someone other than the patient: yes         ED Course as of 02/21/22 0111   Sun Feb 20, 2022   1207 Hemoglobin Quant(!!): 5.6  Hgb 5.6. , decreased 3 points from 2 mo ago [PW]   1208 WBC(!): 24.5  leukocytosis [PW]   1214 Octreotide ordered. [PW]   1216 PRBCs ordered. [PW]   1233 Lactic 8.5  K 2.7 , potassium repletion ordered [PW]   1234 Magnesium(!): 1.2  Mag replacement ordered. [PW]   3774 Pt receiving PRBCs now. [PW]   46 Page placed for Dr. Alla Worthy, orthopedics, for consult for [facility] septic [facility] [PW]   1632 Dr. Eleazar Martines, hospitalist, saw pt. Will admit to ICU and place orders. [PW]   1138 [facility] w/ Dr. Daniel Manning, intensivist, made aware of pt. [PW]      ED Course User Index  [PW] Zulema Rosa, DO        ED Course as of 02/21/22 0111   Sun Feb 20, 2022   1207 Hemoglobin Quant(!!): 5.6  Hgb 5.6. , decreased 3 points from 2 mo ago [PW]   0785 WBC(!): 24.5  leukocytosis [PW]   1214 Octreotide ordered. [PW]   1216 PRBCs ordered. [PW]   1233 Lactic 8.5  K 2.7 , potassium repletion ordered [PW]   1234 Magnesium(!): 1.2  Mag replacement ordered. [PW]   5825 Pt receiving PRBCs now. [PW]   46 Page placed for Dr. Alla Worthy, orthopedics, for consult for possible left [facility] joint septic arthritis [PW]   1632 Dr. Eleazar Martines, hospitalist, saw pt. Will admit to ICU and place orders. [PW]   4548 Obi Hernandez/ Dr. Daniel Manning, intensivist, made aware of pt.   [PW]      ED Course User Index  [PW] Zulema Rosa, DO       --------------------------------------------- PAST HISTORY ---------------------------------------------  Past Medical History:  has a past medical history of Alcohol abuse, Alcoholic cirrhosis ([facility] [address] Anxiety, Hypertension, Hypothyroidism, and Pancreatic cyst.    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 Social History:  reports that she has never smoked. She has never used smokeless tobacco. She reports current alcohol use. She reports that she does not use drugs. Family History: family history includes Breast Cancer in an other family member; High Blood Pressure in her mother; Other in her father and mother. The patients home medications have been reviewed.     Allergies: Pcn [penicillins]    -------------------------------------------------- RESULTS -------------------------------------------------    LABS:  Results for orders placed or performed during the hospital encounter of 02/20/22   COVID-19, Rapid    Specimen: Nasopharyngeal Swab   Result Value Ref Range    SARS-CoV-2, NAAT Not Detected Not Detected   CBC with Auto Differential   Result Value Ref Range    WBC 24.5 (H) 4.5 - 11.5 E9/L    RBC 1.99 (L) 3.50 - 5.50 E12/L    Hemoglobin 5.6 (LL) 11.5 - 15.5 g/dL    Hematocrit 17.9 (L) 34.0 - 48.0 %    MCV 89.9 80.0 - 99.9 fL    MCH 28.1 26.0 - 35.0 pg    MCHC 31.3 (L) 32.0 - 34.5 %    RDW 24.6 (H) 11.5 - 15.0 fL    Platelets 373 908 - 223 E9/L    MPV 11.8 7.0 - 12.0 fL    Neutrophils % 92.3 (H) 43.0 - 80.0 %    Lymphocytes % 3.4 (L) 20.0 - 42.0 %    Monocytes % 3.4 2.0 - 12.0 %    Eosinophils % 0.0 0.0 - 6.0 %    Basophils % 0.0 0.0 - 2.0 %    Neutrophils Absolute 22.79 (H) 1.80 - 7.30 E9/L    Lymphocytes Absolute 0.74 (L) 1.50 - 4.00 E9/L    Monocytes Absolute 0.74 0.10 - 0.95 E9/L    Eosinophils Absolute 0.00 (L) 0.05 - 0.50 E9/L    Basophils Absolute 0.00 0.00 - 0.20 E9/L    Myelocyte Percent 0.9 0 - 0 %    nRBC 0.0 /100 WBC    Anisocytosis 3+     Polychromasia 1+     Hypochromia 2+     Poikilocytes 2+     Schistocytes 1+     Jacquelyn Cells 1+     Ovalocytes 1+     Target Cells 2+    Comprehensive Metabolic Panel w/ Reflex to MG   Result Value Ref Range    Sodium 128 (L) 132 - 146 mmol/L    Potassium reflex Magnesium 2.7 (LL) 3.5 - 5.0 mmol/L    Chloride 85 (L) 98 - 107 mmol/L    CO2 22 22 - 29 mmol/L    Anion Gap 21 (H) 7 - 16 mmol/L    Glucose 157 (H) 74 - 99 mg/dL    BUN 65 (H) 6 - 20 mg/dL    CREATININE 1.0 0.5 - 1.0 mg/dL    GFR Non-African American >60 >=60 mL/min/1.73    GFR African American >60     Calcium 8.2 (L) 8.6 - 10.2 mg/dL    Total Protein 7.2 6.4 - 8.3 g/dL    Albumin 2.8 (L) 3.5 - 5.2 g/dL    Total Bilirubin 4.8 (H) 0.0 - 1.2 mg/dL    Alkaline Phosphatase 167 (H) 35 - 104 U/L    ALT 23 0 - 32 U/L    AST 76 (H) 0 - 31 U/L   Lipase   Result Value Ref Range    Lipase 12 (L) 13 - 60 U/L   Magnesium   Result Value Ref Range    Magnesium 1.2 (L) 1.6 - 2.6 mg/dL   Phosphorus   Result Value Ref Range    Phosphorus 3.1 2.5 - 4.5 mg/dL   Lactic Acid   Result Value Ref Range    Lactic Acid 8.5 (HH) 0.5 - 2.2 mmol/L   Urinalysis   Result Value Ref Range    Color, UA Yellow Straw/Yellow    Clarity, UA Clear Clear    Glucose, Ur Negative Negative mg/dL    Bilirubin Urine Negative Negative    Ketones, Urine Negative Negative mg/dL    Specific Gravity, UA <=1.005 1.005 - 1.030    Blood, Urine Negative Negative    pH, UA 5.5 5.0 - 9.0    Protein, UA Negative Negative mg/dL    Urobilinogen, Urine 1.0 <2.0 E.U./dL    Nitrite, Urine Negative Negative    Leukocyte Esterase, Urine Negative Negative   Protime-INR   Result Value Ref Range    Protime 23.3 (H) 9.3 - 12.4 sec    INR 2.1    APTT   Result Value Ref Range    aPTT 33.4 24.5 - 35.1 sec   Hemoglobin and Hematocrit   Result Value Ref Range    Hemoglobin 7.6 (L) 11.5 - 15.5 g/dL    Hematocrit 23.4 (L) 34.0 - 48.0 %   Ammonia   Result Value Ref Range    Ammonia 84.5 (H) 11.0 - 51.0 umol/L   Basic Metabolic Panel w/ Reflex to MG   Result Value Ref Range    Sodium 133 132 - 146 mmol/L    Potassium reflex Magnesium 3.2 (L) 3.5 - 5.0 mmol/L    Chloride 93 (L) 98 - 107 mmol/L    CO2 25 22 - 29 mmol/L    Anion Gap 15 7 - 16 mmol/L    Glucose 125 (H) 74 - 99 mg/dL    BUN 63 (H) 6 - 20 mg/dL    CREATININE 0.9 0.5 - 1.0 mg/dL    GFR Non-African American >60 >=60 mL/min/1.73    GFR African American >60     Calcium 7.8 (L) 8.6 - 10.2 mg/dL   Magnesium   Result Value Ref Range    Magnesium 2.0 1.6 - 2.6 mg/dL   Lactic Acid   Result Value Ref Range    Lactic Acid 3.8 (HH) 0.5 - 2.2 mmol/L   POC Pregnancy Urine   Result Value Ref Range    HCG, Urine, POC Negative Negative    Lot Number [id number]     Positive QC Pass/Fail Pass     Negative QC Pass/Fail Pass    TYPE AND SCREEN   Result Value Ref Range    ABO/Rh O NEG     Antibody Screen NEG    PREPARE RBC (CROSSMATCH), 2 Units   Result Value Ref Range    Product Code Blood Bank [id number]     Description Blood Bank Red Blood Cells, Leuko-reduced     Unit Number [id number]     Dispense Status Blood Bank transfused     Product Code Blood Bank [id number]     Description Blood Bank Red Blood Cells, Leuko-reduced     Unit Number [id number]     Dispense Status Blood Bank transfused        RADIOLOGY:  CT CHEST W CONTRAST   Final Result   Significant inflammatory changes as well as a focal fluid collection   surrounding the left sternoclavicular joint. Septic arthritis is suspected. An adjacent calcium density linear focus is appreciated slightly inferior and   lateral to the left sternoclavicular joint. A foreign body at this location   cannot be excluded. Consider sampling of the fluid collection anterior and   slightly cranial to the left sternoclavicular joint.   A more inferior portion   of the collection leads to at least moderate compression of the left   brachiocephalic vein which passes posterior to the collection. Multifocal segmental wall thickening of the distal ascending colon, hepatic   flexure and transverse colon. An infectious or inflammatory pneumonitis is   suspected. Ischemic colitis cannot be completely excluded although is felt   to be less likely. There is no obvious high-grade stenosis involving the   superior mesenteric artery or its main branches. Diffusely abnormal appearance of the liver with a cirrhotic appearing   morphology and innumerable hypodense hepatic lesions scattered throughout the   left and right lobes. The portal vein is patent and there is no definite   intrahepatic biliary ductal dilatation. Dilation of the gallbladder with either pericholecystic fluid and/or   gallbladder wall thickening along with cholelithiasis. Findings may be   related to intrinsic liver disease and are similar in appearance to the   November 2020 exam.  Acute cholecystitis cannot completely be excluded. Postsurgical changes related to partial pancreatectomy and splenectomy,   grossly stable. Additional, incidental findings as above. CT ABDOMEN PELVIS W IV CONTRAST Additional Contrast? None   Final Result   Significant inflammatory changes as well as a focal fluid collection   surrounding the left sternoclavicular joint. Septic arthritis is suspected. An adjacent calcium density linear focus is appreciated slightly inferior and   lateral to the left sternoclavicular joint. A foreign body at this location   cannot be excluded. Consider sampling of the fluid collection anterior and   slightly cranial to the left sternoclavicular joint. A more inferior portion   of the collection leads to at least moderate compression of the left   brachiocephalic vein which passes posterior to the collection. Multifocal segmental wall thickening of the distal ascending colon, hepatic   flexure and transverse colon. An infectious or inflammatory pneumonitis is   suspected. Ischemic colitis cannot be completely excluded although is felt   to be less likely. There is no obvious high-grade stenosis involving the   superior mesenteric artery or its main branches. Diffusely abnormal appearance of the liver with a cirrhotic appearing   morphology and innumerable hypodense hepatic lesions scattered throughout the   left and right lobes. The portal vein is patent and there is no definite   intrahepatic biliary ductal dilatation. Dilation of the gallbladder with either pericholecystic fluid and/or   gallbladder wall thickening along with cholelithiasis. Findings may be   related to intrinsic liver disease and are similar in appearance to the   November 2020 exam.  Acute cholecystitis cannot completely be excluded. Postsurgical changes related to partial pancreatectomy and splenectomy,   grossly stable. Additional, incidental findings as above.               ------------------------- NURSING NOTES AND VITALS REVIEWED ---------------------------  Date / Time Roomed:  2/20/2022 10:19 AM  ED Bed Assignment:  [id number]    The nursing notes within the ED encounter and vital signs as below have been reviewed.      Patient Vitals for the past 24 hrs:   BP Temp Temp src Pulse Resp SpO2 Height Weight   02/21/22 0000 126/80 99.1 °F (37.3 °C) Bladder 92 15 91 % -- --   02/20/22 2300 129/80 -- -- 95 12 91 % -- --   02/20/22 2200 138/84 -- -- 89 17 100 % -- --   02/20/22 2100 (!) 140/104 -- -- 93 15 95 % -- --   02/20/22 2004 138/81 98.3 °F (36.8 °C) Oral 85 17 100 % 5' 4\" (1.626 m) 132 lb 11.5 oz (60.2 kg)   02/20/22 1845 119/66 98.6 °F (37 °C) Oral 105 16 95 % -- --   02/20/22 1830 113/72 -- -- -- -- -- -- --   02/20/22 1815 114/63 -- -- -- -- -- -- --   02/20/22 1800 130/77 -- -- 104 -- -- -- --   02/20/22 1745 112/61 -- -- 101 -- 95 % -- --   02/20/22 1730 134/81 -- -- 107 -- 94 % -- --   02/20/22 1724 136/84 98.8 °F (37.1 °C) Oral 107 16 97 % -- --   02/20/22 1715 136/84 -- -- -- -- -- -- --   02/20/22 1600 115/71 -- -- 110 -- 97 % -- --   02/20/22 1545 111/69 -- -- 104 -- 93 % -- --   02/20/22 1530 105/63 -- -- 125 -- 95 % -- --   02/20/22 1515 110/63 -- -- 110 16 94 % -- --   02/20/22 1501 116/67 98.8 °F (37.1 °C) Oral 105 16 94 % -- --   02/20/22 1500 111/71 -- -- 109 16 -- -- --   02/20/22 1445 118/67 -- -- 110 -- 96 % -- --   02/20/22 1442 113/63 98.8 °F (37.1 °C) Oral 110 18 97 % -- --   02/20/22 1408 118/67 99.3 °F (37.4 °C) -- 75 18 98 % 5' 4\" (1.626 m) 125 lb (56.7 kg)   02/20/22 1230 123/75 -- -- -- 16 97 % -- --   02/20/22 1200 101/67 -- -- 101 16 95 % -- --   02/20/22 1018 116/62 98.5 °F (36.9 °C) Temporal 106 14 96 % 5' 4\" (1.626 m) 125 lb (56.7 kg)       Oxygen Saturation Interpretation: Normal    ------------------------------------------ PROGRESS NOTES ------------------------------------------      Counseling:  I have spoken with the patient and discussed todays results, in addition to providing specific details for the plan of care and counseling regarding the diagnosis and prognosis. Their questions are answered at this time and they are agreeable with the plan of admission.    --------------------------------- ADDITIONAL PROVIDER NOTES ---------------------------------  Consultations:  Time: 1800. Spoke with Dr. Kelle Israel. Discussed case. They will admit the patient. This patient's ED course included: history and physical, multiple bedside evaluations, IV medications, monitor, complex decision making and admission to ICU. This patient has remained hemodynamically stable during their ED course. Diagnosis:  1. Coffee ground emesis    2. Pyogenic arthritis of left shoulder region, due to unspecified organism ([address]    3. Acute anemia    4. Hypokalemia    5. Lactic acidosis    6. Alcoholic cirrhosis, unspecified whether ascites present ([address]    7. Gall stones    8.  Colitis        Disposition:  Patient's disposition: Admit to ICU  Patient's condition is poor.          Caro Dykes DO  Resident  02/21/22 0111

## 2022-02-21 ENCOUNTER — ANESTHESIA EVENT (OUTPATIENT)
Dept: OPERATING ROOM | Age: 42
DRG: 280 | End: 2022-02-21
Payer: MEDICAID

## 2022-02-21 ENCOUNTER — APPOINTMENT (OUTPATIENT)
Dept: GENERAL RADIOLOGY | Age: 42
DRG: 280 | End: 2022-02-21
Payer: MEDICAID

## 2022-02-21 LAB
ALBUMIN SERPL-MCNC: 2.3 G/DL (ref 3.5–5.2)
ALP BLD-CCNC: 152 U/L (ref 35–104)
ALT SERPL-CCNC: 19 U/L (ref 0–32)
AMMONIA: 81.3 UMOL/L (ref 11–51)
ANION GAP SERPL CALCULATED.3IONS-SCNC: 10 MMOL/L (ref 7–16)
ANISOCYTOSIS: ABNORMAL
AST SERPL-CCNC: 66 U/L (ref 0–31)
BASOPHILS ABSOLUTE: 0 E9/L (ref 0–0.2)
BASOPHILS RELATIVE PERCENT: 0 % (ref 0–2)
BILIRUB SERPL-MCNC: 4.7 MG/DL (ref 0–1.2)
BLOOD BANK DISPENSE STATUS: NORMAL
BLOOD BANK PRODUCT CODE: NORMAL
BPU ID: NORMAL
BUN BLDV-MCNC: 50 MG/DL (ref 6–20)
C-REACTIVE PROTEIN: 11 MG/DL (ref 0–0.4)
CALCIUM SERPL-MCNC: 7.5 MG/DL (ref 8.6–10.2)
CHLORIDE BLD-SCNC: 101 MMOL/L (ref 98–107)
CO2: 27 MMOL/L (ref 22–29)
CREAT SERPL-MCNC: 0.9 MG/DL (ref 0.5–1)
DESCRIPTION BLOOD BANK: NORMAL
EOSINOPHILS ABSOLUTE: 0 E9/L (ref 0.05–0.5)
EOSINOPHILS RELATIVE PERCENT: 0 % (ref 0–6)
GFR AFRICAN AMERICAN: >60
GFR NON-AFRICAN AMERICAN: >60 ML/MIN/1.73
GLUCOSE BLD-MCNC: 138 MG/DL (ref 74–99)
HCT VFR BLD CALC: 21.3 % (ref 34–48)
HCT VFR BLD CALC: 25.6 % (ref 34–48)
HEMOGLOBIN: 6.9 G/DL (ref 11.5–15.5)
HEMOGLOBIN: 8.5 G/DL (ref 11.5–15.5)
HOWELL-JOLLY BODIES: ABNORMAL
HYPOCHROMIA: ABNORMAL
LACTIC ACID: 2.4 MMOL/L (ref 0.5–2.2)
LYMPHOCYTES ABSOLUTE: 0.21 E9/L (ref 1.5–4)
LYMPHOCYTES RELATIVE PERCENT: 1 % (ref 20–42)
MAGNESIUM: 1.9 MG/DL (ref 1.6–2.6)
MCH RBC QN AUTO: 28.9 PG (ref 26–35)
MCHC RBC AUTO-ENTMCNC: 32.4 % (ref 32–34.5)
MCV RBC AUTO: 89.1 FL (ref 80–99.9)
MONOCYTES ABSOLUTE: 0.84 E9/L (ref 0.1–0.95)
MONOCYTES RELATIVE PERCENT: 4 % (ref 2–12)
MYELOCYTE PERCENT: 1 % (ref 0–0)
NEUTROPHILS ABSOLUTE: 19.86 E9/L (ref 1.8–7.3)
NEUTROPHILS RELATIVE PERCENT: 94 % (ref 43–80)
NUCLEATED RED BLOOD CELLS: 2 /100 WBC
PDW BLD-RTO: 22.3 FL (ref 11.5–15)
PHOSPHORUS: 4 MG/DL (ref 2.5–4.5)
PLATELET # BLD: 173 E9/L (ref 130–450)
PMV BLD AUTO: 11.5 FL (ref 7–12)
POLYCHROMASIA: ABNORMAL
POTASSIUM REFLEX MAGNESIUM: 3.1 MMOL/L (ref 3.5–5)
POTASSIUM SERPL-SCNC: 4 MMOL/L (ref 3.5–5)
RBC # BLD: 2.39 E12/L (ref 3.5–5.5)
SEDIMENTATION RATE, ERYTHROCYTE: 27 MM/HR (ref 0–20)
SODIUM BLD-SCNC: 138 MMOL/L (ref 132–146)
TARGET CELLS: ABNORMAL
TOTAL PROTEIN: 6.2 G/DL (ref 6.4–8.3)
WBC # BLD: 20.9 E9/L (ref 4.5–11.5)

## 2022-02-21 PROCEDURE — 2580000003 HC RX 258: Performed by: FAMILY MEDICINE

## 2022-02-21 PROCEDURE — C9113 INJ PANTOPRAZOLE SODIUM, VIA: HCPCS | Performed by: FAMILY MEDICINE

## 2022-02-21 PROCEDURE — 83605 ASSAY OF LACTIC ACID: CPT

## 2022-02-21 PROCEDURE — 84132 ASSAY OF SERUM POTASSIUM: CPT

## 2022-02-21 PROCEDURE — 84100 ASSAY OF PHOSPHORUS: CPT

## 2022-02-21 PROCEDURE — 36430 TRANSFUSION BLD/BLD COMPNT: CPT

## 2022-02-21 PROCEDURE — 86140 C-REACTIVE PROTEIN: CPT

## 2022-02-21 PROCEDURE — 36415 COLL VENOUS BLD VENIPUNCTURE: CPT

## 2022-02-21 PROCEDURE — 37799 UNLISTED PX VASCULAR SURGERY: CPT

## 2022-02-21 PROCEDURE — 80053 COMPREHEN METABOLIC PANEL: CPT

## 2022-02-21 PROCEDURE — 6360000002 HC RX W HCPCS: Performed by: INTERNAL MEDICINE

## 2022-02-21 PROCEDURE — 85018 HEMOGLOBIN: CPT

## 2022-02-21 PROCEDURE — 2000000000 HC ICU R&B

## 2022-02-21 PROCEDURE — 85014 HEMATOCRIT: CPT

## 2022-02-21 PROCEDURE — 6360000002 HC RX W HCPCS: Performed by: FAMILY MEDICINE

## 2022-02-21 PROCEDURE — 73030 X-RAY EXAM OF SHOULDER: CPT

## 2022-02-21 PROCEDURE — 85025 COMPLETE CBC W/AUTO DIFF WBC: CPT

## 2022-02-21 PROCEDURE — 2580000003 HC RX 258: Performed by: ORTHOPAEDIC SURGERY

## 2022-02-21 PROCEDURE — 99233 SBSQ HOSP IP/OBS HIGH 50: CPT | Performed by: INTERNAL MEDICINE

## 2022-02-21 PROCEDURE — 83735 ASSAY OF MAGNESIUM: CPT

## 2022-02-21 PROCEDURE — 2500000003 HC RX 250 WO HCPCS

## 2022-02-21 PROCEDURE — 99254 IP/OBS CNSLTJ NEW/EST MOD 60: CPT | Performed by: ORTHOPAEDIC SURGERY

## 2022-02-21 PROCEDURE — 85651 RBC SED RATE NONAUTOMATED: CPT

## 2022-02-21 PROCEDURE — 73000 X-RAY EXAM OF COLLAR BONE: CPT

## 2022-02-21 PROCEDURE — 2580000003 HC RX 258: Performed by: INTERNAL MEDICINE

## 2022-02-21 PROCEDURE — 82140 ASSAY OF AMMONIA: CPT

## 2022-02-21 RX ORDER — SODIUM CHLORIDE 9 MG/ML
INJECTION, SOLUTION INTRAVENOUS CONTINUOUS
Status: DISCONTINUED | OUTPATIENT
Start: 2022-02-21 | End: 2022-02-22

## 2022-02-21 RX ORDER — POTASSIUM CHLORIDE 7.45 MG/ML
10 INJECTION INTRAVENOUS
Status: COMPLETED | OUTPATIENT
Start: 2022-02-21 | End: 2022-02-21

## 2022-02-21 RX ORDER — METOPROLOL TARTRATE 5 MG/5ML
2.5 INJECTION INTRAVENOUS EVERY 6 HOURS PRN
Status: DISCONTINUED | OUTPATIENT
Start: 2022-02-21 | End: 2022-02-22

## 2022-02-21 RX ORDER — SODIUM CHLORIDE 9 MG/ML
INJECTION, SOLUTION INTRAVENOUS PRN
Status: DISCONTINUED | OUTPATIENT
Start: 2022-02-21 | End: 2022-02-22

## 2022-02-21 RX ORDER — SODIUM CHLORIDE 0.9 % (FLUSH) 0.9 %
5-40 SYRINGE (ML) INJECTION PRN
Status: DISCONTINUED | OUTPATIENT
Start: 2022-02-21 | End: 2022-02-22 | Stop reason: SDUPTHER

## 2022-02-21 RX ORDER — SODIUM CHLORIDE 0.9 % (FLUSH) 0.9 %
5-40 SYRINGE (ML) INJECTION EVERY 12 HOURS SCHEDULED
Status: DISCONTINUED | OUTPATIENT
Start: 2022-02-21 | End: 2022-02-22 | Stop reason: SDUPTHER

## 2022-02-21 RX ORDER — POTASSIUM CHLORIDE AND SODIUM CHLORIDE 900; 300 MG/100ML; MG/100ML
INJECTION, SOLUTION INTRAVENOUS CONTINUOUS
Status: DISCONTINUED | OUTPATIENT
Start: 2022-02-21 | End: 2022-02-22

## 2022-02-21 RX ORDER — SODIUM CHLORIDE 9 MG/ML
25 INJECTION, SOLUTION INTRAVENOUS PRN
Status: DISCONTINUED | OUTPATIENT
Start: 2022-02-21 | End: 2022-02-22

## 2022-02-21 RX ADMIN — MORPHINE SULFATE 1 MG: 2 INJECTION, SOLUTION INTRAMUSCULAR; INTRAVENOUS at 12:30

## 2022-02-21 RX ADMIN — MORPHINE SULFATE 1 MG: 2 INJECTION, SOLUTION INTRAMUSCULAR; INTRAVENOUS at 05:13

## 2022-02-21 RX ADMIN — SODIUM CHLORIDE, PRESERVATIVE FREE 10 ML: 5 INJECTION INTRAVENOUS at 21:48

## 2022-02-21 RX ADMIN — SODIUM CHLORIDE: 9 INJECTION, SOLUTION INTRAVENOUS at 18:44

## 2022-02-21 RX ADMIN — SODIUM CHLORIDE: 9 INJECTION, SOLUTION INTRAVENOUS at 02:58

## 2022-02-21 RX ADMIN — OCTREOTIDE ACETATE 50 MCG/HR: 500 INJECTION, SOLUTION INTRAVENOUS; SUBCUTANEOUS at 06:11

## 2022-02-21 RX ADMIN — SODIUM CHLORIDE, PRESERVATIVE FREE 10 ML: 5 INJECTION INTRAVENOUS at 09:22

## 2022-02-21 RX ADMIN — MORPHINE SULFATE 1 MG: 2 INJECTION, SOLUTION INTRAMUSCULAR; INTRAVENOUS at 01:11

## 2022-02-21 RX ADMIN — MORPHINE SULFATE 1 MG: 2 INJECTION, SOLUTION INTRAMUSCULAR; INTRAVENOUS at 21:48

## 2022-02-21 RX ADMIN — MORPHINE SULFATE 1 MG: 2 INJECTION, SOLUTION INTRAMUSCULAR; INTRAVENOUS at 09:21

## 2022-02-21 RX ADMIN — POTASSIUM CHLORIDE AND SODIUM CHLORIDE: 900; 300 INJECTION, SOLUTION INTRAVENOUS at 11:33

## 2022-02-21 RX ADMIN — PANTOPRAZOLE SODIUM 40 MG: 40 INJECTION, POWDER, FOR SOLUTION INTRAVENOUS at 09:22

## 2022-02-21 RX ADMIN — METOPROLOL TARTRATE 2.5 MG: 1 INJECTION, SOLUTION INTRAVENOUS at 21:48

## 2022-02-21 RX ADMIN — POTASSIUM CHLORIDE 10 MEQ: 10 INJECTION, SOLUTION INTRAVENOUS at 11:01

## 2022-02-21 RX ADMIN — PANTOPRAZOLE SODIUM 40 MG: 40 INJECTION, POWDER, FOR SOLUTION INTRAVENOUS at 21:48

## 2022-02-21 RX ADMIN — MORPHINE SULFATE 1 MG: 2 INJECTION, SOLUTION INTRAMUSCULAR; INTRAVENOUS at 15:33

## 2022-02-21 RX ADMIN — MORPHINE SULFATE 1 MG: 2 INJECTION, SOLUTION INTRAMUSCULAR; INTRAVENOUS at 18:30

## 2022-02-21 RX ADMIN — POTASSIUM CHLORIDE 10 MEQ: 10 INJECTION, SOLUTION INTRAVENOUS at 12:01

## 2022-02-21 ASSESSMENT — PAIN SCALES - GENERAL
PAINLEVEL_OUTOF10: 5
PAINLEVEL_OUTOF10: 7
PAINLEVEL_OUTOF10: 1
PAINLEVEL_OUTOF10: 6
PAINLEVEL_OUTOF10: 2
PAINLEVEL_OUTOF10: 7
PAINLEVEL_OUTOF10: 0
PAINLEVEL_OUTOF10: 6
PAINLEVEL_OUTOF10: 6
PAINLEVEL_OUTOF10: 7

## 2022-02-21 NOTE — CONSULTS
Department of Orthopedic Surgery  [facility] Nellene Kayser, MD  Consults      Reason for Consult:  Left septic Sternoclavicular joint    Consulting Physician: Dr Nikia Mccoy:                The patient is a 46 y.o. female who was recently admitted with coffee-ground emesis. She is currently in the intensive care unit. She reports about 2 weeks ago she slipped and fell on ice onto her left shoulder. She is seen by her PCP and given a muscle relaxer and a Medrol Dosepak which she relates did not help her symptoms. She was then given a steroid injection into the left shoulder region. She reports this did help but now relates that the pain relief is subsiding. She reports pain mostly over the periscapular region posterior. However she does have localized pain and swelling over the sternoclavicular joint anteriorly. She denies any recent history of infections or illness. She denies any fevers or chills. She does have a history of alcohol abuse and alcoholic liver cirrhosis.     Past Medical History:        Diagnosis Date    Alcohol abuse     Alcoholic cirrhosis ([facility] [address]     Anxiety     not on any meds for it    Hypertension     has not required any med a few years as of 8/2019    Hypothyroidism     in her early 25s - pt was on levothyroxine for a while and then hypothyroidism apparently resolved and pt has been off med since her mid-20s    Pancreatic cyst 01/2017    Pt underwent partial pancreatectomy and splenectomy at [facility] 2/23/2017 and was told pathology showed MCN (mucinous cystic neoplasm) of pancreas with clean margins at surgery     Past Surgical History:        Procedure Laterality Date   [address]  02/23/2017    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 - 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  ACTIVITIES OF DAILY LIVING:    OCCUPATION:    Family History:       Problem Relation Age of Onset    Other Mother         lymphoma    High Blood Pressure Mother     Other Father         ms    Breast Cancer Other         AUNT       REVIEW OF SYSTEMS:  CONSTITUTIONAL:  negative  EYES:  negative  HEENT:  negative  RESPIRATORY:  negative  CARDIOVASCULAR:  negative  GASTROINTESTINAL:  emesis  GENITOURINARY:  negative  INTEGUMENT/BREAST:  negative  HEMATOLOGIC/LYMPHATIC:  anemia  ALLERGIC/IMMUNOLOGIC:  negative  ENDOCRINE:  negative  MUSCULOSKELETAL:  Left periscapular and SC joint paoin  NEUROLOGICAL:  negative  BEHAVIOR/PSYCH:  negative    PHYSICAL EXAM:    VITALS:  BP (!) 149/103   Pulse 80   Temp 98.2 °F (36.8 °C) (Bladder)   Resp 18   Ht 5' 4\" (1.626 m)   Wt 132 lb 11.5 oz (60.2 kg)   SpO2 95%   BMI 22.78 kg/m²   CONSTITUTIONAL:  awake, alert, cooperative, no apparent distress, and appears stated age  EYES:  Lids and lashes normal, pupils equal, round and reactive to light, extra ocular muscles intact, sclera clear, conjunctiva normal  ENT:  Normocephalic, without obvious abnormality, atraumatic, sinuses nontender on palpation, external ears without lesions, oral pharynx with moist mucus membranes, tonsils without erythema or exudates, gums normal and good dentition. NECK:  Supple, symmetrical, trachea midline, no adenopathy, thyroid symmetric, not enlarged and no tenderness, skin normal  NEUROLOGIC:  Awake, alert, oriented to name, place and time. Cranial nerves II-XII are grossly intact. Motor is 5 out of 5 bilaterally. MUSCULOSKELETAL:    Left upper extremity: Tenderness palpation over the periscapular region posteriorly and into the lateral cervical region. Full chin on chest neck flexion and extension with negative Spurling's left and right. Nontender over the long head of the biceps or over the acromioclavicular joint. Forward flexion 160 degrees. Abduction 90 degrees.   External rotation 30 degrees. Internal rotation to lower buttock region. Negative drop arm test.  Negative impingement of her. No warmth erythema over the sternoclavicular joint. However she does have fullness and swelling present. Nontender over the mid clavicle or AC joint. Radial, ulnar, median and axillary nerve sensation intact light touch. Brisk cap refill of all digits and 2+ radial pulse. DATA:    CBC:   Lab Results   Component Value Date    WBC 20.9 02/21/2022    RBC 2.39 02/21/2022    RBC 3.55 07/08/2019    HGB 6.9 02/21/2022    HCT 21.3 02/21/2022    MCV 89.1 02/21/2022    MCH 28.9 02/21/2022    MCHC 32.4 02/21/2022    RDW 22.3 02/21/2022     02/21/2022    MPV 11.5 02/21/2022     PT/INR:    Lab Results   Component Value Date    PROTIME 23.3 02/20/2022    INR 2.1 02/20/2022       CRP and ESR ordered STAT    X-rays clavicle and left shoulder ordered STAT      Radiology Review: CT of the chest was reviewed from February 20, 2022. Axial, coronal, and sagittal images were reviewed demonstrating increased signal surrounding the sternoclavicular joint. No acute fractures identified on the available images. IMPRESSION:  · Left sternoclavicular joint effusion with remote history of injury versus septic joint arthritis  · Left periscapular muscle strain  · Alcoholic liver cirrhosis   · Anemia and recent coffee-ground emesis    PLAN:  Today's findings were explained the patient. There is a concern given her elevated white count and findings on CT scan that she may have a septic sternoclavicular joint. She denies any recent fevers or chills or illnesses. X-rays of the clavicle and left shoulder were ordered. Sed rate and C-reactive protein were also ordered. Given the elevated white count and tenderness over the sternoclavicular joint and no other current sources for possible infection I have explained to her left sternoclavicular joint debridement.   Risks benefits alternatives and potential complications were explained. All questions answered. N.p.o. after midnight. Hold anticoagulation for possible surgical intervention tomorrow. I explained the risks, benefits, alternatives and complications of surgery with the patient including but not limited to the risks of death, continued or worsening infection, possible damage to nerves, vessels, or tendons, stiffness, loss of range of motion, scar sensitivity, wound healing complications, worsening symptoms, possible need for therapy, as well as the possible need further surgery and unanticipated complications. The patient voiced understanding and all questions were answered. The patient elected to proceed with surgical intervention.      Syeda Álvarez MD  2/21/2022

## 2022-02-21 NOTE — PLAN OF CARE
Problem: Pain:  Goal: Pain level will decrease  Description: Pain level will decrease  2/20/2022 2241 by Evelyn Gómez RN  Outcome: Met This Shift  2/20/2022 2241 by Evelyn Gómez RN  Outcome: Met This Shift     Problem: Falls - Risk of:  Goal: Will remain free from falls  Description: Will remain free from falls  2/20/2022 2241 by Evelyn Gómez RN  Outcome: Met This Shift  2/20/2022 2241 by Evelyn Gómez RN  Outcome: Met This Shift

## 2022-02-21 NOTE — PROGRESS NOTES
[facility] Hospitalist   Progress Note    Admitting Date and Time: 2/20/2022 10:19 AM  Admit Dx: Hypokalemia [E87.6]  Lactic acidosis [E87.2]  UGI bleed [K92.2]  Coffee ground emesis [K92.0]  Pyogenic arthritis of left shoulder region, due to unspecified organism ([address] [M00.9]  Acute anemia [P64.5]  Alcoholic cirrhosis, unspecified whether ascites present ([address] [K70.30]    Subjective:    Patient was admitted with Hypokalemia [E87.6]  Lactic acidosis [E87.2]  UGI bleed [K92.2]  Coffee ground emesis [K92.0]  Pyogenic arthritis of left shoulder region, due to unspecified organism ([address] [M00.9]  Acute anemia [Y51.6]  Alcoholic cirrhosis, unspecified whether ascites present ([address] [K70.30]. Patient denies fever, chills, cp, sob, n/v.     octreotide  50 mcg IntraVENous Once    potassium chloride  40 mEq Oral Once    sodium chloride flush  5-40 mL IntraVENous 2 times per day    pantoprazole  40 mg IntraVENous BID    And    sodium chloride (PF)  10 mL IntraVENous Daily     sodium chloride, , PRN  sodium chloride, , PRN  sodium chloride flush, 5-40 mL, PRN  sodium chloride, 25 mL, PRN  ondansetron, 4 mg, Q8H PRN   Or  ondansetron, 4 mg, Q6H PRN  polyethylene glycol, 17 g, Daily PRN  acetaminophen, 650 mg, Q6H PRN   Or  acetaminophen, 650 mg, Q6H PRN  morphine, 1 mg, Q3H PRN         Objective:        PHYSICAL EXAM:    Vitals:  BP (!) 149/103   Pulse 80   Temp 98.2 °F (36.8 °C) (Bladder)   Resp 18   Ht 5' 4\" (1.626 m)   Wt 132 lb 11.5 oz (60.2 kg)   SpO2 95%   BMI 22.78 kg/m²     General:  Appears comfortable. Answers questions appropriately and cooperative with exam  HEENT:  Mucous membranes moist. No erythema, rhinorrhea, or post-nasal drip noted. Neck:  No carotid bruits. Heart:  Rhythm regular at rate of 84  Lungs:  CTA. No wheeze, rales, or rhonchi  Abdomen:  Positive bowel sounds positive. Soft. Non-tender. No guarding, rebound or rigidity. Breast/Rectal/Genitourinary: not pertinent. Extremities:  Negative for lower extremity edema  Skin:  Warm and dry  Vascular: 2/4 Dorsalis Pedis pulses bilaterally. Neuro:  Cranial nerves 2-12 grossly intact, no focal weakness or change in sensation noted. Extraocular muscles intact. Pupils equal, round, reactive to light.             Recent Labs     02/20/22  1136 02/20/22  2100 02/21/22  0500   * 133 138   K 2.7* 3.2* 3.1*   CL 85* 93* 101   CO2 22 25 27   BUN 65* 63* 50*   CREATININE 1.0 0.9 0.9   GLUCOSE 157* 125* 138*   CALCIUM 8.2* 7.8* 7.5*       Recent Labs     02/20/22  1136 02/20/22  2100 02/21/22  0500   WBC 24.5*  --  20.9*   RBC 1.99*  --  2.39*   HGB 5.6* 7.6* 6.9*   HCT 17.9* 23.4* 21.3*   MCV 89.9  --  89.1   MCH 28.1  --  28.9   MCHC 31.3*  --  32.4   RDW 24.6*  --  22.3*     --  173   MPV 11.8  --  11.5       CBC with Differential:    Lab Results   Component Value Date    WBC 20.9 02/21/2022    RBC 2.39 02/21/2022    RBC 3.55 07/08/2019    HGB 6.9 02/21/2022    HCT 21.3 02/21/2022     02/21/2022    MCV 89.1 02/21/2022    MCH 28.9 02/21/2022    MCHC 32.4 02/21/2022    RDW 22.3 02/21/2022    NRBC 2.0 02/21/2022    SEGSPCT 47 03/13/2012    BLASTSPCT 0.9 07/08/2020    METASPCT 1.7 08/09/2019    LYMPHOPCT 1.0 02/21/2022    LYMPHOPCT 30.0 07/08/2019    MONOPCT 4.0 02/21/2022    MYELOPCT 1.0 02/21/2022    EOSPCT 2.2 07/08/2019    BASOPCT 0.0 02/21/2022    MONOSABS 0.84 02/21/2022    LYMPHSABS 0.21 02/21/2022    EOSABS 0.00 02/21/2022    BASOSABS 0.00 02/21/2022     CMP:    Lab Results   Component Value Date     02/21/2022    K 3.1 02/21/2022     02/21/2022    CO2 27 02/21/2022    BUN 50 02/21/2022    CREATININE 0.9 02/21/2022    GFRAA >60 02/21/2022    LABGLOM >60 02/21/2022    GLUCOSE 138 02/21/2022    GLUCOSE 102 07/08/2019    PROT 6.2 02/21/2022    LABALBU 2.3 02/21/2022    LABALBU 5.0 03/13/2012    CALCIUM 7.5 02/21/2022    BILITOT 4.7 02/21/2022    ALKPHOS 152 02/21/2022    AST 66 02/21/2022    ALT 19 Contrast? None   Final Result   Significant inflammatory changes as well as a focal fluid collection   surrounding the left sternoclavicular joint. Septic arthritis is suspected. An adjacent calcium density linear focus is appreciated slightly inferior and   lateral to the left sternoclavicular joint. A foreign body at this location   cannot be excluded. Consider sampling of the fluid collection anterior and   slightly cranial to the left sternoclavicular joint. A more inferior portion   of the collection leads to at least moderate compression of the left   brachiocephalic vein which passes posterior to the collection. Multifocal segmental wall thickening of the distal ascending colon, hepatic   flexure and transverse colon. An infectious or inflammatory pneumonitis is   suspected. Ischemic colitis cannot be completely excluded although is felt   to be less likely. There is no obvious high-grade stenosis involving the   superior mesenteric artery or its main branches. Diffusely abnormal appearance of the liver with a cirrhotic appearing   morphology and innumerable hypodense hepatic lesions scattered throughout the   left and right lobes. The portal vein is patent and there is no definite   intrahepatic biliary ductal dilatation. Dilation of the gallbladder with either pericholecystic fluid and/or   gallbladder wall thickening along with cholelithiasis. Findings may be   related to intrinsic liver disease and are similar in appearance to the   November 2020 exam.  Acute cholecystitis cannot completely be excluded. Postsurgical changes related to partial pancreatectomy and splenectomy,   grossly stable. Additional, incidental findings as above. Assessment:    Principal Problem:    UGI bleed  Resolved Problems:    * No resolved hospital problems. *      Plan:  1. Gi bleed with hematemesis  Pt on ppi and octreotide. Continue fluids  2.  Anemia with acute blood loss component monitor and transfuse prn  3. Alcoholic liver cirrhosis with esophageal varices with hx of banding. Gi following  4. Leukocytosis monitor for fever or other changes  5. Hypokalemia monitor and replace prn  6. Elevated lactic acid level trending downward  Continue to monitor  7. Hypomagnesemia monitor and replace prn  8. Hyponatremia improved    intensivist following. Monitor bp very carefully.  Gi evaluating pt for egd    Chart reviewed and updated by nursing    Time spent is 35 min      Electronically signed by Stephanie Cedeño DO on 2/21/2022 at 4:24 PM

## 2022-02-21 NOTE — PATIENT CARE CONFERENCE
Intensive Care Daily Quality Rounding Checklist      ICU Team Members: Dr. Elba Payan, Corin Patino (residents), charge nurse, bedside nurse    ICU Day #: NUMBER: 2    Intubation Date:  N/A    Ventilator Day #: N/A    Central Line Insertion Date:  N/A        Day #: N/A     Arterial Line Insertion Date:  N/A      Day #: N/A    Temporary Hemodialysis Catheter Insertion Date:  N/A      Day # N/A    DVT Prophylaxis: PCDs    GI Prophylaxis: PROTONIX    Cerna Catheter Insertion Date: February 20       Day #: 2      Continued need (if yes, reason documented and discussed with physician): yes, strict I&O in critically ill patient    Skin Issues/ Wounds and ordered treatment discussed on rounds: no skin issues    Goals/ Plans for the Day: Daily labs, give 1 unit PRBC's, replace K+, change IVF, EGD per GI at some point, repeat H&H after transfusion

## 2022-02-21 NOTE — PROGRESS NOTES
Has bled from small varices  Compliance issues  A single band was placed in October then did not return. Has been absent from her [facility] as well and they have repeated sought to contact her. Emesis several times yesterday including ICU yesterday [employer] with some clots. Always stable hemodynamics  Tx ii units  Only complaint of pain is near [facility] joint where questions of septic joint raised. WBC 24 to 20, afebrile    OK sips/chips  Continue Octreotide  EGD within 24 hrs.

## 2022-02-21 NOTE — CONSULTS
Critical Care Admit/Consult Note         Patient - Alex Cardenas   MRN -  [id number]   Lencho # - [de-identified]   - 1980      Date of Admission -  2022 10:19 AM  Date of evaluation -  2022  0213/0213-A   Hospital Day - 1            ADMIT/CONSULT DETAILS     Reason for Admit/Consult   UGI bleed    Consulting Service/Physician   Consulting - Ayanna Morgan DO  Primary Care Physician - DO HARI Lord   The patient is a 46 y.o. female with significant past medical history of alcoholic cirrhosis and esophageal varices (Dr. Angy Benitez, GI in [address] and Dr. Fuad Clark), who presents to the ED today for vomiting, which began today. She states she had 4 episodes of vomiting relates having coffee ground colored emesis. Denies any bright red blood. In the ED patient received Rocephin, protonix, iv fluids and zofran given. Hemoglobin 5.6, decreased 3 points from 2 months ago. 2 units PRBC ordered. Leukocytosis 24.5. Lactate 8.5. Potassium 2.7, potassium replacement ordered. Mag 1.2,  Mag given. Second IV started. Octreotide ordered. CT findings showed possible septic arthritis of the left AC joint, gallbladder wall thickening and gallstones (present on previous scans however), and colitis. Patient was diagnosed four years ago with liver cirrhosis after workup for a pancreatic cyst.  She was seen at [facility] and had EGD and Esophageal Variceal banding. She was seen locally by Dr. Alexandru Soliz in 2021 where additional varices were banded.        Past Medical History         Diagnosis Date    Alcohol abuse     Alcoholic cirrhosis ([address]     Anxiety     not on any meds for it    Hypertension     has not required any med a few years as of 2019    Hypothyroidism     in her early 25s - pt was on levothyroxine for a while and then hypothyroidism apparently resolved and pt has been off med since her mid-20s    Pancreatic cyst 2017    Pt underwent partial pancreatectomy and splenectomy at [facility] 2/23/2017 and was told pathology showed MCN (mucinous cystic neoplasm) of pancreas with clean margins at surgery        Past Surgical History           Procedure Laterality Date   [address]  02/23/2017    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 - 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    SPLENECTOMY, TOTAL  02/23/2017    along with partial pancreatectomy    UPPER GASTROINTESTINAL ENDOSCOPY N/A 7/4/2020    EGD CONTROL HEMORRHAGE performed by Larissa Crowell MD at [address] [facility]  7/4/2020    EGD ESOPHAGOGASTRODUODENOSCOPY ENDOSCOPIC VARICEAL TREATMENT performed by Larissa Crowell MD at [address] [place] [address],Third Floor N/A 9/17/2021    EGD, BANDING OF VARICES performed by Larissa Crowell MD at [facility] OR         Current Medications   Current Medications    octreotide  50 mcg IntraVENous Once    potassium chloride  40 mEq Oral Once    sodium chloride flush  5-40 mL IntraVENous 2 times per day    pantoprazole  40 mg IntraVENous BID    And    sodium chloride (PF)  10 mL IntraVENous Daily     sodium chloride, sodium chloride flush, sodium chloride, ondansetron **OR** ondansetron, polyethylene glycol, acetaminophen **OR** acetaminophen, morphine  IV Drips/Infusions   sodium chloride      sodium chloride      sodium chloride 125 mL/hr at 02/21/22 0258    octreotide (SANDOSTATIN) infusion 50 mcg/hr (02/21/22 0611)     Home Medications  Medications Prior to Admission: methylPREDNISolone (MEDROL DOSEPACK) 4 MG tablet, Take by mouth.   spironolactone (ALDACTONE) 50 MG tablet, Take 50 mg by mouth daily  tiZANidine (ZANAFLEX) 2 MG tablet, Take 1 tablet by mouth 4 times daily as needed (back pain/ spasm)  furosemide (LASIX) 20 MG tablet, Take 20 mg by mouth daily as needed   lactulose (CHRONULAC) 10 GM/15ML solution, Take 20 g by mouth 3 times 12 and oxygen saturation is 100%. Temperature Range: Temp: 98.2 °F (36.8 °C) Temp  Av.7 °F (37.1 °C)  Min: 98.2 °F (36.8 °C)  Max: 99.3 °F (37.4 °C)  BP Range:  Systolic (13HPF), IUP:233 , Min:101 , QYM:155     Diastolic (60SEH), IAX:45, Min:61, Max:104    Pulse Range: Pulse  Av.5  Min: 75  Max: 125  Respiration Range: Resp  Av.5  Min: 10  Max: 18  Current Pulse Ox[de-identified]  SpO2: 100 %  24HR Pulse Ox Range:  SpO2  Av.6 %  Min: 90 %  Max: 100 %  Oxygen Amount and Delivery:        I/O (24 Hours)    Patient Vitals for the past 8 hrs:   BP Temp Temp src Pulse Resp SpO2   22 0700 138/89 -- -- 89 12 100 %   22 0600 (!) 140/92 -- -- 90 10 100 %   22 0500 (!) 136/91 -- -- 85 12 97 %   22 0400 135/89 98.2 °F (36.8 °C) Bladder 92 11 95 %   22 0300 137/86 -- -- 85 11 93 %   22 0200 134/88 -- -- 87 14 90 %   22 0100 136/81 -- -- 87 10 95 %   22 0000 126/80 99.1 °F (37.3 °C) Bladder 92 15 91 %       Intake/Output Summary (Last 24 hours) at 2022 0754  Last data filed at 2022 0500  Gross per 24 hour   Intake 1977 ml   Output 1250 ml   Net 727 ml     I/O last 3 completed shifts:   In:  [I.V.:1251; IV Piggyback:726]  Out: 1250 [Urine:1250]   Date 22 0000 - 22 235   Shift 6589-2613 4805-0571 7854-9176 24 Hour Total   INTAKE   I.V.(mL/kg) 1251(20.8)   1251(20.8)   IV Piggyback(mL/kg) 094(78.5)   726(12.1)   Shift Total(mL/kg) 1640(85.5)   1977(32.8)   OUTPUT   Urine(mL/kg/hr) 800   800   Shift Total(mL/kg) 800(13.3)   800(13.3)   Weight (kg) 60.2 60.2 60.2 60.2     Patient Vitals for the past 96 hrs (Last 3 readings):   Weight   22 132 lb 11.5 oz (60.2 kg)   22 1408 125 lb (56.7 kg)   22 1018 125 lb (56.7 kg)         Drains/Tubes Outputs  Cerna  Exam         PHYSICAL EXAM:  General Appearance: alert and oriented to person, place and time and in mild acute abdominal distress  Skin: warm and dry  Head: normocephalic and atraumatic  Eyes: pupils equal, round, and reactive to light, extraocular eye movements intact, conjunctivae normal.  Icterus noted bilaterally  Neck: neck supple and non tender without mass.   Left sternoclavicular joint is tender and swollen  Pulmonary/Chest: clear to auscultation bilaterally- no wheezes, rales or rhonchi, normal air movement, no respiratory distress  Cardiovascular: normal rate, normal S1 and S2 and no carotid bruits  Abdomen: soft, non-tender, slightly distended, normal bowel sounds, no masses or organomegaly  Extremities: no cyanosis, no clubbing and no edema  Neurologic: no cranial nerve deficit and speech normal    Data   Old records and images have been reviewed    Lab Results   CBC     Lab Results   Component Value Date    WBC 20.9 02/21/2022    RBC 2.39 02/21/2022    RBC 3.55 07/08/2019    HGB 6.9 02/21/2022    HCT 21.3 02/21/2022     02/21/2022    MCV 89.1 02/21/2022    MCH 28.9 02/21/2022    MCHC 32.4 02/21/2022    RDW 22.3 02/21/2022    NRBC 0.0 02/20/2022    SEGSPCT 47 03/13/2012    BLASTSPCT 0.9 07/08/2020    METASPCT 1.7 08/09/2019    LYMPHOPCT 3.4 02/20/2022    LYMPHOPCT 30.0 07/08/2019    MONOPCT 3.4 02/20/2022    MYELOPCT 0.9 02/20/2022    EOSPCT 2.2 07/08/2019    BASOPCT 0.0 02/20/2022    MONOSABS 0.74 02/20/2022    LYMPHSABS 0.74 02/20/2022    EOSABS 0.00 02/20/2022    BASOSABS 0.00 02/20/2022       BMP   Lab Results   Component Value Date     02/21/2022    K 3.1 02/21/2022     02/21/2022    CO2 27 02/21/2022    BUN 50 02/21/2022    CREATININE 0.9 02/21/2022    GLUCOSE 138 02/21/2022    GLUCOSE 102 07/08/2019    CALCIUM 7.5 02/21/2022       LFTS  Lab Results   Component Value Date    ALKPHOS 152 02/21/2022    ALT 19 02/21/2022    AST 66 02/21/2022    PROT 6.2 02/21/2022    BILITOT 4.7 02/21/2022    BILIDIR 3.8 09/19/2021    IBILI 2.3 09/19/2021    LABALBU 2.3 02/21/2022    LABALBU 5.0 03/13/2012       INR  Recent Labs     02/20/22  1136   PROTIME 23.3*   INR 2. 1       APTT  Recent Labs     02/20/22  1136   APTT 33.4       Lactic Acid  Lab Results   Component Value Date    LACTA 2.4 02/21/2022    LACTA 3.8 02/20/2022    LACTA 8.5 02/20/2022        BNP   No results for input(s): BNP in the last 72 hours. Cultures     No results for input(s): BC in the last 72 hours. No results for input(s): Lawrnce Diones in the last 72 hours. No results for input(s): LABURIN in the last 72 hours. Radiology       CT Scans  Impression   Significant inflammatory changes as well as a focal fluid collection   surrounding the left sternoclavicular joint.  Septic arthritis is suspected.    An adjacent calcium density linear focus is appreciated slightly inferior and   lateral to the left sternoclavicular joint.  A foreign body at this location   cannot be excluded.  Consider sampling of the fluid collection anterior and   slightly cranial to the left sternoclavicular joint.  A more inferior portion   of the collection leads to at least moderate compression of the left   brachiocephalic vein which passes posterior to the collection.       Multifocal segmental wall thickening of the distal ascending colon, hepatic   flexure and transverse colon.  An infectious or inflammatory pneumonitis is   suspected.  Ischemic colitis cannot be completely excluded although is felt   to be less likely. Carlita Mettle is no obvious high-grade stenosis involving the   superior mesenteric artery or its main branches.       Diffusely abnormal appearance of the liver with a cirrhotic appearing   morphology and innumerable hypodense hepatic lesions scattered throughout the   left and right lobes.  The portal vein is patent and there is no definite   intrahepatic biliary ductal dilatation.       Dilation of the gallbladder with either pericholecystic fluid and/or   gallbladder wall thickening along with cholelithiasis.  Findings may be   related to intrinsic liver disease and are similar in appearance to the November 2020 exam.  Acute cholecystitis cannot completely be excluded.       Postsurgical changes related to partial pancreatectomy and splenectomy,   grossly stable.       Additional, incidental findings as above. SYSTEMS ASSESSMENT    Neuro   Pain: morphine    Respiratory   On RA  Wean oxygen as tolerated. Keep O2 sat 90-92%    Cardiovascular   HTN- metoprolol 2.5 IV q6 PRN    Gastrointestinal   UGI bleed: Octreotide drip, protonix,   GI on board, plan is for scope within 24 hours  Nausea: Zofran    Renal   Hypokalemia 3.1- replenished     Infectious Disease   Leukocytosis: 24 to 20  CT: septic sternoclavicular joint- Ortho on board: plan is for joint debridement  Cultures sent  Received rocephin     Hematology/Oncology   Anemia: 6.9 sp PRBC transfusion    Endocrine   none    Social/Spiritual/DNR/Other   Full  NPO      The patient was seen and evaluated by myself and Dr. Genevieve Johnson MD PGY-1  2/21/2022  7:55 AM             I personally saw, examined and provided care for the patient. Radiographs, labs and medication list were reviewed by me independently. I spoke with bedside nursing, therapists and consultants. Critical care services and times documented are independent of procedures and multidisciplinary rounds with Residents. Additionally comprehensive, multidisciplinary rounds were conducted with the MICU team. The case was discussed in detail and plans for care were established. Review of Residents documentation was conducted and revisions were made as appropriate. I agree with the above documented exam, problem list and plan of care with the following additions:    Admitted to the ICU for acute blood loss anemia in the setting of liver cirrhosis and known varices  She has been placed on octreotide, PPI, antibiotics  GI consulted, possible EGD  She has a fluid collection around the L sternoclavicular joint s/p fall. Concerning for septic joint.   Will ortho has evaluated and will take her for I&D. Pending results may involve ID    37 minutes of CCT spent with the patient, reviewing the chart including imaging studies, and discussing the case with other health care professionals. This time excludes procedures.        Karrie Torres MD

## 2022-02-21 NOTE — CARE COORDINATION
Peer Recovery Support Note    Name: Stella Lockett  Date: 2/21/2022    Chief Complaint   Patient presents with    Emesis     x 3 this am       Peer Support met with patient. [x] Support and education provided  [] Resources provided   [] Treatment referral:   [] Other:   [] Patient declined peer recovery services     Referred By: Dillon Colón. Notes: Will continue to follow up with more resources tomorrow morning.     Juve Howard, 2/21/2022

## 2022-02-21 NOTE — PROGRESS NOTES
Patient admitted from ER 13 to 213, with the following belongings none - sent home with patients mother, placed on monitor, patient oriented to room and unit visiting hours. Patient guide at bedside, reviewed patient rights and responsibilities. MRSA nasal swab obtained. Bed alarm on. Call light within reach.

## 2022-02-21 NOTE — CARE COORDINATION
Met w/ patient. Explained role of  and plan of care. Lives w/ her parents and her 39 yr old brother in a 2 story house- 4 steps to entrance. Independent PTA. On Sandostatin drip. Hx [facility] alcohol rehab @ [place]. Addiction Recovery Resource list given to patient. Agreeable to speak with Peer Recovery Services- referral made. Pt is not interested in inpatient rehab. PCP is Dr. Wilver Robles and pharmacy is [facility]. Per pt, plan is to return home on discharge.  Will follow Lety Hensley RN case manager

## 2022-02-21 NOTE — PLAN OF CARE
Problem: Falls - Risk of:  Goal: Will remain free from falls  Description: Will remain free from falls  Outcome: Met This Shift  Goal: Absence of physical injury  Description: Absence of physical injury  Outcome: Met This Shift     Problem: Pain:  Goal: Pain level will decrease  Description: Pain level will decrease  Outcome: Not Met This Shift

## 2022-02-22 ENCOUNTER — ANESTHESIA (OUTPATIENT)
Dept: OPERATING ROOM | Age: 42
DRG: 280 | End: 2022-02-22
Payer: MEDICAID

## 2022-02-22 VITALS — TEMPERATURE: 98.2 F | DIASTOLIC BLOOD PRESSURE: 112 MMHG | OXYGEN SATURATION: 93 % | SYSTOLIC BLOOD PRESSURE: 178 MMHG

## 2022-02-22 LAB
ALBUMIN SERPL-MCNC: 2.4 G/DL (ref 3.5–5.2)
ALP BLD-CCNC: 160 U/L (ref 35–104)
ALT SERPL-CCNC: 21 U/L (ref 0–32)
AMMONIA: 66.7 UMOL/L (ref 11–51)
ANION GAP SERPL CALCULATED.3IONS-SCNC: 9 MMOL/L (ref 7–16)
ANISOCYTOSIS: ABNORMAL
AST SERPL-CCNC: 78 U/L (ref 0–31)
BASOPHILS ABSOLUTE: 0.07 E9/L (ref 0–0.2)
BASOPHILS RELATIVE PERCENT: 0.5 % (ref 0–2)
BILIRUB SERPL-MCNC: 4.2 MG/DL (ref 0–1.2)
BUN BLDV-MCNC: 27 MG/DL (ref 6–20)
CALCIUM SERPL-MCNC: 7.1 MG/DL (ref 8.6–10.2)
CHLORIDE BLD-SCNC: 105 MMOL/L (ref 98–107)
CO2: 24 MMOL/L (ref 22–29)
CREAT SERPL-MCNC: 0.6 MG/DL (ref 0.5–1)
EOSINOPHILS ABSOLUTE: 0.16 E9/L (ref 0.05–0.5)
EOSINOPHILS RELATIVE PERCENT: 1.1 % (ref 0–6)
GFR AFRICAN AMERICAN: >60
GFR NON-AFRICAN AMERICAN: >60 ML/MIN/1.73
GLUCOSE BLD-MCNC: 128 MG/DL (ref 74–99)
HCT VFR BLD CALC: 24.8 % (ref 34–48)
HEMOGLOBIN: 8 G/DL (ref 11.5–15.5)
HYPOCHROMIA: ABNORMAL
IMMATURE GRANULOCYTES #: 0.54 E9/L
IMMATURE GRANULOCYTES %: 3.6 % (ref 0–5)
LACTIC ACID: 1.5 MMOL/L (ref 0.5–2.2)
LYMPHOCYTES ABSOLUTE: 1.21 E9/L (ref 1.5–4)
LYMPHOCYTES RELATIVE PERCENT: 8 % (ref 20–42)
MAGNESIUM: 1.5 MG/DL (ref 1.6–2.6)
MCH RBC QN AUTO: 28.9 PG (ref 26–35)
MCHC RBC AUTO-ENTMCNC: 32.3 % (ref 32–34.5)
MCV RBC AUTO: 89.5 FL (ref 80–99.9)
MONOCYTES ABSOLUTE: 2.24 E9/L (ref 0.1–0.95)
MONOCYTES RELATIVE PERCENT: 14.8 % (ref 2–12)
MRSA CULTURE ONLY: NORMAL
NEUTROPHILS ABSOLUTE: 10.95 E9/L (ref 1.8–7.3)
NEUTROPHILS RELATIVE PERCENT: 72 % (ref 43–80)
PDW BLD-RTO: 22.9 FL (ref 11.5–15)
PHOSPHORUS: 2.2 MG/DL (ref 2.5–4.5)
PLATELET # BLD: 166 E9/L (ref 130–450)
PMV BLD AUTO: 11.3 FL (ref 7–12)
POIKILOCYTES: ABNORMAL
POLYCHROMASIA: ABNORMAL
POTASSIUM SERPL-SCNC: 4.3 MMOL/L (ref 3.5–5)
RBC # BLD: 2.77 E12/L (ref 3.5–5.5)
SODIUM BLD-SCNC: 138 MMOL/L (ref 132–146)
TARGET CELLS: ABNORMAL
TOTAL PROTEIN: 6.6 G/DL (ref 6.4–8.3)
WBC # BLD: 15.2 E9/L (ref 4.5–11.5)

## 2022-02-22 PROCEDURE — 87205 SMEAR GRAM STAIN: CPT

## 2022-02-22 PROCEDURE — 84100 ASSAY OF PHOSPHORUS: CPT

## 2022-02-22 PROCEDURE — 2580000003 HC RX 258: Performed by: INTERNAL MEDICINE

## 2022-02-22 PROCEDURE — 23044 ARTHRT AC SC JT EXP/RMVL FB: CPT | Performed by: ORTHOPAEDIC SURGERY

## 2022-02-22 PROCEDURE — 6360000002 HC RX W HCPCS: Performed by: INTERNAL MEDICINE

## 2022-02-22 PROCEDURE — 87077 CULTURE AEROBIC IDENTIFY: CPT

## 2022-02-22 PROCEDURE — 87075 CULTR BACTERIA EXCEPT BLOOD: CPT

## 2022-02-22 PROCEDURE — 6360000002 HC RX W HCPCS: Performed by: NURSE ANESTHETIST, CERTIFIED REGISTERED

## 2022-02-22 PROCEDURE — 87102 FUNGUS ISOLATION CULTURE: CPT

## 2022-02-22 PROCEDURE — 87116 MYCOBACTERIA CULTURE: CPT

## 2022-02-22 PROCEDURE — C9113 INJ PANTOPRAZOLE SODIUM, VIA: HCPCS | Performed by: INTERNAL MEDICINE

## 2022-02-22 PROCEDURE — 2060000000 HC ICU INTERMEDIATE R&B

## 2022-02-22 PROCEDURE — 80053 COMPREHEN METABOLIC PANEL: CPT

## 2022-02-22 PROCEDURE — 87015 SPECIMEN INFECT AGNT CONCNTJ: CPT

## 2022-02-22 PROCEDURE — 99233 SBSQ HOSP IP/OBS HIGH 50: CPT | Performed by: INTERNAL MEDICINE

## 2022-02-22 PROCEDURE — 6360000002 HC RX W HCPCS: Performed by: ORTHOPAEDIC SURGERY

## 2022-02-22 PROCEDURE — 6360000002 HC RX W HCPCS: Performed by: FAMILY MEDICINE

## 2022-02-22 PROCEDURE — 83605 ASSAY OF LACTIC ACID: CPT

## 2022-02-22 PROCEDURE — 2500000003 HC RX 250 WO HCPCS

## 2022-02-22 PROCEDURE — 2580000003 HC RX 258: Performed by: SPECIALIST

## 2022-02-22 PROCEDURE — C9113 INJ PANTOPRAZOLE SODIUM, VIA: HCPCS | Performed by: FAMILY MEDICINE

## 2022-02-22 PROCEDURE — 82140 ASSAY OF AMMONIA: CPT

## 2022-02-22 PROCEDURE — 3600000002 HC SURGERY LEVEL 2 BASE: Performed by: ORTHOPAEDIC SURGERY

## 2022-02-22 PROCEDURE — 83735 ASSAY OF MAGNESIUM: CPT

## 2022-02-22 PROCEDURE — 3700000000 HC ANESTHESIA ATTENDED CARE: Performed by: ORTHOPAEDIC SURGERY

## 2022-02-22 PROCEDURE — 2500000003 HC RX 250 WO HCPCS: Performed by: INTERNAL MEDICINE

## 2022-02-22 PROCEDURE — 87206 SMEAR FLUORESCENT/ACID STAI: CPT

## 2022-02-22 PROCEDURE — 3600000012 HC SURGERY LEVEL 2 ADDTL 15MIN: Performed by: ORTHOPAEDIC SURGERY

## 2022-02-22 PROCEDURE — 2580000003 HC RX 258: Performed by: ORTHOPAEDIC SURGERY

## 2022-02-22 PROCEDURE — 2580000003 HC RX 258: Performed by: FAMILY MEDICINE

## 2022-02-22 PROCEDURE — 2500000003 HC RX 250 WO HCPCS: Performed by: ORTHOPAEDIC SURGERY

## 2022-02-22 PROCEDURE — 3700000001 HC ADD 15 MINUTES (ANESTHESIA): Performed by: ORTHOPAEDIC SURGERY

## 2022-02-22 PROCEDURE — 87070 CULTURE OTHR SPECIMN AEROBIC: CPT

## 2022-02-22 PROCEDURE — 2709999900 HC NON-CHARGEABLE SUPPLY: Performed by: ORTHOPAEDIC SURGERY

## 2022-02-22 PROCEDURE — 87186 SC STD MICRODIL/AGAR DIL: CPT

## 2022-02-22 PROCEDURE — 88304 TISSUE EXAM BY PATHOLOGIST: CPT

## 2022-02-22 PROCEDURE — 2580000003 HC RX 258

## 2022-02-22 PROCEDURE — 6360000002 HC RX W HCPCS: Performed by: SPECIALIST

## 2022-02-22 PROCEDURE — 87176 TISSUE HOMOGENIZATION CULTR: CPT

## 2022-02-22 PROCEDURE — 85025 COMPLETE CBC W/AUTO DIFF WBC: CPT

## 2022-02-22 PROCEDURE — 36415 COLL VENOUS BLD VENIPUNCTURE: CPT

## 2022-02-22 PROCEDURE — 2500000003 HC RX 250 WO HCPCS: Performed by: NURSE ANESTHETIST, CERTIFIED REGISTERED

## 2022-02-22 RX ORDER — SODIUM CHLORIDE 9 MG/ML
INJECTION, SOLUTION INTRAVENOUS CONTINUOUS
Status: DISCONTINUED | OUTPATIENT
Start: 2022-02-22 | End: 2022-02-24

## 2022-02-22 RX ORDER — MEPERIDINE HYDROCHLORIDE 25 MG/ML
12.5 INJECTION INTRAMUSCULAR; INTRAVENOUS; SUBCUTANEOUS EVERY 5 MIN PRN
Status: DISCONTINUED | OUTPATIENT
Start: 2022-02-22 | End: 2022-02-22 | Stop reason: HOSPADM

## 2022-02-22 RX ORDER — SODIUM CHLORIDE 9 MG/ML
INJECTION, SOLUTION INTRAVENOUS CONTINUOUS PRN
Status: DISCONTINUED | OUTPATIENT
Start: 2022-02-22 | End: 2022-02-22 | Stop reason: SDUPTHER

## 2022-02-22 RX ORDER — SUCCINYLCHOLINE/SOD CL,ISO/PF 200MG/10ML
SYRINGE (ML) INTRAVENOUS PRN
Status: DISCONTINUED | OUTPATIENT
Start: 2022-02-22 | End: 2022-02-22 | Stop reason: SDUPTHER

## 2022-02-22 RX ORDER — ONDANSETRON 2 MG/ML
4 INJECTION INTRAMUSCULAR; INTRAVENOUS
Status: DISCONTINUED | OUTPATIENT
Start: 2022-02-22 | End: 2022-02-22 | Stop reason: HOSPADM

## 2022-02-22 RX ORDER — SODIUM CHLORIDE 0.9 % (FLUSH) 0.9 %
5-40 SYRINGE (ML) INJECTION EVERY 12 HOURS SCHEDULED
Status: DISCONTINUED | OUTPATIENT
Start: 2022-02-22 | End: 2022-02-22 | Stop reason: HOSPADM

## 2022-02-22 RX ORDER — MAGNESIUM SULFATE IN WATER 40 MG/ML
2000 INJECTION, SOLUTION INTRAVENOUS ONCE
Status: DISCONTINUED | OUTPATIENT
Start: 2022-02-22 | End: 2022-02-22

## 2022-02-22 RX ORDER — BUPIVACAINE HYDROCHLORIDE AND EPINEPHRINE 5; 5 MG/ML; UG/ML
INJECTION, SOLUTION EPIDURAL; INTRACAUDAL; PERINEURAL PRN
Status: DISCONTINUED | OUTPATIENT
Start: 2022-02-22 | End: 2022-02-22 | Stop reason: ALTCHOICE

## 2022-02-22 RX ORDER — MORPHINE SULFATE 2 MG/ML
1 INJECTION, SOLUTION INTRAMUSCULAR; INTRAVENOUS EVERY 5 MIN PRN
Status: DISCONTINUED | OUTPATIENT
Start: 2022-02-22 | End: 2022-02-22 | Stop reason: HOSPADM

## 2022-02-22 RX ORDER — FENTANYL CITRATE 50 UG/ML
INJECTION, SOLUTION INTRAMUSCULAR; INTRAVENOUS PRN
Status: DISCONTINUED | OUTPATIENT
Start: 2022-02-22 | End: 2022-02-22 | Stop reason: SDUPTHER

## 2022-02-22 RX ORDER — GLYCOPYRROLATE 1 MG/5 ML
SYRINGE (ML) INTRAVENOUS PRN
Status: DISCONTINUED | OUTPATIENT
Start: 2022-02-22 | End: 2022-02-22 | Stop reason: SDUPTHER

## 2022-02-22 RX ORDER — SODIUM CHLORIDE 0.9 % (FLUSH) 0.9 %
5-40 SYRINGE (ML) INJECTION PRN
Status: DISCONTINUED | OUTPATIENT
Start: 2022-02-22 | End: 2022-02-22 | Stop reason: HOSPADM

## 2022-02-22 RX ORDER — LIDOCAINE HYDROCHLORIDE 20 MG/ML
INJECTION, SOLUTION EPIDURAL; INFILTRATION; INTRACAUDAL; PERINEURAL PRN
Status: DISCONTINUED | OUTPATIENT
Start: 2022-02-22 | End: 2022-02-22 | Stop reason: SDUPTHER

## 2022-02-22 RX ORDER — MAGNESIUM SULFATE IN WATER 40 MG/ML
4000 INJECTION, SOLUTION INTRAVENOUS ONCE
Status: COMPLETED | OUTPATIENT
Start: 2022-02-22 | End: 2022-02-22

## 2022-02-22 RX ORDER — METOPROLOL TARTRATE 5 MG/5ML
5 INJECTION INTRAVENOUS EVERY 8 HOURS PRN
Status: DISCONTINUED | OUTPATIENT
Start: 2022-02-22 | End: 2022-02-26 | Stop reason: HOSPADM

## 2022-02-22 RX ORDER — PROPOFOL 10 MG/ML
INJECTION, EMULSION INTRAVENOUS PRN
Status: DISCONTINUED | OUTPATIENT
Start: 2022-02-22 | End: 2022-02-22 | Stop reason: SDUPTHER

## 2022-02-22 RX ORDER — NEOSTIGMINE METHYLSULFATE 1 MG/ML
INJECTION, SOLUTION INTRAVENOUS PRN
Status: DISCONTINUED | OUTPATIENT
Start: 2022-02-22 | End: 2022-02-22 | Stop reason: SDUPTHER

## 2022-02-22 RX ORDER — THIAMINE HYDROCHLORIDE 100 MG/ML
100 INJECTION, SOLUTION INTRAMUSCULAR; INTRAVENOUS DAILY
Status: COMPLETED | OUTPATIENT
Start: 2022-02-22 | End: 2022-02-26

## 2022-02-22 RX ORDER — SODIUM CHLORIDE 9 MG/ML
25 INJECTION, SOLUTION INTRAVENOUS PRN
Status: DISCONTINUED | OUTPATIENT
Start: 2022-02-22 | End: 2022-02-22 | Stop reason: HOSPADM

## 2022-02-22 RX ORDER — DEXAMETHASONE SODIUM PHOSPHATE 4 MG/ML
INJECTION, SOLUTION INTRA-ARTICULAR; INTRALESIONAL; INTRAMUSCULAR; INTRAVENOUS; SOFT TISSUE PRN
Status: DISCONTINUED | OUTPATIENT
Start: 2022-02-22 | End: 2022-02-22 | Stop reason: SDUPTHER

## 2022-02-22 RX ORDER — ROCURONIUM BROMIDE 10 MG/ML
INJECTION, SOLUTION INTRAVENOUS PRN
Status: DISCONTINUED | OUTPATIENT
Start: 2022-02-22 | End: 2022-02-22 | Stop reason: SDUPTHER

## 2022-02-22 RX ADMIN — SODIUM CHLORIDE: 9 INJECTION, SOLUTION INTRAVENOUS at 08:53

## 2022-02-22 RX ADMIN — OCTREOTIDE ACETATE 50 MCG/HR: 500 INJECTION, SOLUTION INTRAVENOUS; SUBCUTANEOUS at 01:49

## 2022-02-22 RX ADMIN — Medication 0.2 MG: at 10:48

## 2022-02-22 RX ADMIN — ONDANSETRON 4 MG: 2 INJECTION INTRAMUSCULAR; INTRAVENOUS at 08:28

## 2022-02-22 RX ADMIN — Medication 140 MG: at 10:25

## 2022-02-22 RX ADMIN — PANTOPRAZOLE SODIUM 40 MG: 40 INJECTION, POWDER, FOR SOLUTION INTRAVENOUS at 08:27

## 2022-02-22 RX ADMIN — ROCURONIUM BROMIDE 10 MG: 10 INJECTION, SOLUTION INTRAVENOUS at 10:25

## 2022-02-22 RX ADMIN — SODIUM CHLORIDE: 9 INJECTION, SOLUTION INTRAVENOUS at 04:53

## 2022-02-22 RX ADMIN — Medication 1 MG: at 10:48

## 2022-02-22 RX ADMIN — SODIUM CHLORIDE: 9 INJECTION, SOLUTION INTRAVENOUS at 10:14

## 2022-02-22 RX ADMIN — MORPHINE SULFATE 1 MG: 2 INJECTION, SOLUTION INTRAMUSCULAR; INTRAVENOUS at 08:27

## 2022-02-22 RX ADMIN — VANCOMYCIN HYDROCHLORIDE 1500 MG: 500 INJECTION, POWDER, LYOPHILIZED, FOR SOLUTION INTRAVENOUS at 18:26

## 2022-02-22 RX ADMIN — SODIUM CHLORIDE, PRESERVATIVE FREE 10 ML: 5 INJECTION INTRAVENOUS at 08:27

## 2022-02-22 RX ADMIN — THIAMINE HYDROCHLORIDE 100 MG: 100 INJECTION, SOLUTION INTRAMUSCULAR; INTRAVENOUS at 09:02

## 2022-02-22 RX ADMIN — Medication 2000 MG: at 13:19

## 2022-02-22 RX ADMIN — PANTOPRAZOLE SODIUM 40 MG: 40 INJECTION, POWDER, FOR SOLUTION INTRAVENOUS at 20:47

## 2022-02-22 RX ADMIN — MORPHINE SULFATE 1 MG: 2 INJECTION, SOLUTION INTRAMUSCULAR; INTRAVENOUS at 12:38

## 2022-02-22 RX ADMIN — MORPHINE SULFATE 1 MG: 2 INJECTION, SOLUTION INTRAMUSCULAR; INTRAVENOUS at 17:24

## 2022-02-22 RX ADMIN — LIDOCAINE HYDROCHLORIDE 100 MG: 20 INJECTION, SOLUTION EPIDURAL; INFILTRATION; INTRACAUDAL; PERINEURAL at 10:25

## 2022-02-22 RX ADMIN — SODIUM CHLORIDE, PRESERVATIVE FREE 10 ML: 5 INJECTION INTRAVENOUS at 20:47

## 2022-02-22 RX ADMIN — MAGNESIUM SULFATE HEPTAHYDRATE 4000 MG: 40 INJECTION, SOLUTION INTRAVENOUS at 08:27

## 2022-02-22 RX ADMIN — METOPROLOL TARTRATE 5 MG: 5 INJECTION INTRAVENOUS at 20:47

## 2022-02-22 RX ADMIN — POTASSIUM PHOSPHATE, MONOBASIC AND POTASSIUM PHOSPHATE, DIBASIC 15 MMOL: 224; 236 INJECTION, SOLUTION, CONCENTRATE INTRAVENOUS at 10:47

## 2022-02-22 RX ADMIN — DEXAMETHASONE SODIUM PHOSPHATE 10 MG: 4 INJECTION, SOLUTION INTRAMUSCULAR; INTRAVENOUS at 10:39

## 2022-02-22 RX ADMIN — Medication 2000 MG: at 22:54

## 2022-02-22 RX ADMIN — MORPHINE SULFATE 1 MG: 2 INJECTION, SOLUTION INTRAMUSCULAR; INTRAVENOUS at 03:40

## 2022-02-22 RX ADMIN — METOPROLOL TARTRATE 2.5 MG: 1 INJECTION, SOLUTION INTRAVENOUS at 04:53

## 2022-02-22 RX ADMIN — FENTANYL CITRATE 50 MCG: 50 INJECTION, SOLUTION INTRAMUSCULAR; INTRAVENOUS at 10:24

## 2022-02-22 RX ADMIN — PROPOFOL 150 MG: 10 INJECTION, EMULSION INTRAVENOUS at 10:30

## 2022-02-22 RX ADMIN — ONDANSETRON 4 MG: 2 INJECTION INTRAMUSCULAR; INTRAVENOUS at 14:02

## 2022-02-22 RX ADMIN — OCTREOTIDE ACETATE 50 MCG/HR: 500 INJECTION, SOLUTION INTRAVENOUS; SUBCUTANEOUS at 14:03

## 2022-02-22 RX ADMIN — METOPROLOL TARTRATE 5 MG: 5 INJECTION INTRAVENOUS at 14:02

## 2022-02-22 ASSESSMENT — PULMONARY FUNCTION TESTS
PIF_VALUE: 23
PIF_VALUE: 23
PIF_VALUE: 2
PIF_VALUE: 24
PIF_VALUE: 23
PIF_VALUE: 29
PIF_VALUE: 23
PIF_VALUE: 2
PIF_VALUE: 4
PIF_VALUE: 23
PIF_VALUE: 1
PIF_VALUE: 16
PIF_VALUE: 1
PIF_VALUE: 17
PIF_VALUE: 24
PIF_VALUE: 1
PIF_VALUE: 12
PIF_VALUE: 0
PIF_VALUE: 17
PIF_VALUE: 0
PIF_VALUE: 13
PIF_VALUE: 27
PIF_VALUE: 18
PIF_VALUE: 2
PIF_VALUE: 22
PIF_VALUE: 1
PIF_VALUE: 17
PIF_VALUE: 13
PIF_VALUE: 23
PIF_VALUE: 24
PIF_VALUE: 24
PIF_VALUE: 16
PIF_VALUE: 23
PIF_VALUE: 1
PIF_VALUE: 16
PIF_VALUE: 1
PIF_VALUE: 23
PIF_VALUE: 1
PIF_VALUE: 3
PIF_VALUE: 23
PIF_VALUE: 29
PIF_VALUE: 0
PIF_VALUE: 23
PIF_VALUE: 24
PIF_VALUE: 2
PIF_VALUE: 23

## 2022-02-22 ASSESSMENT — PAIN SCALES - GENERAL
PAINLEVEL_OUTOF10: 4
PAINLEVEL_OUTOF10: 4
PAINLEVEL_OUTOF10: 0
PAINLEVEL_OUTOF10: 0
PAINLEVEL_OUTOF10: 10
PAINLEVEL_OUTOF10: 7
PAINLEVEL_OUTOF10: 5
PAINLEVEL_OUTOF10: 5
PAINLEVEL_OUTOF10: 0
PAINLEVEL_OUTOF10: 1

## 2022-02-22 ASSESSMENT — PAIN DESCRIPTION - DIRECTION: RADIATING_TOWARDS: LEFT SHOULDER

## 2022-02-22 ASSESSMENT — PAIN DESCRIPTION - ORIENTATION
ORIENTATION: LEFT
ORIENTATION: UPPER

## 2022-02-22 ASSESSMENT — PAIN DESCRIPTION - LOCATION
LOCATION: SHOULDER
LOCATION: CHEST

## 2022-02-22 ASSESSMENT — PAIN DESCRIPTION - DESCRIPTORS
DESCRIPTORS: ACHING;BURNING;CONSTANT;DISCOMFORT
DESCRIPTORS: SHARP;ACHING

## 2022-02-22 ASSESSMENT — PAIN DESCRIPTION - FREQUENCY: FREQUENCY: CONTINUOUS

## 2022-02-22 ASSESSMENT — PAIN DESCRIPTION - PROGRESSION: CLINICAL_PROGRESSION: NOT CHANGED

## 2022-02-22 ASSESSMENT — PAIN DESCRIPTION - PAIN TYPE: TYPE: ACUTE PAIN;SURGICAL PAIN

## 2022-02-22 ASSESSMENT — PAIN - FUNCTIONAL ASSESSMENT: PAIN_FUNCTIONAL_ASSESSMENT: PREVENTS OR INTERFERES WITH ALL ACTIVE AND SOME PASSIVE ACTIVITIES

## 2022-02-22 ASSESSMENT — PAIN DESCRIPTION - ONSET: ONSET: ON-GOING

## 2022-02-22 NOTE — CARE COORDINATION
Pt to OR today; for debridement clavicular abcess. Iv ancef. Hx ETOH rehab; PRS following. For EGD in am. hgb 8. 0.[facility] for possible iv atb's?? Plan is home. will follow ortho/GI plan. Norman Camejo.

## 2022-02-22 NOTE — PLAN OF CARE
Problem: Falls - Risk of:  Goal: Will remain free from falls  Description: Will remain free from falls  2/22/2022 0007 by Liu Bennett RN  Outcome: Met This Shift

## 2022-02-22 NOTE — ANESTHESIA PRE PROCEDURE
Department of Anesthesiology  Preprocedure Note       Name:  Kasie Zimmerman   Age:  46 y.o.  :  1980                                          MRN:  [id number]         Date:  2022      Surgeon: Josseline Munoz):  Harshil Brice MD    Procedure: Left sternoclavicular joint debridement      Medications prior to admission:   Prior to Admission medications    Medication Sig Start Date End Date Taking? Authorizing Provider   methylPREDNISolone (MEDROL DOSEPACK) 4 MG tablet Take by mouth. 2/10/22  Yes Rosemarie Lawrence DO   spironolactone (ALDACTONE) 50 MG tablet Take 50 mg by mouth daily 21   Historical Provider, MD   tiZANidine (ZANAFLEX) 2 MG tablet Take 1 tablet by mouth 4 times daily as needed (back pain/ spasm) 2/10/22   Rosemarie Lawrence DO   furosemide (LASIX) 20 MG tablet Take 20 mg by mouth daily as needed  21   Historical Provider, MD   lactulose (CHRONULAC) 10 GM/15ML solution Take 20 g by mouth 3 times daily as needed  21   Historical Provider, MD   omeprazole (PRILOSEC) 20 MG delayed release capsule Take 20 mg by mouth Daily  3/5/21   Historical Provider, MD   KLOR-CON M20 20 MEQ extended release tablet Take 20 mEq by mouth daily  3/30/21   Historical Provider, MD   vitamin A 3 MG (59856 UT) capsule Take 10,000 Units by mouth daily 10/1/20   Historical Provider, MD   gabapentin (NEURONTIN) 100 MG capsule Take 100 mg by mouth as needed.      Historical Provider, MD   vitamin D (ERGOCALCIFEROL) 1.25 MG (88531 UT) CAPS capsule Take 50,000 Units by mouth once a week  20   Historical Provider, MD   CREON 55857 units delayed release capsule Take 12,000 Units by mouth 4 times daily (before meals and nightly)  20   Historical Provider, MD       Current medications:    Current Facility-Administered Medications   Medication Dose Route Frequency Provider Last Rate Last Admin    0.9 % sodium chloride infusion   IntraVENous PRN Chey Fernandez MD        0.9% NaCl with KCl 40 mEq infusion   IntraVENous Continuous Kasie Hopkins  mL/hr at 02/21/22 1133 New Bag at 02/21/22 1133    metoprolol (LOPRESSOR) injection 2.5 mg  2.5 mg IntraVENous Q6H PRN Maria De Jesus Frank MD   2.5 mg at 02/21/22 2148    0.9 % sodium chloride infusion   IntraVENous Continuous Srikanth Macias  mL/hr at 02/21/22 1844 New Bag at 02/21/22 1844    sodium chloride flush 0.9 % injection 5-40 mL  5-40 mL IntraVENous 2 times per day Srikanth Macias MD   10 mL at 02/21/22 2148    sodium chloride flush 0.9 % injection 5-40 mL  5-40 mL IntraVENous PRN Srikanth Macias MD        0.9 % sodium chloride infusion  25 mL IntraVENous PRN Srikanth Macias MD        0.9 % sodium chloride infusion   IntraVENous PRN Link Nieves, DO        octreotide (SANDOSTATIN) injection 50 mcg  50 mcg IntraVENous Once Garlon Lizbeth, DO        potassium chloride (KLOR-CON M) extended release tablet 40 mEq  40 mEq Oral Once Garlon Lizbeth, DO        sodium chloride flush 0.9 % injection 5-40 mL  5-40 mL IntraVENous 2 times per day Sherly Noel MD        sodium chloride flush 0.9 % injection 5-40 mL  5-40 mL IntraVENous PRN Sherly Noel MD        0.9 % sodium chloride infusion  25 mL IntraVENous PRN Sherly Noel MD        ondansetron (ZOFRAN-ODT) disintegrating tablet 4 mg  4 mg Oral Q8H PRN Sherly Noel MD        Or    ondansetron [facility]) injection 4 mg  4 mg IntraVENous Q6H PRN Sherly Noel MD   4 mg at 02/20/22 1727    polyethylene glycol (GLYCOLAX) packet 17 g  17 g Oral Daily PRN Sherly Noel MD        acetaminophen (TYLENOL) tablet 650 mg  650 mg Oral Q6H PRN Sherly Noel MD        Or    acetaminophen (TYLENOL) suppository 650 mg  650 mg Rectal Q6H PRN Sherly Noel MD        pantoprazole (PROTONIX) injection 40 mg  40 mg IntraVENous BID Sherly Noel MD   40 mg at 02/21/22 2148    And    sodium chloride (PF) 0.9 % injection 10 mL  10 mL IntraVENous Daily Sherly Noel MD 10 mL at 02/21/22 2160    morphine (PF) injection 1 mg  1 mg IntraVENous Q3H PRN Rolando Cisneros MD   1 mg at 02/21/22 2148    octreotide (SANDOSTATIN) 500 mcg in sodium chloride 0.9 % 100 mL infusion  50 mcg/hr IntraVENous Continuous Brian Chapman MD 10 mL/hr at 02/21/22 0767 50 mcg/hr at 02/21/22 1538       Allergies:     Allergies   Allergen Reactions    Pcn [Penicillins] Hives, Itching, Swelling and Rash       Problem List:    Patient Active Problem List   Diagnosis Code    Sepsis ([facility] A41.9    Jaundice R17    Elevated liver enzymes R74.8    Hyperammonemia (HCC) E72.20    Liver metastases ([facility] C78.7    Malignant ascites R18.0    H/O malignant neoplasm of pancreas Z85.07    Hematemesis K92.0    GI bleed K92.2    Upper GI bleed K92.2    UGI bleed K92.2    Anemia associated with acute blood loss D62    Leukocytosis N08.912    Alcoholic cirrhosis ([facility] H43.21       Past Medical History:        Diagnosis Date    Alcohol abuse     Alcoholic cirrhosis ([address]     Anxiety     not on any meds for it    Hypertension     has not required any med a few years as of 8/2019    Hypothyroidism     in her early 25s - pt was on levothyroxine for a while and then hypothyroidism apparently resolved and pt has been off med since her mid-20s    Pancreatic cyst 01/2017    Pt underwent partial pancreatectomy and splenectomy at [facility] 2/23/2017 and was told pathology showed MCN (mucinous cystic neoplasm) of pancreas with clean margins at surgery       Past Surgical History:        Procedure Laterality Date    [facility]  02/23/2017    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 - 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    SPLENECTOMY, TOTAL  02/23/2017    along with partial pancreatectomy    UPPER GASTROINTESTINAL ENDOSCOPY N/A 7/4/2020    EGD CONTROL HEMORRHAGE performed by Brian Chapman MD at [address] [place] [address] GASTROINTESTINAL ENDOSCOPY  7/4/2020    EGD ESOPHAGOGASTRODUODENOSCOPY ENDOSCOPIC VARICEAL TREATMENT performed by Anna Maria MD at [address] N/A 9/17/2021    EGD, BANDING OF VARICES performed by Anna Maria MD at [address] [place] [address] History:    Social History     Tobacco Use    Smoking status: Never Smoker    Smokeless tobacco: Never Used   Substance Use Topics    Alcohol use: Yes     Comment: daily wine or beer                                Counseling given: Not Answered      Vital Signs (Current):   Vitals:    02/21/22 1900 02/21/22 2000 02/21/22 2100 02/21/22 2200   BP: (!) 165/98 (!) 156/101 (!) 154/96 (!) 150/99   Pulse: 78 80 84 79   Resp: 18 22 24 24   Temp:  98.6 °F (37 °C)     TempSrc:  Bladder     SpO2: 93% 91% 95% 92%   Weight:       Height:                                                  BP Readings from Last 3 Encounters:   02/21/22 (!) 150/99   02/10/22 122/68   12/22/21 (!) 160/80       NPO Status:                                                                                 BMI:   Wt Readings from Last 3 Encounters:   02/20/22 132 lb 11.5 oz (60.2 kg)   02/10/22 129 lb (58.5 kg)   12/21/21 120 lb (54.4 kg)     Body mass index is 22.78 kg/m².     CBC:   Lab Results   Component Value Date    WBC 20.9 02/21/2022    RBC 2.39 02/21/2022    RBC 3.55 07/08/2019    HGB 8.5 02/21/2022    HCT 25.6 02/21/2022    MCV 89.1 02/21/2022    RDW 22.3 02/21/2022     02/21/2022       CMP:   Lab Results   Component Value Date     02/21/2022    K 4.0 02/21/2022    K 3.1 02/21/2022     02/21/2022    CO2 27 02/21/2022    BUN 50 02/21/2022    CREATININE 0.9 02/21/2022    GFRAA >60 02/21/2022    LABGLOM >60 02/21/2022    GLUCOSE 138 02/21/2022    GLUCOSE 102 07/08/2019    PROT 6.2 02/21/2022    CALCIUM 7.5 02/21/2022    BILITOT 4.7 02/21/2022    ALKPHOS 152 02/21/2022    AST 66 02/21/2022    ALT 19 02/21/2022       POC Tests: No results for input(s): POCGLU, POCNA, POCK, POCCL, POCBUN, POCHEMO, POCHCT in the last 72 hours. Coags:   Lab Results   Component Value Date    PROTIME 23.3 02/20/2022    INR 2.1 02/20/2022    APTT 33.4 02/20/2022       HCG (If Applicable):   Lab Results   Component Value Date    PREGTESTUR NEGATIVE 12/21/2021        ABGs:   Lab Results   Component Value Date    KBQ4CPD 21.8 07/06/2020        Type & Screen (If Applicable):  No results found for: LABABO, LABRH    Drug/Infectious Status (If Applicable):  No results found for: HIV, HEPCAB    COVID-19 Screening (If Applicable):   Lab Results   Component Value Date    COVID19 Not Detected 02/20/2022    COVID19 Not Detected 11/26/2020           Anesthesia Evaluation  Patient summary reviewed and Nursing notes reviewed no history of anesthetic complications:   Airway: Mallampati: III  TM distance: >3 FB   Neck ROM: full  Mouth opening: > = 3 FB Dental: normal exam         Pulmonary: breath sounds clear to auscultation  (+) COPD:                             Cardiovascular:  Exercise tolerance: good (>4 METS),   (+) hypertension:,         Rhythm: regular  Rate: normal                    Neuro/Psych:                ROS comment: ETOH abuse GI/Hepatic/Renal:   (+) liver disease:,          ROS comment: Alcoholic cirrhosis    Liver metastasis    GI bleed    Esophageal varices    Hematemesis    Upper GI bleed. Endo/Other:    (+) hypothyroidism, blood dyscrasia: anemia:., electrolyte abnormalities, malignancy/cancer (pancreas). ROS comment: Left sternoclavicular joint effusion with remote history of injury versus septic joint arthritis Abdominal:             Vascular: negative vascular ROS. Other Findings:           Anesthesia Plan      general     ASA 4       Induction: rapid sequence and intravenous. Anesthetic plan and risks discussed with patient. Patient presented with hematemesis to the ED.   She states that an hour before my anesthesia

## 2022-02-22 NOTE — PROGRESS NOTES
-Consulted to dose vancomycin for sternoclavicular abscess.  -Will place loading dose of vancomycin 1500 mg IV x 1 now followed by standing order of vancomycin 1 g IV q8h to begin on 2/23 at 0400.  -Projected AUC/DIMAS is 566.   -Will monitor renal function and steady state vnacomycin level when appropriate.

## 2022-02-22 NOTE — PROGRESS NOTES
[facility] Progress Note    Admitting Date and Time: 2/20/2022 10:19 AM  Admit Dx: Hypokalemia [E87.6]  Lactic acidosis [E87.2]  UGI bleed [K92.2]  Coffee ground emesis [K92.0]  Pyogenic arthritis of left shoulder region, due to unspecified organism ([address] [M00.9]  Acute anemia [V91.0]  Alcoholic cirrhosis, unspecified whether ascites present ([address] [K70.30]    Subjective:  Patient is being followed for Hypokalemia [E87.6]  Lactic acidosis [E87.2]  UGI bleed [K92.2]  Coffee ground emesis [K92.0]  Pyogenic arthritis of left shoulder region, due to unspecified organism ([address] [M00.9]  Acute anemia [G55.2]  Alcoholic cirrhosis, unspecified whether ascites present ([address] [K70.30]   Patient came out from after OT AFTER  I&D done. ROS: denies fever, chills, cp, sob, n/v, HA unless stated above.      thiamine  100 mg IntraVENous Daily    potassium phosphate IVPB  15 mmol IntraVENous Once    ceFAZolin  2,000 mg IntraVENous Q8H    sodium chloride flush  5-40 mL IntraVENous 2 times per day    pantoprazole  40 mg IntraVENous BID    And    sodium chloride (PF)  10 mL IntraVENous Daily     metoprolol, 5 mg, Q8H PRN  sodium chloride, 25 mL, PRN  ondansetron, 4 mg, Q8H PRN   Or  ondansetron, 4 mg, Q6H PRN  polyethylene glycol, 17 g, Daily PRN  acetaminophen, 650 mg, Q6H PRN   Or  acetaminophen, 650 mg, Q6H PRN  morphine, 1 mg, Q3H PRN         Objective:    BP (!) 143/80   Pulse 100   Temp 98.3 °F (36.8 °C) (Bladder)   Resp 16   Ht 5' 4\" (1.626 m)   Wt 132 lb 11.5 oz (60.2 kg)   SpO2 94%   BMI 22.78 kg/m²     General Appearance: alert and oriented to person, place and time and in no acute distress  Skin: warm and dry  Head: normocephalic and atraumatic  Eyes: pupils equal, round, and reactive to light, extraocular eye movements intact, conjunctivae normal  Neck: neck supple and non tender without mass   Pulmonary/Chest: clear to auscultation bilaterally- no wheezes, rales or rhonchi, normal air movement, no respiratory distress  Cardiovascular: normal rate, normal S1 and S2 and no carotid bruits  Abdomen: soft, non-tender, non-distended, normal bowel sounds, no masses or organomegaly  Extremities: no cyanosis, no clubbing and no edema  Neurologic: no cranial nerve deficit and speech normal        Recent Labs     02/20/22 2100 02/20/22 2100 02/21/22  0500 02/21/22  1821 02/22/22  0610     --  138  --  138   K 3.2*   < > 3.1* 4.0 4.3   CL 93*  --  101  --  105   CO2 25  --  27  --  24   BUN 63*  --  50*  --  27*   CREATININE 0.9  --  0.9  --  0.6   GLUCOSE 125*  --  138*  --  128*   CALCIUM 7.8*  --  7.5*  --  7.1*    < > = values in this interval not displayed. Recent Labs     02/20/22  1136 02/20/22 2100 02/21/22  0500 02/21/22  1821 02/22/22  0610   WBC 24.5*  --  20.9*  --  15.2*   RBC 1.99*  --  2.39*  --  2.77*   HGB 5.6*   < > 6.9* 8.5* 8.0*   HCT 17.9*   < > 21.3* 25.6* 24.8*   MCV 89.9  --  89.1  --  89.5   MCH 28.1  --  28.9  --  28.9   MCHC 31.3*  --  32.4  --  32.3   RDW 24.6*  --  22.3*  --  22.9*     --  173  --  166   MPV 11.8  --  11.5  --  11.3    < > = values in this interval not displayed. Assessment:    Principal Problem:    UGI bleed  Active Problems:    Anemia associated with acute blood loss    Leukocytosis    Alcoholic cirrhosis (HCC)    Left shoulder pain  Resolved Problems:    * No resolved hospital problems. *      Plan:  1. Acute blood loss anemia due to hematemesis:  Patient received 2 unit PRBC and hemoglobin now on octreotide drip and PPI drip, possible EGD tomorrow. Continue fluids. 2.  Alcoholic liver cirrhosis with esophageal varices: Patient has a history of alcohol abuse. 3.  Severe hypokalemia: Replaced via IV and potassium 4.3.  4.  Sternoclavicular abscess: I&D done by Ortho surgeon today. Continue cefazolin 2 g IV 8 hours now. 5. Hypomagnesemia: Replaced still low 1.5 okay will replace IV. 6. Hyponatremia-normal now.         NOTE: This report was transcribed using voice recognition software. Every effort was made to ensure accuracy; however, inadvertent computerized transcription errors may be present.   Electronically signed by Rosy Keller MD on 2/22/2022 at 1:13 PM

## 2022-02-22 NOTE — CONSULTS
resolved and pt has been off med since her mid-20s    Pancreatic cyst 01/2017    Pt underwent partial pancreatectomy and splenectomy at [facility] 2/23/2017 and was told pathology showed MCN (mucinous cystic neoplasm) of pancreas with clean margins at surgery     Past Surgical History:        Procedure Laterality Date   [address]    HAND SURGERY Left 2/22/2022    LEFT STERNOCLAVICULAR JOINT DEBRIDEMENT performed by Celia Garcia MD at [address]  02/23/2017    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 - 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    SPLENECTOMY, TOTAL  02/23/2017    along with partial pancreatectomy    UPPER GASTROINTESTINAL ENDOSCOPY N/A 7/4/2020    EGD CONTROL HEMORRHAGE performed by Mike Gaxiola MD at [facility]  7/4/2020    EGD ESOPHAGOGASTRODUODENOSCOPY ENDOSCOPIC VARICEAL TREATMENT performed by Mike Gaxiola MD at [facility] N/A 9/17/2021    EGD, BANDING OF VARICES performed by Mike Gaxiola MD at [address] [place] [address]     Current Medications:   Scheduled Meds:   thiamine  100 mg IntraVENous Daily    ceFAZolin  2,000 mg IntraVENous Q8H    sodium chloride flush  5-40 mL IntraVENous 2 times per day    pantoprazole  40 mg IntraVENous BID    And    sodium chloride (PF)  10 mL IntraVENous Daily     Continuous Infusions:   sodium chloride 75 mL/hr at 02/22/22 1229    sodium chloride      octreotide (SANDOSTATIN) infusion 50 mcg/hr (02/22/22 1403)     PRN Meds:metoprolol, sodium chloride, ondansetron **OR** ondansetron, polyethylene glycol, acetaminophen **OR** acetaminophen, morphine    Allergies:  Pcn [penicillins]    Social History:   Social History     Socioeconomic History    Marital status: Single     Spouse name: Not on file    Number of children: Not on file    Years of education: Not on file    Highest education level: Not on file   Occupational History    Not on file   Tobacco Use    Smoking status: Never Smoker    Smokeless tobacco: Never Used   Vaping Use    Vaping Use: Never used   Substance and Sexual Activity    Alcohol use: Yes     Comment: daily wine or beer    Drug use: No    Sexual activity: Yes     Partners: Male   Other Topics Concern    Not on file   Social History Narrative    Not on file     Social Determinants of Health     Financial Resource Strain:     Difficulty of Paying Living Expenses: Not on file   Food Insecurity:     Worried About Running Out of Food in the Last Year: Not on file    Love of Food in the Last Year: Not on file   Transportation Needs:     Lack of Transportation (Medical): Not on file    Lack of Transportation (Non-Medical): Not on file   Physical Activity:     Days of Exercise per Week: Not on file    Minutes of Exercise per Session: Not on file   Stress:     Feeling of Stress : Not on file   Social Connections:     Frequency of Communication with Friends and Family: Not on file    Frequency of Social Gatherings with Friends and Family: Not on file    Attends Quaker Services: Not on file    Active Member of [address] [place] [address] or Organizations: Not on file    Attends Club or Organization Meetings: Not on file    Marital Status: Not on file   Intimate Partner Violence:     Fear of Current or Ex-Partner: Not on file    Emotionally Abused: Not on file    Physically Abused: Not on file    Sexually Abused: Not on file   Housing Stability:     Unable to Pay for Housing in the Last Year: Not on file    Number of [place] in the Last Year: Not on file    Unstable Housing in the Last Year: Not on file         Family History:       Problem Relation Age of Onset    Other Mother         lymphoma    High Blood Pressure Mother     Other Father         ms    Breast Cancer Other         AUNT   . Otherwise non-pertinent to the chief complaint.     REVIEW OF SYSTEMS:    CONSTITUTIONAL: No chills, fevers or night sweats. No loss of weight. EYES:  No double vision or drainage from eyes, ears or throat. HEENT:  No neck stiffness. No dysphagia. No drainage from eyes, ears or throat  RESPIRATORY:  No cough, productive sputum or hemoptysis. CARDIOVASCULAR:  No chest pain, palpitations, orthopnea or dyspnea on exertion. GASTROINTESTINAL: See history of present illness  GENITOURINARY:  No frequency burning dysuria or hematuria. INTEGUMENT/BREAST:  No rash or breast masses. HEMATOLOGIC/LYMPHATIC:  No lymphadenopathy or blood dyscrasics. ALLERGIC/IMMUNOLOGIC:  No anaphylaxis. ENDOCRINE:  No polyuria or polydipsia or temperature intolerance. MUSCULOSKELETAL: See history of present illness  NEUROLOGICAL:  No focal motor sensory deficit. BEHAVIOR/PSYCH:  No psychosis. PHYSICAL EXAM:    Vitals:    BP (!) 149/87   Pulse 79   Temp 98.4 °F (36.9 °C) (Bladder)   Resp 10   Ht 5' 4\" (1.626 m)   Wt 132 lb 11.5 oz (60.2 kg)   SpO2 94%   BMI 22.78 kg/m²   Constitutional: The patient is awake, alert, and oriented. Skin: Warm and dry. No rashes were noted. No jaundice. HEENT: Eyes show round, and reactive pupils. Moist mucous membranes, no ulcerations, no thrush. Neck: Supple to movements. No lymphadenopathy. Chest: No use of accessory muscles to breathe. Symmetrical expansion. Auscultation reveals no wheezing, crackles, or rhonchi. Cardiovascular: S1 and S2 are rhythmic and regular. No murmurs appreciated. Abdomen: Positive bowel sounds to auscultation. Benign to palpation. No masses felt. No hepatosplenomegaly. Ascites  Genitourinary: Female  Extremities: No clubbing, no cyanosis, no edema.   Musculoskeletal: Left anterior chest wall  Neurological: Peripheral  Lines: peripheral      CBC+dif:  Recent Labs     02/20/22  1136 02/20/22  2100 02/21/22  0500 02/21/22  1821 02/22/22  0610   WBC 24.5*  --  20.9*  --  15.2*   HGB 5.6*   < > 6.9*   < > 8.0*   HCT 17.9*   < > 21.3*   < > 24.8* MCV 89.9  --  89.1  --  89.5     --  173  --  166   NEUTROABS 22.79*  --  19.86*  --  10.95*    < > = values in this interval not displayed. Lab Results   Component Value Date    CRP 11.0 (H) 02/21/2022     No results found for: CRPHS  Lab Results   Component Value Date    SEDRATE 27 (H) 02/21/2022    SEDRATE 14 06/01/2017     Lab Results   Component Value Date    ALT 21 02/22/2022    AST 78 (H) 02/22/2022     (H) 01/04/2017    ALKPHOS 160 (H) 02/22/2022    BILITOT 4.2 (H) 02/22/2022     Lab Results   Component Value Date     02/22/2022    K 4.3 02/22/2022    K 3.1 02/21/2022     02/22/2022    CO2 24 02/22/2022    BUN 27 02/22/2022    CREATININE 0.6 02/22/2022    GFRAA >60 02/22/2022    LABGLOM >60 02/22/2022    GLUCOSE 128 02/22/2022    GLUCOSE 102 07/08/2019    PROT 6.6 02/22/2022    LABALBU 2.4 02/22/2022    LABALBU 5.0 03/13/2012    CALCIUM 7.1 02/22/2022    BILITOT 4.2 02/22/2022    ALKPHOS 160 02/22/2022    AST 78 02/22/2022    ALT 21 02/22/2022       Lab Results   Component Value Date    PROTIME 23.3 02/20/2022    INR 2.1 02/20/2022       Lab Results   Component Value Date    TSH 0.841 06/01/2017       Lab Results   Component Value Date    COLORU Yellow 02/20/2022    PHUR 5.5 02/20/2022    LABCAST MODERATE 08/09/2019    WBCUA 1-3 08/09/2019    RBCUA 0-1 08/09/2019    MUCUS Present 08/09/2019    BACTERIA MANY 08/09/2019    CLARITYU Clear 02/20/2022    SPECGRAV <=1.005 02/20/2022    LEUKOCYTESUR Negative 02/20/2022    UROBILINOGEN 1.0 02/20/2022    BILIRUBINUR Negative 02/20/2022    BLOODU Negative 02/20/2022    GLUCOSEU Negative 02/20/2022       Lab Results   Component Value Date    NCG6VTM 21.8 07/06/2020     Radiology:  XR CLAVICLE LEFT   Final Result   No acute osseous abnormality. XR SHOULDER LEFT (MIN 2 VIEWS)   Final Result   No acute abnormality.          CT CHEST W CONTRAST   Final Result   Significant inflammatory changes as well as a focal fluid collection surrounding the left sternoclavicular joint. Septic arthritis is suspected. An adjacent calcium density linear focus is appreciated slightly inferior and   lateral to the left sternoclavicular joint. A foreign body at this location   cannot be excluded. Consider sampling of the fluid collection anterior and   slightly cranial to the left sternoclavicular joint. A more inferior portion   of the collection leads to at least moderate compression of the left   brachiocephalic vein which passes posterior to the collection. Multifocal segmental wall thickening of the distal ascending colon, hepatic   flexure and transverse colon. An infectious or inflammatory pneumonitis is   suspected. Ischemic colitis cannot be completely excluded although is felt   to be less likely. There is no obvious high-grade stenosis involving the   superior mesenteric artery or its main branches. Diffusely abnormal appearance of the liver with a cirrhotic appearing   morphology and innumerable hypodense hepatic lesions scattered throughout the   left and right lobes. The portal vein is patent and there is no definite   intrahepatic biliary ductal dilatation. Dilation of the gallbladder with either pericholecystic fluid and/or   gallbladder wall thickening along with cholelithiasis. Findings may be   related to intrinsic liver disease and are similar in appearance to the   November 2020 exam.  Acute cholecystitis cannot completely be excluded. Postsurgical changes related to partial pancreatectomy and splenectomy,   grossly stable. Additional, incidental findings as above. CT ABDOMEN PELVIS W IV CONTRAST Additional Contrast? None   Final Result   Significant inflammatory changes as well as a focal fluid collection   surrounding the left sternoclavicular joint. Septic arthritis is suspected.    An adjacent calcium density linear focus is appreciated slightly inferior and   lateral to the left sternoclavicular joint. A foreign body at this location   cannot be excluded. Consider sampling of the fluid collection anterior and   slightly cranial to the left sternoclavicular joint. A more inferior portion   of the collection leads to at least moderate compression of the left   brachiocephalic vein which passes posterior to the collection. Multifocal segmental wall thickening of the distal ascending colon, hepatic   flexure and transverse colon. An infectious or inflammatory pneumonitis is   suspected. Ischemic colitis cannot be completely excluded although is felt   to be less likely. There is no obvious high-grade stenosis involving the   superior mesenteric artery or its main branches. Diffusely abnormal appearance of the liver with a cirrhotic appearing   morphology and innumerable hypodense hepatic lesions scattered throughout the   left and right lobes. The portal vein is patent and there is no definite   intrahepatic biliary ductal dilatation. Dilation of the gallbladder with either pericholecystic fluid and/or   gallbladder wall thickening along with cholelithiasis. Findings may be   related to intrinsic liver disease and are similar in appearance to the   November 2020 exam.  Acute cholecystitis cannot completely be excluded. Postsurgical changes related to partial pancreatectomy and splenectomy,   grossly stable. Additional, incidental findings as above. Microbiology:  Pending  Recent Labs     02/20/22  2200   BC 24 Hours no growth     No results for input(s): ORG in the last 72 hours. Recent Labs     02/20/22  2200   BLOODCULT2 24 Hours no growth     No results for input(s): STREPNEUMAGU in the last 72 hours. No results for input(s): LP1UAG in the last 72 hours. No results for input(s): ASO in the last 72 hours. No results for input(s): CULTRESP in the last 72 hours.     Assessment:  · Left sternoclavicular joint abscess  · Cirrhosis with hematemesis and GI bleed  · Severe anemia  · Leukocytosis related to the above      Plan:    · Cont cefazolin add vancomycin pending cultures  · Check cultures  · Baseline ESR, CRP  · Monitor labs  · Will follow with you    Thank you for having us see this patient in consultation. I will be discussing this case with the treating physicians.       Electronically signed by Yoli Toussaint MD on 2/22/2022 at 4:49 PM

## 2022-02-22 NOTE — PATIENT CARE CONFERENCE
Intensive Care Daily Quality Rounding Checklist        ICU Team Members: Dr. Latesha Barrett, Corin Torres (residents), charge nurse, bedside nurse     ICU Day #: NUMBER: 3     Intubation Date:  N/A     Ventilator Day #: N/A     Central Line Insertion Date:  N/A                                                    Day #: N/A      Arterial Line Insertion Date:  N/A                             Day #: N/A     Temporary Hemodialysis Catheter Insertion Date:  N/A                             Day # N/A     DVT Prophylaxis: PCDs    GI Prophylaxis: PROTONIX     Cerna Catheter Insertion Date: February 20                                        Day #: 3                             Continued need (if yes, reason documented and discussed with physician): yes, strict I&O in critically ill patient     Skin Issues/ Wounds and ordered treatment discussed on rounds: no skin issues     Goals/ Plans for the Day:OR today of sternal clavicular joint, egd in the future, transfer to  after OR

## 2022-02-22 NOTE — CONSULTS
[id number] [address]                                  CONSULTATION    PATIENT NAME: Cayetano Tobar                   :        1980  MED REC NO:   [id number]                            ROOM:       8099  ACCOUNT NO:   [de-identified]                           ADMIT DATE: 2022  PROVIDER:     Nahomi Zarco MD    CONSULT DATE:  2022    REASON FOR CONSULTATION:  Cirrhosis and a history of variceal bleeding  with hematemesis. HISTORY OF PRESENT ILLNESS:  She is 46years of age and has problems  with alcohol addiction. She has cirrhosis as a consequence. She has  been evaluated on multiple occasions for GI bleeding and last year had  an episode of active variceal hemorrhage, which responded to banding. She has been followed predominately through the [facility] and has  had small varices identified and then subsequently bled from what were  perceived to be low-risk lesions. She has continued to drink. To my  knowledge, she has never done through alcohol withdrawal.  She presented  to the emergency room at about 10:00 a.m. yesterday with a history of  coffee-ground emesis. She was hemodynamically stable, though her  hemoglobin was 5.9. She received two units of packed red blood cells  and placed in the Medical Intensive Care Unit. Her BUN to creatinine  ratio were elevated consistent with an upper GI source of bleeding. Her  hemoglobin after two units of blood was 6.9. She also had a  leukocytosis with a white count of 24,000 and CAT scan raising questions  of a septic left sternoclavicular joint. She is currently on Rocephin. She did have a CAT scan of the abdomen which revealed cirrhosis with a  trace amount of ascites. 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 There is quite  a bit of fluid in a distended stomach.   After admission to the hospital  within in the intensive care unit, she had a single episode of emesis,  which historically contained coffee-ground plus some visible clots. She  is currently on octreotide. She has adequate IV access. She has a  consult for Orthopedics pending. 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 Multiple endoscopic procedures. Cirrhosis related to alcohol with  ongoing alcohol abuse. Hypertension and hypothyroidism. MEDICATIONS:  Listed prior to admission were said to include  spironolactone, Lasix, Chronulac, omeprazole, potassium, Neurontin,  vitamin D, and Creon. Compliance on these are uncertain. OBJECTIVE:  She is quiet, subdued, complaining of thirst, not  tachycardic with a blood pressure of 136/70. She is visibly jaundiced  and visibly pale. She is alert and oriented without asterixis. Cranial  nerves are intact. Her neck veins are not distended. Lung fields are  clear. Heart tones are normal.  Regular rate. Regular rhythm. No  audible murmur. No gallop. A soft, benign, and nontender abdomen. No  clinical ascites. LABORATORY STUDIES:  Her bilirubin in September was 6.1, 3.0 in  December, and 4.8 on admission. Albumin 2.8. AST 66. INR 2.1 and  platelet count 045,817. White count 24.5 on admission and 20.9 today. ASSESSMENT:  GI bleeding. Varices _____ generally not been overly  impressive, but documented bleeding has been visualized and controlled  with standing. She is hemodynamically stable on octreotide. She may  have a septic joint. Endoscopic evaluation is planned within the next  24 hours maintaining octreotide until such time and awaiting Orthopedic  opinion _____ necessity for further diagnostics.         Sandy Gramajo MD    D: 02/22/2022 8:57:12       T: 02/22/2022 9:00:52     [email or web address]  Job#: [id number]     Doc#: [id number]    CC:

## 2022-02-22 NOTE — PROGRESS NOTES
Critical Care Team - Daily Progress Note         Date and time: 2/22/2022 7:26 AM  Patient's name:  Radha Ley  Medical Record Number: [id number]  Patient's account/billing number: [de-identified]  Patient's YOB: 1980  Age: 46 y.o. Date of Admission: 2/20/2022 10:19 AM  Length of stay during current admission: 2      Primary Care Physician: Maxwell Leyva DO  ICU Attending Physician: Dr. Jose Alberto Weiss Status: Full Code    Reason for ICU admission: UGI bleed      SUBJECTIVE:     OVERNIGHT EVENTS:           02/22: Patient was nauseous last night, 1 episode of vomiting no blood or coffee ground. Patient had a L sternoclavicular joint debridement today, cultures sent, she tolerated procedure well. EGD planned for tomorrow, clear diet today. Hypomagnesemia and hypophosphatemia replenished today. On cefazolin per ID. Patient is stable for downgrade. 02/21: 46 y.o. female with significant past medical history of alcoholic cirrhosis and esophageal varices (Dr. Marley Saavedra, GI in [address] [place] [address] Road and Dr. Doris Olmedo), who presents to the ED today with coffee ground colored emesis.  In the ED patient received Rocephin, protonix, iv fluids and zofran given.  Hemoglobin 5.6, given  2 units PRBC. Leukocytosis 24.5. Lactate 8.5.  Potassium 2.7, potassium replacement ordered. Mag 1.2,  Mag given.  Second IV started. Octreotide ordered. CT findings showed possible septic arthritis of the left AC joint, gallbladder wall thickening and gallstones (present on previous scans however), and colitis.      CURRENT VENTILATION STATUS:     [] Ventilator  [] BIPAP  [] Nasal Cannula [x] Room Air      IF INTUBATED, ET TUBE MARKING AT LOWER LIP:       cms    SECRETIONS Amount:  [] Small [] Moderate  [] Large  [] None  Color:     [] White [] Colored  [] Bloody    SEDATION:  RAAS Score:  [] Propofol gtt  [] Versed gtt  [] Ativan gtt   [] No Sedation    PARALYZED:  [] No    [] Yes      VASOPRESSORS:  [] No [] Yes    If yes -   [] Levophed       [] Dopamine     [] Vasopressin       [] Dobutamine  [] Phenylephrine         [] Epinephrine    CENTRAL LINES:     [] No   [] Yes   (Date of Insertion:   )           If yes -     [] Right IJ     [] Left IJ [] Right Femoral [] Left Femoral                   [] Right Subclavian [] Left Subclavian       CR'S CATHETER:   [] No   [] Yes  (Date of Insertion:   )     URINE OUTPUT:            [] Good   [] Low              [] Anuric      OBJECTIVE:     VITAL SIGNS:  BP (!) 164/92   Pulse 82   Temp 99.4 °F (37.4 °C) (Bladder)   Resp 12   Ht 5' 4\" (1.626 m)   Wt 132 lb 11.5 oz (60.2 kg)   SpO2 91%   BMI 22.78 kg/m²   Tmax over 24 hours:  Temp (24hrs), Av.8 °F (37.1 °C), Min:98.5 °F (36.9 °C), Max:99.4 °F (37.4 °C)      Patient Vitals for the past 6 hrs:   BP Temp Temp src Pulse Resp SpO2   22 0700 (!) 164/92 -- -- 82 12 91 %   22 0600 (!) 162/100 -- -- 75 12 90 %   22 0500 (!) 165/95 -- -- 78 20 92 %   22 0400 (!) 169/105 99.4 °F (37.4 °C) Bladder 81 20 92 %   22 0300 (!) 164/98 -- -- 80 12 91 %   22 0200 (!) 155/103 -- -- 76 13 93 %         Intake/Output Summary (Last 24 hours) at 2022 0726  Last data filed at 2022 0600  Gross per 24 hour   Intake 3670 ml   Output 2425 ml   Net 1245 ml     Wt Readings from Last 2 Encounters:   22 132 lb 11.5 oz (60.2 kg)   02/10/22 129 lb (58.5 kg)     Body mass index is 22.78 kg/m².         PHYSICAL EXAMINATION:    General Appearance: alert and oriented to person, place and time and in mild acute abdominal distress  Skin: warm and dry  Head: normocephalic and atraumatic  Eyes: pupils equal, round, and reactive to light, extraocular eye movements intact, conjunctivae normal.  Icterus noted bilaterally  Neck: neck supple and non tender without mass.  Left sternoclavicular joint is tender and swollen  Pulmonary/Chest: clear to auscultation bilaterally- no wheezes, rales or rhonchi, normal air movement, no respiratory distress  Cardiovascular: normal rate, normal S1 and S2 and no carotid bruits  Abdomen: soft, non-tender, slightly distended, normal bowel sounds, no masses or organomegaly  Extremities: no cyanosis, no clubbing and no edema  Neurologic: no cranial nerve deficit and speech normal  MEDICATIONS:    Scheduled Meds:   sodium chloride flush  5-40 mL IntraVENous 2 times per day    octreotide  50 mcg IntraVENous Once    potassium chloride  40 mEq Oral Once    sodium chloride flush  5-40 mL IntraVENous 2 times per day    pantoprazole  40 mg IntraVENous BID    And    sodium chloride (PF)  10 mL IntraVENous Daily     Continuous Infusions:   0.9% NaCl with KCl 40 mEq 125 mL/hr at 02/21/22 1133    sodium chloride 125 mL/hr at 02/22/22 0453    sodium chloride      octreotide (SANDOSTATIN) infusion 50 mcg/hr (02/22/22 0149)     PRN Meds:   metoprolol, 2.5 mg, Q6H PRN  sodium chloride flush, 5-40 mL, PRN  sodium chloride, 25 mL, PRN  ondansetron, 4 mg, Q8H PRN   Or  ondansetron, 4 mg, Q6H PRN  polyethylene glycol, 17 g, Daily PRN  acetaminophen, 650 mg, Q6H PRN   Or  acetaminophen, 650 mg, Q6H PRN  morphine, 1 mg, Q3H PRN          VENT SETTINGS (Comprehensive) (if applicable):  Vent Information  SpO2: 91 %  Additional Respiratory  Assessments  Pulse: 82  Resp: 12  SpO2: 91 %  Oral Care: Mouth swabbed    ABGs:   No results for input(s): PH, PCO2, PO2, HCO3, BE, O2SAT in the last 72 hours. Laboratory findings:    Complete Blood Count:   Recent Labs     02/20/22  1136 02/20/22 2100 02/21/22  0500 02/21/22  1821 02/22/22  0610   WBC 24.5*  --  20.9*  --  15.2*   HGB 5.6*   < > 6.9* 8.5* 8.0*   HCT 17.9*   < > 21.3* 25.6* 24.8*     --  173  --  166    < > = values in this interval not displayed.         Last 3 Blood Glucose:   Recent Labs     02/20/22 2100 02/21/22  0500 02/22/22  0610   GLUCOSE 125* 138* 128*        PT/INR:    Lab Results   Component Value Date    PROTIME 23.3 02/20/2022    INR 2.1 02/20/2022     PTT:    Lab Results   Component Value Date    APTT 33.4 02/20/2022       Comprehensive Metabolic Profile:   Recent Labs     02/20/22  2100 02/20/22  2100 02/21/22  0500 02/21/22  0500 02/21/22  1821 02/22/22  0610     --  138  --   --  138   K 3.2*   < > 3.1*  --  4.0 4.3   CL 93*  --  101  --   --  105   CO2 25  --  27  --   --  24   BUN 63*  --  50*  --   --  27*   CREATININE 0.9  --  0.9  --   --  0.6   GLUCOSE 125*  --  138*  --   --  128*   CALCIUM 7.8*  --  7.5*  --   --  7.1*   PROT  --   --  6.2*   < >  --  6.6   LABALBU  --   --  2.3*   < >  --  2.4*   BILITOT  --   --  4.7*   < >  --  4.2*   ALKPHOS  --   --  152*   < >  --  160*   AST  --   --  66*   < >  --  78*   ALT  --   --  19   < >  --  21    < > = values in this interval not displayed. Magnesium:   Lab Results   Component Value Date    MG 1.5 02/22/2022     Phosphorus:   Lab Results   Component Value Date    PHOS 2.2 02/22/2022     Ionized Calcium: No results found for: CAION     Urinalysis:     Troponin: No results for input(s): TROPONINI in the last 72 hours. Microbiology:    Cultures during this admission:     Blood cultures:                 [] None drawn      [] Negative             []  Positive (Details:  )  Urine Culture:                   [] None drawn      [] Negative             []  Positive (Details:  )  Sputum Culture:               [] None drawn       [] Negative             []  Positive (Details:  )   Endotracheal aspirate:     [] None drawn       [] Negative             []  Positive (Details:  )     Other pertinent Labs:       Radiology/Imaging:     Chest Xray (2/22/2022):        ASSESSMENT:     Principal Problem:    UGI bleed  Active Problems:    Anemia associated with acute blood loss    Leukocytosis    Alcoholic cirrhosis ([address]  Resolved Problems:    * No resolved hospital problems.  *      Additional assessment:    ·         PLAN:     WEAN PER PROTOCOL:  [] No   [] Yes  [] N/A    DISCONTINUE ANY LABS:   [] No   [] Yes    ICU PROPHYLAXIS:  Stress ulcer:  [] PPI Agent  [] [id number] [] Sucralfate  [] Other:  VTE:   [] Enoxaparin  [] Unfract. Heparin Subcut  [] EPC Cuffs    NUTRITION:  [] NPO [] Tube Feeding (Specify: ) [] TPN  [] PO (Diet: Diet NPO Exceptions are: Sips of Water with Meds)    HOME MEDICATIONS RECONCILED: [] No  [] Yes    INSULIN DRIP:   [] No   [] Yes    CONSULTATION NEEDED:  [] No   [] Yes    FAMILY UPDATED:    [] No   [] Yes    TRANSFER OUT OF ICU:   [] No   [] Yes    ADDITIONAL PLAN:  Neuro   Pain: morphine     Respiratory   On RA  Wean oxygen as tolerated. Keep O2 sat 90-92%     Cardiovascular   HTN- metoprolol 2.5 IV q6 PRN     Gastrointestinal   UGI bleed: Octreotide drip, protonix,   GI on board, plan is for scope tomorrow  Nausea: Zofran     Renal   Hypokalemia 4.3- replenished  Hypomagnesemia and hypophosphatemia- replenished     Infectious Disease   Leukocytosis: 15.2- trending down  CT: septic sternoclavicular joint- sp joint debridement  Cultures sent  On [facility]      Hematology/Oncology   Anemia: 8.0 sp PRBC transfusion     Endocrine   none     Social/Spiritual/DNR/Other   Full  Clear fluids      The patient was seen and evaluated by myself and Dr. Huntley President, MD PGY-1  2/22/2022  7:26 AM             I personally saw, examined and provided care for the patient. Radiographs, labs and medication list were reviewed by me independently. I spoke with bedside nursing, therapists and consultants. Critical care services and times documented are independent of procedures and multidisciplinary rounds with Residents. Additionally comprehensive, multidisciplinary rounds were conducted with the MICU team. The case was discussed in detail and plans for care were established. Review of Residents documentation was conducted and revisions were made as appropriate.  I agree with the above documented exam, problem list and plan of care with the following

## 2022-02-22 NOTE — PROGRESS NOTES
Debridement today, found pus  No fever  WBC is down  Hct stable  Alert and oriented  No withdrawal    Slow IVF  Clears OK   EGD tomorrow.

## 2022-02-22 NOTE — BRIEF OP NOTE
Brief Postoperative Note      Patient: German Urrutia  YOB: 1980  MRN: [id number]    Date of Procedure: 2/22/2022    Pre-Op Diagnosis: left sternoclavicular abscess     Post-Op Diagnosis: Same       Procedure(s):  LEFT STERNOCLAVICULAR JOINT DEBRIDEMENT     Surgeon(s):  Srikanth Macias MD    Assistant:  Resident: Jordi Means DO    Anesthesia: Monitor Anesthesia Care    Estimated Blood Loss (mL): Minimal    Complications: None    Specimens:   ID Type Source Tests Collected by Time Destination   1 : left sternoclavicular joint  Tissue Tissue CULTURE, ANAEROBIC, CULTURE, FUNGUS, GRAM STAIN, CULTURE, SURGICAL, CULTURE WITH SMEAR, ACID FAST Barrie Vaughn MD 2/22/2022 1038    2 : LEFT STERNOCLAVICULAR JOINT  Tissue Tissue CULTURE, ANAEROBIC, CULTURE, FUNGUS, GRAM STAIN, CULTURE, SURGICAL, CULTURE WITH SMEAR, ACID FAST [address] [facility] Rafael Pineda MD 2/22/2022 1039    A : LEFT STERNOCLAVICULAR JOINT TISSUE Tissue Tissue SURGICAL PATHOLOGY Srikanth Macias MD 2/22/2022 1038        Implants:  * No implants in log *      Drains:   Urethral Catheter Temperature probe (Active)   $ Urethral catheter insertion $ Not inserted for procedure 02/20/22 2200   Catheter Indications Need for fluid volume management of the critically ill patient in a critical care setting 02/22/22 0800   Securement Device Date Changed 02/20/22 02/20/22 2200   Site Assessment No urethral drainage 02/22/22 0800   Urine Color Yellow 02/22/22 0800   Urine Appearance Clear 02/22/22 0800   Output (mL) 800 mL 02/22/22 0600       Findings: see op note    Electronically signed by Crissy Bello DO on 2/22/2022 at 11:04 AM

## 2022-02-23 ENCOUNTER — ANESTHESIA EVENT (OUTPATIENT)
Dept: ENDOSCOPY | Age: 42
DRG: 280 | End: 2022-02-23
Payer: MEDICAID

## 2022-02-23 ENCOUNTER — ANESTHESIA (OUTPATIENT)
Dept: ENDOSCOPY | Age: 42
DRG: 280 | End: 2022-02-23
Payer: MEDICAID

## 2022-02-23 VITALS — OXYGEN SATURATION: 96 % | DIASTOLIC BLOOD PRESSURE: 94 MMHG | SYSTOLIC BLOOD PRESSURE: 141 MMHG

## 2022-02-23 LAB
ALBUMIN SERPL-MCNC: 2.4 G/DL (ref 3.5–5.2)
ALP BLD-CCNC: 154 U/L (ref 35–104)
ALT SERPL-CCNC: 19 U/L (ref 0–32)
AMMONIA: 116.8 UMOL/L (ref 11–51)
ANION GAP SERPL CALCULATED.3IONS-SCNC: 10 MMOL/L (ref 7–16)
ANISOCYTOSIS: ABNORMAL
AST SERPL-CCNC: 65 U/L (ref 0–31)
BASOPHILS ABSOLUTE: 0 E9/L (ref 0–0.2)
BASOPHILS RELATIVE PERCENT: 0 % (ref 0–2)
BILIRUB SERPL-MCNC: 4.1 MG/DL (ref 0–1.2)
BUN BLDV-MCNC: 13 MG/DL (ref 6–20)
CALCIUM SERPL-MCNC: 7.3 MG/DL (ref 8.6–10.2)
CHLORIDE BLD-SCNC: 98 MMOL/L (ref 98–107)
CO2: 23 MMOL/L (ref 22–29)
CREAT SERPL-MCNC: 0.5 MG/DL (ref 0.5–1)
EOSINOPHILS ABSOLUTE: 0 E9/L (ref 0.05–0.5)
EOSINOPHILS RELATIVE PERCENT: 0 % (ref 0–6)
GFR AFRICAN AMERICAN: >60
GFR NON-AFRICAN AMERICAN: >60 ML/MIN/1.73
GLUCOSE BLD-MCNC: 154 MG/DL (ref 74–99)
GRAM STAIN ORDERABLE: NORMAL
GRAM STAIN ORDERABLE: NORMAL
HCT VFR BLD CALC: 24.7 % (ref 34–48)
HEMOGLOBIN: 8 G/DL (ref 11.5–15.5)
HYPOCHROMIA: ABNORMAL
LACTIC ACID: 2.3 MMOL/L (ref 0.5–2.2)
LYMPHOCYTES ABSOLUTE: 0.37 E9/L (ref 1.5–4)
LYMPHOCYTES RELATIVE PERCENT: 4.4 % (ref 20–42)
MAGNESIUM: 1.7 MG/DL (ref 1.6–2.6)
MCH RBC QN AUTO: 29.5 PG (ref 26–35)
MCHC RBC AUTO-ENTMCNC: 32.4 % (ref 32–34.5)
MCV RBC AUTO: 91.1 FL (ref 80–99.9)
MONOCYTES ABSOLUTE: 0.37 E9/L (ref 0.1–0.95)
MONOCYTES RELATIVE PERCENT: 4.4 % (ref 2–12)
MYELOCYTE PERCENT: 0.9 % (ref 0–0)
NEUTROPHILS ABSOLUTE: 8.46 E9/L (ref 1.8–7.3)
NEUTROPHILS RELATIVE PERCENT: 90.3 % (ref 43–80)
NUCLEATED RED BLOOD CELLS: 5.3 /100 WBC
PDW BLD-RTO: 24.3 FL (ref 11.5–15)
PHOSPHORUS: 1.9 MG/DL (ref 2.5–4.5)
PLATELET # BLD: 172 E9/L (ref 130–450)
PMV BLD AUTO: 11.2 FL (ref 7–12)
POIKILOCYTES: ABNORMAL
POLYCHROMASIA: ABNORMAL
POTASSIUM SERPL-SCNC: 3.7 MMOL/L (ref 3.5–5)
RBC # BLD: 2.71 E12/L (ref 3.5–5.5)
SODIUM BLD-SCNC: 131 MMOL/L (ref 132–146)
TARGET CELLS: ABNORMAL
TOTAL PROTEIN: 6.9 G/DL (ref 6.4–8.3)
WBC # BLD: 9.3 E9/L (ref 4.5–11.5)

## 2022-02-23 PROCEDURE — 2709999900 HC NON-CHARGEABLE SUPPLY: Performed by: INTERNAL MEDICINE

## 2022-02-23 PROCEDURE — 99233 SBSQ HOSP IP/OBS HIGH 50: CPT | Performed by: FAMILY MEDICINE

## 2022-02-23 PROCEDURE — 6360000002 HC RX W HCPCS: Performed by: INTERNAL MEDICINE

## 2022-02-23 PROCEDURE — 82140 ASSAY OF AMMONIA: CPT

## 2022-02-23 PROCEDURE — 2060000000 HC ICU INTERMEDIATE R&B

## 2022-02-23 PROCEDURE — 83605 ASSAY OF LACTIC ACID: CPT

## 2022-02-23 PROCEDURE — 2580000003 HC RX 258: Performed by: INTERNAL MEDICINE

## 2022-02-23 PROCEDURE — 6360000002 HC RX W HCPCS: Performed by: FAMILY MEDICINE

## 2022-02-23 PROCEDURE — 36415 COLL VENOUS BLD VENIPUNCTURE: CPT

## 2022-02-23 PROCEDURE — 06L38CZ OCCLUSION OF ESOPHAGEAL VEIN WITH EXTRALUMINAL DEVICE, VIA NATURAL OR ARTIFICIAL OPENING ENDOSCOPIC: ICD-10-PCS | Performed by: FAMILY MEDICINE

## 2022-02-23 PROCEDURE — 85025 COMPLETE CBC W/AUTO DIFF WBC: CPT

## 2022-02-23 PROCEDURE — 0JBF0ZZ EXCISION OF LEFT UPPER ARM SUBCUTANEOUS TISSUE AND FASCIA, OPEN APPROACH: ICD-10-PCS | Performed by: FAMILY MEDICINE

## 2022-02-23 PROCEDURE — 2720000010 HC SURG SUPPLY STERILE: Performed by: INTERNAL MEDICINE

## 2022-02-23 PROCEDURE — 3700000001 HC ADD 15 MINUTES (ANESTHESIA): Performed by: INTERNAL MEDICINE

## 2022-02-23 PROCEDURE — C9113 INJ PANTOPRAZOLE SODIUM, VIA: HCPCS | Performed by: INTERNAL MEDICINE

## 2022-02-23 PROCEDURE — 7100000010 HC PHASE II RECOVERY - FIRST 15 MIN: Performed by: INTERNAL MEDICINE

## 2022-02-23 PROCEDURE — 7100000011 HC PHASE II RECOVERY - ADDTL 15 MIN: Performed by: INTERNAL MEDICINE

## 2022-02-23 PROCEDURE — 3700000000 HC ANESTHESIA ATTENDED CARE: Performed by: INTERNAL MEDICINE

## 2022-02-23 PROCEDURE — 3609012300 HC EGD BAND LIGATION ESOPHGEAL/GASTRIC VARICES: Performed by: INTERNAL MEDICINE

## 2022-02-23 PROCEDURE — 84100 ASSAY OF PHOSPHORUS: CPT

## 2022-02-23 PROCEDURE — 80053 COMPREHEN METABOLIC PANEL: CPT

## 2022-02-23 PROCEDURE — 6360000002 HC RX W HCPCS: Performed by: NURSE ANESTHETIST, CERTIFIED REGISTERED

## 2022-02-23 PROCEDURE — 2580000003 HC RX 258: Performed by: SPECIALIST

## 2022-02-23 PROCEDURE — 6370000000 HC RX 637 (ALT 250 FOR IP): Performed by: FAMILY MEDICINE

## 2022-02-23 PROCEDURE — 6360000002 HC RX W HCPCS: Performed by: SPECIALIST

## 2022-02-23 PROCEDURE — 2500000003 HC RX 250 WO HCPCS: Performed by: NURSE ANESTHETIST, CERTIFIED REGISTERED

## 2022-02-23 PROCEDURE — 83735 ASSAY OF MAGNESIUM: CPT

## 2022-02-23 RX ORDER — PROPOFOL 10 MG/ML
INJECTION, EMULSION INTRAVENOUS PRN
Status: DISCONTINUED | OUTPATIENT
Start: 2022-02-23 | End: 2022-02-23 | Stop reason: SDUPTHER

## 2022-02-23 RX ORDER — MAGNESIUM SULFATE 1 G/100ML
1000 INJECTION INTRAVENOUS ONCE
Status: COMPLETED | OUTPATIENT
Start: 2022-02-23 | End: 2022-02-23

## 2022-02-23 RX ORDER — LIDOCAINE HYDROCHLORIDE 10 MG/ML
INJECTION, SOLUTION INFILTRATION; PERINEURAL PRN
Status: DISCONTINUED | OUTPATIENT
Start: 2022-02-23 | End: 2022-02-23 | Stop reason: SDUPTHER

## 2022-02-23 RX ADMIN — VANCOMYCIN HYDROCHLORIDE 1000 MG: 1 INJECTION, POWDER, LYOPHILIZED, FOR SOLUTION INTRAVENOUS at 03:58

## 2022-02-23 RX ADMIN — VANCOMYCIN HYDROCHLORIDE 1000 MG: 1 INJECTION, POWDER, LYOPHILIZED, FOR SOLUTION INTRAVENOUS at 12:01

## 2022-02-23 RX ADMIN — SODIUM CHLORIDE: 9 INJECTION, SOLUTION INTRAVENOUS at 07:47

## 2022-02-23 RX ADMIN — THIAMINE HYDROCHLORIDE 100 MG: 100 INJECTION, SOLUTION INTRAMUSCULAR; INTRAVENOUS at 07:48

## 2022-02-23 RX ADMIN — PANTOPRAZOLE SODIUM 40 MG: 40 INJECTION, POWDER, FOR SOLUTION INTRAVENOUS at 07:48

## 2022-02-23 RX ADMIN — SODIUM CHLORIDE, PRESERVATIVE FREE 10 ML: 5 INJECTION INTRAVENOUS at 07:48

## 2022-02-23 RX ADMIN — OCTREOTIDE ACETATE 50 MCG/HR: 500 INJECTION, SOLUTION INTRAVENOUS; SUBCUTANEOUS at 15:26

## 2022-02-23 RX ADMIN — POTASSIUM & SODIUM PHOSPHATES POWDER PACK 280-160-250 MG 250 MG: 280-160-250 PACK at 20:19

## 2022-02-23 RX ADMIN — MAGNESIUM SULFATE HEPTAHYDRATE 1000 MG: 1 INJECTION, SOLUTION INTRAVENOUS at 16:49

## 2022-02-23 RX ADMIN — SODIUM CHLORIDE, PRESERVATIVE FREE 10 ML: 5 INJECTION INTRAVENOUS at 20:21

## 2022-02-23 RX ADMIN — OCTREOTIDE ACETATE 50 MCG/HR: 500 INJECTION, SOLUTION INTRAVENOUS; SUBCUTANEOUS at 07:47

## 2022-02-23 RX ADMIN — POTASSIUM & SODIUM PHOSPHATES POWDER PACK 280-160-250 MG 250 MG: 280-160-250 PACK at 16:49

## 2022-02-23 RX ADMIN — LIDOCAINE HYDROCHLORIDE 50 MG: 10 INJECTION, SOLUTION INFILTRATION; PERINEURAL at 13:59

## 2022-02-23 RX ADMIN — VANCOMYCIN HYDROCHLORIDE 1000 MG: 1 INJECTION, POWDER, LYOPHILIZED, FOR SOLUTION INTRAVENOUS at 20:19

## 2022-02-23 RX ADMIN — SODIUM CHLORIDE: 9 INJECTION, SOLUTION INTRAVENOUS at 15:26

## 2022-02-23 RX ADMIN — PANTOPRAZOLE SODIUM 40 MG: 40 INJECTION, POWDER, FOR SOLUTION INTRAVENOUS at 20:19

## 2022-02-23 RX ADMIN — PROPOFOL 360 MG: 10 INJECTION, EMULSION INTRAVENOUS at 13:59

## 2022-02-23 ASSESSMENT — PAIN SCALES - GENERAL
PAINLEVEL_OUTOF10: 0

## 2022-02-23 ASSESSMENT — PAIN - FUNCTIONAL ASSESSMENT: PAIN_FUNCTIONAL_ASSESSMENT: 0-10

## 2022-02-23 NOTE — PROGRESS NOTES
[facility] Progress Note    Admitting Date and Time: 2/20/2022 10:19 AM  Admit Dx: Hypokalemia [E87.6]  Lactic acidosis [E87.2]  UGI bleed [K92.2]  Coffee ground emesis [K92.0]  Pyogenic arthritis of left shoulder region, due to unspecified organism ([address] [M00.9]  Acute anemia [D97.9]  Alcoholic cirrhosis, unspecified whether ascites present ([address] [K70.30]    Subjective:  Patient is being followed for Hypokalemia [E87.6]  Lactic acidosis [E87.2]  UGI bleed [K92.2]  Coffee ground emesis [K92.0]  Pyogenic arthritis of left shoulder region, due to unspecified organism ([address] [M00.9]  Acute anemia [W18.2]  Alcoholic cirrhosis, unspecified whether ascites present ([address] [K70.30]   Pt is confused. Requiring a sitter. NH3 level high. Has an abscess of the left sternoclavicular joint. ID recommending Ancef and Vancomycin IV. EGD with small esophageal varix needing banding. Mild portal hypertensive gastropathy. Antrum deformed but no ulcer or GAVE. Duodenum was normal.  Per RN:  No new concerns. ROS: denies fever, chills, cp, sob, n/v, HA unless stated above.  thiamine  100 mg IntraVENous Daily    vancomycin  1,000 mg IntraVENous Q8H    sodium chloride flush  5-40 mL IntraVENous 2 times per day    pantoprazole  40 mg IntraVENous BID    And    sodium chloride (PF)  10 mL IntraVENous Daily     metoprolol, 5 mg, Q8H PRN  sodium chloride, 25 mL, PRN  ondansetron, 4 mg, Q8H PRN   Or  ondansetron, 4 mg, Q6H PRN  polyethylene glycol, 17 g, Daily PRN  acetaminophen, 650 mg, Q6H PRN   Or  acetaminophen, 650 mg, Q6H PRN  morphine, 1 mg, Q3H PRN    Objective:    BP (!) 158/97   Pulse 90   Temp 97.5 °F (36.4 °C) (Axillary)   Resp 18   Ht 5' 4\" (1.626 m)   Wt 129 lb (58.5 kg)   SpO2 99%   BMI 22.14 kg/m²     General Appearance: confused.     Skin: warm and dry  Head: normocephalic and atraumatic  Eyes: pupils equal, round, and reactive to light, extraocular eye movements intact, conjunctivae normal  Neck: neck supple and non tender without mass   Pulmonary/Chest: clear to auscultation bilaterally- no wheezes, rales or rhonchi, normal air movement, no respiratory distress  Cardiovascular: normal rate, normal S1 and S2 and no carotid bruits  Abdomen: soft, non-tender, non-distended, normal bowel sounds, no masses or organomegaly  Extremities: no cyanosis, no clubbing and no edema  Neurologic: no cranial nerve deficit and speech normal    Recent Labs     02/21/22  0500 02/21/22  1821 02/22/22  0610 02/23/22  0410     --  138 131*   K 3.1* 4.0 4.3 3.7     --  105 98   CO2 27  --  24 23   BUN 50*  --  27* 13   CREATININE 0.9  --  0.6 0.5   GLUCOSE 138*  --  128* 154*   CALCIUM 7.5*  --  7.1* 7.3*       Recent Labs     02/21/22  0500 02/21/22  0500 02/21/22 1821 02/22/22  0610 02/23/22  0410   WBC 20.9*  --   --  15.2* 9.3   RBC 2.39*  --   --  2.77* 2.71*   HGB 6.9*   < > 8.5* 8.0* 8.0*   HCT 21.3*   < > 25.6* 24.8* 24.7*   MCV 89.1  --   --  89.5 91.1   MCH 28.9  --   --  28.9 29.5   MCHC 32.4  --   --  32.3 32.4   RDW 22.3*  --   --  22.9* 24.3*     --   --  166 172   MPV 11.5  --   --  11.3 11.2    < > = values in this interval not displayed. Radiology:   XR CLAVICLE LEFT    Result Date: 2/21/2022  EXAMINATION: TWO XRAY VIEWS OF THE LEFT CLAVICLE 2/21/2022 6:55 pm COMPARISON: None. HISTORY: ORDERING SYSTEM PROVIDED HISTORY: pain TECHNOLOGIST PROVIDED HISTORY: Reason for exam:->pain FINDINGS: There is no evidence of acute fracture. There is normal alignment. No acute joint abnormality. No focal osseous lesion. No focal soft tissue abnormality. No acute osseous abnormality. XR SHOULDER LEFT (MIN 2 VIEWS)    Result Date: 2/21/2022  EXAMINATION: THREE XRAY VIEWS OF THE LEFT SHOULDER 2/21/2022 6:55 pm COMPARISON: None. HISTORY: ORDERING SYSTEM PROVIDED HISTORY: pain TECHNOLOGIST PROVIDED HISTORY: Reason for exam:->pain FINDINGS: Glenohumeral joint is normally aligned.   No evidence of acute fracture or dislocation. No abnormal periarticular calcifications. The [facility] joint is unremarkable in appearance. Visualized lung is unremarkable. No acute abnormality. Assessment:    Principal Problem:    UGI bleed  Active Problems:    Anemia associated with acute blood loss    Leukocytosis    Alcoholic cirrhosis (HCC)    Left shoulder pain  Resolved Problems:    * No resolved hospital problems. *    Plan:  1. S/P EGD - with esophageal varix, small that was banded. Mild portal hypertensive gastropathy. Deformed antrum w/o ulcer or GAVE. Octreotide til tomorrow morning. 2.  Alcoholic lever cirrhosis - patient with history of alcohol abuse. GI on case. NH3 116; Lactic acid 2.3;  Mg2+ 1.7; Phosphorus 1.9  Mg2+ and Phosphorus repletion ordered. Trend labs and treat accordingly. NOTE: This report was transcribed using voice recognition software. Every effort was made to ensure accuracy; however, inadvertent computerized transcription errors may be present.     Electronically signed by Ethan Weir MD on 2/23/2022 at 3:50 PM

## 2022-02-23 NOTE — BRIEF OP NOTE
Brief Postoperative Note      Patient: Nathalie Nur  YOB: 1980  MRN: [id number]    Date of Procedure: 2/23/2022    Pre-Op Diagnosis: anemia/GIB/Cirrhosis w/portal htn    Post-Op Diagnosis: only one small varix amenable to banding, mild portal hypertensive gastropathy,deformed antrum w/o ulcer or GAVE, normal duodenum       Procedure(s):  EGD BAND LIGATION    Surgeon(s):  Indra Aguilar MD    Assistant:  * No surgical staff found *    Anesthesia: Monitor Anesthesia Care    Estimated Blood Loss (mL): Minimal    Complications: None    Specimens:   * No specimens in log *    Implants:  * No implants in log *      Drains:   [REMOVED] Urethral Catheter Temperature probe (Removed)   $ Urethral catheter insertion $ Not inserted for procedure 02/20/22 2200   Catheter Indications Need for fluid volume management of the critically ill patient in a critical care setting;Perioperative use for selected surgical procedures 02/22/22 1708   Securement Device Date Changed 02/20/22 02/20/22 2200   Site Assessment No urethral drainage 02/22/22 1708   Urine Color Yellow 02/22/22 1708   Urine Appearance Hazy 02/22/22 1708   Output (mL) 1850 mL 02/22/22 1600       Findings: no obvious site of bleeding but has bled from small varices before, anticipating Coreg    Electronically signed by Indra Aguilar MD on 2/23/2022 at 2:24 PM

## 2022-02-23 NOTE — FLOWSHEET NOTE
While doing pre-procedure questions, pt kept stating \"none\", when asked what time and day she took the medications that were listed on her home med rec. Ángel Renan, who is the pts father and is at bedside, is also unsure if pt has taken her meds at this time.

## 2022-02-23 NOTE — PROGRESS NOTES
Spoke with patients father Richi Cerna and updated him on the plan of care at this time. Informed consent obtained for EGD over the phone by 2 RNs.

## 2022-02-23 NOTE — ANESTHESIA POSTPROCEDURE EVALUATION
Department of Anesthesiology  Postprocedure Note    Patient: Lul Anne  MRN: [id number]  YOB: 1980  Date of evaluation: 2/23/2022  Time:  2:28 PM     Procedure Summary     Date: 02/23/22 Room / Location: [facility] ENDO 03 / Desiree Swain    Anesthesia Start: 8293 Anesthesia Stop: 5863    Procedure: EGD BAND LIGATION (N/A ) Diagnosis: (anemia)    Surgeons: Courtney Colmenares MD Responsible Provider: Sapna Warren MD    Anesthesia Type: MAC ASA Status: 4          Anesthesia Type: MAC    Mitch Phase I: Mitch Score: 10    Mitch Phase II:      Last vitals: Reviewed and per EMR flowsheets.        Anesthesia Post Evaluation    Patient location during evaluation: bedside  Patient participation: complete - patient participated  Level of consciousness: sleepy but conscious  Pain score: 0  Airway patency: patent  Nausea & Vomiting: no nausea and no vomiting  Complications: no  Cardiovascular status: blood pressure returned to baseline and hemodynamically stable  Respiratory status: acceptable and room air  Hydration status: euvolemic

## 2022-02-23 NOTE — ANESTHESIA PRE PROCEDURE
Department of Anesthesiology  Preprocedure Note       Name:  Sherif Lemons   Age:  46 y.o.  :  1980                                          MRN:  [id number]         Date:  2022      Surgeon: Cayetano Daniels):  Jennifer Ramon MD    Procedure: EGD  ESOPHAGOGASTRODUODENOSCOPY      Medications prior to admission:   Prior to Admission medications    Medication Sig Start Date End Date Taking? Authorizing Provider   spironolactone (ALDACTONE) 50 MG tablet Take 50 mg by mouth daily 21   Historical Provider, MD   methylPREDNISolone (MEDROL DOSEPACK) 4 MG tablet Take by mouth. 2/10/22   Celsa Verde DO   tiZANidine (ZANAFLEX) 2 MG tablet Take 1 tablet by mouth 4 times daily as needed (back pain/ spasm) 2/10/22   Audelia Lawrence DO   furosemide (LASIX) 20 MG tablet Take 20 mg by mouth daily as needed  21   Historical Provider, MD   lactulose (CHRONULAC) 10 GM/15ML solution Take 20 g by mouth 3 times daily as needed  21   Historical Provider, MD   omeprazole (PRILOSEC) 20 MG delayed release capsule Take 20 mg by mouth Daily  3/5/21   Historical Provider, MD   KLOR-CON M20 20 MEQ extended release tablet Take 20 mEq by mouth daily  3/30/21   Historical Provider, MD   vitamin A 3 MG (27530 UT) capsule Take 10,000 Units by mouth daily 10/1/20   Historical Provider, MD   gabapentin (NEURONTIN) 100 MG capsule Take 100 mg by mouth as needed. Historical Provider, MD   vitamin D (ERGOCALCIFEROL) 1.25 MG (44360 UT) CAPS capsule Take 50,000 Units by mouth once a week  20   Historical Provider, MD   CREON 58512 units delayed release capsule Take 12,000 Units by mouth 4 times daily (before meals and nightly)  20   Historical Provider, MD       Current medications:    No current facility-administered medications for this visit. No current outpatient medications on file.      Facility-Administered Medications Ordered in Other Visits   Medication Dose Route Frequency Provider Last Rate Last Elevated liver enzymes R74.8    Hyperammonemia (HCC) E72.20    Liver metastases (HCC) C78.7    Malignant ascites R18.0    H/O malignant neoplasm of pancreas Z85.07    Hematemesis K92.0    GI bleed K92.2    Upper GI bleed K92.2    UGI bleed K92.2    Anemia associated with acute blood loss D62    Leukocytosis J05.907    Alcoholic cirrhosis (HCC) G52.24    Left shoulder pain M25.512       Past Medical History:        Diagnosis Date    Alcohol abuse     Alcoholic cirrhosis ([address]     Anxiety     not on any meds for it    Hypertension     has not required any med a few years as of 8/2019    Hypothyroidism     in her early 25s - pt was on levothyroxine for a while and then hypothyroidism apparently resolved and pt has been off med since her mid-20s    Pancreatic cyst 01/2017    Pt underwent partial pancreatectomy and splenectomy at [facility] [place] 2/23/2017 and was told pathology showed MCN (mucinous cystic neoplasm) of pancreas with clean margins at surgery       Past Surgical History:        Procedure Laterality Date   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 OR       Social History:    Social History     Tobacco Use    Smoking status: Never Smoker    Smokeless tobacco: Never Used   Substance Use Topics    Alcohol use: Yes     Comment: daily wine or beer                                Counseling given: Not Answered      Vital Signs (Current): There were no vitals filed for this visit. BP Readings from Last 3 Encounters:   02/23/22 (!) 157/91   02/22/22 (!) 178/112   02/10/22 122/68       NPO Status:                                                                                 BMI:   Wt Readings from Last 3 Encounters:   02/23/22 129 lb (58.5 kg)   02/10/22 129 lb (58.5 kg)   12/21/21 120 lb (54.4 kg)     There is no height or weight on file to calculate BMI.    CBC:   Lab Results   Component Value Date    WBC 9.3 02/23/2022    RBC 2.71 02/23/2022    RBC 3.55 07/08/2019    HGB 8.0 02/23/2022    HCT 24.7 02/23/2022    MCV 91.1 02/23/2022    RDW 24.3 02/23/2022     02/23/2022       CMP:   Lab Results   Component Value Date     02/23/2022    K 3.7 02/23/2022    K 3.1 02/21/2022    CL 98 02/23/2022    CO2 23 02/23/2022    BUN 13 02/23/2022    CREATININE 0.5 02/23/2022    GFRAA >60 02/23/2022    LABGLOM >60 02/23/2022    GLUCOSE 154 02/23/2022    GLUCOSE 102 07/08/2019    PROT 6.9 02/23/2022    CALCIUM 7.3 02/23/2022    BILITOT 4.1 02/23/2022    ALKPHOS 154 02/23/2022    AST 65 02/23/2022    ALT 19 02/23/2022       POC Tests: No results for input(s): POCGLU, POCNA, POCK, POCCL, POCBUN, POCHEMO, POCHCT in the last 72 hours.     Coags:   Lab Results   Component Value Date    PROTIME 23.3 02/20/2022    INR 2.1 02/20/2022    APTT 33.4 02/20/2022       HCG (If Applicable):   Lab Results   Component Value Date    PREGTESTUR NEGATIVE 12/21/2021        ABGs:   Lab Results   Component Value Date    RFP6IWN 21.8 07/06/2020        Type & Screen (If Applicable):  No results found for: LABABO, LABRH    Drug/Infectious Status (If Applicable):  No results found for: HIV, HEPCAB    COVID-19 Screening (If Applicable):   Lab Results   Component Value Date    COVID19 Not Detected 02/20/2022    COVID19 Not Detected 11/26/2020           Anesthesia Evaluation  Patient summary reviewed and Nursing notes reviewed no history of anesthetic complications:   Airway: Mallampati: II  TM distance: >3 FB   Neck ROM: full  Mouth opening: > = 3 FB Dental: normal exam         Pulmonary: breath sounds clear to auscultation  (+) COPD:                             Cardiovascular:  Exercise tolerance: good (>4 METS),   (+) hypertension:,       ECG reviewed  Rhythm: regular  Rate: normal                    Neuro/Psych:   (+) psychiatric history:depression/anxiety              ROS comment: ETOH abuse GI/Hepatic/Renal:   (+) liver disease: portal hypertension, esophageal varices,          ROS comment: Alcoholic cirrhosis    Liver metastasis    GI bleed    Esophageal varices    Hematemesis    Upper GI bleed. Endo/Other:    (+) hypothyroidism, blood dyscrasia: anemia:., electrolyte abnormalities, malignancy/cancer (pancreas). ROS comment: Left sternoclavicular joint effusion with remote history of injury versus septic joint arthritis Abdominal:             Vascular: negative vascular ROS. Other Findings:               Anesthesia Plan      MAC     ASA 4       Induction: intravenous. MIPS: Prophylactic antiemetics administered. Anesthetic plan and risks discussed with patient, father and healthcare power of . Use of blood products discussed with patient whom consented to blood products. Plan discussed with CRNA. Patient presented with hematemesis to the ED. She states that an hour before my anesthesia pre-op interview that she was still vomiting. Patient to be re-evaluated by [facility] Anesthesiologist          Lisa Martinez MD   2/23/2022        Lisa Martinez MD   2/23/2022  1:41 PM    Patient assessed, chart reviewed. Agree with above.   KRISTOPHER Pete MD

## 2022-02-23 NOTE — PROGRESS NOTES
Pharmacy Consultation Note  (Antibiotic Dosing and Monitoring)    Initial consult date: 2/22/2022  Consulting physician/provider: Dr. Marlee Villanueva  Drug: Vancomycin  Indication: Sternoclavicular joint abscess    Age/  Gender Height Weight IBW  Allergy Information   41 y.o./female 5' 4\" (162.6 cm) 125 lb (56.7 kg)     Ideal body weight: 54.7 kg (120 lb 9.5 oz)  Adjusted ideal body weight: 56.4 kg (124 lb 4.4 oz)   Pcn [penicillins]      Renal Function:  Recent Labs     02/21/22  0500 02/22/22  0610 02/23/22  0410   BUN 50* 27* 13   CREATININE 0.9 0.6 0.5       Intake/Output Summary (Last 24 hours) at 2/23/2022 0901  Last data filed at 2/22/2022 1600  Gross per 24 hour   Intake 750 ml   Output 1855 ml   Net -1105 ml       Vancomycin Monitoring:  Trough:  No results for input(s): VANCOTROUGH in the last 72 hours. Random:  No results for input(s): VANCORANDOM in the last 72 hours. Vancomycin Administration Times:  Recent vancomycin administrations                   vancomycin 1000 mg IVPB in 250 mL D5W addavial (mg) 1,000 mg New Bag 02/23/22 0358    vancomycin 1500 mg in dextrose 5% 300 mL IVPB (mg) 1,500 mg New Bag 02/22/22 1826                Assessment:  · Patient is a 46 y.o. female who has been initiated on vancomycin  · Estimated Creatinine Clearance: 130 mL/min (based on SCr of 0.5 mg/dL).   · To dose vancomycin, pharmacy will be utilizing [employer] calculation software for goal AUC/DIMAS 400-600 mg/L-hr    Plan:  · Will continue vancomycin 1 g IV every 8 hours  · Will check vancomycin levels when appropriate  · Will continue to monitor renal function   · Clinical pharmacy to follow    Lorin Jones PharmD, [facility]  2/23/2022  9:01 AM

## 2022-02-23 NOTE — PROGRESS NOTES
No bleeding  Only one small varix  No other potential site  Single band    Octreotide til AM  Carvedilol   Lactulose when PO established

## 2022-02-23 NOTE — OP NOTE
[id number] [address]                                OPERATIVE REPORT    PATIENT NAME: Camron Dominique                   :        1980  MED REC NO:   [id number]                            ROOM:       7914  ACCOUNT NO:   [de-identified]                           ADMIT DATE: 2022  PROVIDER:     Iris Cheatham MD    DATE OF PROCEDURE:  2022    PREOPERATIVE DIAGNOSIS:  Left sternoclavicular joint septic arthritis. POSTOPERATIVE DIAGNOSIS:  Left sternoclavicular joint septic arthritis. PROCEDURE PERFORMED:  Left sternoclavicular joint excisional  debridement. SURGEON:  Iris Cheatham M.D. ANESTHESIA:  1. General.  2.  Local anesthetic by surgeon consisting of approximately 10 mL of  0.25% Marcaine with epinephrine. ASSISTANT:  Dr. Linnea Barriga, Orthopedic Surgery Resident. ESTIMATED BLOOD LOSS:  Minimal.    FINDINGS:  1.  Gross purulence involving the sternoclavicular joint superficial to  the joint. No significant joint involvement was appreciated, but there  was a small fistula extending down into the joint. 2.  Status post excisional debridement, the remaining tissues were  healthy and viable. SPECIMENS:  1. Excised tissue was sent for culture. 2.  Excised tissue was sent for Pathology. 3.  Cultures were also obtained. DISPOSITION:  The patient in the critical condition and was taken back  to the intensive care unit. OPERATIVE INDICATIONS:  The patient is a very pleasant 53-year-old  female who had a finding of a probable septic sternoclavicular joint  arthritis on CT scan. She had elevated white count. Risks, benefits,  alternatives, and complications of intervention were explained to her  including, but not limited to risk of infection, continued infection,  worsening infection, damage to nerves, vessels, or tendons, recurrence,  and possible need for further surgery. She understood and wished to  proceed. DESCRIPTION OF PROCEDURE:  The patient was identified in the intensive  care unit, taken directly from the intensive care unit to the operating  room, placed supine on the operating table, and underwent general  anesthesia per Anesthesia Department. All bony prominences were well  padded. The left sternoclavicular joint was then prepared and draped in  the standard sterile fashion. Surgical pause was undertaken confirming  the site and procedure. A longitudinal incision directly centered over the sternoclavicular  joint was then created followed by blunt dissection down to the  sternoclavicular joint capsule. This was entered. A bloody turbid  fluid was encountered immediately culture swabbed. Fluid and soft  tissue was sent for culture and Pathology. Excisional debridement of  all devitalized skin, subcutaneous tissue, and deep fascia were  undertaken with a 15-blade scalpel followed by thorough and copious  irrigation of the wound. The remaining tissues were healthy and viable. Good hemostasis was achieved. The deep tissues were reapproximated with  2-0 PDS followed by 2-0 PDS on the subcutaneous tissue and a 3-0  Monocryl suture closure and Dermabond and Aquacel dressing. The patient  remained stable throughout the procedure.         Zander MD Dana    D: 02/22/2022 12:03:56       T: 02/22/2022 14:02:57     [id number]  Job#: [id number]     Doc#: [id number]    CC:

## 2022-02-23 NOTE — PROGRESS NOTES
Department of Orthopedic Surgery      Subjective:  Pt has no complaints today. Sitter at bedside. Patient is confused. When asked where she is she repeats \"December\". Episodes of confusion last night. Medicine is aware. She reports no pain. Vitals  VITALS:  BP (!) 127/92   Pulse 81   Temp 98.1 °F (36.7 °C) (Temporal)   Resp 18   Ht 5' 4\" (1.626 m)   Wt 129 lb 12.8 oz (58.9 kg)   SpO2 98%   BMI 22.28 kg/m²     PHYSICAL EXAM:    Patient confused on examination. Aquacell dressing intact. No erythema. Surrounding dressing. No drainage. Cultures: Pending    ASSESSMENT: POD # 1 s/p left sternoclavicular joint excisional debridement      PLAN:  Keep dressing in place. Will continue to follow cultures. Continue antibiotics per ID.

## 2022-02-23 NOTE — PROGRESS NOTES
[address] [place] [address] [facility]  Progress Note      Chief Complaint   Patient presents with    Emesis     x 3 this am       SUBJECTIVE:  Patient is tolerating medications. No reported adverse drug reactions. No nausea, vomiting, diarrhea. Status post esophageal variceal banding  Resting post procedure  Afebrile  Review of systems:  As stated above in the chief complaint, otherwise negative. Medications:  Scheduled Meds:   potassium & sodium phosphates  1 packet Oral 4x Daily    thiamine  100 mg IntraVENous Daily    vancomycin  1,000 mg IntraVENous Q8H    sodium chloride flush  5-40 mL IntraVENous 2 times per day    pantoprazole  40 mg IntraVENous BID    And    sodium chloride (PF)  10 mL IntraVENous Daily     Continuous Infusions:   sodium chloride 75 mL/hr at 22 1526    sodium chloride      octreotide (SANDOSTATIN) infusion 50 mcg/hr (22 152)     PRN Meds:metoprolol, sodium chloride, ondansetron **OR** ondansetron, polyethylene glycol, acetaminophen **OR** acetaminophen, morphine    OBJECTIVE:  BP (!) 158/97   Pulse 90   Temp 97.5 °F (36.4 °C) (Axillary)   Resp 18   Ht 5' 4\" (1.626 m)   Wt 129 lb (58.5 kg)   SpO2 99%   BMI 22.14 kg/m²   Temp  Av.2 °F (36.8 °C)  Min: 97.5 °F (36.4 °C)  Max: 98.8 °F (37.1 °C)  Constitutional: The patient is awake, alert, and oriented. Skin: Warm and dry. No rashes were noted. HEENT: Round and reactive pupils. Moist mucous membranes. No ulcerations or thrush. Neck: Supple to movements. Chest: No use of accessory muscles to breathe. Symmetrical expansion. No wheezing, crackles or rhonchi. Left SC joint dressed  Cardiovascular: S1 and S2 are rhythmic and regular. No murmurs appreciated. Abdomen: Positive bowel sounds to auscultation. Benign to palpation. No masses felt. No hepatosplenomegaly. Ascites  Genitourinary: Female  Extremities: No clubbing, no cyanosis, no edema.   Lines: peripheral    Laboratory and Tests Review:  Lab Results   Component Value Date    WBC 9.3 02/23/2022    WBC 15.2 (H) 02/22/2022    WBC 20.9 (H) 02/21/2022    HGB 8.0 (L) 02/23/2022    HCT 24.7 (L) 02/23/2022    MCV 91.1 02/23/2022     02/23/2022     Lab Results   Component Value Date    NEUTROABS 8.46 (H) 02/23/2022    NEUTROABS 10.95 (H) 02/22/2022    NEUTROABS 19.86 (H) 02/21/2022     No results found for: CRPHS  Lab Results   Component Value Date    ALT 19 02/23/2022    AST 65 (H) 02/23/2022     (H) 01/04/2017    ALKPHOS 154 (H) 02/23/2022    BILITOT 4.1 (H) 02/23/2022     Lab Results   Component Value Date     02/23/2022    K 3.7 02/23/2022    K 3.1 02/21/2022    CL 98 02/23/2022    CO2 23 02/23/2022    BUN 13 02/23/2022    CREATININE 0.5 02/23/2022    CREATININE 0.6 02/22/2022    CREATININE 0.9 02/21/2022    GFRAA >60 02/23/2022    LABGLOM >60 02/23/2022    GLUCOSE 154 02/23/2022    GLUCOSE 102 07/08/2019    PROT 6.9 02/23/2022    LABALBU 2.4 02/23/2022    LABALBU 5.0 03/13/2012    CALCIUM 7.3 02/23/2022    BILITOT 4.1 02/23/2022    ALKPHOS 154 02/23/2022    AST 65 02/23/2022    ALT 19 02/23/2022     Lab Results   Component Value Date    CRP 11.0 (H) 02/21/2022     Lab Results   Component Value Date    SEDRATE 27 (H) 02/21/2022    SEDRATE 14 06/01/2017     Radiology:  Reviewed    Microbiology:   Lab Results   Component Value Date    BC 24 Hours no growth 02/20/2022    BC 5 Days no growth 07/12/2020    BC 5 Days- no growth 08/09/2019    ORG Escherichia coli 08/09/2019     Lab Results   Component Value Date    BLOODCULT2 24 Hours no growth 02/20/2022    BLOODCULT2 5 Days no growth 07/12/2020    BLOODCULT2 5 Days- no growth 08/09/2019    ORG Escherichia coli 08/09/2019     No results found for: WNDABS  Smear, Respiratory   Date Value Ref Range Status   07/09/2020   Final    Group 5: >25 PMN's/LPF and <10 Epithelial cells/LPF  Abundant Polymorphonuclear leukocytes  Rare Epithelial cells  No organisms seen       No results found for: MPNEUMO, CLAMYDCU, LABLEGI, AFBCX, FUNGSM, LABFUNG  CULTURE, RESPIRATORY   Date Value Ref Range Status   07/09/2020 Oral Pharyngeal Margarita reduced  Final     No results found for: CXCATHTIP  No results found for: BFCS  Culture Surgical   Date Value Ref Range Status   02/22/2022   Preliminary    Growth present, evaluating for:  Alpha hemolytic Strep species     02/22/2022   Preliminary    Growth present, evaluating for:  Alpha hemolytic Strep species       Urine Culture, Routine   Date Value Ref Range Status   07/12/2020 Growth not present  Final   08/09/2019 >100,000 CFU/ml  Final     MRSA Culture Only   Date Value Ref Range Status   02/20/2022 Methicillin resistant Staph aureus not isolated  Final     Microbiology:  2/22/2022: Left SC joint alphahemolytic strep x2        ASSESSMENT:  · Sternoclavicular abscess on the left-alpha strep growing  · Immunocompromised host-cirrhosis    PLAN:  · Continue cefazolin and Vanco pending final cultures  · Check final cultures  · Monitor labs    Haylee Bean MD  6:36 PM  2/23/2022

## 2022-02-23 NOTE — ANESTHESIA POSTPROCEDURE EVALUATION
Department of Anesthesiology  Postprocedure Note    Patient: Lobo Milan  MRN: [id number]  YOB: 1980  Date of evaluation: 2/22/2022  Time:  9:24 PM     Procedure Summary     Date: 02/22/22 Room / Location: [place] [address] / [facility]    Anesthesia Start: 8085 Anesthesia Stop: 1078    Procedure: LEFT STERNOCLAVICULAR JOINT DEBRIDEMENT (Left Chest) Diagnosis: (NA)    Surgeons: Gildardo Al MD Responsible Provider: Chivo Mcgovern DO    Anesthesia Type: general ASA Status: 4          Anesthesia Type: general    Mitch Phase I:      Mitch Phase II:      Last vitals: Reviewed and per EMR flowsheets.        Anesthesia Post Evaluation    Patient location during evaluation: PACU  Patient participation: complete - patient participated  Level of consciousness: awake and alert  Pain score: 1  Airway patency: patent  Nausea & Vomiting: no nausea and no vomiting  Complications: no  Cardiovascular status: hemodynamically stable  Respiratory status: acceptable  Hydration status: euvolemic

## 2022-02-24 LAB
ALBUMIN SERPL-MCNC: 2.2 G/DL (ref 3.5–5.2)
ALP BLD-CCNC: 153 U/L (ref 35–104)
ALT SERPL-CCNC: 21 U/L (ref 0–32)
AMMONIA: 83.3 UMOL/L (ref 11–51)
ANAEROBIC CULTURE: NORMAL
ANAEROBIC CULTURE: NORMAL
ANION GAP SERPL CALCULATED.3IONS-SCNC: 8 MMOL/L (ref 7–16)
ANISOCYTOSIS: ABNORMAL
AST SERPL-CCNC: 92 U/L (ref 0–31)
BASOPHILS ABSOLUTE: 0 E9/L (ref 0–0.2)
BASOPHILS RELATIVE PERCENT: 0 % (ref 0–2)
BILIRUB SERPL-MCNC: 4.9 MG/DL (ref 0–1.2)
BUN BLDV-MCNC: 10 MG/DL (ref 6–20)
BURR CELLS: ABNORMAL
CALCIUM SERPL-MCNC: 7.2 MG/DL (ref 8.6–10.2)
CHLORIDE BLD-SCNC: 97 MMOL/L (ref 98–107)
CO2: 23 MMOL/L (ref 22–29)
CREAT SERPL-MCNC: 0.5 MG/DL (ref 0.5–1)
EOSINOPHILS ABSOLUTE: 0 E9/L (ref 0.05–0.5)
EOSINOPHILS RELATIVE PERCENT: 0 % (ref 0–6)
GFR AFRICAN AMERICAN: >60
GFR NON-AFRICAN AMERICAN: >60 ML/MIN/1.73
GLUCOSE BLD-MCNC: 160 MG/DL (ref 74–99)
HCT VFR BLD CALC: 23 % (ref 34–48)
HEMOGLOBIN: 7.3 G/DL (ref 11.5–15.5)
HYPOCHROMIA: ABNORMAL
LACTIC ACID: 1.5 MMOL/L (ref 0.5–2.2)
LYMPHOCYTES ABSOLUTE: 0.46 E9/L (ref 1.5–4)
LYMPHOCYTES RELATIVE PERCENT: 4.3 % (ref 20–42)
MAGNESIUM: 1.4 MG/DL (ref 1.6–2.6)
MCH RBC QN AUTO: 28.9 PG (ref 26–35)
MCHC RBC AUTO-ENTMCNC: 31.7 % (ref 32–34.5)
MCV RBC AUTO: 90.9 FL (ref 80–99.9)
MONOCYTES ABSOLUTE: 0.58 E9/L (ref 0.1–0.95)
MONOCYTES RELATIVE PERCENT: 5.2 % (ref 2–12)
MYELOCYTE PERCENT: 0.9 % (ref 0–0)
NEUTROPHILS ABSOLUTE: 10.56 E9/L (ref 1.8–7.3)
NEUTROPHILS RELATIVE PERCENT: 89.6 % (ref 43–80)
NUCLEATED RED BLOOD CELLS: 7.8 /100 WBC
PDW BLD-RTO: 24.4 FL (ref 11.5–15)
PHOSPHORUS: 1.9 MG/DL (ref 2.5–4.5)
PLATELET # BLD: 174 E9/L (ref 130–450)
PMV BLD AUTO: 11 FL (ref 7–12)
POIKILOCYTES: ABNORMAL
POLYCHROMASIA: ABNORMAL
POTASSIUM REFLEX MAGNESIUM: 3.1 MMOL/L (ref 3.5–5)
POTASSIUM SERPL-SCNC: 3.1 MMOL/L (ref 3.5–5)
RBC # BLD: 2.53 E12/L (ref 3.5–5.5)
SODIUM BLD-SCNC: 128 MMOL/L (ref 132–146)
TARGET CELLS: ABNORMAL
TOTAL PROTEIN: 6.2 G/DL (ref 6.4–8.3)
WBC # BLD: 11.6 E9/L (ref 4.5–11.5)

## 2022-02-24 PROCEDURE — 2500000003 HC RX 250 WO HCPCS: Performed by: FAMILY MEDICINE

## 2022-02-24 PROCEDURE — A4216 STERILE WATER/SALINE, 10 ML: HCPCS | Performed by: INTERNAL MEDICINE

## 2022-02-24 PROCEDURE — 99233 SBSQ HOSP IP/OBS HIGH 50: CPT | Performed by: FAMILY MEDICINE

## 2022-02-24 PROCEDURE — 80053 COMPREHEN METABOLIC PANEL: CPT

## 2022-02-24 PROCEDURE — 2580000003 HC RX 258: Performed by: INTERNAL MEDICINE

## 2022-02-24 PROCEDURE — 6360000002 HC RX W HCPCS: Performed by: INTERNAL MEDICINE

## 2022-02-24 PROCEDURE — 6370000000 HC RX 637 (ALT 250 FOR IP): Performed by: FAMILY MEDICINE

## 2022-02-24 PROCEDURE — C9113 INJ PANTOPRAZOLE SODIUM, VIA: HCPCS | Performed by: INTERNAL MEDICINE

## 2022-02-24 PROCEDURE — 2580000003 HC RX 258: Performed by: SPECIALIST

## 2022-02-24 PROCEDURE — 36415 COLL VENOUS BLD VENIPUNCTURE: CPT

## 2022-02-24 PROCEDURE — 83605 ASSAY OF LACTIC ACID: CPT

## 2022-02-24 PROCEDURE — 84100 ASSAY OF PHOSPHORUS: CPT

## 2022-02-24 PROCEDURE — 6360000002 HC RX W HCPCS: Performed by: FAMILY MEDICINE

## 2022-02-24 PROCEDURE — 85025 COMPLETE CBC W/AUTO DIFF WBC: CPT

## 2022-02-24 PROCEDURE — 2580000003 HC RX 258: Performed by: FAMILY MEDICINE

## 2022-02-24 PROCEDURE — 83735 ASSAY OF MAGNESIUM: CPT

## 2022-02-24 PROCEDURE — 82140 ASSAY OF AMMONIA: CPT

## 2022-02-24 PROCEDURE — 6360000002 HC RX W HCPCS: Performed by: SPECIALIST

## 2022-02-24 PROCEDURE — 2060000000 HC ICU INTERMEDIATE R&B

## 2022-02-24 RX ORDER — SODIUM CHLORIDE 0.9 % (FLUSH) 0.9 %
5-40 SYRINGE (ML) INJECTION PRN
Status: DISCONTINUED | OUTPATIENT
Start: 2022-02-24 | End: 2022-02-26 | Stop reason: HOSPADM

## 2022-02-24 RX ORDER — MAGNESIUM SULFATE 1 G/100ML
1000 INJECTION INTRAVENOUS PRN
Status: DISCONTINUED | OUTPATIENT
Start: 2022-02-24 | End: 2022-02-26 | Stop reason: HOSPADM

## 2022-02-24 RX ORDER — POTASSIUM CHLORIDE 20 MEQ/1
40 TABLET, EXTENDED RELEASE ORAL PRN
Status: DISCONTINUED | OUTPATIENT
Start: 2022-02-24 | End: 2022-02-26 | Stop reason: HOSPADM

## 2022-02-24 RX ORDER — PANTOPRAZOLE SODIUM 40 MG/1
40 TABLET, DELAYED RELEASE ORAL
Status: DISCONTINUED | OUTPATIENT
Start: 2022-02-25 | End: 2022-02-26 | Stop reason: HOSPADM

## 2022-02-24 RX ORDER — SODIUM CHLORIDE 9 MG/ML
25 INJECTION, SOLUTION INTRAVENOUS PRN
Status: DISCONTINUED | OUTPATIENT
Start: 2022-02-24 | End: 2022-02-26 | Stop reason: HOSPADM

## 2022-02-24 RX ORDER — LIDOCAINE HYDROCHLORIDE 10 MG/ML
5 INJECTION, SOLUTION EPIDURAL; INFILTRATION; INTRACAUDAL; PERINEURAL ONCE
Status: COMPLETED | OUTPATIENT
Start: 2022-02-24 | End: 2022-02-26

## 2022-02-24 RX ORDER — POTASSIUM CHLORIDE 7.45 MG/ML
10 INJECTION INTRAVENOUS PRN
Status: DISCONTINUED | OUTPATIENT
Start: 2022-02-24 | End: 2022-02-26 | Stop reason: HOSPADM

## 2022-02-24 RX ORDER — SODIUM CHLORIDE 0.9 % (FLUSH) 0.9 %
5-40 SYRINGE (ML) INJECTION EVERY 12 HOURS SCHEDULED
Status: DISCONTINUED | OUTPATIENT
Start: 2022-02-24 | End: 2022-02-26 | Stop reason: HOSPADM

## 2022-02-24 RX ADMIN — SODIUM CHLORIDE, PRESERVATIVE FREE 10 ML: 5 INJECTION INTRAVENOUS at 08:13

## 2022-02-24 RX ADMIN — PANTOPRAZOLE SODIUM 40 MG: 40 INJECTION, POWDER, FOR SOLUTION INTRAVENOUS at 19:58

## 2022-02-24 RX ADMIN — WATER 2000 MG: 1 INJECTION INTRAMUSCULAR; INTRAVENOUS; SUBCUTANEOUS at 14:25

## 2022-02-24 RX ADMIN — MORPHINE SULFATE 1 MG: 2 INJECTION, SOLUTION INTRAMUSCULAR; INTRAVENOUS at 05:36

## 2022-02-24 RX ADMIN — MORPHINE SULFATE 1 MG: 2 INJECTION, SOLUTION INTRAMUSCULAR; INTRAVENOUS at 11:33

## 2022-02-24 RX ADMIN — SODIUM CHLORIDE: 9 INJECTION, SOLUTION INTRAVENOUS at 17:09

## 2022-02-24 RX ADMIN — MORPHINE SULFATE 1 MG: 2 INJECTION, SOLUTION INTRAMUSCULAR; INTRAVENOUS at 17:52

## 2022-02-24 RX ADMIN — SODIUM CHLORIDE, PRESERVATIVE FREE 5 ML: 5 INJECTION INTRAVENOUS at 08:25

## 2022-02-24 RX ADMIN — PANTOPRAZOLE SODIUM 40 MG: 40 INJECTION, POWDER, FOR SOLUTION INTRAVENOUS at 08:13

## 2022-02-24 RX ADMIN — POTASSIUM BICARBONATE 40 MEQ: 782 TABLET, EFFERVESCENT ORAL at 08:25

## 2022-02-24 RX ADMIN — SODIUM PHOSPHATE, MONOBASIC, MONOHYDRATE 9.75 MMOL: 276; 142 INJECTION, SOLUTION INTRAVENOUS at 13:11

## 2022-02-24 RX ADMIN — MORPHINE SULFATE 1 MG: 2 INJECTION, SOLUTION INTRAMUSCULAR; INTRAVENOUS at 21:01

## 2022-02-24 RX ADMIN — VANCOMYCIN HYDROCHLORIDE 1000 MG: 1 INJECTION, POWDER, LYOPHILIZED, FOR SOLUTION INTRAVENOUS at 11:30

## 2022-02-24 RX ADMIN — MAGNESIUM SULFATE HEPTAHYDRATE 1000 MG: 1 INJECTION, SOLUTION INTRAVENOUS at 09:33

## 2022-02-24 RX ADMIN — MAGNESIUM SULFATE HEPTAHYDRATE 1000 MG: 1 INJECTION, SOLUTION INTRAVENOUS at 08:13

## 2022-02-24 RX ADMIN — VANCOMYCIN HYDROCHLORIDE 1000 MG: 1 INJECTION, POWDER, LYOPHILIZED, FOR SOLUTION INTRAVENOUS at 04:02

## 2022-02-24 RX ADMIN — THIAMINE HYDROCHLORIDE 100 MG: 100 INJECTION, SOLUTION INTRAMUSCULAR; INTRAVENOUS at 08:12

## 2022-02-24 RX ADMIN — SODIUM CHLORIDE, PRESERVATIVE FREE 10 ML: 5 INJECTION INTRAVENOUS at 19:59

## 2022-02-24 RX ADMIN — Medication 10 ML: at 17:53

## 2022-02-24 RX ADMIN — OCTREOTIDE ACETATE 50 MCG/HR: 500 INJECTION, SOLUTION INTRAVENOUS; SUBCUTANEOUS at 07:23

## 2022-02-24 ASSESSMENT — PAIN DESCRIPTION - DIRECTION: RADIATING_TOWARDS: LEFT ARM

## 2022-02-24 ASSESSMENT — PAIN DESCRIPTION - ONSET
ONSET: ON-GOING
ONSET: ON-GOING

## 2022-02-24 ASSESSMENT — PAIN DESCRIPTION - LOCATION
LOCATION: SHOULDER
LOCATION: CHEST
LOCATION: SHOULDER

## 2022-02-24 ASSESSMENT — PAIN SCALES - GENERAL
PAINLEVEL_OUTOF10: 7
PAINLEVEL_OUTOF10: 0
PAINLEVEL_OUTOF10: 7
PAINLEVEL_OUTOF10: 8
PAINLEVEL_OUTOF10: 8

## 2022-02-24 ASSESSMENT — PAIN DESCRIPTION - PROGRESSION
CLINICAL_PROGRESSION: NOT CHANGED
CLINICAL_PROGRESSION: NOT CHANGED

## 2022-02-24 ASSESSMENT — PAIN DESCRIPTION - DESCRIPTORS
DESCRIPTORS: ACHING;DISCOMFORT;SORE
DESCRIPTORS: ACHING;DISCOMFORT;SORE
DESCRIPTORS: ACHING;CONSTANT;DISCOMFORT

## 2022-02-24 ASSESSMENT — PAIN DESCRIPTION - ORIENTATION
ORIENTATION: UPPER
ORIENTATION: LEFT

## 2022-02-24 ASSESSMENT — PAIN - FUNCTIONAL ASSESSMENT
PAIN_FUNCTIONAL_ASSESSMENT: PREVENTS OR INTERFERES SOME ACTIVE ACTIVITIES AND ADLS

## 2022-02-24 ASSESSMENT — PAIN DESCRIPTION - FREQUENCY
FREQUENCY: CONTINUOUS
FREQUENCY: CONTINUOUS

## 2022-02-24 ASSESSMENT — PAIN DESCRIPTION - PAIN TYPE
TYPE: SURGICAL PAIN
TYPE: SURGICAL PAIN;ACUTE PAIN
TYPE: SURGICAL PAIN

## 2022-02-24 NOTE — CARE COORDINATION
CASE MANAGEMENT. ... S/p EGD with band x1 yesterday. Continues on iv sandostatin gtt, iv protonix, ivf, iv thiamine, and iv vanc. Per ID, will likely need iv atbs at discharge, pending cx. Met with patient at the bedside. Oraliabenjamin Kenan is agreeable to home with [facility] for iv atbs. Confirmed with [facility] that any iv antibiotic will be covered 100% at discharge. Spoke with Dwight Pak from [facility] and informed her of dc plans. States she will provide patient with list of inpatient and outpt ETOH resources. Will cont to follow along and assist with needs accordingly.

## 2022-02-24 NOTE — PROGRESS NOTES
Pharmacy Consultation Note  (Antibiotic Dosing and Monitoring)    Initial consult date: 2/22/2022  Consulting physician/provider: Dr. Mendel Rodríguez  Drug: Vancomycin  Indication: Sternoclavicular joint abscess    Age/  Gender Height Weight IBW  Allergy Information   41 y.o./female 5' 4\" (162.6 cm) 125 lb (56.7 kg)     Ideal body weight: 54.7 kg (120 lb 9.5 oz)  Adjusted ideal body weight: 57.2 kg (126 lb 1.8 oz)   Pcn [penicillins]      Renal Function:  Recent Labs     02/22/22  0610 02/23/22  0410 02/24/22  0506   BUN 27* 13 10   CREATININE 0.6 0.5 0.5       Intake/Output Summary (Last 24 hours) at 2/24/2022 1314  Last data filed at 2/24/2022 0411  Gross per 24 hour   Intake 2927.48 ml   Output --   Net 2927.48 ml       Vancomycin Monitoring:  Trough:  No results for input(s): VANCOTROUGH in the last 72 hours. Random:  No results for input(s): VANCORANDOM in the last 72 hours. Vancomycin Administration Times:  Recent vancomycin administrations                   vancomycin 1000 mg IVPB in 250 mL D5W addavial (mg) 1,000 mg New Bag 02/24/22 1130     1,000 mg New Bag  0402     1,000 mg New Bag 02/23/22 2019     1,000 mg New Bag  1201     1,000 mg New Bag  0358    vancomycin 1500 mg in dextrose 5% 300 mL IVPB (mg) 1,500 mg New Bag 02/22/22 1826                Assessment:  · Patient is a 46 y.o. female who has been initiated on vancomycin  Estimated Creatinine Clearance: 130 mL/min (based on SCr of 0.5 mg/dL). · To dose vancomycin, pharmacy will be utilizing [employer] calculation software for goal AUC/DIMAS 400-600 mg/L-hr    Plan:  · Will continue vancomycin 1 g IV every 8 hours  · Will check vancomycin levels when appropriate  · Will continue to monitor renal function   · Clinical pharmacy to follow    Jocelyn Perez PharmD, [facility]CP  2/24/2022  1:14 PM    Addendum:  Note vancomycin discontinued. Clinical pharmacy will sign-off. Please re-consult if we can be of further assistance.     Jocelyn Perez [facility], PharmD, [address] [facility]  2/24/2022  2:07 PM

## 2022-02-24 NOTE — PROGRESS NOTES
[facility] Progress Note    Admitting Date and Time: 2/20/2022 10:19 AM  Admit Dx: Hypokalemia [E87.6]  Lactic acidosis [E87.2]  UGI bleed [K92.2]  Coffee ground emesis [K92.0]  Pyogenic arthritis of left shoulder region, due to unspecified organism ([address] [M00.9]  Acute anemia [X63.7]  Alcoholic cirrhosis, unspecified whether ascites present ([address] [K70.30]    Subjective:  Patient is being followed for Hypokalemia [E87.6]  Lactic acidosis [E87.2]  UGI bleed [K92.2]  Coffee ground emesis [K92.0]  Pyogenic arthritis of left shoulder region, due to unspecified organism ([address] [M00.9]  Acute anemia [O03.1]  Alcoholic cirrhosis, unspecified whether ascites present ([address] [K70.30]   Pt feels better  Per RN:   Magnesium and Phosphorus low. Replacement ordered. ROS: denies fever, chills, cp, sob, n/v, HA unless stated above.       thiamine  100 mg IntraVENous Daily    vancomycin  1,000 mg IntraVENous Q8H    sodium chloride flush  5-40 mL IntraVENous 2 times per day    pantoprazole  40 mg IntraVENous BID    And    sodium chloride (PF)  10 mL IntraVENous Daily     potassium chloride, 40 mEq, PRN   Or  potassium alternative oral replacement, 40 mEq, PRN   Or  potassium chloride, 10 mEq, PRN  magnesium sulfate, 1,000 mg, PRN  sodium phosphate IVPB, 0.16 mmol/kg, PRN   Or  sodium phosphate IVPB, 0.32 mmol/kg, PRN  metoprolol, 5 mg, Q8H PRN  sodium chloride, 25 mL, PRN  ondansetron, 4 mg, Q8H PRN   Or  ondansetron, 4 mg, Q6H PRN  polyethylene glycol, 17 g, Daily PRN  acetaminophen, 650 mg, Q6H PRN   Or  acetaminophen, 650 mg, Q6H PRN  morphine, 1 mg, Q3H PRN    Objective:    /82   Pulse 82   Temp 98.3 °F (36.8 °C) (Oral)   Resp 18   Ht 5' 4\" (1.626 m)   Wt 134 lb 6.4 oz (61 kg)   SpO2 97%   BMI 23.07 kg/m²     General Appearance: alert and in no acute distress  Skin: warm and dry  Head: normocephalic and atraumatic  Eyes: pupils equal, round, and reactive to light, extraocular eye movements intact, conjunctivae normal  Neck: neck supple and non tender without mass   Pulmonary/Chest: clear to auscultation bilaterally- no wheezes, rales or rhonchi, normal air movement, no respiratory distress  Cardiovascular: normal rate, normal S1 and S2 and no carotid bruits  Abdomen: soft, non-tender, non-distended, normal bowel sounds, no masses or organomegaly  Extremities: no cyanosis, no clubbing and no edema  Neurologic: no cranial nerve deficit and speech normal    Recent Labs     02/22/22  0610 02/22/22  0610 02/23/22  0410 02/24/22  0506     --  131* 128*   K 4.3   < > 3.7 3.1*  3.1*     --  98 97*   CO2 24  --  23 23   BUN 27*  --  13 10   CREATININE 0.6  --  0.5 0.5   GLUCOSE 128*  --  154* 160*   CALCIUM 7.1*  --  7.3* 7.2*    < > = values in this interval not displayed. Recent Labs     02/22/22  0610 02/23/22  0410 02/24/22  0506   WBC 15.2* 9.3 11.6*   RBC 2.77* 2.71* 2.53*   HGB 8.0* 8.0* 7.3*   HCT 24.8* 24.7* 23.0*   MCV 89.5 91.1 90.9   MCH 28.9 29.5 28.9   MCHC 32.3 32.4 31.7*   RDW 22.9* 24.3* 24.4*    172 174   MPV 11.3 11.2 11.0     Radiology:   No results found. Assessment:    Principal Problem:    UGI bleed  Active Problems:    Anemia associated with acute blood loss    Leukocytosis    Alcoholic cirrhosis (HCC)    Left shoulder pain  Resolved Problems:    * No resolved hospital problems. *    Plan:  1. S/P EGD - with esophageal varix, small that was banded. Mild portal hypertensive gastropathy. Deformed antrum w/o ulcer or GAVE. Octreotide til this afternoon, then dc'd.  2.  Alcoholic lever cirrhosis - patient with history of alcohol abuse. GI on case. NH3 83; Lactic acid 1.5;  Mg2+ 1.7; Phosphorus 1.9  Mg2+ and Phosphorus repletion ordered. Trend labs and treat accordingly.     NOTE: This report was transcribed using voice recognition software.  Every effort was made to ensure accuracy; however, inadvertent computerized transcription errors may be present.     Electronically signed by Andie Lazar MD on 2/24/2022 at 11:47 AM

## 2022-02-24 NOTE — PROGRESS NOTES
[address] [place] [address] [facility]  Progress Note      Chief Complaint   Patient presents with    Emesis     x 3 this am     SUBJECTIVE:  Seen at bedside  Side lying   On room air, no cough, no sob  No pain  Tolerating clear liquid diet   No nausea, vomiting ,diarrhea  Afebrile  Patient is tolerating medications. No reported adverse drug reactions. Review of systems:  As stated above in the chief complaint, otherwise negative. Medications:  Scheduled Meds:   thiamine  100 mg IntraVENous Daily    vancomycin  1,000 mg IntraVENous Q8H    sodium chloride flush  5-40 mL IntraVENous 2 times per day    pantoprazole  40 mg IntraVENous BID    And    sodium chloride (PF)  10 mL IntraVENous Daily     Continuous Infusions:   sodium chloride 75 mL/hr at 22 1526    sodium chloride      octreotide (SANDOSTATIN) infusion 50 mcg/hr (22 0723)     PRN Meds:potassium chloride **OR** potassium alternative oral replacement **OR** potassium chloride, magnesium sulfate, sodium phosphate IVPB **OR** sodium phosphate IVPB, metoprolol, sodium chloride, ondansetron **OR** ondansetron, polyethylene glycol, acetaminophen **OR** acetaminophen, morphine    OBJECTIVE:  /82   Pulse 82   Temp 98.3 °F (36.8 °C) (Oral)   Resp 18   Ht 5' 4\" (1.626 m)   Wt 134 lb 6.4 oz (61 kg)   SpO2 97%   BMI 23.07 kg/m²   Temp  Av.3 °F (36.8 °C)  Min: 97.5 °F (36.4 °C)  Max: 98.8 °F (37.1 °C)  Constitutional: The patient is awake, alert, and oriented. Skin: Warm and dry. No rashes were noted. HEENT: Round and reactive pupils. Moist mucous membranes. No ulcerations or thrush. Yellow sclera   Neck: Supple to movements. Chest: No use of accessory muscles to breathe. Symmetrical expansion. No wheezing, crackles or rhonchi. Left SC joint dressed  Cardiovascular: S1 and S2 are rhythmic and regular. No murmurs appreciated. Abdomen: Positive bowel sounds to auscultation. Benign to palpation.  No masses felt. No hepatosplenomegaly. Ascites  Genitourinary: Female  Extremities: No clubbing, no cyanosis, no edema.   Lines: peripheral    Laboratory and Tests Review:  Lab Results   Component Value Date    WBC 11.6 (H) 02/24/2022    WBC 9.3 02/23/2022    WBC 15.2 (H) 02/22/2022    HGB 7.3 (L) 02/24/2022    HCT 23.0 (L) 02/24/2022    MCV 90.9 02/24/2022     02/24/2022     Lab Results   Component Value Date    NEUTROABS 10.56 (H) 02/24/2022    NEUTROABS 8.46 (H) 02/23/2022    NEUTROABS 10.95 (H) 02/22/2022     No results found for: CRP  Lab Results   Component Value Date    ALT 21 02/24/2022    AST 92 (H) 02/24/2022     (H) 01/04/2017    ALKPHOS 153 (H) 02/24/2022    BILITOT 4.9 (H) 02/24/2022     Lab Results   Component Value Date     02/24/2022    K 3.1 02/24/2022    K 3.1 02/24/2022    CL 97 02/24/2022    CO2 23 02/24/2022    BUN 10 02/24/2022    CREATININE 0.5 02/24/2022    CREATININE 0.5 02/23/2022    CREATININE 0.6 02/22/2022    GFRAA >60 02/24/2022    LABGLOM >60 02/24/2022    GLUCOSE 160 02/24/2022    GLUCOSE 102 07/08/2019    PROT 6.2 02/24/2022    LABALBU 2.2 02/24/2022    LABALBU 5.0 03/13/2012    CALCIUM 7.2 02/24/2022    BILITOT 4.9 02/24/2022    ALKPHOS 153 02/24/2022    AST 92 02/24/2022    ALT 21 02/24/2022     Lab Results   Component Value Date    CRP 11.0 (H) 02/21/2022     Lab Results   Component Value Date    SEDRATE 27 (H) 02/21/2022    SEDRATE 14 06/01/2017     Radiology:  Reviewed    Microbiology:   Lab Results   Component Value Date    BC 24 Hours no growth 02/20/2022    BC 5 Days no growth 07/12/2020    BC 5 Days- no growth 08/09/2019    ORG Streptococcus species 02/22/2022    ORG Streptococcus species 02/22/2022    ORG Escherichia coli 08/09/2019     Lab Results   Component Value Date    BLOODCULT2 24 Hours no growth 02/20/2022    BLOODCULT2 5 Days no growth 07/12/2020    BLOODCULT2 5 Days- no growth 08/09/2019    ORG Streptococcus species 02/22/2022    ORG Streptococcus species 02/22/2022    ORG Escherichia coli 08/09/2019     No results found for: WNDABS  Smear, Respiratory   Date Value Ref Range Status   07/09/2020   Final    Group 5: >25 PMN's/LPF and <10 Epithelial cells/LPF  Abundant Polymorphonuclear leukocytes  Rare Epithelial cells  No organisms seen       No results found for: MPNEUMO, CLAMYDCU, LABLEGI, AFBCX, FUNGSM, LABFUNG  CULTURE, RESPIRATORY   Date Value Ref Range Status   07/09/2020 Oral Pharyngeal Margarita reduced  Final     No results found for: CXCATHTIP  No results found for: BFCS  Culture Surgical   Date Value Ref Range Status   02/22/2022 (A)  Preliminary    Growth present, evaluating for:  Alpha hemolytic Strep species     02/22/2022   Preliminary    Moderate growth  Identification and sensitivity to follow     02/22/2022 (A)  Preliminary    Growth present, evaluating for:  Alpha hemolytic Strep species     02/22/2022   Preliminary    Light growth  Identification and sensitivity to follow       Urine Culture, Routine   Date Value Ref Range Status   07/12/2020 Growth not present  Final   08/09/2019 >100,000 CFU/ml  Final     MRSA Culture Only   Date Value Ref Range Status   02/20/2022 Methicillin resistant Staph aureus not isolated  Final     Microbiology:  2/22/2022: Left SC joint alphahemolytic strep x2    ASSESSMENT:  · Sternoclavicular abscess on the left-alpha strep and strep species pending  · Septic sternoclavicular joint  · Immunocompromised host-cirrhosis  · Leukocytosis  · S/p eosphageal banding - has varices   · Dehydrated - K, Mag, Cl, Na all low     PLAN:  · DC Vancomycin, start on Ceftriaxone (aware of PCN allergy- pt has not had in greater than 10 years, will give)  · Recommend iv fluids   · Needs picc line and 4-6 weeks IV ATB - med rec completed   · Check final cultures  · Labs, cultures, imaging reviewed     RADHA Correia NP  11:47 AM     Pt seen and examined. Above discussed agree with advanced practice nurse.  Labs, cultures, and radiographs reviewed. Face to Face encounter occurred. Changes made as necessary.      Rashard Alvarado MD  2/24/2022

## 2022-02-24 NOTE — CARE COORDINATION
Peer Recovery Support Note    Name: Zofia Fonseca  Date: 2/24/2022    Chief Complaint   Patient presents with    Emesis     x 3 this am       Peer Support met with patient. [] Support and education provided  [] Resources provided   [] Treatment referral:   [] Other:   [] Patient declined peer recovery services     Referred By:     Notes: Will continue to follow.     Jesse Escudero, 2/24/2022

## 2022-02-25 LAB
ALBUMIN SERPL-MCNC: 2.3 G/DL (ref 3.5–5.2)
ALP BLD-CCNC: 144 U/L (ref 35–104)
ALT SERPL-CCNC: 25 U/L (ref 0–32)
AMMONIA: 65.1 UMOL/L (ref 11–51)
ANION GAP SERPL CALCULATED.3IONS-SCNC: 7 MMOL/L (ref 7–16)
ANISOCYTOSIS: ABNORMAL
AST SERPL-CCNC: 86 U/L (ref 0–31)
BASOPHILS ABSOLUTE: 0 E9/L (ref 0–0.2)
BASOPHILS RELATIVE PERCENT: 0 % (ref 0–2)
BILIRUB SERPL-MCNC: 4.3 MG/DL (ref 0–1.2)
BUN BLDV-MCNC: 7 MG/DL (ref 6–20)
BURR CELLS: ABNORMAL
CALCIUM SERPL-MCNC: 7.5 MG/DL (ref 8.6–10.2)
CHLORIDE BLD-SCNC: 97 MMOL/L (ref 98–107)
CO2: 27 MMOL/L (ref 22–29)
CREAT SERPL-MCNC: 0.5 MG/DL (ref 0.5–1)
CULTURE SURGICAL: ABNORMAL
CULTURE SURGICAL: ABNORMAL
EOSINOPHILS ABSOLUTE: 0.22 E9/L (ref 0.05–0.5)
EOSINOPHILS RELATIVE PERCENT: 1.7 % (ref 0–6)
GFR AFRICAN AMERICAN: >60
GFR NON-AFRICAN AMERICAN: >60 ML/MIN/1.73
GLUCOSE BLD-MCNC: 118 MG/DL (ref 74–99)
HCT VFR BLD CALC: 25.9 % (ref 34–48)
HEMOGLOBIN: 8.1 G/DL (ref 11.5–15.5)
HYPOCHROMIA: ABNORMAL
LACTIC ACID: 2.1 MMOL/L (ref 0.5–2.2)
LYMPHOCYTES ABSOLUTE: 0.53 E9/L (ref 1.5–4)
LYMPHOCYTES RELATIVE PERCENT: 4.3 % (ref 20–42)
MAGNESIUM: 1.2 MG/DL (ref 1.6–2.6)
MAGNESIUM: 2.1 MG/DL (ref 1.6–2.6)
MCH RBC QN AUTO: 29 PG (ref 26–35)
MCHC RBC AUTO-ENTMCNC: 31.3 % (ref 32–34.5)
MCV RBC AUTO: 92.8 FL (ref 80–99.9)
METAMYELOCYTES RELATIVE PERCENT: 0.9 % (ref 0–1)
MONOCYTES ABSOLUTE: 1.98 E9/L (ref 0.1–0.95)
MONOCYTES RELATIVE PERCENT: 14.8 % (ref 2–12)
NEUTROPHILS ABSOLUTE: 10.43 E9/L (ref 1.8–7.3)
NEUTROPHILS RELATIVE PERCENT: 78.3 % (ref 43–80)
NUCLEATED RED BLOOD CELLS: 5.2 /100 WBC
ORGANISM: ABNORMAL
ORGANISM: ABNORMAL
PDW BLD-RTO: 25.1 FL (ref 11.5–15)
PHOSPHORUS: 2.4 MG/DL (ref 2.5–4.5)
PLATELET # BLD: 197 E9/L (ref 130–450)
PMV BLD AUTO: 11.1 FL (ref 7–12)
POIKILOCYTES: ABNORMAL
POLYCHROMASIA: ABNORMAL
POTASSIUM REFLEX MAGNESIUM: 3 MMOL/L (ref 3.5–5)
POTASSIUM SERPL-SCNC: 3 MMOL/L (ref 3.5–5)
POTASSIUM SERPL-SCNC: 3.5 MMOL/L (ref 3.5–5)
RBC # BLD: 2.79 E12/L (ref 3.5–5.5)
SCHISTOCYTES: ABNORMAL
SODIUM BLD-SCNC: 131 MMOL/L (ref 132–146)
STOMATOCYTES: ABNORMAL
TARGET CELLS: ABNORMAL
TOTAL PROTEIN: 6.4 G/DL (ref 6.4–8.3)
WBC # BLD: 13.2 E9/L (ref 4.5–11.5)

## 2022-02-25 PROCEDURE — 36415 COLL VENOUS BLD VENIPUNCTURE: CPT

## 2022-02-25 PROCEDURE — 6360000002 HC RX W HCPCS: Performed by: INTERNAL MEDICINE

## 2022-02-25 PROCEDURE — 85025 COMPLETE CBC W/AUTO DIFF WBC: CPT

## 2022-02-25 PROCEDURE — 83735 ASSAY OF MAGNESIUM: CPT

## 2022-02-25 PROCEDURE — 6360000002 HC RX W HCPCS: Performed by: FAMILY MEDICINE

## 2022-02-25 PROCEDURE — 83605 ASSAY OF LACTIC ACID: CPT

## 2022-02-25 PROCEDURE — 6370000000 HC RX 637 (ALT 250 FOR IP): Performed by: INTERNAL MEDICINE

## 2022-02-25 PROCEDURE — 84100 ASSAY OF PHOSPHORUS: CPT

## 2022-02-25 PROCEDURE — 99233 SBSQ HOSP IP/OBS HIGH 50: CPT | Performed by: FAMILY MEDICINE

## 2022-02-25 PROCEDURE — 80053 COMPREHEN METABOLIC PANEL: CPT

## 2022-02-25 PROCEDURE — 82140 ASSAY OF AMMONIA: CPT

## 2022-02-25 PROCEDURE — 84132 ASSAY OF SERUM POTASSIUM: CPT

## 2022-02-25 PROCEDURE — 2580000003 HC RX 258: Performed by: INTERNAL MEDICINE

## 2022-02-25 PROCEDURE — 6370000000 HC RX 637 (ALT 250 FOR IP): Performed by: FAMILY MEDICINE

## 2022-02-25 PROCEDURE — 2060000000 HC ICU INTERMEDIATE R&B

## 2022-02-25 RX ORDER — THIAMINE MONONITRATE (VIT B1) 100 MG
100 TABLET ORAL DAILY
Qty: 30 TABLET | Refills: 0 | COMMUNITY
Start: 2022-02-25 | End: 2022-02-26 | Stop reason: SDUPTHER

## 2022-02-25 RX ORDER — FOLIC ACID 1 MG/1
1 TABLET ORAL DAILY
Qty: 30 TABLET | Refills: 0 | COMMUNITY
Start: 2022-02-25 | End: 2022-02-26 | Stop reason: SDUPTHER

## 2022-02-25 RX ORDER — CARVEDILOL 3.12 MG/1
3.12 TABLET ORAL 2 TIMES DAILY
Status: DISCONTINUED | OUTPATIENT
Start: 2022-02-25 | End: 2022-02-26 | Stop reason: HOSPADM

## 2022-02-25 RX ORDER — CARVEDILOL 3.12 MG/1
3.12 TABLET ORAL 2 TIMES DAILY
Qty: 60 TABLET | Refills: 0 | Status: SHIPPED | OUTPATIENT
Start: 2022-02-25 | End: 2022-02-26

## 2022-02-25 RX ADMIN — POTASSIUM CHLORIDE 40 MEQ: 1500 TABLET, EXTENDED RELEASE ORAL at 11:50

## 2022-02-25 RX ADMIN — CARVEDILOL 3.12 MG: 3.12 TABLET, FILM COATED ORAL at 09:52

## 2022-02-25 RX ADMIN — SODIUM CHLORIDE, PRESERVATIVE FREE 10 ML: 5 INJECTION INTRAVENOUS at 08:32

## 2022-02-25 RX ADMIN — MORPHINE SULFATE 1 MG: 2 INJECTION, SOLUTION INTRAMUSCULAR; INTRAVENOUS at 10:52

## 2022-02-25 RX ADMIN — POTASSIUM CHLORIDE 10 MEQ: 7.46 INJECTION, SOLUTION INTRAVENOUS at 09:48

## 2022-02-25 RX ADMIN — SODIUM CHLORIDE, PRESERVATIVE FREE 10 ML: 5 INJECTION INTRAVENOUS at 08:26

## 2022-02-25 RX ADMIN — MORPHINE SULFATE 1 MG: 2 INJECTION, SOLUTION INTRAMUSCULAR; INTRAVENOUS at 22:06

## 2022-02-25 RX ADMIN — MORPHINE SULFATE 1 MG: 2 INJECTION, SOLUTION INTRAMUSCULAR; INTRAVENOUS at 06:38

## 2022-02-25 RX ADMIN — MAGNESIUM SULFATE HEPTAHYDRATE 1000 MG: 1 INJECTION, SOLUTION INTRAVENOUS at 10:58

## 2022-02-25 RX ADMIN — SODIUM CHLORIDE, PRESERVATIVE FREE 10 ML: 5 INJECTION INTRAVENOUS at 20:00

## 2022-02-25 RX ADMIN — MAGNESIUM SULFATE HEPTAHYDRATE 1000 MG: 1 INJECTION, SOLUTION INTRAVENOUS at 12:04

## 2022-02-25 RX ADMIN — POTASSIUM CHLORIDE 10 MEQ: 7.46 INJECTION, SOLUTION INTRAVENOUS at 10:52

## 2022-02-25 RX ADMIN — WATER 2000 MG: 1 INJECTION INTRAMUSCULAR; INTRAVENOUS; SUBCUTANEOUS at 14:31

## 2022-02-25 RX ADMIN — MAGNESIUM SULFATE HEPTAHYDRATE 1000 MG: 1 INJECTION, SOLUTION INTRAVENOUS at 09:49

## 2022-02-25 RX ADMIN — THIAMINE HYDROCHLORIDE 100 MG: 100 INJECTION, SOLUTION INTRAMUSCULAR; INTRAVENOUS at 08:26

## 2022-02-25 RX ADMIN — MORPHINE SULFATE 1 MG: 2 INJECTION, SOLUTION INTRAMUSCULAR; INTRAVENOUS at 02:19

## 2022-02-25 RX ADMIN — PANTOPRAZOLE SODIUM 40 MG: 40 TABLET, DELAYED RELEASE ORAL at 05:43

## 2022-02-25 RX ADMIN — CARVEDILOL 3.12 MG: 3.12 TABLET, FILM COATED ORAL at 20:00

## 2022-02-25 RX ADMIN — Medication 10 ML: at 18:53

## 2022-02-25 RX ADMIN — MORPHINE SULFATE 1 MG: 2 INJECTION, SOLUTION INTRAMUSCULAR; INTRAVENOUS at 18:53

## 2022-02-25 RX ADMIN — POTASSIUM CHLORIDE 10 MEQ: 7.46 INJECTION, SOLUTION INTRAVENOUS at 08:25

## 2022-02-25 RX ADMIN — Medication 10 ML: at 14:32

## 2022-02-25 RX ADMIN — POTASSIUM CHLORIDE 10 MEQ: 7.46 INJECTION, SOLUTION INTRAVENOUS at 12:02

## 2022-02-25 RX ADMIN — MAGNESIUM SULFATE HEPTAHYDRATE 1000 MG: 1 INJECTION, SOLUTION INTRAVENOUS at 08:25

## 2022-02-25 RX ADMIN — Medication 10 ML: at 10:53

## 2022-02-25 ASSESSMENT — PAIN DESCRIPTION - LOCATION
LOCATION: SHOULDER
LOCATION: SHOULDER

## 2022-02-25 ASSESSMENT — PAIN DESCRIPTION - ONSET
ONSET: ON-GOING
ONSET: ON-GOING

## 2022-02-25 ASSESSMENT — PAIN SCALES - GENERAL
PAINLEVEL_OUTOF10: 0
PAINLEVEL_OUTOF10: 6
PAINLEVEL_OUTOF10: 0
PAINLEVEL_OUTOF10: 6
PAINLEVEL_OUTOF10: 4
PAINLEVEL_OUTOF10: 6
PAINLEVEL_OUTOF10: 6
PAINLEVEL_OUTOF10: 7
PAINLEVEL_OUTOF10: 0
PAINLEVEL_OUTOF10: 7
PAINLEVEL_OUTOF10: 0

## 2022-02-25 ASSESSMENT — PAIN DESCRIPTION - PROGRESSION
CLINICAL_PROGRESSION: NOT CHANGED
CLINICAL_PROGRESSION: NOT CHANGED

## 2022-02-25 ASSESSMENT — PAIN DESCRIPTION - DESCRIPTORS
DESCRIPTORS: ACHING;DISCOMFORT;SORE
DESCRIPTORS: ACHING;DISCOMFORT;SORE

## 2022-02-25 ASSESSMENT — PAIN - FUNCTIONAL ASSESSMENT
PAIN_FUNCTIONAL_ASSESSMENT: PREVENTS OR INTERFERES SOME ACTIVE ACTIVITIES AND ADLS
PAIN_FUNCTIONAL_ASSESSMENT: PREVENTS OR INTERFERES SOME ACTIVE ACTIVITIES AND ADLS

## 2022-02-25 ASSESSMENT — PAIN DESCRIPTION - FREQUENCY
FREQUENCY: CONTINUOUS
FREQUENCY: CONTINUOUS

## 2022-02-25 ASSESSMENT — PAIN DESCRIPTION - DIRECTION
RADIATING_TOWARDS: LEFT ARM
RADIATING_TOWARDS: LEFT ARM

## 2022-02-25 ASSESSMENT — PAIN DESCRIPTION - ORIENTATION
ORIENTATION: LEFT
ORIENTATION: LEFT

## 2022-02-25 ASSESSMENT — PAIN DESCRIPTION - PAIN TYPE
TYPE: SURGICAL PAIN
TYPE: SURGICAL PAIN

## 2022-02-25 NOTE — OP NOTE
[address]                                OPERATIVE REPORT    PATIENT NAME: Vasile Novak                   :        1980  MED REC NO:   [id number]                            ROOM:       8255  ACCOUNT NO:   [de-identified]                           ADMIT DATE: 2022  PROVIDER:     Mendy Medley MD    DATE OF PROCEDURE:  2022    PROCEDURE PERFORMED:  Esophagogastroduodenoscopy plus banding of  esophageal varix. PREOPERATIVE DIAGNOSES:  Gastrointestinal hemorrhage, cirrhosis, portal  hypertension, previous bleeding from varices and prior banding. POSTOPERATIVE DIAGNOSES:  1.  No active bleeding. 2.  Minimal residual varices and only one target identified with a  single band applied. 3.  Minimal portal hypertensive gastropathy with no evidence of gastric  varices and a somewhat deformed antrum, but no ulcer disease and a  normal duodenum. INDICATIONS:  She is 46years of age and has a history of cirrhosis  related to alcohol. There is a suspicion ongoing alcohol consumption is  in play here. She presented with coffee-ground emesis and painful  sternoclavicular joint and was demonstrated to have an infected joint  with debridement. She is on antibiotics. She also presented with  coffee-ground emesis with some clots the same day of admission. She was  required 3 units of blood. Her hemoglobin was about 5.5. She remained  hemodynamically stable throughout. Her last endoscopic evaluation was  completed in October and a single band was applied. She has been  demonstrated to have actively bleeding varices in the past, which were  quite small and considered low risk at that time. Preop is monitored anesthesia care. DESCRIPTION OF PROCEDURE:  With her in left lateral and a bite block in  place, the Olympus GIF-H190 video endoscope was guided into the cervical  esophagus.   This passed distally and there was a suggestion of at least  one grade 2 varix, but w/o stigmata. There was no July-Rodas tear. No  esophagitis. The stomach was empty. Fundus and cardia were well seen  and there was only minimal portal hypertensive gastropathy without  gastric varices. The instrument was redirected distally. The distal  stomach was somewhat deformed, but close inspection between folds  revealed no ulcer disease and the proximal duodenum was normal.  The  endoscope was withdrawn and again the distal esophagus closely inspected  and appeared to be at least one target possible. A Yoni-Cook band ligator was attached, lubricated, and reintroduced  into the cervical esophagus without difficulty. Distally, that varix  was banded. I did not see anything else worthy of the attempt. The  endoscope was withdrawn. Procedure terminated and well tolerated. EBL: 0    IMPRESSION:  One has to presume this is portal hypertensive bleeding,  there are no other sources identified. Octreotide through tomorrow and  discontinue. Ramya Lackey MD    D: 02/24/2022 17:01:10       T: 02/24/2022 17:05:03     [email or web address]  Job#: [id number]     Doc#: [id number]    CC:   Mark Voss DO

## 2022-02-25 NOTE — CARE COORDINATION
Social Work/Discharge Planning:  Received notification patient will discharge today once PICC placed. Called Merlyn with [facility] and confirmed they can see patient tomorrow at home for IV antibiotics. Informed Tia Castillo that patient will discharge home today. Notified Physician. Patient will need a home health care order. Will continue to follow.   Electronically signed by TOMI Hayden on 2/25/2022 at 10:54 AM

## 2022-02-25 NOTE — PROGRESS NOTES
[address] [place] [address] [facility]  Progress Note      Chief Complaint   Patient presents with    Emesis     x 3 this am     SUBJECTIVE:  Seen at bedside  On room air, no cough, no sob  No pain  Tolerating clear liquid diet   No nausea, vomiting ,diarrhea  Afebrile  Neck sore  Getting K and mag replacement  Patient is tolerating medications. No reported adverse drug reactions. Review of systems:  As stated above in the chief complaint, otherwise negative. Medications:  Scheduled Meds:   carvedilol  3.125 mg Oral BID    lidocaine 1 % injection  5 mL IntraDERmal Once    sodium chloride flush  5-40 mL IntraVENous 2 times per day    cefTRIAXone (ROCEPHIN) IV  2,000 mg IntraVENous Q24H    pantoprazole  40 mg Oral QAM AC    thiamine  100 mg IntraVENous Daily    sodium chloride flush  5-40 mL IntraVENous 2 times per day     Continuous Infusions:   sodium chloride      sodium chloride       PRN Meds:potassium chloride **OR** potassium alternative oral replacement **OR** potassium chloride, magnesium sulfate, sodium phosphate IVPB **OR** sodium phosphate IVPB, sodium chloride flush, sodium chloride, metoprolol, sodium chloride, ondansetron **OR** ondansetron, polyethylene glycol, acetaminophen **OR** acetaminophen, morphine    OBJECTIVE:  BP (!) 162/82   Pulse 83   Temp 98.4 °F (36.9 °C) (Oral)   Resp 18   Ht 5' 4\" (1.626 m)   Wt 131 lb 6.4 oz (59.6 kg)   SpO2 98%   BMI 22.55 kg/m²   Temp  Av.7 °F (37.1 °C)  Min: 98.4 °F (36.9 °C)  Max: 98.9 °F (37.2 °C)  Constitutional: The patient is awake, alert, and oriented. Skin: Warm and dry. No rashes were noted. HEENT: Round and reactive pupils. Moist mucous membranes. No ulcerations or thrush. Yellow sclera   Neck: Supple to movements. Chest: No use of accessory muscles to breathe. Symmetrical expansion. No wheezing, crackles or rhonchi. Left SC joint dressed  Cardiovascular: S1 and S2 are rhythmic and regular.  No murmurs BLOODCULT2 5 Days- no growth 08/09/2019    ORG Streptococcus pneumoniae 02/22/2022    ORG Streptococcus pneumoniae 02/22/2022    ORG Escherichia coli 08/09/2019     No results found for: WNDABS  Smear, Respiratory   Date Value Ref Range Status   07/09/2020   Final    Group 5: >25 PMN's/LPF and <10 Epithelial cells/LPF  Abundant Polymorphonuclear leukocytes  Rare Epithelial cells  No organisms seen       No results found for: MPNEUMO, CLAMYDCU, LABLEGI, AFBCX, FUNGSM, LABFUNG  CULTURE, RESPIRATORY   Date Value Ref Range Status   07/09/2020 Oral Pharyngeal Margarita reduced  Final     No results found for: CXCATHTIP  No results found for: BFCS  Culture Surgical   Date Value Ref Range Status   02/22/2022 Moderate growth  Final   02/22/2022   Final    Light growth  Refer to previous culture for susceptibility results from CXSUR:  Tissue-Left Sternoclavicular joint collected on 2-22-22 at 1039. Urine Culture, Routine   Date Value Ref Range Status   07/12/2020 Growth not present  Final   08/09/2019 >100,000 CFU/ml  Final     MRSA Culture Only   Date Value Ref Range Status   02/20/2022 Methicillin resistant Staph aureus not isolated  Final     Microbiology:  2/22/2022: Left SC joint alphahemolytic strep x2, Streptococcus pneumoniae    ASSESSMENT:  · Sternoclavicular abscess on the left- strep pneumoniae. This is probably hematogenously spread  · Although rare this organisms can cause endocarditis in certain populations.  She could have been bacteremic which caused her fall and seated the SC joint  · Septic sternoclavicular joint  · Immunocompromised host-cirrhosis  · Leukocytosis  · S/p eosphageal banding - has varices   · Dehydrated - K, Mag, Cl, Na all low -being replaced    PLAN:  · continue Ceftriaxone (aware of PCN allergy- pt has not had in greater than 10 years, will give)  · Getting IV fluids  · 2D echo  · Needs picc line and 4-6 weeks IV ATB - med rec completed   · Check final cultures  · Labs, cultures, imaging reviewed   · Awaiting picc placement  · check TTE    Glendale Meigs, APRN - CNP  11:48 AM   Pt seen and examined. Above discussed agree with advanced practice nurse. Labs, cultures, and radiographs reviewed. Face to Face encounter occurred. Changes made as necessary.      Karla Pulliam MD

## 2022-02-25 NOTE — PROGRESS NOTES
Comprehensive Nutrition Assessment    Type and Reason for Visit:  Initial,RD Nutrition Re-Screen/LOS    Nutrition Recommendations/Plan: Continue Current Diet, Start Ensure Compact BID    Nutrition Assessment:  Pt w/ UGI bleed s/p EGD w/ esophageal varix banding, noted ETOH cirrhosis. Noted L supraclavicular abscess s/p debridement. Hx ETOH abuse/pancreatic cyst. PO meal intake improving, wound noted, will add ONS BID. Nutrition Related Findings:  +I&Os, no edema, abd distended/gross ascites, +BS, hx ETOH abuse, Na 131, K+ 3, Mg 1.2, Phos 2.4, elevated Glu, elevated LFTs, bili 4.3, NH3 83.3, no N/V noted at this time      Wounds:  Surgical Incision       Current Nutrition Therapies:    ADULT DIET; Regular    Anthropometric Measures:  · Height: 5' 4\" (162.6 cm)  · Current Body Weight: 131 lb (59.4 kg) (2/25 bed)   · Admission Body Weight: 132 lb (59.9 kg) (2/20 bed)    · Usual Body Weight: 139 lb (63 kg) (9/2021 actual per EMR)     · Ideal Body Weight: 120 lbs; % Ideal Body Weight 109.2 %   · BMI: 22.5  · BMI Categories: Normal Weight (BMI 18.5-24. 9)       Nutrition Diagnosis:   · Increased nutrient needs related to increase demand for energy/nutrients as evidenced by wounds    Nutrition Interventions:   Nutrition Education/Counseling:  No recommendation at this time   Coordination of Nutrition Care:  Continue to monitor while inpatient    Goals:  Pt to consume >75% of meals/ONS       Nutrition Monitoring and Evaluation:   Food/Nutrient Intake Outcomes:  Food and Nutrient Intake,Supplement Intake  Physical Signs/Symptoms Outcomes:  Biochemical Data,GI Status,Fluid Status or Edema,Nutrition Focused Physical Findings,Skin,Weight     Discharge Planning:     Too soon to determine     Electronically signed by Eddie Albrecht RD, LD on 2/25/22 at 1:19 PM EST  Contact: [phone]

## 2022-02-25 NOTE — PROGRESS NOTES
[facility] Progress Note    Admitting Date and Time: 2/20/2022 10:19 AM  Admit Dx: Hypokalemia [E87.6]  Lactic acidosis [E87.2]  UGI bleed [K92.2]  Coffee ground emesis [K92.0]  Pyogenic arthritis of left shoulder region, due to unspecified organism ([address] [M00.9]  Acute anemia [K47.3]  Alcoholic cirrhosis, unspecified whether ascites present ([address] [K70.30]    Subjective:  Patient is being followed for Hypokalemia [E87.6]  Lactic acidosis [E87.2]  UGI bleed [K92.2]  Coffee ground emesis [K92.0]  Pyogenic arthritis of left shoulder region, due to unspecified organism ([address] [M00.9]  Acute anemia [I97.9]  Alcoholic cirrhosis, unspecified whether ascites present ([address] [K70.30]   Pt feels good. Needs PICC line. Receiving Mg2+ and K+ boluses. No diarrhea  Per RN: Home today after recheck of Mg2+ and K+ levels appropriate. Home Health can see tomorrow with antibiotic. ROS: denies fever, chills, cp, sob, n/v, HA unless stated above.       carvedilol  3.125 mg Oral BID    lidocaine 1 % injection  5 mL IntraDERmal Once    sodium chloride flush  5-40 mL IntraVENous 2 times per day    cefTRIAXone (ROCEPHIN) IV  2,000 mg IntraVENous Q24H    pantoprazole  40 mg Oral QAM AC    thiamine  100 mg IntraVENous Daily    sodium chloride flush  5-40 mL IntraVENous 2 times per day     potassium chloride, 40 mEq, PRN   Or  potassium alternative oral replacement, 40 mEq, PRN   Or  potassium chloride, 10 mEq, PRN  magnesium sulfate, 1,000 mg, PRN  sodium phosphate IVPB, 0.16 mmol/kg, PRN   Or  sodium phosphate IVPB, 0.32 mmol/kg, PRN  sodium chloride flush, 5-40 mL, PRN  sodium chloride, 25 mL, PRN  metoprolol, 5 mg, Q8H PRN  sodium chloride, 25 mL, PRN  ondansetron, 4 mg, Q8H PRN   Or  ondansetron, 4 mg, Q6H PRN  polyethylene glycol, 17 g, Daily PRN  acetaminophen, 650 mg, Q6H PRN   Or  acetaminophen, 650 mg, Q6H PRN  morphine, 1 mg, Q3H PRN    Objective:    BP (!) 162/82   Pulse 83   Temp 98.4 °F (36.9 °C) (Oral)   Resp 18   Ht 5' 4\" (1.626 m)   Wt 131 lb 6.4 oz (59.6 kg)   SpO2 98%   BMI 22.55 kg/m²     General Appearance: alert and oriented to person, place and time and in no acute distress  Skin: warm and dry  Head: normocephalic and atraumatic  Eyes: pupils equal, round, and reactive to light, extraocular eye movements intact, conjunctivae normal  Neck: neck supple and non tender without mass   Pulmonary/Chest: clear to auscultation bilaterally- no wheezes, rales or rhonchi, normal air movement, no respiratory distress  Cardiovascular: normal rate, normal S1 and S2 and no carotid bruits  Abdomen: soft, non-tender, non-distended, normal bowel sounds, no masses or organomegaly  Extremities: no cyanosis, no clubbing and no edema  Neurologic: no cranial nerve deficit and speech normal    Recent Labs     02/23/22  0410 02/23/22  0410 02/24/22  0506 02/25/22  0638   *  --  128* 131*   K 3.7   < > 3.1*  3.1* 3.0*  3.0*   CL 98  --  97* 97*   CO2 23  --  23 27   BUN 13  --  10 7   CREATININE 0.5  --  0.5 0.5   GLUCOSE 154*  --  160* 118*   CALCIUM 7.3*  --  7.2* 7.5*    < > = values in this interval not displayed. Recent Labs     02/23/22  0410 02/24/22  0506 02/25/22  0638   WBC 9.3 11.6* 13.2*   RBC 2.71* 2.53* 2.79*   HGB 8.0* 7.3* 8.1*   HCT 24.7* 23.0* 25.9*   MCV 91.1 90.9 92.8   MCH 29.5 28.9 29.0   MCHC 32.4 31.7* 31.3*   RDW 24.3* 24.4* 25.1*    174 197   MPV 11.2 11.0 11.1     Radiology:   No results found. Assessment:    Principal Problem:    UGI bleed  Active Problems:    Anemia associated with acute blood loss    Leukocytosis    Alcoholic cirrhosis (HCC)    Left shoulder pain  Resolved Problems:    * No resolved hospital problems. *    Plan:  1.     Alcoholic liver cirrhosis - patient with history of alcohol abuse.  GI on case.  Mg2+ 1.2; Phosphorus 2.4. Eating well. Repeat blood work after Mg2 + infusions and K+ infusions complete.   2.     Left supraclavicular abscess - needs PICC placed. Home Health can see tomorrow for Rocephin daily infusion. Culture of surgery grew Streptococcus pneumoniae, pan-sensitive. ID recommending 4 - 6 wks of Rocephin 2 gm IV daily. NOTE: This report was transcribed using voice recognition software. Every effort was made to ensure accuracy; however, inadvertent computerized transcription errors may be present.     Electronically signed by Sherly Noel MD on 2/25/2022 at 11:02 AM

## 2022-02-25 NOTE — CARE COORDINATION
Social Work/Discharge Planning:  Received notification patient will not discharge today, but will discharge tomorrow. Notified RN to administer patient IV Rocephin before she discharges home on Saturday. Notified Sara Crystal with [facility] and informed her to anticipate start of care on Sunday 2/27.   Electronically signed by TOMI Cesar on 2/25/2022 at 3:43 PM

## 2022-02-25 NOTE — PROGRESS NOTES
Up talking on the phone  Pleasant, polite oriented  Hct down but no bleeding  Octreotide off. Has developed hyponatremia. On LR plus clears.   Bilirubin 4.9    /90  p 80      For PICC and 4 to 6 weeks antibiotics    Advance diet  Stop IVF   PO PPI  Carvedilol 3.125 BID  Recheck BMP

## 2022-02-25 NOTE — PROGRESS NOTES
Spoke with Dr. Jenny Torres regarding PRN potassium scale, per Dr. Jenny Torres give 4 bags of the 10 meq IV and Give 40 Meq oral .

## 2022-02-26 VITALS
HEIGHT: 64 IN | RESPIRATION RATE: 15 BRPM | HEART RATE: 75 BPM | SYSTOLIC BLOOD PRESSURE: 131 MMHG | DIASTOLIC BLOOD PRESSURE: 83 MMHG | BODY MASS INDEX: 21.82 KG/M2 | WEIGHT: 127.8 LBS | TEMPERATURE: 97.8 F | OXYGEN SATURATION: 95 %

## 2022-02-26 LAB
ALBUMIN SERPL-MCNC: 2.5 G/DL (ref 3.5–5.2)
ALP BLD-CCNC: 162 U/L (ref 35–104)
ALT SERPL-CCNC: 29 U/L (ref 0–32)
AMMONIA: 76.1 UMOL/L (ref 11–51)
ANION GAP SERPL CALCULATED.3IONS-SCNC: 8 MMOL/L (ref 7–16)
AST SERPL-CCNC: 89 U/L (ref 0–31)
BILIRUB SERPL-MCNC: 3.9 MG/DL (ref 0–1.2)
BLOOD CULTURE, ROUTINE: NORMAL
BUN BLDV-MCNC: 6 MG/DL (ref 6–20)
CALCIUM SERPL-MCNC: 7.8 MG/DL (ref 8.6–10.2)
CHLORIDE BLD-SCNC: 94 MMOL/L (ref 98–107)
CO2: 25 MMOL/L (ref 22–29)
CREAT SERPL-MCNC: 0.5 MG/DL (ref 0.5–1)
CULTURE, BLOOD 2: NORMAL
GFR AFRICAN AMERICAN: >60
GFR NON-AFRICAN AMERICAN: >60 ML/MIN/1.73
GLUCOSE BLD-MCNC: 129 MG/DL (ref 74–99)
INR BLD: 2.1
LACTIC ACID: 1.6 MMOL/L (ref 0.5–2.2)
LV EF: 58 %
LVEF MODALITY: NORMAL
MAGNESIUM: 1.4 MG/DL (ref 1.6–2.6)
PHOSPHORUS: 3.3 MG/DL (ref 2.5–4.5)
POTASSIUM REFLEX MAGNESIUM: 3.8 MMOL/L (ref 3.5–5)
POTASSIUM SERPL-SCNC: 3.8 MMOL/L (ref 3.5–5)
PROTHROMBIN TIME: 23.6 SEC (ref 9.3–12.4)
SODIUM BLD-SCNC: 127 MMOL/L (ref 132–146)
TOTAL PROTEIN: 6.7 G/DL (ref 6.4–8.3)

## 2022-02-26 PROCEDURE — 6360000002 HC RX W HCPCS: Performed by: INTERNAL MEDICINE

## 2022-02-26 PROCEDURE — 84100 ASSAY OF PHOSPHORUS: CPT

## 2022-02-26 PROCEDURE — 36569 INSJ PICC 5 YR+ W/O IMAGING: CPT

## 2022-02-26 PROCEDURE — 02HV33Z INSERTION OF INFUSION DEVICE INTO SUPERIOR VENA CAVA, PERCUTANEOUS APPROACH: ICD-10-PCS | Performed by: FAMILY MEDICINE

## 2022-02-26 PROCEDURE — 99239 HOSP IP/OBS DSCHRG MGMT >30: CPT | Performed by: FAMILY MEDICINE

## 2022-02-26 PROCEDURE — 80053 COMPREHEN METABOLIC PANEL: CPT

## 2022-02-26 PROCEDURE — C1751 CATH, INF, PER/CENT/MIDLINE: HCPCS

## 2022-02-26 PROCEDURE — 82140 ASSAY OF AMMONIA: CPT

## 2022-02-26 PROCEDURE — 6370000000 HC RX 637 (ALT 250 FOR IP): Performed by: INTERNAL MEDICINE

## 2022-02-26 PROCEDURE — 36415 COLL VENOUS BLD VENIPUNCTURE: CPT

## 2022-02-26 PROCEDURE — 83735 ASSAY OF MAGNESIUM: CPT

## 2022-02-26 PROCEDURE — 2500000003 HC RX 250 WO HCPCS: Performed by: INTERNAL MEDICINE

## 2022-02-26 PROCEDURE — 6360000002 HC RX W HCPCS: Performed by: FAMILY MEDICINE

## 2022-02-26 PROCEDURE — 83605 ASSAY OF LACTIC ACID: CPT

## 2022-02-26 PROCEDURE — 2580000003 HC RX 258: Performed by: INTERNAL MEDICINE

## 2022-02-26 PROCEDURE — 85610 PROTHROMBIN TIME: CPT

## 2022-02-26 PROCEDURE — 93306 TTE W/DOPPLER COMPLETE: CPT

## 2022-02-26 PROCEDURE — 76937 US GUIDE VASCULAR ACCESS: CPT

## 2022-02-26 RX ORDER — CARVEDILOL 3.12 MG/1
3.12 TABLET ORAL 2 TIMES DAILY
Qty: 60 TABLET | Refills: 0 | Status: SHIPPED | OUTPATIENT
Start: 2022-02-26 | End: 2022-02-26

## 2022-02-26 RX ORDER — CARVEDILOL 3.12 MG/1
3.12 TABLET ORAL 2 TIMES DAILY
Qty: 60 TABLET | Refills: 0
Start: 2022-02-26 | End: 2022-02-26

## 2022-02-26 RX ORDER — THIAMINE MONONITRATE (VIT B1) 100 MG
100 TABLET ORAL DAILY
Qty: 30 TABLET | Refills: 0 | Status: SHIPPED | OUTPATIENT
Start: 2022-02-26 | End: 2022-02-26 | Stop reason: SDUPTHER

## 2022-02-26 RX ORDER — THIAMINE MONONITRATE (VIT B1) 100 MG
100 TABLET ORAL DAILY
Qty: 30 TABLET | Refills: 0 | Status: ON HOLD | OUTPATIENT
Start: 2022-02-26 | End: 2022-11-01 | Stop reason: HOSPADM

## 2022-02-26 RX ORDER — FOLIC ACID 1 MG/1
1 TABLET ORAL DAILY
Qty: 30 TABLET | Refills: 0 | Status: ON HOLD | OUTPATIENT
Start: 2022-02-26 | End: 2022-11-01 | Stop reason: HOSPADM

## 2022-02-26 RX ORDER — FOLIC ACID 1 MG/1
1 TABLET ORAL DAILY
Qty: 30 TABLET | Refills: 0
Start: 2022-02-26 | End: 2022-02-26 | Stop reason: SDUPTHER

## 2022-02-26 RX ORDER — MAGNESIUM SULFATE IN WATER 40 MG/ML
2000 INJECTION, SOLUTION INTRAVENOUS ONCE
Status: COMPLETED | OUTPATIENT
Start: 2022-02-26 | End: 2022-02-26

## 2022-02-26 RX ORDER — CARVEDILOL 3.12 MG/1
3.12 TABLET ORAL 2 TIMES DAILY
Qty: 60 TABLET | Refills: 0 | Status: ON HOLD | OUTPATIENT
Start: 2022-02-26 | End: 2022-11-01 | Stop reason: HOSPADM

## 2022-02-26 RX ORDER — THIAMINE MONONITRATE (VIT B1) 100 MG
100 TABLET ORAL DAILY
Qty: 30 TABLET | Refills: 0
Start: 2022-02-26 | End: 2022-02-26 | Stop reason: SDUPTHER

## 2022-02-26 RX ORDER — FOLIC ACID 1 MG/1
1 TABLET ORAL DAILY
Qty: 30 TABLET | Refills: 0 | Status: SHIPPED | OUTPATIENT
Start: 2022-02-26 | End: 2022-02-26 | Stop reason: SDUPTHER

## 2022-02-26 RX ADMIN — MAGNESIUM SULFATE HEPTAHYDRATE 1000 MG: 1 INJECTION, SOLUTION INTRAVENOUS at 07:00

## 2022-02-26 RX ADMIN — MAGNESIUM SULFATE HEPTAHYDRATE 1000 MG: 1 INJECTION, SOLUTION INTRAVENOUS at 06:00

## 2022-02-26 RX ADMIN — MORPHINE SULFATE 1 MG: 2 INJECTION, SOLUTION INTRAMUSCULAR; INTRAVENOUS at 06:00

## 2022-02-26 RX ADMIN — CARVEDILOL 3.12 MG: 3.12 TABLET, FILM COATED ORAL at 08:39

## 2022-02-26 RX ADMIN — SODIUM CHLORIDE, PRESERVATIVE FREE 10 ML: 5 INJECTION INTRAVENOUS at 09:08

## 2022-02-26 RX ADMIN — THIAMINE HYDROCHLORIDE 100 MG: 100 INJECTION, SOLUTION INTRAMUSCULAR; INTRAVENOUS at 08:39

## 2022-02-26 RX ADMIN — MORPHINE SULFATE 1 MG: 2 INJECTION, SOLUTION INTRAMUSCULAR; INTRAVENOUS at 13:29

## 2022-02-26 RX ADMIN — MORPHINE SULFATE 1 MG: 2 INJECTION, SOLUTION INTRAMUSCULAR; INTRAVENOUS at 09:29

## 2022-02-26 RX ADMIN — PANTOPRAZOLE SODIUM 40 MG: 40 TABLET, DELAYED RELEASE ORAL at 06:05

## 2022-02-26 RX ADMIN — LIDOCAINE HYDROCHLORIDE 1 ML: 10 INJECTION, SOLUTION EPIDURAL; INFILTRATION; INTRACAUDAL; PERINEURAL at 11:00

## 2022-02-26 RX ADMIN — MAGNESIUM SULFATE HEPTAHYDRATE 2000 MG: 40 INJECTION, SOLUTION INTRAVENOUS at 08:39

## 2022-02-26 RX ADMIN — Medication 30 ML: at 11:25

## 2022-02-26 RX ADMIN — MORPHINE SULFATE 1 MG: 2 INJECTION, SOLUTION INTRAMUSCULAR; INTRAVENOUS at 01:12

## 2022-02-26 RX ADMIN — WATER 2000 MG: 1 INJECTION INTRAMUSCULAR; INTRAVENOUS; SUBCUTANEOUS at 13:29

## 2022-02-26 RX ADMIN — SODIUM CHLORIDE, PRESERVATIVE FREE 10 ML: 5 INJECTION INTRAVENOUS at 08:39

## 2022-02-26 ASSESSMENT — PAIN DESCRIPTION - PAIN TYPE
TYPE: SURGICAL PAIN
TYPE: SURGICAL PAIN

## 2022-02-26 ASSESSMENT — PAIN DESCRIPTION - ORIENTATION
ORIENTATION: LEFT
ORIENTATION: LEFT

## 2022-02-26 ASSESSMENT — PAIN DESCRIPTION - PROGRESSION
CLINICAL_PROGRESSION: NOT CHANGED
CLINICAL_PROGRESSION: NOT CHANGED

## 2022-02-26 ASSESSMENT — PAIN DESCRIPTION - LOCATION
LOCATION: SHOULDER
LOCATION: SHOULDER

## 2022-02-26 ASSESSMENT — PAIN SCALES - GENERAL
PAINLEVEL_OUTOF10: 6
PAINLEVEL_OUTOF10: 0
PAINLEVEL_OUTOF10: 6
PAINLEVEL_OUTOF10: 6
PAINLEVEL_OUTOF10: 0
PAINLEVEL_OUTOF10: 0
PAINLEVEL_OUTOF10: 6

## 2022-02-26 ASSESSMENT — PAIN DESCRIPTION - ONSET
ONSET: ON-GOING
ONSET: ON-GOING

## 2022-02-26 ASSESSMENT — PAIN DESCRIPTION - FREQUENCY
FREQUENCY: CONTINUOUS
FREQUENCY: CONTINUOUS

## 2022-02-26 ASSESSMENT — PAIN DESCRIPTION - DIRECTION
RADIATING_TOWARDS: LEFT ARM
RADIATING_TOWARDS: LEFT ARM

## 2022-02-26 ASSESSMENT — PAIN DESCRIPTION - DESCRIPTORS
DESCRIPTORS: ACHING;SORE
DESCRIPTORS: ACHING;DISCOMFORT;SORE

## 2022-02-26 NOTE — PROGRESS NOTES
Value Date    WBC 13.2 (H) 02/25/2022    WBC 11.6 (H) 02/24/2022    WBC 9.3 02/23/2022    HGB 8.1 (L) 02/25/2022    HCT 25.9 (L) 02/25/2022    MCV 92.8 02/25/2022     02/25/2022     Lab Results   Component Value Date    NEUTROABS 10.43 (H) 02/25/2022    NEUTROABS 10.56 (H) 02/24/2022    NEUTROABS 8.46 (H) 02/23/2022     No results found for: [facility]  Lab Results   Component Value Date    ALT 29 02/26/2022    AST 89 (H) 02/26/2022     (H) 01/04/2017    ALKPHOS 162 (H) 02/26/2022    BILITOT 3.9 (H) 02/26/2022     Lab Results   Component Value Date     02/26/2022    K 3.8 02/26/2022    K 3.8 02/26/2022    CL 94 02/26/2022    CO2 25 02/26/2022    BUN 6 02/26/2022    CREATININE 0.5 02/26/2022    CREATININE 0.5 02/25/2022    CREATININE 0.5 02/24/2022    GFRAA >60 02/26/2022    LABGLOM >60 02/26/2022    GLUCOSE 129 02/26/2022    GLUCOSE 102 07/08/2019    PROT 6.7 02/26/2022    LABALBU 2.5 02/26/2022    LABALBU 5.0 03/13/2012    CALCIUM 7.8 02/26/2022    BILITOT 3.9 02/26/2022    ALKPHOS 162 02/26/2022    AST 89 02/26/2022    ALT 29 02/26/2022     Lab Results   Component Value Date    CRP 11.0 (H) 02/21/2022     Lab Results   Component Value Date    SEDRATE 27 (H) 02/21/2022    SEDRATE 14 06/01/2017     Radiology:  Reviewed    Microbiology:     2/22/2022: Left SC joint alphahemolytic strep x2, Streptococcus pneumoniae    ASSESSMENT:  · Sternoclavicular abscess on the left- strep pneumoniae. This is probably hematogenously spread  · Invasive pneumococcal disease  · Although rare this organisms can cause endocarditis in certain populations. She could have been bacteremic which caused her fall and seated the SC joint  · Septic sternoclavicular joint  · Immunocompromised host-cirrhosis  · Leukocytosis  · S/p eosphageal banding - has varices   · Dehydrated - K, Mag, Cl, Na all low -being replaced    PLAN:  · Continue Ceftriaxone x4 to 6 weeks.   Reconciled  · Getting IV fluids  · 2D echo  · Labs, cultures, imaging reviewed     Spoke with nursing    Christien Dockery MD  12:57 PM

## 2022-02-26 NOTE — DISCHARGE SUMMARY
[facility] Physician Discharge Summary       Pelon Nettles [phone]    In 2 weeks      [address] [facility]  [phone]          Activity level: As tolerated     Dispo:  home      Condition on discharge: Stable     Patient ID:  Sherif Lemons  [id number]  46 y.o.  1980    Admit date: 2/20/2022    Discharge date and time:  2/26/2022  11:55 AM    Admission Diagnoses: Principal Problem:    UGI bleed  Active Problems:    Anemia associated with acute blood loss    Leukocytosis    Alcoholic cirrhosis ([address]    Left shoulder pain  Resolved Problems:    * No resolved hospital problems. *      Discharge Diagnoses: Principal Problem:    UGI bleed  Active Problems:    Anemia associated with acute blood loss    Leukocytosis    Alcoholic cirrhosis (HCC)    Left shoulder pain  Resolved Problems:    * No resolved hospital problems. *      Consults:  IP CONSULT TO CRITICAL CARE  IP CONSULT TO GI  IP CONSULT TO ORTHOPEDIC SURGERY  IP CONSULT TO INFECTIOUS DISEASES  IP CONSULT TO PHARMACY  IP CONSULT TO IV TEAM  IP CONSULT TO HOME CARE NEEDS  IP CONSULT TO CARDIOLOGY    Procedures:   2/26/22 - PICC line insertion;  2/23/22 - esophageal varix banding. 2/23/22 - Left sternoclavicular I & D of abscess    Hospital Course:   Patient Sherif Lemons is a 46 y.o. presented with Hypokalemia [E87.6]  Lactic acidosis [E87.2]  UGI bleed [K92.2]  Coffee ground emesis [K92.0]  Pyogenic arthritis of left shoulder region, due to unspecified organism ([facility] [M00.9]  Acute anemia [A23.4]  Alcoholic cirrhosis, unspecified whether ascites present ([address] [K70.30] . She was admitted for treatment of anemia and electrolyte abnormalities from her liver cirrhosis. She was given IV PPI and Octreotide. She was found on CT of the chest to have a fluid collection around the left sternoclavicular joint.   Concern was for septic arthritis. She was seen by Orthopedics and GI. Patient underwent an I & D. She was found with an abscess but no joint involvement. Cultures were sent and were positive for Streptococcus pneumonia. ID was consulted. She was placed on Cefazolin and Vancomycin initially. The antibiotics were de-escalated to Rocephin 2 gm IV for 28 days once ID and sensitivities were available for the Strep pneumo. She had an EGD and had a small esophageal varix banded. There was mild portal hypertensive gastropathy present. She was placed on  Coreg 3.125 mg po bid. She had replacement of her electrolyte abnormalities (K+, Mg2+and Phosphorus). Cardiology consult was placed for 2 D ECHO as patient had a systolic ejection murmur. Blood cultures were negative. Cardiology ordered a 2 D ECHO. She was stable for discharge to home on IV Rocephin for 4 - 6 wks. F/U with GI per their recommendations.     Discharge Exam:    General Appearance: alert and oriented to person, place and time and in no acute distress  Skin: warm and dry  Head: normocephalic and atraumatic  Eyes: pupils equal, round, and reactive to light, extraocular eye movements intact, conjunctivae normal  Neck: neck supple and non tender without mass   Pulmonary/Chest: clear to auscultation bilaterally- no wheezes, rales or rhonchi, normal air movement, no respiratory distress  Cardiovascular: normal rate, normal S1 and S2 and no carotid bruits  Abdomen: soft, non-tender, non-distended, normal bowel sounds, no masses or organomegaly  Extremities: no cyanosis, no clubbing and no edema  Neurologic: no cranial nerve deficit and speech normal    LABS:  Recent Labs     02/24/22  0506 02/24/22  0506 02/25/22  0638 02/25/22  0638 02/25/22  1500 02/26/22  0313   *  --  131*  --   --  127*   K 3.1*  3.1*   < > 3.0*  3.0*   < > 3.5 3.8  3.8   CL 97*  --  97*  --   --  94*   CO2 23  --  27  --   --  25   BUN 10  --  7  --   --  6   CREATININE 0.5  --  0.5  --   -- 0.5   GLUCOSE 160*  --  118*  --   --  129*   CALCIUM 7.2*  --  7.5*  --   --  7.8*    < > = values in this interval not displayed. Recent Labs     02/24/22  0506 02/25/22  0638   WBC 11.6* 13.2*   RBC 2.53* 2.79*   HGB 7.3* 8.1*   HCT 23.0* 25.9*   MCV 90.9 92.8   MCH 28.9 29.0   MCHC 31.7* 31.3*   RDW 24.4* 25.1*    197   MPV 11.0 11.1     Imaging:  XR CLAVICLE LEFT    Result Date: 2/21/2022  EXAMINATION: TWO XRAY VIEWS OF THE LEFT CLAVICLE 2/21/2022 6:55 pm COMPARISON: None. HISTORY: ORDERING SYSTEM PROVIDED HISTORY: pain TECHNOLOGIST PROVIDED HISTORY: Reason for exam:->pain FINDINGS: There is no evidence of acute fracture. There is normal alignment. No acute joint abnormality. No focal osseous lesion. No focal soft tissue abnormality. No acute osseous abnormality. CT CHEST W CONTRAST    Result Date: 2/20/2022  EXAMINATION: CT OF THE ABDOMEN AND PELVIS WITH CONTRAST; CT OF THE CHEST WITH CONTRAST 2/20/2022 2:18 pm TECHNIQUE: CT of the abdomen and pelvis was performed with the administration of intravenous contrast. Multiplanar reformatted images are provided for review. Dose modulation, iterative reconstruction, and/or weight based adjustment of the mA/kV was utilized to reduce the radiation dose to as low as reasonably achievable.; CT of the chest was performed with the administration of intravenous contrast. Multiplanar reformatted images are provided for review. Dose modulation, iterative reconstruction, and/or weight based adjustment of the mA/kV was utilized to reduce the radiation dose to as low as reasonably achievable.  COMPARISON: CT abdomen and pelvis from November 26, 2020 HISTORY: ORDERING SYSTEM PROVIDED HISTORY:  Hematemesis TECHNOLOGIST PROVIDED HISTORY: Additional Contrast?  None Reason for exam:  Hematemesis Decision Support Exception - unselect if not a suspected or confirmed emergency medical condition->Emergency Medical Condition (MA) ORDERING SYSTEM PROVIDED HISTORY: Hx of cirrhosis, hematemesis TECHNOLOGIST PROVIDED HISTORY: Reason for Exam:  Hx of cirrhosis, hematemesis Decision Support Exception - unselect if not a suspected or confirmed emergency medical condition->Emergency Medical Condition (MA) FINDINGS: Chest: Mediastinum: Heart size is within normal limits. Trace pericardial fluid is present. The thoracic esophagus is decompressed, limiting assessment. No obvious esophageal abnormality. The main pulmonary artery is dilated up to 3.3 cm in transverse dimension. Visible but nonenlarged mediastinal lymph nodes are present. Mild induration of the anterior mediastinal fat is noted immediately caudal and posterior to the left sternoclavicular joint. Lungs/pleura: The central airways are patent. No pleural effusion or pneumothorax. Minimal dependent atelectasis is present at both lung bases. No evidence of a consolidative airspace opacity or suspicious pulmonary nodule. Soft Tissues/Bones: There are significant inflammatory changes surrounding the left sternoclavicular joint. A 2.5 x 3.7 cm peripherally enhancing fluid collection is seen slightly cranial and medial to the upper margin of the joint. A small amount of fluid is also noted posterior to the joint, leading to at least moderate compression of the left brachiocephalic vein as it crosses posterior to this collection. A calcium density linear structure is noted approximately 1 cm inferior and 2-3 cm lateral to the left sternoclavicular joint (axial image 27), a foreign body cannot be excluded at this location. No acute osseous abnormality or evidence of an aggressive osseous lesion. Abdomen/Pelvis: Organs: The liver has a nodular surface contour and contains innumerable subcentimeter hypodense lesions scattered throughout the left and right lobes. A small volume of perihepatic ascites is present.   The gallbladder is dilated and there is either pericholecystic fluid and or circumferential wall thickening of the gallbladder. Multiple gallstones are present near the gallbladder fundus. The spleen is surgically absent, along with the body and tail of the pancreas. Adrenal glands are normal in size. The kidneys enhance symmetrically and are without hydronephrosis or radiopaque calculus. Multiple collateral veins are noted throughout the upper abdomen. These are similar in appearance to the prior exam. GI/Bowel: No evidence of a bowel obstruction, free air or pneumatosis. Portions the colon are decompressed, limiting assessment for mucosal based abnormalities. There is focal segmental circumferential wall thickening involving the colon at the level of the distal ascending colon and hepatic flexure. Focal wall thickening of the transverse colon is also noted on axial image 197. Pericolonic edema is noted at each of these locations. Pelvis: The urinary bladder is distended without obvious abnormality. The uterus and left ovary are normal.  The right ovary is not visualized. A small volume of free fluid is present in the pelvis. No pelvic lymphadenopathy. Peritoneum/Retroperitoneum: The abdominal aorta is normal in caliber. No retroperitoneal lymphadenopathy or mass. Bones/Soft Tissues: No acute osseous abnormality or evidence of an aggressive osseous lesion. Significant inflammatory changes as well as a focal fluid collection surrounding the left sternoclavicular joint. Septic arthritis is suspected. An adjacent calcium density linear focus is appreciated slightly inferior and lateral to the left sternoclavicular joint. A foreign body at this location cannot be excluded. Consider sampling of the fluid collection anterior and slightly cranial to the left sternoclavicular joint. A more inferior portion of the collection leads to at least moderate compression of the left brachiocephalic vein which passes posterior to the collection.  Multifocal segmental wall thickening of the distal ascending colon, hepatic flexure and transverse colon. An infectious or inflammatory pneumonitis is suspected. Ischemic colitis cannot be completely excluded although is felt to be less likely. There is no obvious high-grade stenosis involving the superior mesenteric artery or its main branches. Diffusely abnormal appearance of the liver with a cirrhotic appearing morphology and innumerable hypodense hepatic lesions scattered throughout the left and right lobes. The portal vein is patent and there is no definite intrahepatic biliary ductal dilatation. Dilation of the gallbladder with either pericholecystic fluid and/or gallbladder wall thickening along with cholelithiasis. Findings may be related to intrinsic liver disease and are similar in appearance to the November 2020 exam.  Acute cholecystitis cannot completely be excluded. 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 Additional, incidental findings as above. CT ABDOMEN PELVIS W IV CONTRAST Additional Contrast? None    Result Date: 2/20/2022  EXAMINATION: CT OF THE ABDOMEN AND PELVIS WITH CONTRAST; CT OF THE CHEST WITH CONTRAST 2/20/2022 2:18 pm TECHNIQUE: CT of the abdomen and pelvis was performed with the administration of intravenous contrast. Multiplanar reformatted images are provided for review. Dose modulation, iterative reconstruction, and/or weight based adjustment of the mA/kV was utilized to reduce the radiation dose to as low as reasonably achievable.; CT of the chest was performed with the administration of intravenous contrast. Multiplanar reformatted images are provided for review. Dose modulation, iterative reconstruction, and/or weight based adjustment of the mA/kV was utilized to reduce the radiation dose to as low as reasonably achievable.  COMPARISON: CT abdomen and pelvis from November 26, 2020 HISTORY: ORDERING SYSTEM PROVIDED HISTORY:  Hematemesis TECHNOLOGIST PROVIDED HISTORY: Additional Contrast?  None Reason for exam:  Hematemesis Decision Support Exception - unselect if not a suspected or confirmed emergency medical condition->Emergency Medical Condition (MA) ORDERING SYSTEM PROVIDED HISTORY:  Hx of cirrhosis, hematemesis TECHNOLOGIST PROVIDED HISTORY: Reason for Exam:  Hx of cirrhosis, hematemesis Decision Support Exception - unselect if not a suspected or confirmed emergency medical condition->Emergency Medical Condition (MA) FINDINGS: Chest: Mediastinum: Heart size is within normal limits. Trace pericardial fluid is present. The thoracic esophagus is decompressed, limiting assessment. No obvious esophageal abnormality. The main pulmonary artery is dilated up to 3.3 cm in transverse dimension. Visible but nonenlarged mediastinal lymph nodes are present. Mild induration of the anterior mediastinal fat is noted immediately caudal and posterior to the left sternoclavicular joint. Lungs/pleura: The central airways are patent. No pleural effusion or pneumothorax. Minimal dependent atelectasis is present at both lung bases. No evidence of a consolidative airspace opacity or suspicious pulmonary nodule. Soft Tissues/Bones: There are significant inflammatory changes surrounding the left sternoclavicular joint. A 2.5 x 3.7 cm peripherally enhancing fluid collection is seen slightly cranial and medial to the upper margin of the joint. A small amount of fluid is also noted posterior to the joint, leading to at least moderate compression of the left brachiocephalic vein as it crosses posterior to this collection. A calcium density linear structure is noted approximately 1 cm inferior and 2-3 cm lateral to the left sternoclavicular joint (axial image 27), a foreign body cannot be excluded at this location. No acute osseous abnormality or evidence of an aggressive osseous lesion. Abdomen/Pelvis: Organs:  The liver has a nodular surface contour and contains innumerable subcentimeter hypodense lesions scattered throughout the left and right lobes. A small volume of perihepatic ascites is present. The gallbladder is dilated and there is either pericholecystic fluid and or circumferential wall thickening of the gallbladder. Multiple gallstones are present near the gallbladder fundus. The spleen is surgically absent, along with the body and tail of the pancreas. Adrenal glands are normal in size. The kidneys enhance symmetrically and are without hydronephrosis or radiopaque calculus. Multiple collateral veins are noted throughout the upper abdomen. These are similar in appearance to the prior exam. GI/Bowel: No evidence of a bowel obstruction, free air or pneumatosis. Portions the colon are decompressed, limiting assessment for mucosal based abnormalities. There is focal segmental circumferential wall thickening involving the colon at the level of the distal ascending colon and hepatic flexure. Focal wall thickening of the transverse colon is also noted on axial image 197. Pericolonic edema is noted at each of these locations. Pelvis: The urinary bladder is distended without obvious abnormality. The uterus and left ovary are normal.  The right ovary is not visualized. A small volume of free fluid is present in the pelvis. No pelvic lymphadenopathy. Peritoneum/Retroperitoneum: The abdominal aorta is normal in caliber. No retroperitoneal lymphadenopathy or mass. Bones/Soft Tissues: No acute osseous abnormality or evidence of an aggressive osseous lesion. Significant inflammatory changes as well as a focal fluid collection surrounding the left sternoclavicular joint. Septic arthritis is suspected. An adjacent calcium density linear focus is appreciated slightly inferior and lateral to the left sternoclavicular joint. A foreign body at this location cannot be excluded. Consider sampling of the fluid collection anterior and slightly cranial to the left sternoclavicular joint.   A more inferior portion of the collection leads to at least moderate compression of the left brachiocephalic vein which passes posterior to the collection. Multifocal segmental wall thickening of the distal ascending colon, hepatic flexure and transverse colon. An infectious or inflammatory pneumonitis is suspected. Ischemic colitis cannot be completely excluded although is felt to be less likely. There is no obvious high-grade stenosis involving the superior mesenteric artery or its main branches. Diffusely abnormal appearance of the liver with a cirrhotic appearing morphology and innumerable hypodense hepatic lesions scattered throughout the left and right lobes. The portal vein is patent and there is no definite intrahepatic biliary ductal dilatation. Dilation of the gallbladder with either pericholecystic fluid and/or gallbladder wall thickening along with cholelithiasis. Findings may be related to intrinsic liver disease and are similar in appearance to the November 2020 exam.  Acute cholecystitis cannot completely be excluded. 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 Additional, incidental findings as above. XR SHOULDER LEFT (MIN 2 VIEWS)    Result Date: 2/21/2022  EXAMINATION: THREE XRAY VIEWS OF THE LEFT SHOULDER 2/21/2022 6:55 pm COMPARISON: None. HISTORY: ORDERING SYSTEM PROVIDED HISTORY: pain TECHNOLOGIST PROVIDED HISTORY: Reason for exam:->pain FINDINGS: Glenohumeral joint is normally aligned. No evidence of acute fracture or dislocation. No abnormal periarticular calcifications. The [facility] joint is unremarkable in appearance. Visualized lung is unremarkable. No acute abnormality.      Patient Instructions:      Medication List      START taking these medications    carvedilol 3.125 MG tablet  Commonly known as: COREG  Take 1 tablet by mouth 2 times daily     cefTRIAXone  infusion  Commonly known as: ROCEPHIN  Infuse 2,000 mg intravenously every 24 hours for 28 days Compound per protocol     folic acid 1 MG tablet  Commonly known as: FOLVITE  Take 1 tablet by mouth daily     vitamin B-1 100 MG tablet  Commonly known as: THIAMINE  Take 1 tablet by mouth daily        CONTINUE taking these medications    Creon [id number] units delayed release capsule  Generic drug: lipase-protease-amylase     furosemide 20 MG tablet  Commonly known as: LASIX     Klor-Con M20 20 MEQ extended release tablet  Generic drug: potassium chloride     lactulose 10 GM/15ML solution  Commonly known as: CHRONULAC     omeprazole 20 MG delayed release capsule  Commonly known as: PRILOSEC     spironolactone 50 MG tablet  Commonly known as: ALDACTONE     vitamin A 3 MG (54862 UT) capsule     vitamin D 1.25 MG (69646 UT) Caps capsule  Commonly known as: ERGOCALCIFEROL        STOP taking these medications    gabapentin 100 MG capsule  Commonly known as: NEURONTIN     methylPREDNISolone 4 MG tablet  Commonly known as: MEDROL DOSEPACK     tiZANidine 2 MG tablet  Commonly known as: [place] La           Where to Get Your Medications      These medications were sent to [address] [place] [address] [phone]  [address] [place] [id number]    Phone: [phone]   · carvedilol 3.125 MG tablet     You can get these medications from any pharmacy    Bring a paper prescription for each of these medications  · cefTRIAXone  infusion  You don't need a prescription for these medications  · folic acid 1 MG tablet  · vitamin B-1 100 MG tablet       Note that more than 35 minutes was spent in preparing discharge papers, discussing discharge with patient, medication review, etc.    Signed:  Electronically signed by Corrine Colmenares MD on 2/26/2022 at 11:55 AM

## 2022-02-26 NOTE — PROGRESS NOTES
Went up to pts room at 10:30 am to do Echo, but she was getting a PICC line placed. I will try to do later today or tomorrow.

## 2022-02-26 NOTE — PROCEDURES
PICC   Catheter insertion date: 2/26/2022     Product Number:  [id number]   Lot No: [id number]  EXP. DATE: 11/30/22   Gauge: 4.5 FR   Lumen: SINGLE   RIGHT BRACHIAL     Vein Diameter : 0.59 CM   Mid upper arm circumference: 22 CM   Catheter Length : 34 CM   Internal Length: 32 CM   External Catheter Length: 2 CM   Ultrasound Used:YES  VPS [facility] [place] confirms PICC tip is placed in the lower 1/3 of the SVC or at the Cavoatrial junction. Floor nurse notified PICC is okay to use.    : Brian Pereira RN.   ASSISTANT Zeynep Saunders RN, [place]-BC

## 2022-02-26 NOTE — PROGRESS NOTES
[facility] Progress Note    Admitting Date and Time: 2/20/2022 10:19 AM  Admit Dx: Hypokalemia [E87.6]  Lactic acidosis [E87.2]  UGI bleed [K92.2]  Coffee ground emesis [K92.0]  Pyogenic arthritis of left shoulder region, due to unspecified organism ([address] [M00.9]  Acute anemia [I83.7]  Alcoholic cirrhosis, unspecified whether ascites present ([address] [K70.30]    Subjective:  Patient is being followed for Hypokalemia [E87.6]  Lactic acidosis [E87.2]  UGI bleed [K92.2]  Coffee ground emesis [K92.0]  Pyogenic arthritis of left shoulder region, due to unspecified organism ([address] [M00.9]  Acute anemia [A10.6]  Alcoholic cirrhosis, unspecified whether ascites present ([address] [K70.30]   Pt hopeful can go home. Needs PICC line. Mg2+ still low. ID wants 2 D ECHO. Cardiology consulted. Per RN: To give 4 gm of MgSO4 for Mg2+ level of 1.4    ROS: denies fever, chills, cp, sob, n/v, HA unless stated above.       magnesium sulfate  2,000 mg IntraVENous Once    carvedilol  3.125 mg Oral BID    lidocaine 1 % injection  5 mL IntraDERmal Once    sodium chloride flush  5-40 mL IntraVENous 2 times per day    cefTRIAXone (ROCEPHIN) IV  2,000 mg IntraVENous Q24H    pantoprazole  40 mg Oral QAM AC    thiamine  100 mg IntraVENous Daily    sodium chloride flush  5-40 mL IntraVENous 2 times per day     perflutren lipid microspheres, 1.5 mL, ONCE PRN  potassium chloride, 40 mEq, PRN   Or  potassium alternative oral replacement, 40 mEq, PRN   Or  potassium chloride, 10 mEq, PRN  magnesium sulfate, 1,000 mg, PRN  sodium phosphate IVPB, 0.16 mmol/kg, PRN   Or  sodium phosphate IVPB, 0.32 mmol/kg, PRN  sodium chloride flush, 5-40 mL, PRN  sodium chloride, 25 mL, PRN  metoprolol, 5 mg, Q8H PRN  sodium chloride, 25 mL, PRN  ondansetron, 4 mg, Q8H PRN   Or  ondansetron, 4 mg, Q6H PRN  polyethylene glycol, 17 g, Daily PRN  acetaminophen, 650 mg, Q6H PRN   Or  acetaminophen, 650 mg, Q6H PRN  morphine, 1 mg, Q3H PRN    Objective:    /74   Pulse 82   Temp 98 °F (36.7 °C) (Oral)   Resp 18   Ht 5' 4\" (1.626 m)   Wt 127 lb 12.8 oz (58 kg)   SpO2 98%   BMI 21.94 kg/m²     General Appearance: alert and oriented to person, place and time and in no acute distress  Skin: warm and dry  Head: normocephalic and atraumatic  Eyes: pupils equal, round, and reactive to light, extraocular eye movements intact, conjunctivae normal  Neck: neck supple and non tender without mass   Pulmonary/Chest: clear to auscultation bilaterally- no wheezes, rales or rhonchi, normal air movement, no respiratory distress  Cardiovascular: normal rate, normal S1 and S2 and no carotid bruits. Soft systolic ejection murmur heard  Abdomen: soft, non-tender, non-distended, normal bowel sounds, no masses or organomegaly  Extremities: no cyanosis, no clubbing and no edema  Neurologic: no cranial nerve deficit and speech normal    Recent Labs     02/24/22  0506 02/24/22  0506 02/25/22  0638 02/25/22  0638 02/25/22  1500 02/26/22  0313   *  --  131*  --   --  127*   K 3.1*  3.1*   < > 3.0*  3.0*   < > 3.5 3.8  3.8   CL 97*  --  97*  --   --  94*   CO2 23  --  27  --   --  25   BUN 10  --  7  --   --  6   CREATININE 0.5  --  0.5  --   --  0.5   GLUCOSE 160*  --  118*  --   --  129*   CALCIUM 7.2*  --  7.5*  --   --  7.8*    < > = values in this interval not displayed. Recent Labs     02/24/22  0506 02/25/22  0638   WBC 11.6* 13.2*   RBC 2.53* 2.79*   HGB 7.3* 8.1*   HCT 23.0* 25.9*   MCV 90.9 92.8   MCH 28.9 29.0   MCHC 31.7* 31.3*   RDW 24.4* 25.1*    197   MPV 11.0 11.1     Radiology:  No results found. Assessment:    Principal Problem:    UGI bleed  Active Problems:    Anemia associated with acute blood loss    Leukocytosis    Alcoholic cirrhosis (HCC)    Left shoulder pain  Resolved Problems:    * No resolved hospital problems. *    Plan:  1.    Alcoholic liver cirrhosis - patient with history of alcohol abuse.  GI on case.  Mg2+ 1.4; Phosphorus 2.7; K+ 3.8; Na+ 127.  1500 ml fluid restriction for low Na+. Eating well. Repeat blood work after Mg2 + infusions and K+ infusions complete. 2.   Left supraclavicular abscess - needs PICC placed. Home Health can see tomorrow for Rocephin daily infusion. Culture of surgery grew Streptococcus pneumoniae, pan-sensitive. ID recommending 4 - 6 wks of Rocephin 2 gm IV daily. 3.   RAFAELA - cardiology consult. ID wanting a 2 D ECHO. Patient aware may need to stay over the weekend. 4.  Hyponatremia - 2/2 volume overload most likely. Fluid restriction of 1500 ordered. NOTE: This report was transcribed using voice recognition software. Every effort was made to ensure accuracy; however, inadvertent computerized transcription errors may be present.     Electronically signed by Demetri Diaz MD on 2/26/2022 at 8:11 AM

## 2022-02-26 NOTE — CONSULTS
Full consult for 2D echo deferred. Echo has been ordered, will review once performed. Please reconsult if needed.     Lianna Osborne MD

## 2022-02-28 ENCOUNTER — TELEPHONE (OUTPATIENT)
Dept: PRIMARY CARE CLINIC | Age: 42
End: 2022-02-28

## 2022-02-28 NOTE — TELEPHONE ENCOUNTER
[facility] Initial Follow Up Call    Outreach made within 2 business days of discharge: Yes    Patient: Radha Ley Patient : 1980   MRN: [id number]  Reason for Admission: UGI bleed  Discharge Date: 22       Spoke with: Unable to leave message. Mailbox Full    Discharge department/facility: [facility]    [facility] Interactive Patient Contact:    Scheduled appointment with PCP within 7-14 days    Follow Up  No future appointments.     Beni Pham MA

## 2022-03-01 LAB
ALBUMIN SERPL-MCNC: 2.7 G/DL (ref 3.5–5.2)
ALP BLD-CCNC: 199 U/L (ref 35–104)
ALT SERPL-CCNC: 32 U/L (ref 0–32)
ANION GAP SERPL CALCULATED.3IONS-SCNC: 10 MMOL/L (ref 7–16)
ANISOCYTOSIS: ABNORMAL
AST SERPL-CCNC: 69 U/L (ref 0–31)
BASOPHILS ABSOLUTE: 0.28 E9/L (ref 0–0.2)
BASOPHILS RELATIVE PERCENT: 1.8 % (ref 0–2)
BILIRUB SERPL-MCNC: 3 MG/DL (ref 0–1.2)
BUN BLDV-MCNC: 6 MG/DL (ref 6–20)
BURR CELLS: ABNORMAL
C-REACTIVE PROTEIN: 2.6 MG/DL (ref 0–0.4)
CALCIUM SERPL-MCNC: 8 MG/DL (ref 8.6–10.2)
CHLORIDE BLD-SCNC: 102 MMOL/L (ref 98–107)
CO2: 24 MMOL/L (ref 22–29)
CREAT SERPL-MCNC: 0.5 MG/DL (ref 0.5–1)
EOSINOPHILS ABSOLUTE: 0 E9/L (ref 0.05–0.5)
EOSINOPHILS RELATIVE PERCENT: 1.4 % (ref 0–6)
GFR AFRICAN AMERICAN: >60
GFR NON-AFRICAN AMERICAN: >60 ML/MIN/1.73
GLUCOSE BLD-MCNC: 112 MG/DL (ref 74–99)
HCT VFR BLD CALC: 26.4 % (ref 34–48)
HEMOGLOBIN: 8.2 G/DL (ref 11.5–15.5)
HYPOCHROMIA: ABNORMAL
LYMPHOCYTES ABSOLUTE: 1.1 E9/L (ref 1.5–4)
LYMPHOCYTES RELATIVE PERCENT: 7.1 % (ref 20–42)
MAGNESIUM: 1.4 MG/DL (ref 1.6–2.6)
MCH RBC QN AUTO: 28.6 PG (ref 26–35)
MCHC RBC AUTO-ENTMCNC: 31.1 % (ref 32–34.5)
MCV RBC AUTO: 92 FL (ref 80–99.9)
MONOCYTES ABSOLUTE: 1.88 E9/L (ref 0.1–0.95)
MONOCYTES RELATIVE PERCENT: 11.5 % (ref 2–12)
MYELOCYTE PERCENT: 2.6 % (ref 0–0)
NEUTROPHILS ABSOLUTE: 12.56 E9/L (ref 1.8–7.3)
NEUTROPHILS RELATIVE PERCENT: 77 % (ref 43–80)
OVALOCYTES: ABNORMAL
PDW BLD-RTO: 24.7 FL (ref 11.5–15)
PHOSPHORUS: 2.8 MG/DL (ref 2.5–4.5)
PLATELET # BLD: 321 E9/L (ref 130–450)
PMV BLD AUTO: 9.8 FL (ref 7–12)
POIKILOCYTES: ABNORMAL
POLYCHROMASIA: ABNORMAL
POTASSIUM SERPL-SCNC: 3.6 MMOL/L (ref 3.5–5)
RBC # BLD: 2.87 E12/L (ref 3.5–5.5)
SCHISTOCYTES: ABNORMAL
SEDIMENTATION RATE, ERYTHROCYTE: 16 MM/HR (ref 0–20)
SODIUM BLD-SCNC: 136 MMOL/L (ref 132–146)
SPHEROCYTES: ABNORMAL
TARGET CELLS: ABNORMAL
TOTAL PROTEIN: 7.3 G/DL (ref 6.4–8.3)
TOXIC GRANULATION: ABNORMAL
WBC # BLD: 15.7 E9/L (ref 4.5–11.5)

## 2022-03-04 ENCOUNTER — TELEPHONE (OUTPATIENT)
Dept: ORTHOPEDIC SURGERY | Age: 42
End: 2022-03-04

## 2022-03-04 ENCOUNTER — HOSPITAL ENCOUNTER (OUTPATIENT)
Dept: INFUSION THERAPY | Age: 42
Setting detail: INFUSION SERIES
Discharge: HOME | End: 2022-03-04
Payer: MEDICAID

## 2022-03-04 VITALS
HEART RATE: 89 BPM | BODY MASS INDEX: 21.68 KG/M2 | HEIGHT: 64 IN | SYSTOLIC BLOOD PRESSURE: 132 MMHG | WEIGHT: 127 LBS | TEMPERATURE: 98.4 F | OXYGEN SATURATION: 99 % | DIASTOLIC BLOOD PRESSURE: 80 MMHG | RESPIRATION RATE: 18 BRPM

## 2022-03-04 DIAGNOSIS — M00.9 SEPTIC ARTHRITIS OF LEFT STERNOCLAVICULAR JOINT (HCC): Primary | ICD-10-CM

## 2022-03-04 DIAGNOSIS — M25.512 LEFT SHOULDER PAIN, UNSPECIFIED CHRONICITY: Primary | ICD-10-CM

## 2022-03-04 LAB
ALBUMIN SERPL-MCNC: 2.9 G/DL (ref 3.5–5.2)
ALP BLD-CCNC: 203 U/L (ref 35–104)
ALT SERPL-CCNC: 30 U/L (ref 0–32)
ANION GAP SERPL CALCULATED.3IONS-SCNC: 9 MMOL/L (ref 7–16)
ANISOCYTOSIS: ABNORMAL
AST SERPL-CCNC: 68 U/L (ref 0–31)
BASOPHILS ABSOLUTE: 0.12 E9/L (ref 0–0.2)
BASOPHILS RELATIVE PERCENT: 1.3 % (ref 0–2)
BILIRUB SERPL-MCNC: 2.6 MG/DL (ref 0–1.2)
BUN BLDV-MCNC: 6 MG/DL (ref 6–20)
BURR CELLS: ABNORMAL
CALCIUM SERPL-MCNC: 8.2 MG/DL (ref 8.6–10.2)
CHLORIDE BLD-SCNC: 102 MMOL/L (ref 98–107)
CO2: 25 MMOL/L (ref 22–29)
CREAT SERPL-MCNC: 0.4 MG/DL (ref 0.5–1)
EOSINOPHILS ABSOLUTE: 0.33 E9/L (ref 0.05–0.5)
EOSINOPHILS RELATIVE PERCENT: 3.5 % (ref 0–6)
GFR AFRICAN AMERICAN: >60
GFR NON-AFRICAN AMERICAN: >60 ML/MIN/1.73
GLUCOSE BLD-MCNC: 137 MG/DL (ref 74–99)
HCT VFR BLD CALC: 26.2 % (ref 34–48)
HEMOGLOBIN: 8.2 G/DL (ref 11.5–15.5)
HOWELL-JOLLY BODIES: ABNORMAL
HYPOCHROMIA: ABNORMAL
IMMATURE GRANULOCYTES #: 0.06 E9/L
IMMATURE GRANULOCYTES %: 0.6 % (ref 0–5)
LYMPHOCYTES ABSOLUTE: 1.36 E9/L (ref 1.5–4)
LYMPHOCYTES RELATIVE PERCENT: 14.6 % (ref 20–42)
MCH RBC QN AUTO: 29.3 PG (ref 26–35)
MCHC RBC AUTO-ENTMCNC: 31.3 % (ref 32–34.5)
MCV RBC AUTO: 93.6 FL (ref 80–99.9)
MONOCYTES ABSOLUTE: 1.64 E9/L (ref 0.1–0.95)
MONOCYTES RELATIVE PERCENT: 17.6 % (ref 2–12)
NEUTROPHILS ABSOLUTE: 5.83 E9/L (ref 1.8–7.3)
NEUTROPHILS RELATIVE PERCENT: 62.4 % (ref 43–80)
OVALOCYTES: ABNORMAL
PAPPENHEIMER BODIES: ABNORMAL
PDW BLD-RTO: 24.6 FL (ref 11.5–15)
PLATELET # BLD: 411 E9/L (ref 130–450)
PMV BLD AUTO: 9 FL (ref 7–12)
POIKILOCYTES: ABNORMAL
POLYCHROMASIA: ABNORMAL
POTASSIUM SERPL-SCNC: 3.2 MMOL/L (ref 3.5–5)
RBC # BLD: 2.8 E12/L (ref 3.5–5.5)
SCHISTOCYTES: ABNORMAL
SODIUM BLD-SCNC: 136 MMOL/L (ref 132–146)
TARGET CELLS: ABNORMAL
TOTAL PROTEIN: 7.5 G/DL (ref 6.4–8.3)
WBC # BLD: 9.3 E9/L (ref 4.5–11.5)

## 2022-03-04 PROCEDURE — 36415 COLL VENOUS BLD VENIPUNCTURE: CPT

## 2022-03-04 PROCEDURE — 80053 COMPREHEN METABOLIC PANEL: CPT

## 2022-03-04 PROCEDURE — 2580000003 HC RX 258: Performed by: SPECIALIST

## 2022-03-04 PROCEDURE — 36592 COLLECT BLOOD FROM PICC: CPT

## 2022-03-04 PROCEDURE — 6360000002 HC RX W HCPCS: Performed by: SPECIALIST

## 2022-03-04 PROCEDURE — 85025 COMPLETE CBC W/AUTO DIFF WBC: CPT

## 2022-03-04 RX ORDER — SODIUM CHLORIDE 0.9 % (FLUSH) 0.9 %
10 SYRINGE (ML) INJECTION PRN
Status: CANCELLED | OUTPATIENT
Start: 2022-03-04

## 2022-03-04 RX ORDER — HEPARIN SODIUM (PORCINE) LOCK FLUSH IV SOLN 100 UNIT/ML 100 UNIT/ML
300 SOLUTION INTRAVENOUS PRN
Status: CANCELLED | OUTPATIENT
Start: 2022-03-04

## 2022-03-04 RX ORDER — SODIUM CHLORIDE 0.9 % (FLUSH) 0.9 %
10 SYRINGE (ML) INJECTION PRN
Status: DISCONTINUED | OUTPATIENT
Start: 2022-03-04 | End: 2022-03-05 | Stop reason: HOSPADM

## 2022-03-04 RX ORDER — HEPARIN SODIUM (PORCINE) LOCK FLUSH IV SOLN 100 UNIT/ML 100 UNIT/ML
300 SOLUTION INTRAVENOUS PRN
Status: DISCONTINUED | OUTPATIENT
Start: 2022-03-04 | End: 2022-03-05 | Stop reason: HOSPADM

## 2022-03-04 RX ADMIN — SODIUM CHLORIDE, PRESERVATIVE FREE 10 ML: 5 INJECTION INTRAVENOUS at 14:52

## 2022-03-04 RX ADMIN — SODIUM CHLORIDE, PRESERVATIVE FREE 10 ML: 5 INJECTION INTRAVENOUS at 14:54

## 2022-03-04 RX ADMIN — HEPARIN 300 UNITS: 100 SYRINGE at 14:55

## 2022-03-04 NOTE — PROGRESS NOTES
Patient tolerated PICC flush well. Labs drawn through PICC  Line. Therapy plan reviewed with patient. Verbalizes understanding. Declines copy of AVS and any medication information, verbalizes good knowledge of current plan, and has no signs or symptoms to report at this time. Next appointment made.

## 2022-03-07 ENCOUNTER — OFFICE VISIT (OUTPATIENT)
Dept: ORTHOPEDIC SURGERY | Age: 42
End: 2022-03-07

## 2022-03-07 VITALS — HEIGHT: 64 IN | WEIGHT: 125 LBS | BODY MASS INDEX: 21.34 KG/M2

## 2022-03-07 DIAGNOSIS — M00.9 SEPTIC ARTHRITIS OF LEFT STERNOCLAVICULAR JOINT (HCC): Primary | ICD-10-CM

## 2022-03-07 PROCEDURE — 99024 POSTOP FOLLOW-UP VISIT: CPT | Performed by: ORTHOPAEDIC SURGERY

## 2022-03-07 NOTE — PROGRESS NOTES
HPI: Patient presents today POD #13 s/p left sternoclavicular joint excisional debridement. Patient has a PICC line in place. She is tolerating antibiotics. She reports a lot of pain to her parascapular musculature. She has seen her PCP for this. She has tried muscle relaxers and a cortisone injection. Physical Exam: incision intact. Medial portion of incision with suture knot. This was removed. Moderate amount of purulent drainage expressed. No surrounding erythema. + tenderness over the parascapular musculature. Remains NVI. Xray: x-rays of the left clavicle were obtained today in the office and reviewed with the patient. 2 views : demonstrate no acute fracture or dislocation. No evidence of osteomyelitis   Impression: No acute fracture dislocation    Cultures: strep pneumoniae    Assessment: POD #13 s/p left sternoclavicular joint excisional debridement    Plan:   Suture knot and wound debrided today. Start daily dressing changes. Continue antibiotics. Follow up with ID. Follow up in 1 week for a wound check. Anticipate referral to therapy for parascapular muscle pain when wound is healed. All questions and concerns answered. I have seen and evaluated the patient and agree with the above assessment and plan on today's visit. I have performed the key components of the history and physical examination with significant findings of postop sternoclavicular joint excisional debridement. . I concur with the findings and plan as documented.     Petra Quigley MD  3/7/2022

## 2022-03-08 ENCOUNTER — HOSPITAL ENCOUNTER (OUTPATIENT)
Dept: INFUSION THERAPY | Age: 42
Setting detail: INFUSION SERIES
Discharge: HOME | End: 2022-03-08
Payer: MEDICAID

## 2022-03-08 VITALS
HEIGHT: 64 IN | OXYGEN SATURATION: 98 % | DIASTOLIC BLOOD PRESSURE: 86 MMHG | RESPIRATION RATE: 18 BRPM | TEMPERATURE: 97.9 F | WEIGHT: 125 LBS | HEART RATE: 91 BPM | BODY MASS INDEX: 21.34 KG/M2 | SYSTOLIC BLOOD PRESSURE: 130 MMHG

## 2022-03-08 DIAGNOSIS — M00.9 SEPTIC ARTHRITIS OF LEFT STERNOCLAVICULAR JOINT (HCC): Primary | ICD-10-CM

## 2022-03-08 LAB
ALBUMIN SERPL-MCNC: 3 G/DL (ref 3.5–5.2)
ALP BLD-CCNC: 193 U/L (ref 35–104)
ALT SERPL-CCNC: 27 U/L (ref 0–32)
ANION GAP SERPL CALCULATED.3IONS-SCNC: 10 MMOL/L (ref 7–16)
ANISOCYTOSIS: ABNORMAL
AST SERPL-CCNC: 87 U/L (ref 0–31)
BASOPHILS ABSOLUTE: 0.19 E9/L (ref 0–0.2)
BASOPHILS RELATIVE PERCENT: 2.2 % (ref 0–2)
BILIRUB SERPL-MCNC: 2.2 MG/DL (ref 0–1.2)
BUN BLDV-MCNC: 5 MG/DL (ref 6–20)
C-REACTIVE PROTEIN: 1.2 MG/DL (ref 0–0.4)
CALCIUM SERPL-MCNC: 8.4 MG/DL (ref 8.6–10.2)
CHLORIDE BLD-SCNC: 108 MMOL/L (ref 98–107)
CO2: 23 MMOL/L (ref 22–29)
CREAT SERPL-MCNC: 0.5 MG/DL (ref 0.5–1)
EOSINOPHILS ABSOLUTE: 0.51 E9/L (ref 0.05–0.5)
EOSINOPHILS RELATIVE PERCENT: 6 % (ref 0–6)
GFR AFRICAN AMERICAN: >60
GFR NON-AFRICAN AMERICAN: >60 ML/MIN/1.73
GLUCOSE BLD-MCNC: 113 MG/DL (ref 74–99)
HCT VFR BLD CALC: 25.5 % (ref 34–48)
HEMOGLOBIN: 8.1 G/DL (ref 11.5–15.5)
HYPOCHROMIA: ABNORMAL
IMMATURE GRANULOCYTES #: 0.04 E9/L
IMMATURE GRANULOCYTES %: 0.5 % (ref 0–5)
LYMPHOCYTES ABSOLUTE: 1.42 E9/L (ref 1.5–4)
LYMPHOCYTES RELATIVE PERCENT: 16.6 % (ref 20–42)
MCH RBC QN AUTO: 29.3 PG (ref 26–35)
MCHC RBC AUTO-ENTMCNC: 31.8 % (ref 32–34.5)
MCV RBC AUTO: 92.4 FL (ref 80–99.9)
MONOCYTES ABSOLUTE: 1.13 E9/L (ref 0.1–0.95)
MONOCYTES RELATIVE PERCENT: 13.2 % (ref 2–12)
NEUTROPHILS ABSOLUTE: 5.27 E9/L (ref 1.8–7.3)
NEUTROPHILS RELATIVE PERCENT: 61.5 % (ref 43–80)
PDW BLD-RTO: 23.9 FL (ref 11.5–15)
PLATELET # BLD: 345 E9/L (ref 130–450)
PMV BLD AUTO: 8.7 FL (ref 7–12)
POIKILOCYTES: ABNORMAL
POLYCHROMASIA: ABNORMAL
POTASSIUM SERPL-SCNC: 3.2 MMOL/L (ref 3.5–5)
RBC # BLD: 2.76 E12/L (ref 3.5–5.5)
SEDIMENTATION RATE, ERYTHROCYTE: 40 MM/HR (ref 0–20)
SODIUM BLD-SCNC: 141 MMOL/L (ref 132–146)
TARGET CELLS: ABNORMAL
TOTAL PROTEIN: 7.6 G/DL (ref 6.4–8.3)
WBC # BLD: 8.6 E9/L (ref 4.5–11.5)

## 2022-03-08 PROCEDURE — 85651 RBC SED RATE NONAUTOMATED: CPT

## 2022-03-08 PROCEDURE — 2580000003 HC RX 258: Performed by: SPECIALIST

## 2022-03-08 PROCEDURE — 36592 COLLECT BLOOD FROM PICC: CPT

## 2022-03-08 PROCEDURE — 85025 COMPLETE CBC W/AUTO DIFF WBC: CPT

## 2022-03-08 PROCEDURE — 36415 COLL VENOUS BLD VENIPUNCTURE: CPT

## 2022-03-08 PROCEDURE — 6360000002 HC RX W HCPCS: Performed by: SPECIALIST

## 2022-03-08 PROCEDURE — 86140 C-REACTIVE PROTEIN: CPT

## 2022-03-08 PROCEDURE — 80053 COMPREHEN METABOLIC PANEL: CPT

## 2022-03-08 RX ORDER — HEPARIN SODIUM (PORCINE) LOCK FLUSH IV SOLN 100 UNIT/ML 100 UNIT/ML
300 SOLUTION INTRAVENOUS PRN
Status: DISCONTINUED | OUTPATIENT
Start: 2022-03-08 | End: 2022-03-09 | Stop reason: HOSPADM

## 2022-03-08 RX ORDER — HEPARIN SODIUM (PORCINE) LOCK FLUSH IV SOLN 100 UNIT/ML 100 UNIT/ML
300 SOLUTION INTRAVENOUS PRN
Status: CANCELLED | OUTPATIENT
Start: 2022-03-08

## 2022-03-08 RX ORDER — SODIUM CHLORIDE 0.9 % (FLUSH) 0.9 %
10 SYRINGE (ML) INJECTION PRN
Status: CANCELLED | OUTPATIENT
Start: 2022-03-08

## 2022-03-08 RX ORDER — SODIUM CHLORIDE 0.9 % (FLUSH) 0.9 %
10 SYRINGE (ML) INJECTION PRN
Status: DISCONTINUED | OUTPATIENT
Start: 2022-03-08 | End: 2022-03-09 | Stop reason: HOSPADM

## 2022-03-08 RX ADMIN — SODIUM CHLORIDE, PRESERVATIVE FREE 10 ML: 5 INJECTION INTRAVENOUS at 15:19

## 2022-03-08 RX ADMIN — HEPARIN 300 UNITS: 100 SYRINGE at 15:24

## 2022-03-08 RX ADMIN — SODIUM CHLORIDE, PRESERVATIVE FREE 10 ML: 5 INJECTION INTRAVENOUS at 15:23

## 2022-03-08 ASSESSMENT — PAIN DESCRIPTION - LOCATION: LOCATION: BACK;SHOULDER

## 2022-03-08 ASSESSMENT — PAIN DESCRIPTION - FREQUENCY: FREQUENCY: CONTINUOUS

## 2022-03-08 ASSESSMENT — PAIN SCALES - GENERAL: PAINLEVEL_OUTOF10: 8

## 2022-03-08 ASSESSMENT — PAIN DESCRIPTION - ORIENTATION: ORIENTATION: LEFT;UPPER

## 2022-03-08 ASSESSMENT — PAIN DESCRIPTION - DESCRIPTORS: DESCRIPTORS: ACHING

## 2022-03-08 NOTE — PROGRESS NOTES
Patient tolerated PICC flush well. Blood work drawn through [facility]. Therapy plan reviewed with patient. Verbalizes understanding. Declines copy of AVS and any medication information, verbalizes good knowledge of current plan, and has no signs or symptoms to report at this time. Next appointment made.

## 2022-03-11 ENCOUNTER — HOSPITAL ENCOUNTER (OUTPATIENT)
Dept: INFUSION THERAPY | Age: 42
Setting detail: INFUSION SERIES
Discharge: HOME | End: 2022-03-11
Payer: MEDICAID

## 2022-03-11 VITALS
DIASTOLIC BLOOD PRESSURE: 92 MMHG | TEMPERATURE: 98 F | OXYGEN SATURATION: 95 % | RESPIRATION RATE: 18 BRPM | HEART RATE: 98 BPM | SYSTOLIC BLOOD PRESSURE: 133 MMHG

## 2022-03-11 DIAGNOSIS — M00.9 SEPTIC ARTHRITIS OF LEFT STERNOCLAVICULAR JOINT (HCC): Primary | ICD-10-CM

## 2022-03-11 LAB
ALBUMIN SERPL-MCNC: 2.9 G/DL (ref 3.5–5.2)
ALP BLD-CCNC: 200 U/L (ref 35–104)
ALT SERPL-CCNC: 26 U/L (ref 0–32)
ANION GAP SERPL CALCULATED.3IONS-SCNC: 9 MMOL/L (ref 7–16)
ANISOCYTOSIS: ABNORMAL
AST SERPL-CCNC: 92 U/L (ref 0–31)
BASOPHILS ABSOLUTE: 0.12 E9/L (ref 0–0.2)
BASOPHILS RELATIVE PERCENT: 1.7 % (ref 0–2)
BILIRUB SERPL-MCNC: 1.8 MG/DL (ref 0–1.2)
BUN BLDV-MCNC: 5 MG/DL (ref 6–20)
CALCIUM SERPL-MCNC: 8.2 MG/DL (ref 8.6–10.2)
CHLORIDE BLD-SCNC: 105 MMOL/L (ref 98–107)
CO2: 24 MMOL/L (ref 22–29)
CREAT SERPL-MCNC: 0.5 MG/DL (ref 0.5–1)
EOSINOPHILS ABSOLUTE: 0.67 E9/L (ref 0.05–0.5)
EOSINOPHILS RELATIVE PERCENT: 9.4 % (ref 0–6)
GFR AFRICAN AMERICAN: >60
GFR NON-AFRICAN AMERICAN: >60 ML/MIN/1.73
GLUCOSE BLD-MCNC: 123 MG/DL (ref 74–99)
HCT VFR BLD CALC: 25.6 % (ref 34–48)
HEMOGLOBIN: 8.1 G/DL (ref 11.5–15.5)
HOWELL-JOLLY BODIES: ABNORMAL
HYPOCHROMIA: ABNORMAL
IMMATURE GRANULOCYTES #: 0.03 E9/L
IMMATURE GRANULOCYTES %: 0.4 % (ref 0–5)
LYMPHOCYTES ABSOLUTE: 1.39 E9/L (ref 1.5–4)
LYMPHOCYTES RELATIVE PERCENT: 19.5 % (ref 20–42)
MCH RBC QN AUTO: 28.8 PG (ref 26–35)
MCHC RBC AUTO-ENTMCNC: 31.6 % (ref 32–34.5)
MCV RBC AUTO: 91.1 FL (ref 80–99.9)
MONOCYTES ABSOLUTE: 0.67 E9/L (ref 0.1–0.95)
MONOCYTES RELATIVE PERCENT: 9.4 % (ref 2–12)
NEUTROPHILS ABSOLUTE: 4.23 E9/L (ref 1.8–7.3)
NEUTROPHILS RELATIVE PERCENT: 59.6 % (ref 43–80)
PAPPENHEIMER BODIES: ABNORMAL
PDW BLD-RTO: 22.7 FL (ref 11.5–15)
PLATELET # BLD: 234 E9/L (ref 130–450)
PMV BLD AUTO: 9.1 FL (ref 7–12)
POIKILOCYTES: ABNORMAL
POLYCHROMASIA: ABNORMAL
POTASSIUM SERPL-SCNC: 3.1 MMOL/L (ref 3.5–5)
RBC # BLD: 2.81 E12/L (ref 3.5–5.5)
SCHISTOCYTES: ABNORMAL
SODIUM BLD-SCNC: 138 MMOL/L (ref 132–146)
TARGET CELLS: ABNORMAL
TOTAL PROTEIN: 7.7 G/DL (ref 6.4–8.3)
WBC # BLD: 7.1 E9/L (ref 4.5–11.5)

## 2022-03-11 PROCEDURE — 2580000003 HC RX 258: Performed by: SPECIALIST

## 2022-03-11 PROCEDURE — 36415 COLL VENOUS BLD VENIPUNCTURE: CPT

## 2022-03-11 PROCEDURE — 36592 COLLECT BLOOD FROM PICC: CPT

## 2022-03-11 PROCEDURE — 85025 COMPLETE CBC W/AUTO DIFF WBC: CPT

## 2022-03-11 PROCEDURE — 80053 COMPREHEN METABOLIC PANEL: CPT

## 2022-03-11 PROCEDURE — 6360000002 HC RX W HCPCS: Performed by: SPECIALIST

## 2022-03-11 RX ORDER — SODIUM CHLORIDE 0.9 % (FLUSH) 0.9 %
10 SYRINGE (ML) INJECTION PRN
Status: DISCONTINUED | OUTPATIENT
Start: 2022-03-11 | End: 2022-03-12 | Stop reason: HOSPADM

## 2022-03-11 RX ORDER — HEPARIN SODIUM (PORCINE) LOCK FLUSH IV SOLN 100 UNIT/ML 100 UNIT/ML
300 SOLUTION INTRAVENOUS PRN
Status: DISCONTINUED | OUTPATIENT
Start: 2022-03-11 | End: 2022-03-12 | Stop reason: HOSPADM

## 2022-03-11 RX ORDER — SODIUM CHLORIDE 0.9 % (FLUSH) 0.9 %
10 SYRINGE (ML) INJECTION PRN
Status: CANCELLED | OUTPATIENT
Start: 2022-03-11

## 2022-03-11 RX ORDER — HEPARIN SODIUM (PORCINE) LOCK FLUSH IV SOLN 100 UNIT/ML 100 UNIT/ML
300 SOLUTION INTRAVENOUS PRN
Status: CANCELLED | OUTPATIENT
Start: 2022-03-11

## 2022-03-11 RX ADMIN — SODIUM CHLORIDE, PRESERVATIVE FREE 10 ML: 5 INJECTION INTRAVENOUS at 15:58

## 2022-03-11 RX ADMIN — HEPARIN 1000 UNITS: 100 SYRINGE at 16:00

## 2022-03-11 RX ADMIN — SODIUM CHLORIDE, PRESERVATIVE FREE 10 ML: 5 INJECTION INTRAVENOUS at 16:00

## 2022-03-11 ASSESSMENT — PAIN DESCRIPTION - DESCRIPTORS: DESCRIPTORS: ACHING;DISCOMFORT;DULL

## 2022-03-11 ASSESSMENT — PAIN DESCRIPTION - LOCATION: LOCATION: SHOULDER

## 2022-03-11 ASSESSMENT — PAIN DESCRIPTION - FREQUENCY: FREQUENCY: INTERMITTENT

## 2022-03-11 ASSESSMENT — PAIN DESCRIPTION - PAIN TYPE: TYPE: ACUTE PAIN

## 2022-03-11 ASSESSMENT — PAIN DESCRIPTION - ORIENTATION: ORIENTATION: LEFT

## 2022-03-11 NOTE — PROGRESS NOTES
PATIENT INSTRUCTED TO KEEP DRESSING CLEAN AND DRY; TO AVOID STRENUOUS ACTIVITY AND LIMIT LIFTING  WITH ARM OF PICC TO TEN POUNDS OR LESS; INSTRUCTED TO CALL THE DOCTOR FOR ANY REDNESS, TENDERNESS OR  DRAINAGE AT INSERTION SITE AND/OR LEAKAGE OR  CHANGE IN  EXTERNAL CATHETER PATIENT VERBALIZES UNDERSTANDING. Patient tolerated infusion well. Patient alert and oriented x3. No distress noted. Vital signs stable. Patient denies any new or worsening pain. Patient educated on signs and symptoms of reaction to medication. Educated patient on possible side effects and treatment of medication. Patient verbalized understanding. Offered patient education an/or discharge material.  Patient declined. Patient denies any needs. All questions answered.       LANS FOR BIWEEKLY  NEOID PROTOCOL DRAWN VIA PICC TO LAB

## 2022-03-14 ENCOUNTER — OFFICE VISIT (OUTPATIENT)
Dept: ORTHOPEDIC SURGERY | Age: 42
End: 2022-03-14

## 2022-03-14 VITALS — HEIGHT: 64 IN | WEIGHT: 125 LBS | RESPIRATION RATE: 20 BRPM | BODY MASS INDEX: 21.34 KG/M2

## 2022-03-14 DIAGNOSIS — M00.9 SEPTIC ARTHRITIS OF LEFT STERNOCLAVICULAR JOINT (HCC): Primary | ICD-10-CM

## 2022-03-14 PROCEDURE — 99024 POSTOP FOLLOW-UP VISIT: CPT | Performed by: ORTHOPAEDIC SURGERY

## 2022-03-14 RX ORDER — ACETAMINOPHEN AND CODEINE PHOSPHATE 300; 30 MG/1; MG/1
1 TABLET ORAL EVERY 6 HOURS PRN
Qty: 28 TABLET | Refills: 0 | Status: SHIPPED | OUTPATIENT
Start: 2022-03-14 | End: 2022-03-21

## 2022-03-14 NOTE — PROGRESS NOTES
HPI: Patient presents today 20 days s/p left sternoclavicular joint excisional debridement. Patient has a PICC line in place. She is tolerating antibiotics. She reports a lot of pain to her parascapular musculature. She has seen her PCP for this. She has tried muscle relaxers and a cortisone injection. For a wound check. She states after her last office visit she had 1 day of drainage. Physical Exam: incision intact. This is healing well. No fluctuance. + scar tissue. No surrounding erythema. No drainage. + tenderness over the parascapular musculature. Mild tenderness over the long head of the biceps tendon. + impingement. Remains NVI. Cultures: strep pneumoniae    Assessment: 20 days s/p left sternoclavicular joint excisional debridement  Left shoulder and parascapular muscle pain    Plan:   Scar management advised. Continue antibiotics. Follow up with ID. Referral to therapy for parascapular muscle pain and left shoulder pain. One time rx for tylenol with codeine provided to the patient. Patient to follow up in 1 month. We will plan for xrays of the left shoulder at that time. All questions and concerns answered. I have seen and evaluated the patient and agree with the above assessment and plan on today's visit. I have performed the key components of the history and physical examination with significant findings of postop. I concur with the findings and plan as documented.     Karin Angelo MD  3/14/2022

## 2022-03-15 ENCOUNTER — HOSPITAL ENCOUNTER (OUTPATIENT)
Dept: INFUSION THERAPY | Age: 42
Setting detail: INFUSION SERIES
Discharge: HOME | End: 2022-03-15
Payer: MEDICAID

## 2022-03-15 VITALS
SYSTOLIC BLOOD PRESSURE: 131 MMHG | HEIGHT: 64 IN | WEIGHT: 125 LBS | RESPIRATION RATE: 20 BRPM | TEMPERATURE: 98.4 F | BODY MASS INDEX: 21.34 KG/M2 | HEART RATE: 102 BPM | DIASTOLIC BLOOD PRESSURE: 91 MMHG | OXYGEN SATURATION: 97 %

## 2022-03-15 DIAGNOSIS — M00.9 SEPTIC ARTHRITIS OF LEFT STERNOCLAVICULAR JOINT (HCC): Primary | ICD-10-CM

## 2022-03-15 LAB
ALBUMIN SERPL-MCNC: 3 G/DL (ref 3.5–5.2)
ALP BLD-CCNC: 199 U/L (ref 35–104)
ALT SERPL-CCNC: 27 U/L (ref 0–32)
ANION GAP SERPL CALCULATED.3IONS-SCNC: 12 MMOL/L (ref 7–16)
ANISOCYTOSIS: ABNORMAL
AST SERPL-CCNC: 103 U/L (ref 0–31)
BASOPHILS ABSOLUTE: 0.1 E9/L (ref 0–0.2)
BASOPHILS RELATIVE PERCENT: 1.7 % (ref 0–2)
BILIRUB SERPL-MCNC: 2.1 MG/DL (ref 0–1.2)
BUN BLDV-MCNC: 3 MG/DL (ref 6–20)
C-REACTIVE PROTEIN: 1.2 MG/DL (ref 0–0.4)
CALCIUM SERPL-MCNC: 7.9 MG/DL (ref 8.6–10.2)
CHLORIDE BLD-SCNC: 102 MMOL/L (ref 98–107)
CO2: 23 MMOL/L (ref 22–29)
CREAT SERPL-MCNC: 0.6 MG/DL (ref 0.5–1)
EOSINOPHILS ABSOLUTE: 0.41 E9/L (ref 0.05–0.5)
EOSINOPHILS RELATIVE PERCENT: 7 % (ref 0–6)
GFR AFRICAN AMERICAN: >60
GFR NON-AFRICAN AMERICAN: >60 ML/MIN/1.73
GLUCOSE BLD-MCNC: 110 MG/DL (ref 74–99)
HCT VFR BLD CALC: 25.7 % (ref 34–48)
HEMOGLOBIN: 8.2 G/DL (ref 11.5–15.5)
LYMPHOCYTES ABSOLUTE: 1.12 E9/L (ref 1.5–4)
LYMPHOCYTES RELATIVE PERCENT: 19.1 % (ref 20–42)
MCH RBC QN AUTO: 28.6 PG (ref 26–35)
MCHC RBC AUTO-ENTMCNC: 31.9 % (ref 32–34.5)
MCV RBC AUTO: 89.5 FL (ref 80–99.9)
MONOCYTES ABSOLUTE: 0.3 E9/L (ref 0.1–0.95)
MONOCYTES RELATIVE PERCENT: 5.2 % (ref 2–12)
MYELOCYTE PERCENT: 1.7 % (ref 0–0)
NEUTROPHILS ABSOLUTE: 3.89 E9/L (ref 1.8–7.3)
NEUTROPHILS RELATIVE PERCENT: 64.4 % (ref 43–80)
NUCLEATED RED BLOOD CELLS: 0 /100 WBC
PDW BLD-RTO: 21.3 FL (ref 11.5–15)
PLATELET # BLD: 100 E9/L (ref 130–450)
PMV BLD AUTO: 9.9 FL (ref 7–12)
POLYCHROMASIA: ABNORMAL
POTASSIUM SERPL-SCNC: 2.7 MMOL/L (ref 3.5–5)
PROMYELOCYTES PERCENT: 0.9 % (ref 0–0)
RBC # BLD: 2.87 E12/L (ref 3.5–5.5)
SCHISTOCYTES: ABNORMAL
SEDIMENTATION RATE, ERYTHROCYTE: 51 MM/HR (ref 0–20)
SODIUM BLD-SCNC: 137 MMOL/L (ref 132–146)
TARGET CELLS: ABNORMAL
TEAR DROP CELLS: ABNORMAL
TOTAL PROTEIN: 8.1 G/DL (ref 6.4–8.3)
WBC # BLD: 5.9 E9/L (ref 4.5–11.5)

## 2022-03-15 PROCEDURE — 85651 RBC SED RATE NONAUTOMATED: CPT

## 2022-03-15 PROCEDURE — 86140 C-REACTIVE PROTEIN: CPT

## 2022-03-15 PROCEDURE — 36592 COLLECT BLOOD FROM PICC: CPT

## 2022-03-15 PROCEDURE — 80053 COMPREHEN METABOLIC PANEL: CPT

## 2022-03-15 PROCEDURE — 6360000002 HC RX W HCPCS: Performed by: SPECIALIST

## 2022-03-15 PROCEDURE — 36415 COLL VENOUS BLD VENIPUNCTURE: CPT

## 2022-03-15 PROCEDURE — A4216 STERILE WATER/SALINE, 10 ML: HCPCS | Performed by: SPECIALIST

## 2022-03-15 PROCEDURE — 2580000003 HC RX 258: Performed by: SPECIALIST

## 2022-03-15 PROCEDURE — 85025 COMPLETE CBC W/AUTO DIFF WBC: CPT

## 2022-03-15 RX ORDER — SODIUM CHLORIDE 0.9 % (FLUSH) 0.9 %
10 SYRINGE (ML) INJECTION PRN
Status: DISCONTINUED | OUTPATIENT
Start: 2022-03-15 | End: 2022-03-16 | Stop reason: HOSPADM

## 2022-03-15 RX ORDER — SODIUM CHLORIDE 0.9 % (FLUSH) 0.9 %
10 SYRINGE (ML) INJECTION PRN
Status: CANCELLED | OUTPATIENT
Start: 2022-03-15

## 2022-03-15 RX ORDER — HEPARIN SODIUM (PORCINE) LOCK FLUSH IV SOLN 100 UNIT/ML 100 UNIT/ML
300 SOLUTION INTRAVENOUS PRN
Status: CANCELLED | OUTPATIENT
Start: 2022-03-15

## 2022-03-15 RX ORDER — HEPARIN SODIUM (PORCINE) LOCK FLUSH IV SOLN 100 UNIT/ML 100 UNIT/ML
300 SOLUTION INTRAVENOUS PRN
Status: DISCONTINUED | OUTPATIENT
Start: 2022-03-15 | End: 2022-03-16 | Stop reason: HOSPADM

## 2022-03-15 RX ADMIN — SODIUM CHLORIDE, PRESERVATIVE FREE 10 ML: 5 INJECTION INTRAVENOUS at 14:42

## 2022-03-15 RX ADMIN — SODIUM CHLORIDE, PRESERVATIVE FREE 10 ML: 5 INJECTION INTRAVENOUS at 14:44

## 2022-03-15 RX ADMIN — HEPARIN 300 UNITS: 100 SYRINGE at 14:45

## 2022-03-15 ASSESSMENT — PAIN DESCRIPTION - ORIENTATION: ORIENTATION: LEFT;INNER

## 2022-03-15 ASSESSMENT — PAIN DESCRIPTION - LOCATION: LOCATION: BACK;SHOULDER

## 2022-03-15 ASSESSMENT — PAIN DESCRIPTION - DESCRIPTORS: DESCRIPTORS: ACHING

## 2022-03-15 ASSESSMENT — PAIN DESCRIPTION - PAIN TYPE: TYPE: CHRONIC PAIN

## 2022-03-15 ASSESSMENT — PAIN DESCRIPTION - FREQUENCY: FREQUENCY: CONTINUOUS

## 2022-03-15 ASSESSMENT — PAIN SCALES - GENERAL: PAINLEVEL_OUTOF10: 7

## 2022-03-15 NOTE — PROGRESS NOTES
Patient instructed on s/s infection/complication with PICC line to report and instructed on keeping PICC dressing clean, dry and intact patient verbalizes understanding. Tolerated line flush/lab draw and PICC dressing change well. Reviewed therapy plan, offered education material and/or discharge material, reviewed medication information and signs and symptoms  and educated on possible side effects, verbalizes good knowledge of current plan patient verbalizes understanding, and has no signs or symptoms to report at this time. Patient discharged. Patient alert and oriented x3. No distress noted. Vital signs stable. Patient denies any new or worsening pain. Patient denies any needs. All questions answered. Next appointment scheduled.  Declines copy of AVS.

## 2022-03-18 ENCOUNTER — HOSPITAL ENCOUNTER (OUTPATIENT)
Dept: INFUSION THERAPY | Age: 42
Setting detail: INFUSION SERIES
Discharge: HOME | End: 2022-03-18
Payer: MEDICAID

## 2022-03-18 VITALS
TEMPERATURE: 97.6 F | DIASTOLIC BLOOD PRESSURE: 85 MMHG | RESPIRATION RATE: 18 BRPM | SYSTOLIC BLOOD PRESSURE: 138 MMHG | HEART RATE: 114 BPM | OXYGEN SATURATION: 95 %

## 2022-03-18 DIAGNOSIS — M00.9 SEPTIC ARTHRITIS OF LEFT STERNOCLAVICULAR JOINT (HCC): Primary | ICD-10-CM

## 2022-03-18 LAB
ACANTHOCYTES: ABNORMAL
ALBUMIN SERPL-MCNC: 3.1 G/DL (ref 3.5–5.2)
ALP BLD-CCNC: 206 U/L (ref 35–104)
ALT SERPL-CCNC: 28 U/L (ref 0–32)
ANION GAP SERPL CALCULATED.3IONS-SCNC: 13 MMOL/L (ref 7–16)
ANISOCYTOSIS: ABNORMAL
AST SERPL-CCNC: 107 U/L (ref 0–31)
BASOPHILS ABSOLUTE: 0.06 E9/L (ref 0–0.2)
BASOPHILS RELATIVE PERCENT: 0.9 % (ref 0–2)
BILIRUB SERPL-MCNC: 2.5 MG/DL (ref 0–1.2)
BUN BLDV-MCNC: 4 MG/DL (ref 6–20)
BURR CELLS: ABNORMAL
CALCIUM SERPL-MCNC: 8.4 MG/DL (ref 8.6–10.2)
CHLORIDE BLD-SCNC: 101 MMOL/L (ref 98–107)
CO2: 24 MMOL/L (ref 22–29)
CREAT SERPL-MCNC: 0.5 MG/DL (ref 0.5–1)
EOSINOPHILS ABSOLUTE: 0 E9/L (ref 0.05–0.5)
EOSINOPHILS RELATIVE PERCENT: 0 % (ref 0–6)
GFR AFRICAN AMERICAN: >60
GFR NON-AFRICAN AMERICAN: >60 ML/MIN/1.73
GLUCOSE BLD-MCNC: 97 MG/DL (ref 74–99)
HCT VFR BLD CALC: 26.7 % (ref 34–48)
HEMOGLOBIN: 8.5 G/DL (ref 11.5–15.5)
HYPOCHROMIA: ABNORMAL
LYMPHOCYTES ABSOLUTE: 0.78 E9/L (ref 1.5–4)
LYMPHOCYTES RELATIVE PERCENT: 11.5 % (ref 20–42)
MCH RBC QN AUTO: 28 PG (ref 26–35)
MCHC RBC AUTO-ENTMCNC: 31.8 % (ref 32–34.5)
MCV RBC AUTO: 87.8 FL (ref 80–99.9)
MONOCYTES ABSOLUTE: 0.84 E9/L (ref 0.1–0.95)
MONOCYTES RELATIVE PERCENT: 13.3 % (ref 2–12)
NEUTROPHILS ABSOLUTE: 4.81 E9/L (ref 1.8–7.3)
NEUTROPHILS RELATIVE PERCENT: 74.3 % (ref 43–80)
NUCLEATED RED BLOOD CELLS: 1.8 /100 WBC
PDW BLD-RTO: 20.9 FL (ref 11.5–15)
PLATELET # BLD: 100 E9/L (ref 130–450)
PMV BLD AUTO: 10.3 FL (ref 7–12)
POIKILOCYTES: ABNORMAL
POLYCHROMASIA: ABNORMAL
POTASSIUM SERPL-SCNC: 2.6 MMOL/L (ref 3.5–5)
RBC # BLD: 3.04 E12/L (ref 3.5–5.5)
SCHISTOCYTES: ABNORMAL
SODIUM BLD-SCNC: 138 MMOL/L (ref 132–146)
TARGET CELLS: ABNORMAL
TEAR DROP CELLS: ABNORMAL
TOTAL PROTEIN: 8.2 G/DL (ref 6.4–8.3)
WBC # BLD: 6.5 E9/L (ref 4.5–11.5)

## 2022-03-18 PROCEDURE — 2580000003 HC RX 258: Performed by: SPECIALIST

## 2022-03-18 PROCEDURE — 36592 COLLECT BLOOD FROM PICC: CPT

## 2022-03-18 PROCEDURE — 85025 COMPLETE CBC W/AUTO DIFF WBC: CPT

## 2022-03-18 PROCEDURE — 6360000002 HC RX W HCPCS: Performed by: SPECIALIST

## 2022-03-18 PROCEDURE — 36415 COLL VENOUS BLD VENIPUNCTURE: CPT

## 2022-03-18 PROCEDURE — 80053 COMPREHEN METABOLIC PANEL: CPT

## 2022-03-18 RX ORDER — HEPARIN SODIUM (PORCINE) LOCK FLUSH IV SOLN 100 UNIT/ML 100 UNIT/ML
300 SOLUTION INTRAVENOUS PRN
Status: CANCELLED | OUTPATIENT
Start: 2022-03-18

## 2022-03-18 RX ORDER — HEPARIN SODIUM (PORCINE) LOCK FLUSH IV SOLN 100 UNIT/ML 100 UNIT/ML
300 SOLUTION INTRAVENOUS PRN
Status: DISCONTINUED | OUTPATIENT
Start: 2022-03-18 | End: 2022-03-19 | Stop reason: HOSPADM

## 2022-03-18 RX ORDER — SODIUM CHLORIDE 0.9 % (FLUSH) 0.9 %
10 SYRINGE (ML) INJECTION PRN
Status: CANCELLED | OUTPATIENT
Start: 2022-03-18

## 2022-03-18 RX ORDER — SODIUM CHLORIDE 0.9 % (FLUSH) 0.9 %
10 SYRINGE (ML) INJECTION PRN
Status: DISCONTINUED | OUTPATIENT
Start: 2022-03-18 | End: 2022-03-19 | Stop reason: HOSPADM

## 2022-03-18 RX ADMIN — SODIUM CHLORIDE, PRESERVATIVE FREE 10 ML: 5 INJECTION INTRAVENOUS at 14:20

## 2022-03-18 RX ADMIN — HEPARIN 300 UNITS: 100 SYRINGE at 14:23

## 2022-03-18 RX ADMIN — SODIUM CHLORIDE, PRESERVATIVE FREE 10 ML: 5 INJECTION INTRAVENOUS at 14:22

## 2022-03-18 NOTE — PROGRESS NOTES
Received call from lab with critical potassium of 2.6 called patient she said she already spoke with the nurse practitioner at [facility] and they instructed her on taking 2 of her potassium pills daily and they told her to stop her heparin flushes.

## 2022-03-18 NOTE — PROGRESS NOTES
Patient instructed on s/s infection/complication with PICC line to report and instructed on keeping PICC dressing clean, dry and intact patient verbalizes understanding. Patient doing her own home infusionsl. Reviewed therapy plan, offered education material and/or discharge material, reviewed medication information and signs and symptoms  and educated on possible side effects, verbalizes good knowledge of current plan patient verbalizes understanding, and has no signs or symptoms to report at this time. Patient discharged. Patient alert and oriented x3. No distress noted. Vital signs stable. Patient denies any new or worsening pain. Patient denies any needs. All questions answered. Next appointment scheduled. Declines copy of AVS. Patient instructed to call her family physician about her potassium she said Dr. Que[facility] Sep office did call her but no one told her about potassium. She has my chart and will call her PCP when she leaves to see how much potassium she should take. Also noted platelets have dropped will see results from today's draw and if any lower will call to see if heparin should be discontinued.

## 2022-03-22 ENCOUNTER — HOSPITAL ENCOUNTER (OUTPATIENT)
Dept: INFUSION THERAPY | Age: 42
Setting detail: INFUSION SERIES
Discharge: HOME | End: 2022-03-22
Payer: MEDICAID

## 2022-03-22 VITALS
HEART RATE: 99 BPM | RESPIRATION RATE: 16 BRPM | DIASTOLIC BLOOD PRESSURE: 83 MMHG | TEMPERATURE: 97.2 F | SYSTOLIC BLOOD PRESSURE: 127 MMHG | OXYGEN SATURATION: 100 %

## 2022-03-22 DIAGNOSIS — M00.9 SEPTIC ARTHRITIS OF LEFT STERNOCLAVICULAR JOINT (HCC): Primary | ICD-10-CM

## 2022-03-22 LAB
ACANTHOCYTES: ABNORMAL
ALBUMIN SERPL-MCNC: 2.8 G/DL (ref 3.5–5.2)
ALP BLD-CCNC: 190 U/L (ref 35–104)
ALT SERPL-CCNC: 28 U/L (ref 0–32)
ANION GAP SERPL CALCULATED.3IONS-SCNC: 9 MMOL/L (ref 7–16)
ANISOCYTOSIS: ABNORMAL
AST SERPL-CCNC: 128 U/L (ref 0–31)
BASOPHILS ABSOLUTE: 0.1 E9/L (ref 0–0.2)
BASOPHILS RELATIVE PERCENT: 2 % (ref 0–2)
BILIRUB SERPL-MCNC: 2 MG/DL (ref 0–1.2)
BUN BLDV-MCNC: 5 MG/DL (ref 6–20)
BURR CELLS: ABNORMAL
C-REACTIVE PROTEIN: 1.1 MG/DL (ref 0–0.4)
CALCIUM SERPL-MCNC: 8.1 MG/DL (ref 8.6–10.2)
CHLORIDE BLD-SCNC: 107 MMOL/L (ref 98–107)
CO2: 23 MMOL/L (ref 22–29)
CREAT SERPL-MCNC: 0.5 MG/DL (ref 0.5–1)
EOSINOPHILS ABSOLUTE: 0.38 E9/L (ref 0.05–0.5)
EOSINOPHILS RELATIVE PERCENT: 8 % (ref 0–6)
GFR AFRICAN AMERICAN: >60
GFR NON-AFRICAN AMERICAN: >60 ML/MIN/1.73
GLUCOSE BLD-MCNC: 112 MG/DL (ref 74–99)
HCT VFR BLD CALC: 24.8 % (ref 34–48)
HEMOGLOBIN: 7.7 G/DL (ref 11.5–15.5)
HOWELL-JOLLY BODIES: ABNORMAL
HYPOCHROMIA: ABNORMAL
LYMPHOCYTES ABSOLUTE: 1.06 E9/L (ref 1.5–4)
LYMPHOCYTES RELATIVE PERCENT: 22 % (ref 20–42)
MCH RBC QN AUTO: 27.6 PG (ref 26–35)
MCHC RBC AUTO-ENTMCNC: 31 % (ref 32–34.5)
MCV RBC AUTO: 88.9 FL (ref 80–99.9)
MONOCYTES ABSOLUTE: 0.53 E9/L (ref 0.1–0.95)
MONOCYTES RELATIVE PERCENT: 11 % (ref 2–12)
NEUTROPHILS ABSOLUTE: 2.74 E9/L (ref 1.8–7.3)
NEUTROPHILS RELATIVE PERCENT: 57 % (ref 43–80)
NUCLEATED RED BLOOD CELLS: 4 /100 WBC
PDW BLD-RTO: 20.8 FL (ref 11.5–15)
PLATELET # BLD: 99 E9/L (ref 130–450)
PLATELET CONFIRMATION: NORMAL
PMV BLD AUTO: 10.1 FL (ref 7–12)
POIKILOCYTES: ABNORMAL
POLYCHROMASIA: ABNORMAL
POTASSIUM SERPL-SCNC: 3.7 MMOL/L (ref 3.5–5)
RBC # BLD: 2.79 E12/L (ref 3.5–5.5)
SCHISTOCYTES: ABNORMAL
SEDIMENTATION RATE, ERYTHROCYTE: 15 MM/HR (ref 0–20)
SODIUM BLD-SCNC: 139 MMOL/L (ref 132–146)
TARGET CELLS: ABNORMAL
TOTAL PROTEIN: 7.5 G/DL (ref 6.4–8.3)
WBC # BLD: 4.8 E9/L (ref 4.5–11.5)

## 2022-03-22 PROCEDURE — 2580000003 HC RX 258: Performed by: SPECIALIST

## 2022-03-22 PROCEDURE — 80053 COMPREHEN METABOLIC PANEL: CPT

## 2022-03-22 PROCEDURE — 36415 COLL VENOUS BLD VENIPUNCTURE: CPT

## 2022-03-22 PROCEDURE — 36592 COLLECT BLOOD FROM PICC: CPT

## 2022-03-22 PROCEDURE — 85651 RBC SED RATE NONAUTOMATED: CPT

## 2022-03-22 PROCEDURE — 86140 C-REACTIVE PROTEIN: CPT

## 2022-03-22 PROCEDURE — 85025 COMPLETE CBC W/AUTO DIFF WBC: CPT

## 2022-03-22 RX ORDER — HEPARIN SODIUM (PORCINE) LOCK FLUSH IV SOLN 100 UNIT/ML 100 UNIT/ML
300 SOLUTION INTRAVENOUS PRN
Status: CANCELLED | OUTPATIENT
Start: 2022-03-22

## 2022-03-22 RX ORDER — SODIUM CHLORIDE 0.9 % (FLUSH) 0.9 %
10 SYRINGE (ML) INJECTION PRN
Status: CANCELLED | OUTPATIENT
Start: 2022-03-22

## 2022-03-22 RX ORDER — HEPARIN SODIUM (PORCINE) LOCK FLUSH IV SOLN 100 UNIT/ML 100 UNIT/ML
300 SOLUTION INTRAVENOUS PRN
Status: DISCONTINUED | OUTPATIENT
Start: 2022-03-22 | End: 2022-03-23 | Stop reason: HOSPADM

## 2022-03-22 RX ORDER — SODIUM CHLORIDE 0.9 % (FLUSH) 0.9 %
10 SYRINGE (ML) INJECTION PRN
Status: DISCONTINUED | OUTPATIENT
Start: 2022-03-22 | End: 2022-03-23 | Stop reason: HOSPADM

## 2022-03-22 RX ADMIN — SODIUM CHLORIDE, PRESERVATIVE FREE 10 ML: 5 INJECTION INTRAVENOUS at 15:14

## 2022-03-22 RX ADMIN — SODIUM CHLORIDE, PRESERVATIVE FREE 20 ML: 5 INJECTION INTRAVENOUS at 15:15

## 2022-03-22 ASSESSMENT — PAIN DESCRIPTION - DESCRIPTORS: DESCRIPTORS: ACHING;CONSTANT

## 2022-03-22 ASSESSMENT — PAIN DESCRIPTION - FREQUENCY: FREQUENCY: CONTINUOUS

## 2022-03-22 ASSESSMENT — PAIN DESCRIPTION - LOCATION: LOCATION: SHOULDER

## 2022-03-22 ASSESSMENT — PAIN DESCRIPTION - ORIENTATION: ORIENTATION: LEFT

## 2022-03-22 ASSESSMENT — PAIN SCALES - GENERAL: PAINLEVEL_OUTOF10: 3

## 2022-03-22 ASSESSMENT — PAIN DESCRIPTION - PAIN TYPE: TYPE: CHRONIC PAIN

## 2022-03-22 NOTE — PROGRESS NOTES
Patient instructed on s/s infection/complication with PICC line to report and instructed on keeping PICC dressing clean, dry and intact patient verbalizes understanding. Tolerated dressing change and lab draw well. Reviewed therapy plan, offered education material and/or discharge material, reviewed medication information and signs and symptoms  and educated on possible side effects, verbalizes good knowledge of current plan patient verbalizes understanding, and has no signs or symptoms to report at this time. Patient discharged. Patient alert and oriented x3. No distress noted. Vital signs stable. Patient denies any new or worsening pain. Patient denies any needs. All questions answered. Next appointment scheduled.  declines copy of AVS.Heparin used today per pt due to her platelets low, states  doesn't want her to use

## 2022-03-25 ENCOUNTER — HOSPITAL ENCOUNTER (OUTPATIENT)
Dept: INFUSION THERAPY | Age: 42
Setting detail: INFUSION SERIES
Discharge: HOME | End: 2022-03-25
Payer: MEDICAID

## 2022-03-25 VITALS
BODY MASS INDEX: 21.34 KG/M2 | RESPIRATION RATE: 18 BRPM | HEIGHT: 64 IN | WEIGHT: 125 LBS | OXYGEN SATURATION: 99 % | HEART RATE: 101 BPM | TEMPERATURE: 98.1 F | DIASTOLIC BLOOD PRESSURE: 89 MMHG | SYSTOLIC BLOOD PRESSURE: 130 MMHG

## 2022-03-25 DIAGNOSIS — M00.9 SEPTIC ARTHRITIS OF LEFT STERNOCLAVICULAR JOINT (HCC): Primary | ICD-10-CM

## 2022-03-25 LAB
ACANTHOCYTES: ABNORMAL
ALBUMIN SERPL-MCNC: 2.9 G/DL (ref 3.5–5.2)
ALP BLD-CCNC: 201 U/L (ref 35–104)
ALT SERPL-CCNC: 28 U/L (ref 0–32)
ANION GAP SERPL CALCULATED.3IONS-SCNC: 11 MMOL/L (ref 7–16)
ANISOCYTOSIS: ABNORMAL
AST SERPL-CCNC: 114 U/L (ref 0–31)
ATYPICAL LYMPHOCYTE RELATIVE PERCENT: 5.2 % (ref 0–4)
BASOPHILS ABSOLUTE: 0.09 E9/L (ref 0–0.2)
BASOPHILS RELATIVE PERCENT: 1.7 % (ref 0–2)
BILIRUB SERPL-MCNC: 2.1 MG/DL (ref 0–1.2)
BUN BLDV-MCNC: 5 MG/DL (ref 6–20)
BURR CELLS: ABNORMAL
CALCIUM SERPL-MCNC: 7.9 MG/DL (ref 8.6–10.2)
CHLORIDE BLD-SCNC: 106 MMOL/L (ref 98–107)
CO2: 22 MMOL/L (ref 22–29)
CREAT SERPL-MCNC: 0.5 MG/DL (ref 0.5–1)
EOSINOPHILS ABSOLUTE: 0.22 E9/L (ref 0.05–0.5)
EOSINOPHILS RELATIVE PERCENT: 4.4 % (ref 0–6)
GFR AFRICAN AMERICAN: >60
GFR NON-AFRICAN AMERICAN: >60 ML/MIN/1.73
GLUCOSE BLD-MCNC: 105 MG/DL (ref 74–99)
HCT VFR BLD CALC: 25 % (ref 34–48)
HEMOGLOBIN: 7.9 G/DL (ref 11.5–15.5)
HYPOCHROMIA: ABNORMAL
LYMPHOCYTES ABSOLUTE: 0.87 E9/L (ref 1.5–4)
LYMPHOCYTES RELATIVE PERCENT: 11.3 % (ref 20–42)
MCH RBC QN AUTO: 27.5 PG (ref 26–35)
MCHC RBC AUTO-ENTMCNC: 31.6 % (ref 32–34.5)
MCV RBC AUTO: 87.1 FL (ref 80–99.9)
MONOCYTES ABSOLUTE: 0.56 E9/L (ref 0.1–0.95)
MONOCYTES RELATIVE PERCENT: 11.3 % (ref 2–12)
NEUTROPHILS ABSOLUTE: 3.37 E9/L (ref 1.8–7.3)
NEUTROPHILS RELATIVE PERCENT: 66.1 % (ref 43–80)
NUCLEATED RED BLOOD CELLS: 0 /100 WBC
PDW BLD-RTO: 20.5 FL (ref 11.5–15)
PLATELET # BLD: 126 E9/L (ref 130–450)
PMV BLD AUTO: 10.6 FL (ref 7–12)
POIKILOCYTES: ABNORMAL
POLYCHROMASIA: ABNORMAL
POTASSIUM SERPL-SCNC: 3.4 MMOL/L (ref 3.5–5)
RBC # BLD: 2.87 E12/L (ref 3.5–5.5)
SCHISTOCYTES: ABNORMAL
SODIUM BLD-SCNC: 139 MMOL/L (ref 132–146)
TARGET CELLS: ABNORMAL
TOTAL PROTEIN: 7.8 G/DL (ref 6.4–8.3)
WBC # BLD: 5.1 E9/L (ref 4.5–11.5)

## 2022-03-25 PROCEDURE — 85025 COMPLETE CBC W/AUTO DIFF WBC: CPT

## 2022-03-25 PROCEDURE — 36415 COLL VENOUS BLD VENIPUNCTURE: CPT

## 2022-03-25 PROCEDURE — 36592 COLLECT BLOOD FROM PICC: CPT

## 2022-03-25 PROCEDURE — 80053 COMPREHEN METABOLIC PANEL: CPT

## 2022-03-25 PROCEDURE — 2580000003 HC RX 258: Performed by: SPECIALIST

## 2022-03-25 RX ORDER — SODIUM CHLORIDE 0.9 % (FLUSH) 0.9 %
10 SYRINGE (ML) INJECTION PRN
Status: DISCONTINUED | OUTPATIENT
Start: 2022-03-25 | End: 2022-03-26 | Stop reason: HOSPADM

## 2022-03-25 RX ORDER — HEPARIN SODIUM (PORCINE) LOCK FLUSH IV SOLN 100 UNIT/ML 100 UNIT/ML
300 SOLUTION INTRAVENOUS PRN
Status: DISCONTINUED | OUTPATIENT
Start: 2022-03-25 | End: 2022-03-26 | Stop reason: HOSPADM

## 2022-03-25 RX ORDER — HEPARIN SODIUM (PORCINE) LOCK FLUSH IV SOLN 100 UNIT/ML 100 UNIT/ML
300 SOLUTION INTRAVENOUS PRN
Status: CANCELLED | OUTPATIENT
Start: 2022-03-25

## 2022-03-25 RX ORDER — SODIUM CHLORIDE 0.9 % (FLUSH) 0.9 %
10 SYRINGE (ML) INJECTION PRN
Status: CANCELLED | OUTPATIENT
Start: 2022-03-25

## 2022-03-25 RX ADMIN — SODIUM CHLORIDE, PRESERVATIVE FREE 10 ML: 5 INJECTION INTRAVENOUS at 15:37

## 2022-03-25 RX ADMIN — SODIUM CHLORIDE, PRESERVATIVE FREE 10 ML: 5 INJECTION INTRAVENOUS at 15:25

## 2022-03-25 RX ADMIN — SODIUM CHLORIDE, PRESERVATIVE FREE 10 ML: 5 INJECTION INTRAVENOUS at 15:26

## 2022-03-25 ASSESSMENT — PAIN DESCRIPTION - PAIN TYPE: TYPE: CHRONIC PAIN

## 2022-03-25 ASSESSMENT — PAIN DESCRIPTION - DESCRIPTORS: DESCRIPTORS: ACHING;CONSTANT

## 2022-03-25 ASSESSMENT — PAIN DESCRIPTION - ORIENTATION: ORIENTATION: LEFT

## 2022-03-25 ASSESSMENT — PAIN DESCRIPTION - PROGRESSION: CLINICAL_PROGRESSION: GRADUALLY IMPROVING

## 2022-03-25 ASSESSMENT — PAIN DESCRIPTION - LOCATION: LOCATION: SHOULDER

## 2022-03-25 ASSESSMENT — PAIN DESCRIPTION - FREQUENCY: FREQUENCY: CONTINUOUS

## 2022-03-25 ASSESSMENT — PAIN SCALES - GENERAL: PAINLEVEL_OUTOF10: 6

## 2022-03-25 ASSESSMENT — PAIN DESCRIPTION - ONSET: ONSET: ON-GOING

## 2022-03-25 NOTE — PROGRESS NOTES
Tolerated PICC flush well. Reviewed therapy plan, offered education material and/or discharge material, verbalizes good knowledge of current plan patient verbalizes understanding, and has no signs or symptoms to report at this time. Patient discharged. Patient alert and oriented x3. No distress noted. Vital signs stable. Patient denies any new or worsening pain. Patient denies any needs. All questions answered. Next appointment scheduled.

## 2022-03-28 LAB
FUNGUS (MYCOLOGY) CULTURE: NORMAL
FUNGUS STAIN: NORMAL

## 2022-04-04 ENCOUNTER — NURSE TRIAGE (OUTPATIENT)
Dept: OTHER | Facility: CLINIC | Age: 42
End: 2022-04-04

## 2022-04-04 RX ORDER — ONDANSETRON 4 MG/1
4 TABLET, FILM COATED ORAL EVERY 12 HOURS PRN
Qty: 30 TABLET | Refills: 0 | Status: SHIPPED | OUTPATIENT
Start: 2022-04-04 | End: 2022-04-25 | Stop reason: SDUPTHER

## 2022-04-04 NOTE — TELEPHONE ENCOUNTER
Received call from Mitchell Rowe at [facility] with [employer]. Subjective: Caller states \"I have been having panic attacks out of no where. I get sob and lightheaded. It is uncomfortable. It is not debilitating, I have still been working and functioning. It happens everyday. I can be dead asleep in the middle of the night and the symptoms hit me. \"     Current Symptoms: sob (d/t Nerves)     Onset: 2 weeks ago; worsening    Associated Symptoms: NA    Pain Severity: Denies   Temperature: Denies   What has been tried: Denies     LMP: NA Pregnant: NA    Recommended disposition: See PCP within 3 Days    Care advice provided, patient verbalizes understanding; denies any other questions or concerns; instructed to call back for any new or worsening symptoms. Patient/Caller agrees with recommended disposition; writer provided warm transfer to Jacob Orellana  at [facility] for appointment scheduling     Attention Provider: Thank you for allowing me to participate in the care of your patient. The patient was connected to triage in response to information provided to the ECC/PSC. Please do not respond through this encounter as the response is not directed to a shared pool.         Reason for Disposition   Patient wants to be seen    Protocols used: ANXIETY AND PANIC ATTACK-ADULT-[place]

## 2022-04-05 ENCOUNTER — TELEPHONE (OUTPATIENT)
Dept: PRIMARY CARE CLINIC | Age: 42
End: 2022-04-05

## 2022-04-05 DIAGNOSIS — F41.9 ANXIETY: Primary | ICD-10-CM

## 2022-04-05 RX ORDER — HYDROXYZINE HYDROCHLORIDE 25 MG/1
25 TABLET, FILM COATED ORAL EVERY 8 HOURS PRN
Qty: 30 TABLET | Refills: 0 | Status: SHIPPED | OUTPATIENT
Start: 2022-04-05 | End: 2022-04-15

## 2022-04-05 NOTE — TELEPHONE ENCOUNTER
----- Message from [place] sent at 4/4/2022  4:23 PM EDT -----  Subject: Referral Request    QUESTIONS   Reason for referral request? pt asked to have something sent to pharmacy   for her for panic attacks-if she needs a referral to see someone in order   to get something for panic attacks she asked for that also. Has the physician seen you for this condition before? No   Preferred Specialist (if applicable)? Do you already have an appointment scheduled? Additional Information for Provider? if they are not able to contact her   she asked to have her mom be called [phone]. she is her emergency   contact.   ---------------------------------------------------------------------------  --------------  [address]  What is the best way for the office to contact you? Do not leave any   message, patient will call back for answer, OK to respond with electronic   message via [employer] portal (only for patients who have registered [employer]   account)  Preferred Call Back Phone Number? [phone]  ---------------------------------------------------------------------------  --------------  SCRIPT ANSWERS  Relationship to Patient?  Self

## 2022-04-07 ENCOUNTER — OFFICE VISIT (OUTPATIENT)
Dept: PRIMARY CARE CLINIC | Age: 42
End: 2022-04-07
Payer: MEDICAID

## 2022-04-07 VITALS
TEMPERATURE: 97.6 F | WEIGHT: 130 LBS | OXYGEN SATURATION: 95 % | HEART RATE: 97 BPM | BODY MASS INDEX: 22.31 KG/M2 | DIASTOLIC BLOOD PRESSURE: 84 MMHG | SYSTOLIC BLOOD PRESSURE: 122 MMHG

## 2022-04-07 DIAGNOSIS — F41.9 ANXIETY: Primary | ICD-10-CM

## 2022-04-07 PROCEDURE — G8420 CALC BMI NORM PARAMETERS: HCPCS | Performed by: INTERNAL MEDICINE

## 2022-04-07 PROCEDURE — 1036F TOBACCO NON-USER: CPT | Performed by: INTERNAL MEDICINE

## 2022-04-07 PROCEDURE — G8427 DOCREV CUR MEDS BY ELIG CLIN: HCPCS | Performed by: INTERNAL MEDICINE

## 2022-04-07 PROCEDURE — 99213 OFFICE O/P EST LOW 20 MIN: CPT | Performed by: INTERNAL MEDICINE

## 2022-04-07 RX ORDER — ESCITALOPRAM OXALATE 5 MG/1
5 TABLET ORAL DAILY
Qty: 30 TABLET | Refills: 5 | Status: ON HOLD
Start: 2022-04-07 | End: 2022-11-01 | Stop reason: HOSPADM

## 2022-04-07 NOTE — PROGRESS NOTES
4/7/22    Antonio Buenrostro, a female of 46 y.o. presents today for Panic Attack    She has been having bad panic attacks. She has been having problems with a child at work. She has been having breathlessness. She has anxiety about her health and her mother's illness. /84   Pulse 97   Temp 97.6 °F (36.4 °C)   Wt 130 lb (59 kg)   SpO2 95%   BMI 22.31 kg/m²     Review of Systems  Constitutional:Negative for activity change, appetite change, chills, fatigue and fever. Respiratory: Negative for choking, chest tightness, shortness of breath and wheezing. Cardiovascular: Negative for chest pain, palpitations and leg swelling. Gastrointestinal: Negative for abdominal distention, constipation, diarrhea, nausea and vomiting. Musculoskeletal: Negative for arthralgias, back pain, gait problem and joint swelling. Neurological: Negative for dizziness, weakness,numbness and headaches.      Patient Active Problem List    Diagnosis Date Noted    Secondary esophageal varices with bleeding (HCC)     Septic arthritis of left sternoclavicular joint (HCC)     Hypomagnesemia     Hyponatremia     Left shoulder pain     Anemia associated with acute blood loss     Leukocytosis     Alcoholic cirrhosis ([facility]     UGI bleed 02/20/2022    Upper GI bleed 09/15/2021    GI bleed 09/14/2021    Hematemesis 07/04/2020    Sepsis (HCC)     Jaundice     Elevated liver enzymes     Hyperammonemia (HCC)     Liver metastases (HCC)     Malignant ascites     H/O malignant neoplasm of pancreas      Allergies   Allergen Reactions    Pcn [Penicillins] Hives, Itching, Swelling and Rash     Current Outpatient Medications on File Prior to Visit   Medication Sig Dispense Refill    hydrOXYzine (ATARAX) 25 MG tablet Take 1 tablet by mouth every 8 hours as needed for Anxiety 30 tablet 0    ondansetron (ZOFRAN) 4 MG tablet Take 1 tablet by mouth every 12 hours as needed for Nausea or Vomiting 30 tablet 0    cefdinir (OMNICEF) 300 MG capsule Take 1 capsule by mouth 2 times daily 60 capsule 1    folic acid (FOLVITE) 1 MG tablet Take 1 tablet by mouth daily 30 tablet 0    vitamin B-1 (THIAMINE) 100 MG tablet Take 1 tablet by mouth daily 30 tablet 0    carvedilol (COREG) 3.125 MG tablet Take 1 tablet by mouth 2 times daily 60 tablet 0    spironolactone (ALDACTONE) 50 MG tablet Take 50 mg by mouth daily      furosemide (LASIX) 20 MG tablet Take 20 mg by mouth daily as needed       lactulose (CHRONULAC) 10 GM/15ML solution Take 20 g by mouth 3 times daily as needed       omeprazole (PRILOSEC) 20 MG delayed release capsule Take 20 mg by mouth Daily As needed      KLOR-CON M20 20 MEQ extended release tablet Take 20 mEq by mouth daily Takes 2 pills daily      vitamin A 3 MG (28868 UT) capsule Take 10,000 Units by mouth daily      vitamin D (ERGOCALCIFEROL) 1.25 MG (48436 UT) CAPS capsule Take 50,000 Units by mouth once a week       CREON 36563 units delayed release capsule Take 12,000 Units by mouth 4 times daily (before meals and nightly)        No current facility-administered medications on file prior to visit. Physical Exam   Constitutional:  Oriented to person, place, and time. Appears well-developed and well-nourished. No acute distress. HENT: No sinus tenderness or lymphadenopathy  Head: Normocephalic and atraumatic. Eyes: Eyes exhibits no discharge. No scleral icterus present. Neck: No tracheal deviation present. No thyromegaly present. Cardiovascular: Normal rate, regular rhythm, normal heart sounds and intact distal pulses. Exam reveals no gallop nor friction rub. No murmur heard. Pulmonary: Effort normal and breath sounds normal. No respiratory distress. No wheezes or rales. Abdomen: No signs of rigidity rebound or organomegaly  Musculoskeletal:  No tenderness to palpation  Neurological:Alert and oriented to person, place, and time. Skin: No diaphoresis.    Psychiatric: Normal mood and affect. Behavior is Normal.     ASSESSMENT AND PLAN:    Assessment  Diagnoses and all orders for this visit:  Anxiety  Other orders  -     escitalopram (LEXAPRO) 5 MG tablet; Take 1 tablet by mouth daily      Plan    1. Start Atarax as needed for anxiety  2. Start Lexapro  3. Continue counseling  4. Likely secondary to multiple social issues including her own health concerns, stress at work and her mother's chronic illness    Return if symptoms worsen or fail to improve.     Electronically signed by Mckenzie Howard DO on 4/7/2022 at 5:04 PM    Mckenzie Howard DO

## 2022-04-11 ENCOUNTER — HOSPITAL ENCOUNTER (OUTPATIENT)
Age: 42
Discharge: HOME | End: 2022-04-11
Payer: MEDICAID

## 2022-04-11 LAB
FOLATE: 7.2 NG/ML (ref 4.8–24.2)
IRON SATURATION: 11 % (ref 15–50)
IRON: 23 MCG/DL (ref 37–145)
TOTAL IRON BINDING CAPACITY: 214 MCG/DL (ref 250–450)

## 2022-04-11 PROCEDURE — 83540 ASSAY OF IRON: CPT

## 2022-04-11 PROCEDURE — 36415 COLL VENOUS BLD VENIPUNCTURE: CPT

## 2022-04-11 PROCEDURE — 83550 IRON BINDING TEST: CPT

## 2022-04-11 PROCEDURE — 82746 ASSAY OF FOLIC ACID SERUM: CPT

## 2022-04-12 LAB
AFB CULTURE (MYCOBACTERIA): NORMAL
AFB SMEAR: NORMAL

## 2022-04-14 ENCOUNTER — OFFICE VISIT (OUTPATIENT)
Dept: ORTHOPEDIC SURGERY | Age: 42
End: 2022-04-14

## 2022-04-14 VITALS — HEIGHT: 64 IN | WEIGHT: 125 LBS | BODY MASS INDEX: 21.34 KG/M2

## 2022-04-14 DIAGNOSIS — M00.9 SEPTIC ARTHRITIS OF LEFT STERNOCLAVICULAR JOINT (HCC): Primary | ICD-10-CM

## 2022-04-14 DIAGNOSIS — M25.512 LEFT SHOULDER PAIN, UNSPECIFIED CHRONICITY: ICD-10-CM

## 2022-04-14 PROCEDURE — 99024 POSTOP FOLLOW-UP VISIT: CPT | Performed by: ORTHOPAEDIC SURGERY

## 2022-04-14 NOTE — PROGRESS NOTES
HPI: Patient presents today 7 weeks s/p left sternoclavicular joint excisional debridement. PICC line removed a week ago. She is on oral antibiotics. Pain to her parascapular musculature is much improved. She feels things are overall improving. Physical Exam: incision intact. This is healing well. No fluctuance. + scar tissue. No surrounding erythema. + tenderness over the parascapular musculature. Mild tenderness over the long head of the biceps tendon. - impingement. - drop arm. , abduction 90 degrees, IR to sacrum. Remains NVI. Xray: x-rays of the left shoulder were obtained today in the office and reviewed with the patient. 3 views: AP/scapular Y view/axial : demonstrate no acute fractures or dislocations  Impression: no acute fractures or dislocations       Assessment:   7 weeks s/p left sternoclavicular joint excisional debridement  Left shoulder and parascapular muscle pain    Plan:   Okay to advance activities as tolerated. No restrictions. Continue follow ups with ID. Pain gets worse she may contact the office for referral to therapy versus cortisone injection. Patient to follow up as needed. All questions and concerns answered. I have seen and evaluated the patient and agree with the above assessment and plan on today's visit. I have performed the key components of the history and physical examination with significant findings of postop doing very well. I concur with the findings and plan as documented.     Danisha Verde MD  4/14/2022

## 2022-05-24 ENCOUNTER — HOSPITAL ENCOUNTER (OUTPATIENT)
Age: 42
Discharge: HOME | End: 2022-05-24
Payer: MEDICAID

## 2022-05-24 LAB
FERRITIN: 37 NG/ML
FOLATE: 11 NG/ML (ref 4.8–24.2)
IRON SATURATION: 17 % (ref 15–50)
IRON: 31 MCG/DL (ref 37–145)
TOTAL IRON BINDING CAPACITY: 184 MCG/DL (ref 250–450)

## 2022-05-24 PROCEDURE — 82728 ASSAY OF FERRITIN: CPT

## 2022-05-24 PROCEDURE — 83540 ASSAY OF IRON: CPT

## 2022-05-24 PROCEDURE — 82746 ASSAY OF FOLIC ACID SERUM: CPT

## 2022-05-24 PROCEDURE — 83550 IRON BINDING TEST: CPT

## 2022-05-24 PROCEDURE — 36415 COLL VENOUS BLD VENIPUNCTURE: CPT

## 2022-06-25 NOTE — ED PROVIDER NOTES
ATTENDING PROVIDER ATTESTATION:     Melissa Grajeda presented to the emergency department for evaluation of Emesis (x 3 this am)  . The patient was initially evaluated by the Medical Resident. Please see their note for further details, HPI, and ED course. I have reviewed & discussed the patient's case in details, including pertinent history & exam findings, with the Medical Resident and have personally participated in and/or performed the history, physical examination, medical decision-making, and procedure(s). I agree with all pertinent clinical information and any changes or corrections, if present, are noted below or in my separate documentation. I have reviewed my findings and recommendations with the assigned Medical Resident, patient, and family member(s) present at the time of disposition. I have performed a history and physical examination of this patient and directly supervised the resident caring for this patient. I have directly supervised any procedures performed by the resident and was present for the procedure including all critical portions of the procedure(s). I, Dr. Marc Amaro, am the primary provider of record    [address]    Upon my evaluation, this patient had a high probability of imminent or life-threatening deterioration due to coffee-ground emesis, history of esophageal varices s/p banding, tachycardia, which required my direct attention, intervention, and personal management. Patient with coffee ground emesis reported at home PTA, arrives tachycardic and with borderline pressures (794 systolic). Patient found to have Hb of 5.6. Continued persistent retching but no emesis. Remains mildly tachycardic and with borderline Bps, fluids running. Blood products x2 units ordered. Ceftriaxone and protonix ordered. Octreotide dose. CT showing multiple intra-abdominal issues. Remains HD stable, though pressures remain soft despite resuscitation.  One episode of Garrett Dotty emesis here in ED. Also found to have septic joint concern on CT, and patient on reassessment does state that she has what she thought was a \"pinched nerve\" on her left shoulder for some time, with intermittent shooting severe pain with movement. Will require orthopedics to see her once more stable. ultrasound-guided IV 18-gauge placed in the left AC fossa by this provider. Mechanical compression performed to evaluate venous structure. Tolerated well. Needle visualized in vein. Flushes easily, minimal bleeding, patient tolerated well. Due to need for two large bore IVs.. .  ultrasound-guided IV 18-gauge placed in the right AC fossa by this provider. Chemical compression performed to evaluate venous structure. Patient tolerated well, needle visualized and pain. Flushed easily. Minimal bleeding, patient tolerated well. I have personally provided 45 minutes of critical care time exclusive of time spent on separately billable procedures. Time includes review of laboratory data, radiology results, discussion with consultants and family, and monitoring for potential decompensation. Interventions were performed as documented in this note.     Juana Michael MD  02/20/22 6572 done

## 2022-06-30 ENCOUNTER — HOSPITAL ENCOUNTER (INPATIENT)
Age: 42
LOS: 1 days | Discharge: HOME | DRG: 280 | End: 2022-07-02
Attending: EMERGENCY MEDICINE | Admitting: INTERNAL MEDICINE
Payer: MEDICAID

## 2022-06-30 DIAGNOSIS — E83.42 HYPOMAGNESEMIA: ICD-10-CM

## 2022-06-30 DIAGNOSIS — K70.31 ALCOHOLIC CIRRHOSIS OF LIVER WITH ASCITES (HCC): Primary | ICD-10-CM

## 2022-06-30 DIAGNOSIS — E87.6 HYPOKALEMIA: ICD-10-CM

## 2022-06-30 LAB
HCG, URINE, POC: NEGATIVE
Lab: NORMAL
NEGATIVE QC PASS/FAIL: NORMAL
POSITIVE QC PASS/FAIL: NORMAL

## 2022-06-30 PROCEDURE — 87077 CULTURE AEROBIC IDENTIFY: CPT

## 2022-06-30 PROCEDURE — 96375 TX/PRO/DX INJ NEW DRUG ADDON: CPT

## 2022-06-30 PROCEDURE — 80053 COMPREHEN METABOLIC PANEL: CPT

## 2022-06-30 PROCEDURE — 87088 URINE BACTERIA CULTURE: CPT

## 2022-06-30 PROCEDURE — 96365 THER/PROPH/DIAG IV INF INIT: CPT

## 2022-06-30 PROCEDURE — 87502 INFLUENZA DNA AMP PROBE: CPT

## 2022-06-30 PROCEDURE — 99285 EMERGENCY DEPT VISIT HI MDM: CPT

## 2022-06-30 PROCEDURE — 83690 ASSAY OF LIPASE: CPT

## 2022-06-30 PROCEDURE — 96366 THER/PROPH/DIAG IV INF ADDON: CPT

## 2022-06-30 PROCEDURE — 96367 TX/PROPH/DG ADDL SEQ IV INF: CPT

## 2022-06-30 PROCEDURE — 85025 COMPLETE CBC W/AUTO DIFF WBC: CPT

## 2022-06-30 PROCEDURE — 87186 SC STD MICRODIL/AGAR DIL: CPT

## 2022-06-30 PROCEDURE — 83735 ASSAY OF MAGNESIUM: CPT

## 2022-06-30 PROCEDURE — 84484 ASSAY OF TROPONIN QUANT: CPT

## 2022-06-30 PROCEDURE — 87635 SARS-COV-2 COVID-19 AMP PRB: CPT

## 2022-06-30 PROCEDURE — 81001 URINALYSIS AUTO W/SCOPE: CPT

## 2022-06-30 RX ORDER — KETOROLAC TROMETHAMINE 30 MG/ML
15 INJECTION, SOLUTION INTRAMUSCULAR; INTRAVENOUS ONCE
Status: COMPLETED | OUTPATIENT
Start: 2022-06-30 | End: 2022-07-01

## 2022-07-01 ENCOUNTER — APPOINTMENT (OUTPATIENT)
Dept: CT IMAGING | Age: 42
DRG: 280 | End: 2022-07-01
Payer: MEDICAID

## 2022-07-01 PROBLEM — E87.6 HYPOKALEMIA: Status: ACTIVE | Noted: 2022-07-01

## 2022-07-01 LAB
ALBUMIN SERPL-MCNC: 2.3 G/DL (ref 3.5–5.2)
ALP BLD-CCNC: 200 U/L (ref 35–104)
ALT SERPL-CCNC: 39 U/L (ref 0–32)
AMMONIA: 61.7 UMOL/L (ref 11–51)
ANION GAP SERPL CALCULATED.3IONS-SCNC: 11 MMOL/L (ref 7–16)
ANION GAP SERPL CALCULATED.3IONS-SCNC: 13 MMOL/L (ref 7–16)
ANISOCYTOSIS: ABNORMAL
AST SERPL-CCNC: 290 U/L (ref 0–31)
BACTERIA: ABNORMAL /HPF
BASOPHILS ABSOLUTE: 0.12 E9/L (ref 0–0.2)
BASOPHILS RELATIVE PERCENT: 1.5 % (ref 0–2)
BILIRUB SERPL-MCNC: 9.8 MG/DL (ref 0–1.2)
BILIRUBIN URINE: ABNORMAL
BLOOD, URINE: ABNORMAL
BUN BLDV-MCNC: 4 MG/DL (ref 6–20)
BUN BLDV-MCNC: 5 MG/DL (ref 6–20)
CALCIUM SERPL-MCNC: 7.4 MG/DL (ref 8.6–10.2)
CALCIUM SERPL-MCNC: 7.4 MG/DL (ref 8.6–10.2)
CHLORIDE BLD-SCNC: 91 MMOL/L (ref 98–107)
CHLORIDE BLD-SCNC: 99 MMOL/L (ref 98–107)
CLARITY: CLEAR
CO2: 24 MMOL/L (ref 22–29)
CO2: 24 MMOL/L (ref 22–29)
COLOR: YELLOW
CREAT SERPL-MCNC: 0.6 MG/DL (ref 0.5–1)
CREAT SERPL-MCNC: 0.6 MG/DL (ref 0.5–1)
EKG ATRIAL RATE: 97 BPM
EKG P AXIS: 40 DEGREES
EKG P-R INTERVAL: 168 MS
EKG Q-T INTERVAL: 432 MS
EKG QRS DURATION: 100 MS
EKG QTC CALCULATION (BAZETT): 548 MS
EKG R AXIS: 28 DEGREES
EKG T AXIS: 30 DEGREES
EKG VENTRICULAR RATE: 97 BPM
EOSINOPHILS ABSOLUTE: 0.25 E9/L (ref 0.05–0.5)
EOSINOPHILS RELATIVE PERCENT: 3 % (ref 0–6)
GFR AFRICAN AMERICAN: >60
GFR AFRICAN AMERICAN: >60
GFR NON-AFRICAN AMERICAN: >60 ML/MIN/1.73
GFR NON-AFRICAN AMERICAN: >60 ML/MIN/1.73
GLUCOSE BLD-MCNC: 111 MG/DL (ref 74–99)
GLUCOSE BLD-MCNC: 115 MG/DL (ref 74–99)
GLUCOSE URINE: NEGATIVE MG/DL
HCT VFR BLD CALC: 29.2 % (ref 34–48)
HEMOGLOBIN: 9.5 G/DL (ref 11.5–15.5)
IMMATURE GRANULOCYTES #: 0.04 E9/L
IMMATURE GRANULOCYTES %: 0.5 % (ref 0–5)
INFLUENZA A BY PCR: NOT DETECTED
INFLUENZA B BY PCR: NOT DETECTED
KETONES, URINE: NEGATIVE MG/DL
LEUKOCYTE ESTERASE, URINE: NEGATIVE
LIPASE: 33 U/L (ref 13–60)
LYMPHOCYTES ABSOLUTE: 1.46 E9/L (ref 1.5–4)
LYMPHOCYTES RELATIVE PERCENT: 17.8 % (ref 20–42)
MAGNESIUM: 1.2 MG/DL (ref 1.6–2.6)
MAGNESIUM: 1.3 MG/DL (ref 1.6–2.6)
MAGNESIUM: 1.9 MG/DL (ref 1.6–2.6)
MCH RBC QN AUTO: 29.4 PG (ref 26–35)
MCHC RBC AUTO-ENTMCNC: 32.5 % (ref 32–34.5)
MCV RBC AUTO: 90.4 FL (ref 80–99.9)
MONOCYTES ABSOLUTE: 0.91 E9/L (ref 0.1–0.95)
MONOCYTES RELATIVE PERCENT: 11.1 % (ref 2–12)
NEUTROPHILS ABSOLUTE: 5.43 E9/L (ref 1.8–7.3)
NEUTROPHILS RELATIVE PERCENT: 66.1 % (ref 43–80)
NITRITE, URINE: NEGATIVE
PDW BLD-RTO: 27.4 FL (ref 11.5–15)
PH UA: 6.5 (ref 5–9)
PLATELET # BLD: 143 E9/L (ref 130–450)
PMV BLD AUTO: 11.2 FL (ref 7–12)
POIKILOCYTES: ABNORMAL
POLYCHROMASIA: ABNORMAL
POTASSIUM REFLEX MAGNESIUM: 2.5 MMOL/L (ref 3.5–5)
POTASSIUM SERPL-SCNC: 2.7 MMOL/L (ref 3.5–5)
PROTEIN UA: NEGATIVE MG/DL
RBC # BLD: 3.23 E12/L (ref 3.5–5.5)
RBC UA: ABNORMAL /HPF (ref 0–2)
SARS-COV-2, NAAT: NOT DETECTED
SODIUM BLD-SCNC: 128 MMOL/L (ref 132–146)
SODIUM BLD-SCNC: 134 MMOL/L (ref 132–146)
SPECIFIC GRAVITY UA: <=1.005 (ref 1–1.03)
T4 TOTAL: 4.4 MCG/DL (ref 4.5–11.7)
TARGET CELLS: ABNORMAL
TOTAL PROTEIN: 10.1 G/DL (ref 6.4–8.3)
TROPONIN, HIGH SENSITIVITY: 71 NG/L (ref 0–9)
TROPONIN, HIGH SENSITIVITY: 76 NG/L (ref 0–9)
TSH SERPL DL<=0.05 MIU/L-ACNC: 2.83 UIU/ML (ref 0.27–4.2)
UROBILINOGEN, URINE: 4 E.U./DL
WBC # BLD: 8.2 E9/L (ref 4.5–11.5)
WBC UA: ABNORMAL /HPF (ref 0–5)

## 2022-07-01 PROCEDURE — 96376 TX/PRO/DX INJ SAME DRUG ADON: CPT

## 2022-07-01 PROCEDURE — 6360000002 HC RX W HCPCS

## 2022-07-01 PROCEDURE — 6370000000 HC RX 637 (ALT 250 FOR IP): Performed by: INTERNAL MEDICINE

## 2022-07-01 PROCEDURE — 96375 TX/PRO/DX INJ NEW DRUG ADDON: CPT

## 2022-07-01 PROCEDURE — 2060000000 HC ICU INTERMEDIATE R&B

## 2022-07-01 PROCEDURE — G0378 HOSPITAL OBSERVATION PER HR: HCPCS

## 2022-07-01 PROCEDURE — 84484 ASSAY OF TROPONIN QUANT: CPT

## 2022-07-01 PROCEDURE — 6360000002 HC RX W HCPCS: Performed by: EMERGENCY MEDICINE

## 2022-07-01 PROCEDURE — 36415 COLL VENOUS BLD VENIPUNCTURE: CPT

## 2022-07-01 PROCEDURE — 74176 CT ABD & PELVIS W/O CONTRAST: CPT

## 2022-07-01 PROCEDURE — 83735 ASSAY OF MAGNESIUM: CPT

## 2022-07-01 PROCEDURE — 96365 THER/PROPH/DIAG IV INF INIT: CPT

## 2022-07-01 PROCEDURE — 93005 ELECTROCARDIOGRAM TRACING: CPT | Performed by: EMERGENCY MEDICINE

## 2022-07-01 PROCEDURE — 6370000000 HC RX 637 (ALT 250 FOR IP): Performed by: EMERGENCY MEDICINE

## 2022-07-01 PROCEDURE — 84443 ASSAY THYROID STIM HORMONE: CPT

## 2022-07-01 PROCEDURE — 96366 THER/PROPH/DIAG IV INF ADDON: CPT

## 2022-07-01 PROCEDURE — 82140 ASSAY OF AMMONIA: CPT

## 2022-07-01 PROCEDURE — 6360000002 HC RX W HCPCS: Performed by: INTERNAL MEDICINE

## 2022-07-01 PROCEDURE — 80048 BASIC METABOLIC PNL TOTAL CA: CPT

## 2022-07-01 PROCEDURE — 84436 ASSAY OF TOTAL THYROXINE: CPT

## 2022-07-01 PROCEDURE — 99223 1ST HOSP IP/OBS HIGH 75: CPT | Performed by: INTERNAL MEDICINE

## 2022-07-01 RX ORDER — ONDANSETRON 2 MG/ML
4 INJECTION INTRAMUSCULAR; INTRAVENOUS EVERY 6 HOURS PRN
Status: DISCONTINUED | OUTPATIENT
Start: 2022-07-01 | End: 2022-07-02 | Stop reason: HOSPADM

## 2022-07-01 RX ORDER — PANTOPRAZOLE SODIUM 40 MG/1
40 TABLET, DELAYED RELEASE ORAL
Status: DISCONTINUED | OUTPATIENT
Start: 2022-07-01 | End: 2022-07-02 | Stop reason: HOSPADM

## 2022-07-01 RX ORDER — FUROSEMIDE 20 MG/1
20 TABLET ORAL DAILY
Status: DISCONTINUED | OUTPATIENT
Start: 2022-07-01 | End: 2022-07-01

## 2022-07-01 RX ORDER — MAGNESIUM SULFATE 1 G/100ML
1000 INJECTION INTRAVENOUS ONCE
Status: COMPLETED | OUTPATIENT
Start: 2022-07-01 | End: 2022-07-01

## 2022-07-01 RX ORDER — CARVEDILOL 3.12 MG/1
3.12 TABLET ORAL 2 TIMES DAILY
Status: DISCONTINUED | OUTPATIENT
Start: 2022-07-01 | End: 2022-07-02 | Stop reason: HOSPADM

## 2022-07-01 RX ORDER — FOLIC ACID 1 MG/1
1 TABLET ORAL DAILY
Status: DISCONTINUED | OUTPATIENT
Start: 2022-07-01 | End: 2022-07-02 | Stop reason: HOSPADM

## 2022-07-01 RX ORDER — POTASSIUM CHLORIDE 20 MEQ/1
20 TABLET, EXTENDED RELEASE ORAL DAILY
Status: DISCONTINUED | OUTPATIENT
Start: 2022-07-01 | End: 2022-07-01

## 2022-07-01 RX ORDER — POTASSIUM CHLORIDE 7.45 MG/ML
10 INJECTION INTRAVENOUS
Status: COMPLETED | OUTPATIENT
Start: 2022-07-01 | End: 2022-07-01

## 2022-07-01 RX ORDER — SPIRONOLACTONE 25 MG/1
50 TABLET ORAL DAILY
Status: DISCONTINUED | OUTPATIENT
Start: 2022-07-01 | End: 2022-07-02 | Stop reason: HOSPADM

## 2022-07-01 RX ORDER — MULTIVIT-MIN/IRON/FOLIC ACID/K 18-600-40
CAPSULE ORAL DAILY
Status: ON HOLD | COMMUNITY
End: 2022-11-01 | Stop reason: HOSPADM

## 2022-07-01 RX ORDER — LACTULOSE 10 G/15ML
20 SOLUTION ORAL DAILY
Status: DISCONTINUED | OUTPATIENT
Start: 2022-07-01 | End: 2022-07-01

## 2022-07-01 RX ORDER — ESCITALOPRAM OXALATE 10 MG/1
5 TABLET ORAL DAILY
Status: DISCONTINUED | OUTPATIENT
Start: 2022-07-01 | End: 2022-07-02 | Stop reason: HOSPADM

## 2022-07-01 RX ORDER — ERGOCALCIFEROL 1.25 MG/1
50000 CAPSULE ORAL WEEKLY
Status: DISCONTINUED | OUTPATIENT
Start: 2022-07-01 | End: 2022-07-01

## 2022-07-01 RX ORDER — POTASSIUM CHLORIDE 7.45 MG/ML
10 INJECTION INTRAVENOUS ONCE
Status: DISCONTINUED | OUTPATIENT
Start: 2022-07-01 | End: 2022-07-01 | Stop reason: SDUPTHER

## 2022-07-01 RX ORDER — MAGNESIUM SULFATE IN WATER 40 MG/ML
2000 INJECTION, SOLUTION INTRAVENOUS ONCE
Status: COMPLETED | OUTPATIENT
Start: 2022-07-01 | End: 2022-07-02

## 2022-07-01 RX ORDER — POTASSIUM CHLORIDE 20 MEQ/1
40 TABLET, EXTENDED RELEASE ORAL 2 TIMES DAILY WITH MEALS
Status: DISCONTINUED | OUTPATIENT
Start: 2022-07-01 | End: 2022-07-01

## 2022-07-01 RX ORDER — CHOLECALCIFEROL (VITAMIN D3) 50 MCG
2000 TABLET ORAL DAILY
Status: DISCONTINUED | OUTPATIENT
Start: 2022-07-01 | End: 2022-07-02 | Stop reason: HOSPADM

## 2022-07-01 RX ORDER — POTASSIUM CHLORIDE 7.45 MG/ML
INJECTION INTRAVENOUS
Status: COMPLETED
Start: 2022-07-01 | End: 2022-07-01

## 2022-07-01 RX ORDER — POTASSIUM CHLORIDE 20 MEQ/1
40 TABLET, EXTENDED RELEASE ORAL ONCE
Status: COMPLETED | OUTPATIENT
Start: 2022-07-01 | End: 2022-07-01

## 2022-07-01 RX ORDER — POTASSIUM CHLORIDE 20 MEQ/1
40 TABLET, EXTENDED RELEASE ORAL
Status: DISCONTINUED | OUTPATIENT
Start: 2022-07-01 | End: 2022-07-02 | Stop reason: HOSPADM

## 2022-07-01 RX ORDER — LANOLIN ALCOHOL/MO/W.PET/CERES
100 CREAM (GRAM) TOPICAL DAILY
Status: DISCONTINUED | OUTPATIENT
Start: 2022-07-01 | End: 2022-07-02 | Stop reason: HOSPADM

## 2022-07-01 RX ADMIN — POTASSIUM CHLORIDE 40 MEQ: 1500 TABLET, EXTENDED RELEASE ORAL at 12:39

## 2022-07-01 RX ADMIN — POTASSIUM CHLORIDE 40 MEQ: 1500 TABLET, EXTENDED RELEASE ORAL at 01:50

## 2022-07-01 RX ADMIN — KETOROLAC TROMETHAMINE 15 MG: 30 INJECTION, SOLUTION INTRAMUSCULAR; INTRAVENOUS at 01:50

## 2022-07-01 RX ADMIN — Medication 2000 UNITS: at 08:35

## 2022-07-01 RX ADMIN — FOLIC ACID 1 MG: 1 TABLET ORAL at 08:36

## 2022-07-01 RX ADMIN — Medication 100 MG: at 08:39

## 2022-07-01 RX ADMIN — CARVEDILOL 3.12 MG: 3.12 TABLET, FILM COATED ORAL at 08:35

## 2022-07-01 RX ADMIN — LACTULOSE 20 G: 20 SOLUTION ORAL at 08:36

## 2022-07-01 RX ADMIN — MAGNESIUM SULFATE HEPTAHYDRATE 1000 MG: 1 INJECTION, SOLUTION INTRAVENOUS at 08:37

## 2022-07-01 RX ADMIN — MAGNESIUM SULFATE HEPTAHYDRATE 2000 MG: 40 INJECTION, SOLUTION INTRAVENOUS at 21:50

## 2022-07-01 RX ADMIN — ESCITALOPRAM OXALATE 5 MG: 10 TABLET ORAL at 08:35

## 2022-07-01 RX ADMIN — FUROSEMIDE 20 MG: 20 TABLET ORAL at 08:36

## 2022-07-01 RX ADMIN — PANTOPRAZOLE SODIUM 40 MG: 40 TABLET, DELAYED RELEASE ORAL at 06:50

## 2022-07-01 RX ADMIN — POTASSIUM CHLORIDE 10 MEQ: 10 INJECTION, SOLUTION INTRAVENOUS at 15:34

## 2022-07-01 RX ADMIN — SPIRONOLACTONE 50 MG: 25 TABLET ORAL at 08:35

## 2022-07-01 RX ADMIN — CARVEDILOL 3.12 MG: 3.12 TABLET, FILM COATED ORAL at 16:31

## 2022-07-01 RX ADMIN — MAGNESIUM SULFATE HEPTAHYDRATE 1000 MG: 1 INJECTION, SOLUTION INTRAVENOUS at 03:58

## 2022-07-01 RX ADMIN — POTASSIUM CHLORIDE 10 MEQ: 7.46 INJECTION, SOLUTION INTRAVENOUS at 05:47

## 2022-07-01 RX ADMIN — PANCRELIPASE 12000 UNITS: 60000; 12000; 38000 CAPSULE, DELAYED RELEASE PELLETS ORAL at 20:18

## 2022-07-01 RX ADMIN — POTASSIUM CHLORIDE 10 MEQ: 10 INJECTION, SOLUTION INTRAVENOUS at 12:38

## 2022-07-01 RX ADMIN — PANCRELIPASE 12000 UNITS: 60000; 12000; 38000 CAPSULE, DELAYED RELEASE PELLETS ORAL at 06:50

## 2022-07-01 RX ADMIN — POTASSIUM CHLORIDE 10 MEQ: 7.46 INJECTION, SOLUTION INTRAVENOUS at 01:49

## 2022-07-01 RX ADMIN — POTASSIUM CHLORIDE 40 MEQ: 1500 TABLET, EXTENDED RELEASE ORAL at 08:39

## 2022-07-01 RX ADMIN — ONDANSETRON 4 MG: 2 INJECTION INTRAMUSCULAR; INTRAVENOUS at 21:04

## 2022-07-01 RX ADMIN — POTASSIUM CHLORIDE 10 MEQ: 10 INJECTION, SOLUTION INTRAVENOUS at 19:39

## 2022-07-01 RX ADMIN — POTASSIUM CHLORIDE 10 MEQ: 10 INJECTION, SOLUTION INTRAVENOUS at 17:34

## 2022-07-01 RX ADMIN — ONDANSETRON 4 MG: 2 INJECTION INTRAMUSCULAR; INTRAVENOUS at 14:53

## 2022-07-01 RX ADMIN — POTASSIUM CHLORIDE 40 MEQ: 1500 TABLET, EXTENDED RELEASE ORAL at 16:31

## 2022-07-01 ASSESSMENT — LIFESTYLE VARIABLES
HOW MANY STANDARD DRINKS CONTAINING ALCOHOL DO YOU HAVE ON A TYPICAL DAY: 1 OR 2
HOW OFTEN DO YOU HAVE A DRINK CONTAINING ALCOHOL: 2-3 TIMES A WEEK

## 2022-07-01 NOTE — H&P
[facility] History and Physical    --------------------------------------------------------------------------------------  Assessment / Plan  Past Medical History:   Diagnosis Date    Alcohol abuse     Alcoholic cirrhosis ([address]     Anxiety     not on any meds for it    Hypertension     has not required any med a few years as of 8/2019    Hypothyroidism     in her early 25s - pt was on levothyroxine for a while and then hypothyroidism apparently resolved and pt has been off med since her mid-20s    Pancreatic cyst 01/2017    Pt underwent partial pancreatectomy and splenectomy at [facility] 2/23/2017 and was told pathology showed MCN (mucinous cystic neoplasm) of pancreas with clean margins at surgery     decompensated alcoholic cirrhosis  Hypokalemia  Hypomagnesemia  She is admitted for treatment of electrolyte abnormalities from her liver cirrhosis  Will replace K and mag, received 40 meq and 1g in Ed will repeat then check levels  Total bilirubin was noted to have risen to 9.8 from baseline on 2  Ammonia is 61 which is at baseline  Albumin 2.3  INR pending  Continue aldactone   Continue folic acid and vitamin supplementation  On lactulose        Hyponatremia  This is likely hypotonic hyponatremia form hypervolemia 2/2 liver disease  Start gentle diuresis    Portal hypertension  Esophageal varices  Stable  No evidence of GIB  On carvidalol  On PPI    Anemia of chronic disease  Thrombocytopenia 126 stable  HH 7.9/25 at baseline  Stable    History of alcohol dependence  History of depression- on lexapro  Hypovitaminosis D- will continue supplementation      Please see orders for further plan of care.      Code status  full   DVT prophylaxis lovenox   Disposition  home  --------------------------------------------------------------------------------------    Admission Date  6/30/2022 10:35 PM  Chief Complaint weakness  Chief Complaint   Patient presents with    Fatigue     x2 weeks    Nausea  Leg Pain     pain in calfs x3 days       Subjective  History of Present Illness    The patient is a 46 y.o. female with significant past medical history of alcoholic cirrhosis and esophageal varices (Dr. Rich Ndiaye, GI in [place] and Dr. Sunny Valerio), who presents to the ED today for 2 weeks of increased weakness and fatigue. Patient was diagnosed four years ago with liver cirrhosis after workup for a pancreatic cyst.  She was seen at [facility] and had EGD and Esophageal Variceal banding.  She was seen locally by Dr. Black Marroquin in July of 2021 and 2/23/22 where additional varices were banded. Denies fever or chills  No abdominal pain  In ED, vitals showed Bp 114/86, 106, 16, 97.   Labs showed Na 128, k 2.5, mag 1.3  Total bili was 9.8 from baseline of 2, CT abdomen didn't show biliary obstruction      Review of Systems - 12-point review of systems has been reviewed and is otherwise negative except as listed in the HPI    Past Medical History:   Diagnosis Date    Alcohol abuse     Alcoholic cirrhosis ([address]     Anxiety     not on any meds for it    Hypertension     has not required any med a few years as of 8/2019    Hypothyroidism     in her early 25s - pt was on levothyroxine for a while and then hypothyroidism apparently resolved and pt has been off med since her mid-20s    Pancreatic cyst 01/2017    Pt underwent partial pancreatectomy and splenectomy at [facility] 2/23/2017 and was told pathology showed MCN (mucinous cystic neoplasm) of pancreas with clean margins at surgery     Past Surgical History:   Procedure Laterality Date   [address]    HAND SURGERY Left 2/22/2022    LEFT STERNOCLAVICULAR JOINT DEBRIDEMENT performed by Chary Cowan MD at [address] [place] [address] PICC LINE  2/26/2022         PANCREAS SURGERY  02/23/2017    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 - 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    SPLENECTOMY, TOTAL  02/23/2017 along with partial pancreatectomy    UPPER GASTROINTESTINAL ENDOSCOPY N/A 7/4/2020    EGD CONTROL HEMORRHAGE performed by Edilberto Solares MD at [address]  7/4/2020    EGD ESOPHAGOGASTRODUODENOSCOPY ENDOSCOPIC VARICEAL TREATMENT performed by Edilberto Solares MD at [address] N/A 9/17/2021    EGD, BANDING OF VARICES performed by Edilberto Solares MD at [address] N/A 2/23/2022    EGD BAND LIGATION performed by Edilberto Solares MD at [address]     Prior to Admission medications    Medication Sig Start Date End Date Taking?  Authorizing Provider   escitalopram (LEXAPRO) 5 MG tablet Take 1 tablet by mouth daily 4/7/22   [facility] DO Howard   folic acid (FOLVITE) 1 MG tablet Take 1 tablet by mouth daily 2/26/22   Ashley Burleson MD   vitamin B-1 (THIAMINE) 100 MG tablet Take 1 tablet by mouth daily 2/26/22   Ashley Burleson MD   carvedilol (COREG) 3.125 MG tablet Take 1 tablet by mouth 2 times daily 2/26/22   Ashley Burleson MD   spironolactone (ALDACTONE) 50 MG tablet Take 50 mg by mouth daily 9/13/21   Historical Provider, MD   furosemide (LASIX) 20 MG tablet Take 20 mg by mouth daily as needed  5/7/21   Historical Provider, MD   lactulose (CHRONULAC) 10 GM/15ML solution Take 20 g by mouth 3 times daily as needed  2/12/21   Historical Provider, MD   omeprazole (PRILOSEC) 20 MG delayed release capsule Take 20 mg by mouth Daily As needed 3/5/21   Historical Provider, MD   KLOR-CON M20 20 MEQ extended release tablet Take 20 mEq by mouth daily Takes 2 pills daily 3/30/21   Historical Provider, MD   vitamin A 3 MG (19509 UT) capsule Take 10,000 Units by mouth daily 10/1/20   Historical Provider, MD   vitamin D (ERGOCALCIFEROL) 1.25 MG (33564 UT) CAPS capsule Take 50,000 Units by mouth once a week  2/11/20   Historical Provider, MD   CREON 27385 units delayed release capsule Take 12,000 Units by mouth 4 times daily (before meals and nightly)  2/12/20   Historical Provider, MD     Allergies  Pcn [penicillins]    Social History   reports that she has never smoked. She has never used smokeless tobacco. She reports current alcohol use. She reports that she does not use drugs. Family History  family history includes Breast Cancer in an other family member; High Blood Pressure in her mother; Other in her father and mother.     Objective  Physical Exam   Vitals: /60   Pulse 86   Temp 97.1 °F (36.2 °C) (Oral)   Resp 16   Ht 5' 4\" (1.626 m)   Wt 120 lb (54.4 kg)   SpO2 91%   BMI 20.60 kg/m²   General: cachectic malnourished, no acute distress, cooperative  Skin: generally warm, dry, and intact, with normal color  HEENT: normocephalic, atraumatic, no gross abnormalities  Respiratory: clear to auscultation bilaterally without respiratory distress  Cardiovascular: regular rate and rhythm without murmur / rub / gallop  Abdominal: soft, nontender, nondistended, normoactive bowel sounds  Extremities: no obvious edema or deformity  Neurologic: awake, alert, no gross deficits  Psychiatric: normal affect, cooperative    *Available labs, imaging studies, microbiologic studies, cardiac studies have been reviewed    Electronically signed by Tanesha Gill MD on 7/1/2022 at 5:12 AM

## 2022-07-01 NOTE — ED PROVIDER NOTES
HPI:  6/30/22,   Time: 11:03 PM EDT       Rona Multani is a 46 y.o. female presenting to the ED for fatigue generalized weakness and leg cramps, beginning 2 weeks ago. The complaint has been persistent, moderate in severity, and worsened by nothing. Patient states over the last 2 weeks she has not been feeling well. She states she has been feeling fatigued as well as sleeping more often. She states that over the last couple days she has been having pain in both calves and feels as if she is having cramps. Due to this she came to the ED to be evaluated. She states she recently did have COVID 19. She states she also has a history of cirrhosis. She has been compliant with her medications. No chest pain. No abdominal pain. No vomiting or diarrhea. No numbness or tingling. No focal deficits. Denies any fevers or chills. Review of Systems:   Pertinent positives and negatives are stated within HPI, all other systems reviewed and are negative.          --------------------------------------------- PAST HISTORY ---------------------------------------------  Past Medical History:  has a past medical history of Alcohol abuse, Alcoholic cirrhosis ([facility] [address] [address] Anxiety, Hypertension, Hypothyroidism, and Pancreatic cyst.    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 Social History:  reports that she has never smoked. She has never used smokeless tobacco. She reports current alcohol use. She reports that she does not use drugs. Family History: family history includes Breast Cancer in an other family member; High Blood Pressure in her mother;  Other in her father and mother. The patients home medications have been reviewed. Allergies: Pcn [penicillins]        ---------------------------------------------------PHYSICAL EXAM--------------------------------------    Constitutional/General: Alert and oriented x3, well appearing, non toxic in NAD  Head: Normocephalic and atraumatic  Eyes: PERRL, EOMI, conjunctive normal, sclera icterus noted  Mouth: Oropharynx clear, handling secretions, no trismus, no asymmetry of the posterior oropharynx or uvular edema  Neck: Supple, full ROM, non tender to palpation in the midline, no stridor, no crepitus, no meningeal signs  Respiratory: Lungs clear to auscultation bilaterally, no wheezes, rales, or rhonchi. Not in respiratory distress  Cardiovascular:  Regular rate. Regular rhythm. No murmurs, gallops, or rubs. 2+ distal pulses  GI:  Abdomen Soft, Non tender, Non distended. +BS. No organomegaly, no palpable masses,  No rebound, guarding, or rigidity. Musculoskeletal: Moves all extremities x 4. Warm and well perfused, no clubbing, cyanosis. Edema noted to bilateral lower extremities capillary refill <3 seconds  Integument: skin warm and dry. No rashes. Neurologic: GCS 15, no focal deficits  Psychiatric: Normal Affect    -------------------------------------------------- RESULTS -------------------------------------------------  I have personally reviewed all laboratory and imaging results for this patient. Results are listed below.      LABS:  Results for orders placed or performed during the hospital encounter of 06/30/22   Rapid influenza A/B antigens    Specimen: Nares   Result Value Ref Range    Influenza A by PCR Not Detected Not Detected    Influenza B by PCR Not Detected Not Detected   COVID-19, Rapid    Specimen: Nasopharyngeal Swab   Result Value Ref Range    SARS-CoV-2, NAAT Not Detected Not Detected   CBC with Auto Differential   Result Value Ref Range    WBC 8.2 4.5 - 11.5 E9/L    RBC 3.23 (L) 3.50 - 5.50 E12/L    Hemoglobin 9.5 (L) 11.5 - 15.5 g/dL    Hematocrit 29.2 (L) 34.0 - 48.0 %    MCV 90.4 80.0 - 99.9 fL    MCH 29.4 26.0 - 35.0 pg    MCHC 32.5 32.0 - 34.5 %    RDW 27.4 (H) 11.5 - 15.0 fL    Platelets 436 251 - 927 E9/L    MPV 11.2 7.0 - 12.0 fL   Comprehensive Metabolic Panel w/ Reflex to MG   Result Value Ref Range    Sodium 128 (L) 132 - 146 mmol/L    Potassium reflex Magnesium 2.5 (LL) 3.5 - 5.0 mmol/L    Chloride 91 (L) 98 - 107 mmol/L    CO2 24 22 - 29 mmol/L    Anion Gap 13 7 - 16 mmol/L    Glucose 115 (H) 74 - 99 mg/dL    BUN 4 (L) 6 - 20 mg/dL    CREATININE 0.6 0.5 - 1.0 mg/dL    GFR Non-African American >60 >=60 mL/min/1.73    GFR African American >60     Calcium 7.4 (L) 8.6 - 10.2 mg/dL    Total Protein 10.1 (H) 6.4 - 8.3 g/dL    Albumin 2.3 (L) 3.5 - 5.2 g/dL    Total Bilirubin 9.8 (H) 0.0 - 1.2 mg/dL    Alkaline Phosphatase 200 (H) 35 - 104 U/L    ALT 39 (H) 0 - 32 U/L     (H) 0 - 31 U/L   Lipase   Result Value Ref Range    Lipase 33 13 - 60 U/L   Troponin   Result Value Ref Range    Troponin, High Sensitivity 71 (H) 0 - 9 ng/L   Urinalysis with Microscopic   Result Value Ref Range    Color, UA Yellow Straw/Yellow    Clarity, UA Clear Clear    Glucose, Ur Negative Negative mg/dL    Bilirubin Urine SMALL (A) Negative    Ketones, Urine Negative Negative mg/dL    Specific Gravity, UA <=1.005 1.005 - 1.030    Blood, Urine TRACE-LYSED Negative    pH, UA 6.5 5.0 - 9.0    Protein, UA Negative Negative mg/dL    Urobilinogen, Urine 4.0 (A) <2.0 E.U./dL    Nitrite, Urine Negative Negative    Leukocyte Esterase, Urine Negative Negative    WBC, UA 0-1 0 - 5 /HPF    RBC, UA 0-1 0 - 2 /HPF    Bacteria, UA FEW (A) None Seen /HPF   Troponin   Result Value Ref Range    Troponin, High Sensitivity 76 (H) 0 - 9 ng/L   Magnesium   Result Value Ref Range    Magnesium 1.2 (L) 1.6 - 2.6 mg/dL   Magnesium   Result Value Ref Range    Magnesium 1.3 (L) 1.6 - 2.6 mg/dL   TSH   Result Value Ref Range    TSH 2.830 0.270 - 4.200 uIU/mL   T4   Result Value Ref Range    T4, Total 4.4 (L) 4.5 - 11.7 mcg/dL   Ammonia   Result Value Ref Range    Ammonia 61.7 (H) 11.0 - 51.0 umol/L   POC Pregnancy Urine   Result Value Ref Range    HCG, Urine, POC Negative Negative    Lot Number [id number]     Positive QC Pass/Fail Acceptable     Negative QC Pass/Fail Acceptable        RADIOLOGY:  Interpreted by Radiologist.  CT ABDOMEN PELVIS WO CONTRAST Additional Contrast? None   Final Result   Cirrhotic liver with splenectomy. Portal hypertension with intra-abdominal varices and ascites. Cholelithiasis. Previous splenectomy and previous partial pancreatectomy. EKG:  This EKG is signed and interpreted by the EP. EKG shows normal sinus rhythm 97 bpm.  Nonspecific ST and T wave changes. Prolonged QT. No STEMI.    ------------------------- NURSING NOTES AND VITALS REVIEWED ---------------------------   The nursing notes within the ED encounter and vital signs as below have been reviewed by myself. /60   Pulse 86   Temp 97.1 °F (36.2 °C) (Oral)   Resp 16   Ht 5' 4\" (1.626 m)   Wt 120 lb (54.4 kg)   SpO2 91%   BMI 20.60 kg/m²   Oxygen Saturation Interpretation: Normal    The patients available past medical records and past encounters were reviewed. ------------------------------ ED COURSE/MEDICAL DECISION MAKING----------------------  Medications   potassium chloride 10 mEq/100 mL IVPB (Peripheral Line) (10 mEq IntraVENous New Bag 7/1/22 0149)   magnesium sulfate 1000 mg in dextrose 5% 100 mL IVPB (1,000 mg IntraVENous New Bag 7/1/22 5448)   ketorolac (TORADOL) injection 15 mg (15 mg IntraVENous Given 7/1/22 0150)   potassium chloride (KLOR-CON M) extended release tablet 40 mEq (40 mEq Oral Given 7/1/22 0150)         ED COURSE:       Medical Decision Making: This is a 72-year-old female presents to the ED for generalized fatigue as well as leg cramps.   Patient underwent laboratory work-up which showed a normal CBC except for an H&H of 9.5/29.2. Chemistry showed a sodium 128 potassium of 2.5 as well as a magnesium level 1.3. Troponins were 71 and 76. Nonspecific findings noted on EKG. Patient's ammonia level is 61.7. Pregnancy test negative. COVID test and influenza test negative. CT on pelvis shows no acute findings. Patient's potassium and magnesium replaced here in the emergency department. Case discussed with hospitalist who is agreed admit the patient for further care. Critical care: 31 minutes    I, Dr. Ashleigh Varma, am the primary provider for this encounter    This patient's ED course included: a personal history and physicial examination, re-evaluation prior to disposition, multiple bedside re-evaluations, cardiac monitoring and continuous pulse oximetry    This patient has remained hemodynamically stable during their ED course. Re-Evaluations:             Re-evaluation. Patients symptoms show no change      Counseling: The emergency provider has spoken with the patient and discussed todays results, in addition to providing specific details for the plan of care and counseling regarding the diagnosis and prognosis. Questions are answered at this time and they are agreeable with the plan.       --------------------------------- IMPRESSION AND DISPOSITION ---------------------------------    IMPRESSION  1. Alcoholic cirrhosis of liver with ascites ([address]    2. Hypokalemia    3. Hypomagnesemia        DISPOSITION  Disposition: Admit to telemetry  Patient condition is stable    NOTE: This report was transcribed using voice recognition software.  Every effort was made to ensure accuracy; however, inadvertent computerized transcription errors may be present        Karina Carter,   07/01/22 4845

## 2022-07-01 NOTE — ED NOTES
Lab called for critical K  2.52. Dr. Alina Polanco aware, no new orders to this nurse.       Pavel Winslow RN  07/01/22 0040

## 2022-07-01 NOTE — PLAN OF CARE
80-year-old female presents to the hospital with weakness as well as fatigue. Patient does have a history of alcoholic cirrhosis. Labs indicate hypokalemia, hypomagnesemia, and hyponatremia. Patient also has elevated ammonia levels from cirrhosis however they appear to be at baseline. Keara Karst UA is unremarkable. Thyroid panel is unremarkable. CT abdomen pelvis indicates cirrhosis status post splenectomy, portal hypertension with intra-abdominal varices and ascites and a partial pancreatectomy. It appears patient received total of 2 g of magnesium sulfate, Klor-Con 40 mEq p.o. x1, potassium chloride 10 mill equivalents x2 doses, and has been placed on Klor-Con 40 mill equivalents oral twice daily. We will recheck labs in the a.m. and replete accordingly. Will continue to monitor.

## 2022-07-02 VITALS
SYSTOLIC BLOOD PRESSURE: 137 MMHG | BODY MASS INDEX: 23.49 KG/M2 | TEMPERATURE: 97.5 F | HEART RATE: 99 BPM | DIASTOLIC BLOOD PRESSURE: 89 MMHG | RESPIRATION RATE: 18 BRPM | OXYGEN SATURATION: 96 % | HEIGHT: 64 IN | WEIGHT: 137.6 LBS

## 2022-07-02 LAB
ALBUMIN SERPL-MCNC: 1.9 G/DL (ref 3.5–5.2)
ALP BLD-CCNC: 153 U/L (ref 35–104)
ALT SERPL-CCNC: 32 U/L (ref 0–32)
ANION GAP SERPL CALCULATED.3IONS-SCNC: 11 MMOL/L (ref 7–16)
AST SERPL-CCNC: 237 U/L (ref 0–31)
BILIRUB SERPL-MCNC: 8.4 MG/DL (ref 0–1.2)
BUN BLDV-MCNC: 5 MG/DL (ref 6–20)
CALCIUM SERPL-MCNC: 7.2 MG/DL (ref 8.6–10.2)
CHLORIDE BLD-SCNC: 99 MMOL/L (ref 98–107)
CO2: 21 MMOL/L (ref 22–29)
CREAT SERPL-MCNC: 0.6 MG/DL (ref 0.5–1)
GFR AFRICAN AMERICAN: >60
GFR NON-AFRICAN AMERICAN: >60 ML/MIN/1.73
GLUCOSE BLD-MCNC: 89 MG/DL (ref 74–99)
MAGNESIUM: 1.6 MG/DL (ref 1.6–2.6)
POTASSIUM SERPL-SCNC: 3.4 MMOL/L (ref 3.5–5)
POTASSIUM SERPL-SCNC: 4 MMOL/L (ref 3.5–5)
SODIUM BLD-SCNC: 131 MMOL/L (ref 132–146)
TOTAL PROTEIN: 8.1 G/DL (ref 6.4–8.3)

## 2022-07-02 PROCEDURE — 6360000002 HC RX W HCPCS: Performed by: INTERNAL MEDICINE

## 2022-07-02 PROCEDURE — 36415 COLL VENOUS BLD VENIPUNCTURE: CPT

## 2022-07-02 PROCEDURE — 80053 COMPREHEN METABOLIC PANEL: CPT

## 2022-07-02 PROCEDURE — 84132 ASSAY OF SERUM POTASSIUM: CPT

## 2022-07-02 PROCEDURE — 83735 ASSAY OF MAGNESIUM: CPT

## 2022-07-02 PROCEDURE — 6370000000 HC RX 637 (ALT 250 FOR IP): Performed by: INTERNAL MEDICINE

## 2022-07-02 PROCEDURE — G0378 HOSPITAL OBSERVATION PER HR: HCPCS

## 2022-07-02 PROCEDURE — 99239 HOSP IP/OBS DSCHRG MGMT >30: CPT | Performed by: INTERNAL MEDICINE

## 2022-07-02 PROCEDURE — 96376 TX/PRO/DX INJ SAME DRUG ADON: CPT

## 2022-07-02 PROCEDURE — 99232 SBSQ HOSP IP/OBS MODERATE 35: CPT | Performed by: INTERNAL MEDICINE

## 2022-07-02 RX ORDER — FUROSEMIDE 20 MG/1
20 TABLET ORAL DAILY
Status: DISCONTINUED | OUTPATIENT
Start: 2022-07-02 | End: 2022-07-02 | Stop reason: HOSPADM

## 2022-07-02 RX ORDER — POTASSIUM CHLORIDE 20 MEQ/1
40 TABLET, EXTENDED RELEASE ORAL ONCE
Status: COMPLETED | OUTPATIENT
Start: 2022-07-02 | End: 2022-07-02

## 2022-07-02 RX ORDER — LACTULOSE 10 G/15ML
20 SOLUTION ORAL 3 TIMES DAILY
Status: DISCONTINUED | OUTPATIENT
Start: 2022-07-02 | End: 2022-07-02 | Stop reason: HOSPADM

## 2022-07-02 RX ORDER — IBUPROFEN 600 MG/1
600 TABLET ORAL EVERY 6 HOURS PRN
Status: DISCONTINUED | OUTPATIENT
Start: 2022-07-02 | End: 2022-07-02 | Stop reason: HOSPADM

## 2022-07-02 RX ADMIN — ESCITALOPRAM OXALATE 5 MG: 10 TABLET ORAL at 07:30

## 2022-07-02 RX ADMIN — Medication 2000 UNITS: at 07:31

## 2022-07-02 RX ADMIN — ONDANSETRON 4 MG: 2 INJECTION INTRAMUSCULAR; INTRAVENOUS at 07:31

## 2022-07-02 RX ADMIN — FUROSEMIDE 20 MG: 20 TABLET ORAL at 09:48

## 2022-07-02 RX ADMIN — PANCRELIPASE 12000 UNITS: 60000; 12000; 38000 CAPSULE, DELAYED RELEASE PELLETS ORAL at 06:38

## 2022-07-02 RX ADMIN — CARVEDILOL 3.12 MG: 3.12 TABLET, FILM COATED ORAL at 07:30

## 2022-07-02 RX ADMIN — PANTOPRAZOLE SODIUM 40 MG: 40 TABLET, DELAYED RELEASE ORAL at 06:38

## 2022-07-02 RX ADMIN — IBUPROFEN 600 MG: 600 TABLET, FILM COATED ORAL at 02:09

## 2022-07-02 RX ADMIN — POTASSIUM CHLORIDE 40 MEQ: 1500 TABLET, EXTENDED RELEASE ORAL at 09:48

## 2022-07-02 RX ADMIN — SPIRONOLACTONE 50 MG: 25 TABLET ORAL at 07:31

## 2022-07-02 RX ADMIN — POTASSIUM CHLORIDE 40 MEQ: 1500 TABLET, EXTENDED RELEASE ORAL at 12:17

## 2022-07-02 RX ADMIN — FOLIC ACID 1 MG: 1 TABLET ORAL at 07:31

## 2022-07-02 RX ADMIN — POTASSIUM CHLORIDE 40 MEQ: 1500 TABLET, EXTENDED RELEASE ORAL at 07:30

## 2022-07-02 ASSESSMENT — PAIN SCALES - GENERAL
PAINLEVEL_OUTOF10: 7
PAINLEVEL_OUTOF10: 0

## 2022-07-02 ASSESSMENT — PAIN DESCRIPTION - ORIENTATION: ORIENTATION: MID

## 2022-07-02 ASSESSMENT — PAIN DESCRIPTION - LOCATION: LOCATION: HEAD

## 2022-07-02 ASSESSMENT — PAIN DESCRIPTION - DESCRIPTORS: DESCRIPTORS: ACHING

## 2022-07-02 NOTE — DISCHARGE SUMMARY
[facility] Physician Discharge Summary       No follow-up provider specified. Activity level: As tolerated  Dispo: Home  Condition on discharge: Stable    Patient ID:  Vladimir Plata  [id number]  41 y.o.  1980    Admit date: 6/30/2022    Discharge date and time:  7/2/2022  2:41 PM    Admission Diagnoses: Principal Problem:    Hypokalemia  Resolved Problems:    * No resolved hospital problems. *      Discharge Diagnoses: Principal Problem:    Hypokalemia  Resolved Problems:    * No resolved hospital problems. *      Consults:  IP CONSULT TO GI  IP CONSULT TO IV TEAM  IP CONSULT TO IV TEAM    Hospital Course:   Patient Vladimir Plata is a 46 y.o. presented with Hypokalemia [E87.6]  Hypomagnesemia [T90.52]  Alcoholic cirrhosis of liver with ascites ([facility] [address] [K70.31]    1. Decompensated alcoholic cirrhosis with portal hypertension, varices, and ascites   Hypokalemia  Hypomagnesemia  Hyponatremia likely hypotonic hyponatremia from hypervolemia      She is admitted for treatment of electrolyte abnormalities from her liver cirrhosis  Received 40 meq K and Mag 1g in ED in addition to 1 more gram Magnesium Sulfate, Potassium chloride 20 mEq, and KlorCon three times daily   Repeat Mag 1.6 and repeat K 4.0      Total bilirubin 9.8 < 8.4   Ammonia is 61 which is at baseline  Albumin 1.9   INR pending  Continue aldactone   Continue folic acid and vitamin supplementation  Continue Creon      2. Portal hypertension  Esophageal varices  Stable  No evidence of GIB  On carvidelol  On PPI Protonix 40 mg daily      3. Anemia of chronic disease  Thrombocytopenia 143 stable  HH 7.9/25 at baseline - 9.5 today   Stable     4.  Asymptomatic bacteruria  Urine culture + gram positive cocci  Patient is afebrile without urinary complaints   Follow off ATB      History of alcohol dependence  History of depression- on lexapro  Hypovitaminosis D- will continue supplementation     CODE: FULL  Discharge dispo: home exam:->transaminitis Additional Contrast?->None Decision Support Exception - unselect if not a suspected or confirmed emergency medical condition->Emergency Medical Condition (MA) FINDINGS: THORACIC STRUCTURES: Unremarkable. LIVER:  The liver is cirrhotic. No focal mass. No intra or extrahepatic bile duct dilation. There is ascitic fluid. GALL BLADDER: Cholelithiasis. SPLEEN:  Previous splenectomy PANCREAS:  Previous partial pancreatectomy ADRENAL GLANDS:  Unremarkable. ESOPHAGUS AND STOMACH:  Unremarkable. BOWEL: Small bowel:  Unremarkable. Large bowel: The colon and rectum are of normal course and caliber. The appendix is within normal limits. There is no intraperitoneal free air. URINARY/GENITAL TRACT: Kidneys:  The kidneys are unremarkable. .  No evidence of hydronephrosis, renal calcifications or solid renal mass. Ureters: The ureters are normal course and caliber. There is no evidence of ureter calculus/calculi. URINARY BLADDER:  The urinary bladder is well distended without wall thickening or focal mass. REPRODUCTIVE ORGANS:  Unremarkable. BLOOD VESSELS: Unremarkable given patients age. LYMPH NODES:  No evidence of intraabdominal or intrapelvic lymphadenopathy. ABDOMINAL WALL & SOFT TISSUES:  Unremarkable. OSSEOUS STRUCTURES: No acute osseous lesion. Cirrhotic liver with splenectomy. Portal hypertension with intra-abdominal varices and ascites. Cholelithiasis. Previous splenectomy and previous partial pancreatectomy.        Patient Instructions:      Medication List      ASK your doctor about these medications    carvedilol 3.125 MG tablet  Commonly known as: COREG  Take 1 tablet by mouth 2 times daily     Creon 96359-21551 units delayed release capsule  Generic drug: lipase-protease-amylase     escitalopram 5 MG tablet  Commonly known as: LEXAPRO  Take 1 tablet by mouth daily     folic acid 1 MG tablet  Commonly known as: FOLVITE  Take 1 tablet by mouth daily     furosemide 20 MG tablet  Commonly known as: LASIX     Klor-Con M20 20 MEQ extended release tablet  Generic drug: potassium chloride     lactulose 10 GM/15ML solution  Commonly known as: CHRONULAC     omeprazole 20 MG delayed release capsule  Commonly known as: PRILOSEC     spironolactone 50 MG tablet  Commonly known as: ALDACTONE     vitamin A 3 MG (93155 UT) capsule     vitamin B-1 100 MG tablet  Commonly known as: THIAMINE  Take 1 tablet by mouth daily     vitamin D 50 MCG (2000 UT) Caps capsule          30 minutes was spent in preparing discharge papers, discussing discharge with patient, medication review, etc.    Signed:  Electronically signed by RADHA Segura NP on 7/2/2022 at 2:41 PM     Addendum: I have personally participated in the history, exam, medical decision making with Corrine Chambers on the date of service and I agree with all of the pertinent clinical information unless otherwise noted. I have also reviewed and agree with the past medical, family, and social history unless otherwise noted. Patient was admitted with weakness    PHYSICAL EXAM:  Vitals:  /89   Pulse 99   Temp 97.5 °F (36.4 °C) (Oral)   Resp 18   Ht 5' 4\" (1.626 m)   Wt 137 lb 9.6 oz (62.4 kg)   SpO2 96%   BMI 23.62 kg/m²   Gen: awake, alert, NAD  Lungs: clear to auscultation bilaterally no crackles no wheezing. Heart: RRR, no murmur   Abdomen: soft nontender nondistended positive bowel sounds. Extremities: full range of motion no peripheral edema. Impression:  Principal Problem:    Hypokalemia  Resolved Problems:    * No resolved hospital problems. *      My findings/plan include:      Patient presents to the hospital with weakness and was found to have alcoholic cirrhosis. She was seen by GI. She was found to have low potassium and magnesium which has been repleted. She is stable for discharge. NOTE: This report was transcribed using voice recognition software.  Every effort was made to ensure accuracy; however, inadvertent computerized transcription errors may be present.     Electronically signed by Rachna Mason DO on 7/2/2022 at 5:37 PM

## 2022-07-02 NOTE — CONSULTS
severe hypokalemia with a potassium of 2.5. BUN and  creatinine were normal.  More to the point, she had a bilirubin of 9.8. Her baseline has been about 2. She always has an AST higher than ALT  and that remains, but the AST is now higher to 90 and the ALT minimally  elevated at 39. She had imaging by CT on presentation. IV contrast was  given and she does have evidence of cirrhosis and portal hypertension. She has had no bleeding. 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 Also, rather strikingly difference in just a couple of months ago was  the finding of actual jaundice. Her bilirubin is 9.8. It has generally  been running somewhere between 1.8 and 2.1. Her INR is 2.1 and that is  a little change from last year. When questioned about alcohol, she said \"I have had a few. \"  She is  planning a trip to [facility] for four days from the next weekend. She is hoping to be discharged and able to make that trip. She is a  never smoker. She has no use of recreational drugs. PAST MEDICAL HISTORY:  Her past medical history is dominated by her  alcoholism and liver disease and several episodes of GI bleeding. She  is currently free of ascites, but she has had at least one paracentesis  in the past performed in [facility]. Multiple episodes of banding. 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 History of hypertension and  hypothyroidism. MEDICATIONS ON ADMISSION FROM HOME:  Vitamin D, Lexapro, folic acid,  thiamine, carvedilol 3.125 mg twice a day, 50 mg of spirolactone, 20 mg  of furosemide, omeprazole, potassium, and vitamin C as well as Creon. In the hospital, she is receiving aggressive potassium repletion. She  remains on spirolactone, but off furosemide.     OBJECTIVE:  GENERAL:  She is awake and alert. She is not pale or jaundiced. NECK:  She has no neck vein distention or adenopathy. LUNGS:  Clear. HEART:  Tones are normal.  She has a regular rate and regular rhythm. No audible murmur or gallop. ABDOMEN:  Soft. It is not tender. I do not feel organ enlargement. She has no clinical ascites. Trace pretibial edema. RECTAL:  Not done. IMPRESSION:  She is significantly hypokalemic, but not encephalopathic. She appears to have received a single dose of lactulose, but I think  that can be withheld. Potassium repletion immediate goal Will repeat her liver  function studies tomorrow, but anticipating steroids for acute  superimposed alcoholic hepatitis on top of her chronic alcoholic  cirrhosis. She is not likely to be able to tolerate a trip on an  airplane a week from now. She was forewarned about this.         Mikaela Hylton MD    D: 07/01/2022 16:25:48       T: 07/01/2022 21:36:09     [email or web address]  Job#: [id number]     Doc#: [id number]    CC:

## 2022-07-02 NOTE — PROGRESS NOTES
Nursing texted me today to [facility] discharge. I said she should stay another day.  She was discharged anyway

## 2022-07-02 NOTE — PROGRESS NOTES
[facility] Progress Note    Admitting Date and Time: 6/30/2022 10:35 PM  Admit Dx: Hypokalemia [E87.6]  Hypomagnesemia [I95.17]  Alcoholic cirrhosis of liver with ascites ([facility] [address] [K70.31]      Assessment:    Principal Problem:    Hypokalemia  Resolved Problems:    * No resolved hospital problems. *      Plan:  1. Decompensated alcoholic cirrhosis with portal hypertension, varices, and ascites   Hypokalemia  Hypomagnesemia  Hyponatremia likely hypotonic hyponatremia from hypervolemia     She is admitted for treatment of electrolyte abnormalities from her liver cirrhosis  Received 40 meq K and Mag 1g in ED in addition to 1 more gram Magnesium Sulfate, Potassium chloride 20 mEq, and KlorCon three times daily   Repeat Mag 1.6 and repeat K 4.0   Will repeat both tomorrow AM - replace as needed     Total bilirubin 9.8 < 8.4   Ammonia is 61 which is at baseline  Albumin 1.9   INR pending  Continue aldactone   Continue folic acid and vitamin supplementation  Continue Creon      2. Portal hypertension  Esophageal varices  Stable  No evidence of GIB  On carvidelol  On PPI Protonix 40 mg daily      3. Anemia of chronic disease  Thrombocytopenia 143 stable  HH 7.9/25 at baseline - 9.5 today   Stable    4. Asymptomatic bacteruria  Urine culture + gram positive cocci  Patient is afebrile without urinary complaints   Follow off ATB      History of alcohol dependence  History of depression- on lexapro  Hypovitaminosis D- will continue supplementation     CODE: FULL  DVT prophylaxis: Lovenox  Discharge dispo: home tomorrow once ok with GI     Subjective:  Patient is being followed for Hypokalemia [E87.6]  Hypomagnesemia [C59.11]  Alcoholic cirrhosis of liver with ascites ([facility] [address] [K70.31]     Seen at bedside  Awake and alert   No nausea, vomiting ,diarrhea, abdominal pain   Anxious to get home     ROS: denies fever, chills, cp, sob, n/v, HA unless stated above.       carvedilol  3.125 mg Oral BID    lipase-protease-amylase  12,000 Units Oral 4x Daily AC & HS    escitalopram  5 mg Oral Daily    folic acid  1 mg Oral Daily    pantoprazole  40 mg Oral QAM AC    spironolactone  50 mg Oral Daily    thiamine  100 mg Oral Daily    vitamin D  2,000 Units Oral Daily    potassium chloride  40 mEq Oral TID WC     ibuprofen, 600 mg, Q6H PRN  ondansetron, 4 mg, Q6H PRN         Objective:    /89   Pulse 99   Temp 97.5 °F (36.4 °C) (Oral)   Resp 18   Ht 5' 4\" (1.626 m)   Wt 137 lb 9.6 oz (62.4 kg)   SpO2 96%   BMI 23.62 kg/m²     General Appearance: alert and oriented to person, place and time and in no acute distress  Skin: warm and dry  Head: normocephalic and atraumatic  Eyes: pupils equal, round, and reactive to light, extraocular eye movements intact, conjunctivae normal  Neck: neck supple and non tender without mass   Pulmonary/Chest: clear to auscultation bilaterally- no wheezes, rales or rhonchi, normal air movement, no respiratory distress  Cardiovascular: normal rate, normal S1 and S2 and no carotid bruits  Abdomen: soft, non-tender, non-distended, normal bowel sounds, no masses or organomegaly  Extremities: no cyanosis, no clubbing and no edema  Neurologic: no cranial nerve deficit and speech normal        Recent Labs     06/30/22  2330 07/01/22  1112 07/02/22  0500   * 134 131*   K 2.5* 2.7* 3.4*   CL 91* 99 99   CO2 24 24 21*   BUN 4* 5* 5*   CREATININE 0.6 0.6 0.6   GLUCOSE 115* 111* 89   CALCIUM 7.4* 7.4* 7.2*       Recent Labs     06/30/22  2330   WBC 8.2   RBC 3.23*   HGB 9.5*   HCT 29.2*   MCV 90.4   MCH 29.4   MCHC 32.5   RDW 27.4*      MPV 11.2       Radiology: reviewed     20 minutes time spent reviewing patient chart, assessing patient, discussing plan of care with patient and family, discussing plan of care with collaborating physician, and charting.       Electronically signed by RADHA Rodriguez NP on 7/2/2022 at 8:54 AM

## 2022-07-03 LAB
ORGANISM: ABNORMAL
URINE CULTURE, ROUTINE: ABNORMAL

## 2022-07-19 ENCOUNTER — HOSPITAL ENCOUNTER (OUTPATIENT)
Age: 42
Discharge: HOME | End: 2022-07-19
Payer: MEDICAID

## 2022-07-19 LAB
ALBUMIN SERPL-MCNC: 1.9 G/DL (ref 3.5–5.2)
ALP BLD-CCNC: 169 U/L (ref 35–104)
ALT SERPL-CCNC: 31 U/L (ref 0–32)
ANION GAP SERPL CALCULATED.3IONS-SCNC: 12 MMOL/L (ref 7–16)
ANISOCYTOSIS: ABNORMAL
AST SERPL-CCNC: 227 U/L (ref 0–31)
BASOPHILS ABSOLUTE: 0.14 E9/L (ref 0–0.2)
BASOPHILS RELATIVE PERCENT: 1.9 % (ref 0–2)
BILIRUB SERPL-MCNC: 8.4 MG/DL (ref 0–1.2)
BUN BLDV-MCNC: 9 MG/DL (ref 6–20)
CALCIUM SERPL-MCNC: 7.4 MG/DL (ref 8.6–10.2)
CHLORIDE BLD-SCNC: 96 MMOL/L (ref 98–107)
CO2: 22 MMOL/L (ref 22–29)
CREAT SERPL-MCNC: 1 MG/DL (ref 0.5–1)
EOSINOPHILS ABSOLUTE: 0.44 E9/L (ref 0.05–0.5)
EOSINOPHILS RELATIVE PERCENT: 5.9 % (ref 0–6)
GFR AFRICAN AMERICAN: >60
GFR NON-AFRICAN AMERICAN: >60 ML/MIN/1.73
GLUCOSE BLD-MCNC: 99 MG/DL (ref 74–99)
HCT VFR BLD CALC: 26.2 % (ref 34–48)
HEMOGLOBIN: 8.6 G/DL (ref 11.5–15.5)
IMMATURE GRANULOCYTES #: 0.03 E9/L
IMMATURE GRANULOCYTES %: 0.4 % (ref 0–5)
INR BLD: 2.5
LYMPHOCYTES ABSOLUTE: 1.32 E9/L (ref 1.5–4)
LYMPHOCYTES RELATIVE PERCENT: 17.7 % (ref 20–42)
MCH RBC QN AUTO: 31.6 PG (ref 26–35)
MCHC RBC AUTO-ENTMCNC: 32.8 % (ref 32–34.5)
MCV RBC AUTO: 96.3 FL (ref 80–99.9)
MONOCYTES ABSOLUTE: 1.15 E9/L (ref 0.1–0.95)
MONOCYTES RELATIVE PERCENT: 15.4 % (ref 2–12)
NEUTROPHILS ABSOLUTE: 4.37 E9/L (ref 1.8–7.3)
NEUTROPHILS RELATIVE PERCENT: 58.7 % (ref 43–80)
PDW BLD-RTO: 24.7 FL (ref 11.5–15)
PLATELET # BLD: 154 E9/L (ref 130–450)
PMV BLD AUTO: 10.3 FL (ref 7–12)
POIKILOCYTES: ABNORMAL
POTASSIUM SERPL-SCNC: 3.1 MMOL/L (ref 3.5–5)
PROTHROMBIN TIME: 28.2 SEC (ref 9.3–12.4)
RBC # BLD: 2.72 E12/L (ref 3.5–5.5)
SMUDGE CELLS: ABNORMAL
SODIUM BLD-SCNC: 130 MMOL/L (ref 132–146)
TARGET CELLS: ABNORMAL
TOTAL PROTEIN: 9 G/DL (ref 6.4–8.3)
WBC # BLD: 7.5 E9/L (ref 4.5–11.5)

## 2022-07-19 PROCEDURE — 80053 COMPREHEN METABOLIC PANEL: CPT

## 2022-07-19 PROCEDURE — 85610 PROTHROMBIN TIME: CPT

## 2022-07-19 PROCEDURE — 85025 COMPLETE CBC W/AUTO DIFF WBC: CPT

## 2022-07-19 PROCEDURE — 36415 COLL VENOUS BLD VENIPUNCTURE: CPT

## 2022-07-25 ENCOUNTER — HOSPITAL ENCOUNTER (OUTPATIENT)
Age: 42
Discharge: HOME | End: 2022-07-27

## 2022-07-25 LAB
APPEARANCE FLUID: CLEAR
FLUID TYPE: NORMAL
PROTEIN FLUID: 1.1 G/DL

## 2022-07-25 PROCEDURE — 84157 ASSAY OF PROTEIN OTHER: CPT

## 2022-07-25 PROCEDURE — 87070 CULTURE OTHR SPECIMN AEROBIC: CPT

## 2022-07-25 PROCEDURE — 87205 SMEAR GRAM STAIN: CPT

## 2022-07-25 PROCEDURE — 89051 BODY FLUID CELL COUNT: CPT

## 2022-07-26 LAB
APPEARANCE FLUID: CLEAR
CELL COUNT FLUID TYPE: NORMAL
COLOR FLUID: YELLOW
MONOCYTE, FLUID: 83 %
NEUTROPHIL, FLUID: 17 %
NUCLEATED CELLS FLUID: 12 /UL
RBC FLUID: <2000 /UL

## 2022-07-28 LAB
BODY FLUID CULTURE, STERILE: NORMAL
GRAM STAIN RESULT: NORMAL

## 2022-08-15 ENCOUNTER — APPOINTMENT (OUTPATIENT)
Dept: GENERAL RADIOLOGY | Age: 42
DRG: 426 | End: 2022-08-15
Payer: MEDICAID

## 2022-08-15 LAB
ALBUMIN SERPL-MCNC: 2.1 G/DL (ref 3.5–5.2)
ALP BLD-CCNC: 175 U/L (ref 35–104)
ALT SERPL-CCNC: 33 U/L (ref 0–32)
ANION GAP SERPL CALCULATED.3IONS-SCNC: 14 MMOL/L (ref 7–16)
AST SERPL-CCNC: 225 U/L (ref 0–31)
BACTERIA: ABNORMAL /HPF
BILIRUB SERPL-MCNC: 10.7 MG/DL (ref 0–1.2)
BILIRUBIN URINE: ABNORMAL
BLOOD, URINE: ABNORMAL
BUN BLDV-MCNC: 8 MG/DL (ref 6–20)
CALCIUM SERPL-MCNC: 7.8 MG/DL (ref 8.6–10.2)
CHLORIDE BLD-SCNC: 92 MMOL/L (ref 98–107)
CLARITY: CLEAR
CO2: 19 MMOL/L (ref 22–29)
COLOR: ABNORMAL
CREAT SERPL-MCNC: 0.7 MG/DL (ref 0.5–1)
EPITHELIAL CELLS, UA: ABNORMAL /HPF
GFR AFRICAN AMERICAN: >60
GFR NON-AFRICAN AMERICAN: >60 ML/MIN/1.73
GLUCOSE BLD-MCNC: 116 MG/DL (ref 74–99)
GLUCOSE URINE: NEGATIVE MG/DL
HCG, URINE, POC: NEGATIVE
KETONES, URINE: NEGATIVE MG/DL
LEUKOCYTE ESTERASE, URINE: NEGATIVE
Lab: NORMAL
MAGNESIUM: 1.3 MG/DL (ref 1.6–2.6)
NEGATIVE QC PASS/FAIL: NORMAL
NITRITE, URINE: NEGATIVE
PH UA: 6.5 (ref 5–9)
POSITIVE QC PASS/FAIL: NORMAL
POTASSIUM REFLEX MAGNESIUM: 2.8 MMOL/L (ref 3.5–5)
PRO-BNP: 181 PG/ML (ref 0–125)
PROTEIN UA: NEGATIVE MG/DL
RBC UA: ABNORMAL /HPF (ref 0–2)
REASON FOR REJECTION: NORMAL
REJECTED TEST: NORMAL
SARS-COV-2, NAAT: NOT DETECTED
SODIUM BLD-SCNC: 125 MMOL/L (ref 132–146)
SPECIFIC GRAVITY UA: <=1.005 (ref 1–1.03)
TOTAL PROTEIN: 9.4 G/DL (ref 6.4–8.3)
TROPONIN, HIGH SENSITIVITY: 111 NG/L (ref 0–9)
UROBILINOGEN, URINE: 4 E.U./DL
WBC UA: ABNORMAL /HPF (ref 0–5)

## 2022-08-15 PROCEDURE — 84484 ASSAY OF TROPONIN QUANT: CPT

## 2022-08-15 PROCEDURE — 71046 X-RAY EXAM CHEST 2 VIEWS: CPT

## 2022-08-15 PROCEDURE — 96365 THER/PROPH/DIAG IV INF INIT: CPT

## 2022-08-15 PROCEDURE — 99285 EMERGENCY DEPT VISIT HI MDM: CPT

## 2022-08-15 PROCEDURE — 81001 URINALYSIS AUTO W/SCOPE: CPT

## 2022-08-15 PROCEDURE — 83735 ASSAY OF MAGNESIUM: CPT

## 2022-08-15 PROCEDURE — 87635 SARS-COV-2 COVID-19 AMP PRB: CPT

## 2022-08-15 PROCEDURE — 80053 COMPREHEN METABOLIC PANEL: CPT

## 2022-08-15 PROCEDURE — 93005 ELECTROCARDIOGRAM TRACING: CPT | Performed by: PHYSICIAN ASSISTANT

## 2022-08-15 PROCEDURE — 83880 ASSAY OF NATRIURETIC PEPTIDE: CPT

## 2022-08-16 ENCOUNTER — HOSPITAL ENCOUNTER (INPATIENT)
Age: 42
LOS: 1 days | Discharge: HOME | DRG: 426 | End: 2022-08-17
Attending: EMERGENCY MEDICINE | Admitting: INTERNAL MEDICINE
Payer: MEDICAID

## 2022-08-16 DIAGNOSIS — E87.1 HYPONATREMIA: ICD-10-CM

## 2022-08-16 DIAGNOSIS — E86.0 DEHYDRATION: ICD-10-CM

## 2022-08-16 DIAGNOSIS — E87.6 HYPOKALEMIA: ICD-10-CM

## 2022-08-16 DIAGNOSIS — K70.31 ALCOHOLIC CIRRHOSIS OF LIVER WITH ASCITES (HCC): Primary | ICD-10-CM

## 2022-08-16 PROBLEM — E87.8 ELECTROLYTE ABNORMALITY: Status: ACTIVE | Noted: 2022-01-01

## 2022-08-16 LAB
ALBUMIN SERPL-MCNC: 1.8 G/DL (ref 3.5–5.2)
ALP BLD-CCNC: 139 U/L (ref 35–104)
ALT SERPL-CCNC: 28 U/L (ref 0–32)
AMMONIA: 66.7 UMOL/L (ref 11–51)
ANION GAP SERPL CALCULATED.3IONS-SCNC: 10 MMOL/L (ref 7–16)
ANION GAP SERPL CALCULATED.3IONS-SCNC: 11 MMOL/L (ref 7–16)
ANISOCYTOSIS: ABNORMAL
AST SERPL-CCNC: 195 U/L (ref 0–31)
BASOPHILS ABSOLUTE: 0.1 E9/L (ref 0–0.2)
BASOPHILS RELATIVE PERCENT: 1.1 % (ref 0–2)
BILIRUB SERPL-MCNC: 10.5 MG/DL (ref 0–1.2)
BUN BLDV-MCNC: 8 MG/DL (ref 6–20)
BUN BLDV-MCNC: 8 MG/DL (ref 6–20)
CALCIUM IONIZED: 1.1 MMOL/L (ref 1.15–1.33)
CALCIUM SERPL-MCNC: 7.5 MG/DL (ref 8.6–10.2)
CALCIUM SERPL-MCNC: 7.7 MG/DL (ref 8.6–10.2)
CHLORIDE BLD-SCNC: 101 MMOL/L (ref 98–107)
CHLORIDE BLD-SCNC: 99 MMOL/L (ref 98–107)
CO2: 20 MMOL/L (ref 22–29)
CO2: 22 MMOL/L (ref 22–29)
CREAT SERPL-MCNC: 0.6 MG/DL (ref 0.5–1)
CREAT SERPL-MCNC: 0.7 MG/DL (ref 0.5–1)
EKG ATRIAL RATE: 89 BPM
EKG P AXIS: 57 DEGREES
EKG P-R INTERVAL: 158 MS
EKG Q-T INTERVAL: 440 MS
EKG QRS DURATION: 98 MS
EKG QTC CALCULATION (BAZETT): 535 MS
EKG R AXIS: 39 DEGREES
EKG T AXIS: 40 DEGREES
EKG VENTRICULAR RATE: 89 BPM
EOSINOPHILS ABSOLUTE: 0.42 E9/L (ref 0.05–0.5)
EOSINOPHILS RELATIVE PERCENT: 4.7 % (ref 0–6)
GFR AFRICAN AMERICAN: >60
GFR AFRICAN AMERICAN: >60
GFR NON-AFRICAN AMERICAN: >60 ML/MIN/1.73
GFR NON-AFRICAN AMERICAN: >60 ML/MIN/1.73
GLUCOSE BLD-MCNC: 107 MG/DL (ref 74–99)
GLUCOSE BLD-MCNC: 91 MG/DL (ref 74–99)
HCT VFR BLD CALC: 22.1 % (ref 34–48)
HEMOGLOBIN: 7.6 G/DL (ref 11.5–15.5)
HYPOCHROMIA: ABNORMAL
IMMATURE GRANULOCYTES #: 0.09 E9/L
IMMATURE GRANULOCYTES %: 1 % (ref 0–5)
LIPASE: 47 U/L (ref 13–60)
LYMPHOCYTES ABSOLUTE: 1.31 E9/L (ref 1.5–4)
LYMPHOCYTES RELATIVE PERCENT: 14.8 % (ref 20–42)
MAGNESIUM: 1.5 MG/DL (ref 1.6–2.6)
MAGNESIUM: 1.7 MG/DL (ref 1.6–2.6)
MCH RBC QN AUTO: 35.2 PG (ref 26–35)
MCHC RBC AUTO-ENTMCNC: 34.4 % (ref 32–34.5)
MCV RBC AUTO: 102.3 FL (ref 80–99.9)
MONOCYTES ABSOLUTE: 1.05 E9/L (ref 0.1–0.95)
MONOCYTES RELATIVE PERCENT: 11.9 % (ref 2–12)
NEUTROPHILS ABSOLUTE: 5.88 E9/L (ref 1.8–7.3)
NEUTROPHILS RELATIVE PERCENT: 66.5 % (ref 43–80)
OVALOCYTES: ABNORMAL
PDW BLD-RTO: 21.4 FL (ref 11.5–15)
PLATELET # BLD: 96 E9/L (ref 130–450)
PLATELET CONFIRMATION: NORMAL
PMV BLD AUTO: 11.3 FL (ref 7–12)
POIKILOCYTES: ABNORMAL
POTASSIUM SERPL-SCNC: 2.8 MMOL/L (ref 3.5–5)
POTASSIUM SERPL-SCNC: 4.2 MMOL/L (ref 3.5–5)
RBC # BLD: 2.16 E12/L (ref 3.5–5.5)
SODIUM BLD-SCNC: 131 MMOL/L (ref 132–146)
SODIUM BLD-SCNC: 132 MMOL/L (ref 132–146)
TARGET CELLS: ABNORMAL
TOTAL PROTEIN: 8.1 G/DL (ref 6.4–8.3)
TROPONIN, HIGH SENSITIVITY: 107 NG/L (ref 0–9)
VITAMIN D 25-HYDROXY: 25 NG/ML (ref 30–100)
WBC # BLD: 8.9 E9/L (ref 4.5–11.5)

## 2022-08-16 PROCEDURE — 99222 1ST HOSP IP/OBS MODERATE 55: CPT | Performed by: NURSE PRACTITIONER

## 2022-08-16 PROCEDURE — 84484 ASSAY OF TROPONIN QUANT: CPT

## 2022-08-16 PROCEDURE — 6360000002 HC RX W HCPCS: Performed by: NURSE PRACTITIONER

## 2022-08-16 PROCEDURE — 85025 COMPLETE CBC W/AUTO DIFF WBC: CPT

## 2022-08-16 PROCEDURE — 80053 COMPREHEN METABOLIC PANEL: CPT

## 2022-08-16 PROCEDURE — 2060000000 HC ICU INTERMEDIATE R&B

## 2022-08-16 PROCEDURE — 36415 COLL VENOUS BLD VENIPUNCTURE: CPT

## 2022-08-16 PROCEDURE — 83690 ASSAY OF LIPASE: CPT

## 2022-08-16 PROCEDURE — C9113 INJ PANTOPRAZOLE SODIUM, VIA: HCPCS | Performed by: NURSE PRACTITIONER

## 2022-08-16 PROCEDURE — 82140 ASSAY OF AMMONIA: CPT

## 2022-08-16 PROCEDURE — 2580000003 HC RX 258: Performed by: NURSE PRACTITIONER

## 2022-08-16 PROCEDURE — 6360000002 HC RX W HCPCS: Performed by: INTERNAL MEDICINE

## 2022-08-16 PROCEDURE — 6360000002 HC RX W HCPCS: Performed by: EMERGENCY MEDICINE

## 2022-08-16 PROCEDURE — 82306 VITAMIN D 25 HYDROXY: CPT

## 2022-08-16 PROCEDURE — 80048 BASIC METABOLIC PNL TOTAL CA: CPT

## 2022-08-16 PROCEDURE — 6370000000 HC RX 637 (ALT 250 FOR IP): Performed by: NURSE PRACTITIONER

## 2022-08-16 PROCEDURE — 82330 ASSAY OF CALCIUM: CPT

## 2022-08-16 PROCEDURE — 83970 ASSAY OF PARATHORMONE: CPT

## 2022-08-16 PROCEDURE — 83735 ASSAY OF MAGNESIUM: CPT

## 2022-08-16 PROCEDURE — 6370000000 HC RX 637 (ALT 250 FOR IP): Performed by: EMERGENCY MEDICINE

## 2022-08-16 RX ORDER — SPIRONOLACTONE 25 MG/1
50 TABLET ORAL DAILY
Status: DISCONTINUED | OUTPATIENT
Start: 2022-08-16 | End: 2022-08-17 | Stop reason: HOSPADM

## 2022-08-16 RX ORDER — ONDANSETRON 4 MG/1
4 TABLET, ORALLY DISINTEGRATING ORAL EVERY 8 HOURS PRN
Status: DISCONTINUED | OUTPATIENT
Start: 2022-08-16 | End: 2022-08-17 | Stop reason: HOSPADM

## 2022-08-16 RX ORDER — PROMETHAZINE HYDROCHLORIDE 25 MG/ML
25 INJECTION, SOLUTION INTRAMUSCULAR; INTRAVENOUS EVERY 6 HOURS PRN
Status: DISCONTINUED | OUTPATIENT
Start: 2022-08-16 | End: 2022-08-17 | Stop reason: HOSPADM

## 2022-08-16 RX ORDER — IBUPROFEN 600 MG/1
600 TABLET ORAL EVERY 6 HOURS PRN
Status: DISCONTINUED | OUTPATIENT
Start: 2022-08-16 | End: 2022-08-16

## 2022-08-16 RX ORDER — POTASSIUM CHLORIDE 7.45 MG/ML
10 INJECTION INTRAVENOUS PRN
Status: DISCONTINUED | OUTPATIENT
Start: 2022-08-16 | End: 2022-08-17 | Stop reason: HOSPADM

## 2022-08-16 RX ORDER — SODIUM CHLORIDE 9 MG/ML
INJECTION, SOLUTION INTRAVENOUS PRN
Status: DISCONTINUED | OUTPATIENT
Start: 2022-08-16 | End: 2022-08-17 | Stop reason: HOSPADM

## 2022-08-16 RX ORDER — IBUPROFEN 600 MG/1
600 TABLET ORAL EVERY 6 HOURS PRN
Status: DISCONTINUED | OUTPATIENT
Start: 2022-08-16 | End: 2022-08-17 | Stop reason: HOSPADM

## 2022-08-16 RX ORDER — POTASSIUM CHLORIDE 20 MEQ/1
40 TABLET, EXTENDED RELEASE ORAL 2 TIMES DAILY WITH MEALS
Status: DISCONTINUED | OUTPATIENT
Start: 2022-08-16 | End: 2022-08-17 | Stop reason: HOSPADM

## 2022-08-16 RX ORDER — SODIUM CHLORIDE 0.9 % (FLUSH) 0.9 %
5-40 SYRINGE (ML) INJECTION EVERY 12 HOURS SCHEDULED
Status: DISCONTINUED | OUTPATIENT
Start: 2022-08-16 | End: 2022-08-17 | Stop reason: HOSPADM

## 2022-08-16 RX ORDER — MAGNESIUM SULFATE IN WATER 40 MG/ML
2000 INJECTION, SOLUTION INTRAVENOUS ONCE
Status: DISCONTINUED | OUTPATIENT
Start: 2022-08-16 | End: 2022-08-16

## 2022-08-16 RX ORDER — PANTOPRAZOLE SODIUM 40 MG/10ML
40 INJECTION, POWDER, LYOPHILIZED, FOR SOLUTION INTRAVENOUS DAILY
Status: DISCONTINUED | OUTPATIENT
Start: 2022-08-16 | End: 2022-08-17 | Stop reason: HOSPADM

## 2022-08-16 RX ORDER — FUROSEMIDE 20 MG/1
20 TABLET ORAL DAILY
Status: DISCONTINUED | OUTPATIENT
Start: 2022-08-16 | End: 2022-08-17 | Stop reason: HOSPADM

## 2022-08-16 RX ORDER — MAGNESIUM SULFATE IN WATER 40 MG/ML
2000 INJECTION, SOLUTION INTRAVENOUS PRN
Status: DISCONTINUED | OUTPATIENT
Start: 2022-08-16 | End: 2022-08-17 | Stop reason: HOSPADM

## 2022-08-16 RX ORDER — POTASSIUM CHLORIDE 7.45 MG/ML
10 INJECTION INTRAVENOUS ONCE
Status: COMPLETED | OUTPATIENT
Start: 2022-08-16 | End: 2022-08-16

## 2022-08-16 RX ORDER — CARVEDILOL 3.12 MG/1
3.12 TABLET ORAL 2 TIMES DAILY
Status: DISCONTINUED | OUTPATIENT
Start: 2022-08-16 | End: 2022-08-17 | Stop reason: HOSPADM

## 2022-08-16 RX ORDER — ESCITALOPRAM OXALATE 10 MG/1
5 TABLET ORAL DAILY
Status: DISCONTINUED | OUTPATIENT
Start: 2022-08-16 | End: 2022-08-17 | Stop reason: HOSPADM

## 2022-08-16 RX ORDER — POTASSIUM CHLORIDE 20 MEQ/1
40 TABLET, EXTENDED RELEASE ORAL ONCE
Status: COMPLETED | OUTPATIENT
Start: 2022-08-16 | End: 2022-08-16

## 2022-08-16 RX ORDER — ACETAMINOPHEN 325 MG/1
650 TABLET ORAL EVERY 6 HOURS PRN
Status: DISCONTINUED | OUTPATIENT
Start: 2022-08-16 | End: 2022-08-17 | Stop reason: HOSPADM

## 2022-08-16 RX ORDER — POTASSIUM CHLORIDE 20 MEQ/1
40 TABLET, EXTENDED RELEASE ORAL PRN
Status: DISCONTINUED | OUTPATIENT
Start: 2022-08-16 | End: 2022-08-17 | Stop reason: HOSPADM

## 2022-08-16 RX ORDER — MAGNESIUM SULFATE IN WATER 40 MG/ML
2000 INJECTION, SOLUTION INTRAVENOUS ONCE
Status: COMPLETED | OUTPATIENT
Start: 2022-08-16 | End: 2022-08-16

## 2022-08-16 RX ORDER — POLYETHYLENE GLYCOL 3350 17 G/17G
17 POWDER, FOR SOLUTION ORAL DAILY PRN
Status: DISCONTINUED | OUTPATIENT
Start: 2022-08-16 | End: 2022-08-17 | Stop reason: HOSPADM

## 2022-08-16 RX ORDER — FOLIC ACID 1 MG/1
1 TABLET ORAL DAILY
Status: DISCONTINUED | OUTPATIENT
Start: 2022-08-16 | End: 2022-08-17 | Stop reason: HOSPADM

## 2022-08-16 RX ORDER — LANOLIN ALCOHOL/MO/W.PET/CERES
100 CREAM (GRAM) TOPICAL DAILY
Status: DISCONTINUED | OUTPATIENT
Start: 2022-08-16 | End: 2022-08-17 | Stop reason: HOSPADM

## 2022-08-16 RX ORDER — SODIUM CHLORIDE 0.9 % (FLUSH) 0.9 %
5-40 SYRINGE (ML) INJECTION PRN
Status: DISCONTINUED | OUTPATIENT
Start: 2022-08-16 | End: 2022-08-17 | Stop reason: HOSPADM

## 2022-08-16 RX ORDER — ONDANSETRON 2 MG/ML
4 INJECTION INTRAMUSCULAR; INTRAVENOUS EVERY 6 HOURS PRN
Status: DISCONTINUED | OUTPATIENT
Start: 2022-08-16 | End: 2022-08-17 | Stop reason: HOSPADM

## 2022-08-16 RX ORDER — LACTULOSE 10 G/15ML
20 SOLUTION ORAL 3 TIMES DAILY PRN
Status: DISCONTINUED | OUTPATIENT
Start: 2022-08-16 | End: 2022-08-17 | Stop reason: HOSPADM

## 2022-08-16 RX ORDER — ACETAMINOPHEN 650 MG/1
650 SUPPOSITORY RECTAL EVERY 6 HOURS PRN
Status: DISCONTINUED | OUTPATIENT
Start: 2022-08-16 | End: 2022-08-17 | Stop reason: HOSPADM

## 2022-08-16 RX ADMIN — SODIUM CHLORIDE, PRESERVATIVE FREE 10 ML: 5 INJECTION INTRAVENOUS at 11:13

## 2022-08-16 RX ADMIN — POTASSIUM CHLORIDE 10 MEQ: 7.45 INJECTION INTRAVENOUS at 17:15

## 2022-08-16 RX ADMIN — MAGNESIUM SULFATE HEPTAHYDRATE 2000 MG: 40 INJECTION, SOLUTION INTRAVENOUS at 06:33

## 2022-08-16 RX ADMIN — ONDANSETRON 4 MG: 4 TABLET, ORALLY DISINTEGRATING ORAL at 12:05

## 2022-08-16 RX ADMIN — PANCRELIPASE 12000 UNITS: 60000; 12000; 38000 CAPSULE, DELAYED RELEASE PELLETS ORAL at 11:02

## 2022-08-16 RX ADMIN — POTASSIUM CHLORIDE 10 MEQ: 7.46 INJECTION, SOLUTION INTRAVENOUS at 03:52

## 2022-08-16 RX ADMIN — POTASSIUM CHLORIDE 10 MEQ: 7.45 INJECTION INTRAVENOUS at 15:11

## 2022-08-16 RX ADMIN — Medication 100 MG: at 11:02

## 2022-08-16 RX ADMIN — PROMETHAZINE HYDROCHLORIDE 25 MG: 25 INJECTION INTRAMUSCULAR; INTRAVENOUS at 15:05

## 2022-08-16 RX ADMIN — FOLIC ACID 1 MG: 1 TABLET ORAL at 11:03

## 2022-08-16 RX ADMIN — SODIUM CHLORIDE, PRESERVATIVE FREE 10 ML: 5 INJECTION INTRAVENOUS at 20:20

## 2022-08-16 RX ADMIN — PANCRELIPASE 12000 UNITS: 60000; 12000; 38000 CAPSULE, DELAYED RELEASE PELLETS ORAL at 18:19

## 2022-08-16 RX ADMIN — ACETAMINOPHEN 650 MG: 325 TABLET ORAL at 20:14

## 2022-08-16 RX ADMIN — SPIRONOLACTONE 50 MG: 25 TABLET ORAL at 11:03

## 2022-08-16 RX ADMIN — POTASSIUM CHLORIDE 10 MEQ: 7.45 INJECTION INTRAVENOUS at 14:09

## 2022-08-16 RX ADMIN — POTASSIUM CHLORIDE 40 MEQ: 1500 TABLET, EXTENDED RELEASE ORAL at 11:03

## 2022-08-16 RX ADMIN — PANTOPRAZOLE SODIUM 40 MG: 40 INJECTION, POWDER, FOR SOLUTION INTRAVENOUS at 11:02

## 2022-08-16 RX ADMIN — POTASSIUM CHLORIDE 40 MEQ: 1500 TABLET, EXTENDED RELEASE ORAL at 03:53

## 2022-08-16 RX ADMIN — POTASSIUM CHLORIDE 10 MEQ: 7.45 INJECTION INTRAVENOUS at 12:52

## 2022-08-16 RX ADMIN — POTASSIUM CHLORIDE 40 MEQ: 1500 TABLET, EXTENDED RELEASE ORAL at 15:13

## 2022-08-16 RX ADMIN — FUROSEMIDE 20 MG: 20 TABLET ORAL at 11:02

## 2022-08-16 RX ADMIN — ESCITALOPRAM OXALATE 5 MG: 10 TABLET ORAL at 11:03

## 2022-08-16 RX ADMIN — PANCRELIPASE 12000 UNITS: 60000; 12000; 38000 CAPSULE, DELAYED RELEASE PELLETS ORAL at 20:14

## 2022-08-16 RX ADMIN — ONDANSETRON 4 MG: 4 TABLET, ORALLY DISINTEGRATING ORAL at 20:14

## 2022-08-16 RX ADMIN — POTASSIUM CHLORIDE 10 MEQ: 7.45 INJECTION INTRAVENOUS at 12:05

## 2022-08-16 RX ADMIN — CARVEDILOL 3.12 MG: 3.12 TABLET, FILM COATED ORAL at 11:03

## 2022-08-16 RX ADMIN — POTASSIUM CHLORIDE 10 MEQ: 7.45 INJECTION INTRAVENOUS at 16:09

## 2022-08-16 RX ADMIN — MAGNESIUM SULFATE HEPTAHYDRATE 2000 MG: 40 INJECTION, SOLUTION INTRAVENOUS at 12:16

## 2022-08-16 NOTE — H&P
[facility]   History and Physical      CHIEF COMPLAINT:  weakness    History of Present Illness:  46 y.o. female who presented to the ER for weakness that began over the weekend. Patient states had a second paracentesis Thursday, went to work Friday, and started to feel weak over the weekend. She states she had another paracentesis at [facility] earlier this month. She is unable to tell me how much fluid was removed but states after her second procedure, she had lost 11 pounds. She is notably jaundiced and she states her urine is orange sometimes in the moring but turns to a lemonade color by the end of the day. Abdomen is distended and tender to paracentesis site . Patient unable to tell me if distention is baseline or not. She reports she still drinks alcohol \"every once in awhile\" and had two [employer] 2 days ago. She states she no longer takes medication for her HTN or her hypothyroidism but is unable to say for certain why. Denies nausea, vomiting, diarrhea, syncope or confusion. Patient was admitted June 30th this year for same and was discharged home July 2nd. Informant(s) for H&P: patient     REVIEW OF SYSTEMS:  Denies CP, SOB subjective fever/chills, cough, congestion, n/v/d, ha, vision/hearing changes, wt changes, hot/cold flashes, other open skin lesions, constipation, dysuria/hematuria unless noted in HPI. Complete ROS performed with the patient and is otherwise negative.       PMH:  Past Medical History:   Diagnosis Date    Alcohol abuse     Alcoholic cirrhosis ([address]     Anxiety     not on any meds for it    Hypertension     has not required any med a few years as of 8/2019    Hypothyroidism     in her early 25s - pt was on levothyroxine for a while and then hypothyroidism apparently resolved and pt has been off med since her mid-20s    Pancreatic cyst 01/2017    Pt underwent partial pancreatectomy and splenectomy at [facility] 2/23/2017 and was told pathology showed MCN (mucinous cystic neoplasm) of pancreas with clean margins at surgery       Surgical History:  Past Surgical History:   Procedure Laterality Date    [address]    HAND SURGERY Left 2/22/2022    LEFT STERNOCLAVICULAR JOINT DEBRIDEMENT performed by Beatriz Laurent MD at [place] [address] PICC LINE  2/26/2022         PANCREAS SURGERY  02/23/2017    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 - 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    SPLENECTOMY, TOTAL  02/23/2017    along with partial pancreatectomy    UPPER GASTROINTESTINAL ENDOSCOPY N/A 7/4/2020    EGD CONTROL HEMORRHAGE performed by Hillary Zamora MD at [place] [address]  7/4/2020    EGD ESOPHAGOGASTRODUODENOSCOPY ENDOSCOPIC VARICEAL TREATMENT performed by Hillary Zamora MD at [place] [address] N/A 9/17/2021    EGD, BANDING OF VARICES performed by Hillary Zamora MD at [address] [place] [address] N/A 2/23/2022    EGD BAND LIGATION performed by Hillary Zamora MD at [facility] ENDOSCOPY       Medications Prior to Admission:    Prior to Admission medications    Medication Sig Start Date End Date Taking? Authorizing Provider   LORazepam (ATIVAN PO) Take by mouth Patient doesn't know dose or frequency its prescribed for. She has only taken it once and it was too strong for her.    Yes Historical Provider, MD   Cholecalciferol (VITAMIN D) 50 MCG (2000 UT) CAPS capsule Take by mouth daily    Historical Provider, MD   escitalopram (LEXAPRO) 5 MG tablet Take 1 tablet by mouth daily 4/7/22   Angel Lawrence DO   folic acid (FOLVITE) 1 MG tablet Take 1 tablet by mouth daily 2/26/22   Rj Rodriguez MD   vitamin B-1 (THIAMINE) 100 MG tablet Take 1 tablet by mouth daily  Patient not taking: Reported on 8/16/2022 2/26/22   Rj Rodriguez MD   carvedilol (COREG) 3.125 MG tablet Take 1 tablet by mouth 2 times daily 2/26/22 Sohail Mar MD   spironolactone (ALDACTONE) 50 MG tablet Take 50 mg by mouth daily 9/13/21   Historical Provider, MD   furosemide (LASIX) 20 MG tablet Take 20 mg by mouth in the morning. 5/7/21   Historical Provider, MD   lactulose (CHRONULAC) 10 GM/15ML solution Take 20 g by mouth 3 times daily as needed (titrate to 3 BM) 2/12/21   Historical Provider, MD   omeprazole (PRILOSEC) 20 MG delayed release capsule Take 20 mg by mouth Daily As needed 3/5/21   Historical Provider, MD   KLOR-CON M20 20 MEQ extended release tablet Take 20 mEq by mouth daily Takes 2 pills daily 3/30/21   Historical Provider, MD   vitamin A 3 MG (61613 UT) capsule Take 10,000 Units by mouth daily 10/1/20   Historical Provider, MD   CREON 45667 units delayed release capsule Take 12,000 Units by mouth 4 times daily (before meals and nightly)  2/12/20   Historical Provider, MD       Allergies:    Pcn [penicillins]    Social History:    reports that she has never smoked. She has never used smokeless tobacco. She reports current alcohol use. She reports that she does not use drugs. Family History:   family history includes Breast Cancer in an other family member; High Blood Pressure in her mother; Other in her father and mother.      PHYSICAL EXAM:  Vitals:  BP (!) 123/93   Pulse (!) 113   Temp 98 °F (36.7 °C) (Oral)   Resp 18   Ht 5' 4\" (1.626 m)   Wt 118 lb (53.5 kg)   SpO2 96%   BMI 20.25 kg/m²     General Appearance: alert and oriented to person, place and time and in no acute distress  Skin: warm and dry, jaundice  Head: normocephalic and atraumatic  Eyes: pupils equal, round, and reactive to light, extraocular eye movements intact, scleral icterus  Neck: neck supple and non tender without mass   Pulmonary/Chest: clear to auscultation bilaterally- no wheezes, rales or rhonchi, normal air movement, no respiratory distress  Cardiovascular: normal rate, normal S1 and S2 and no carotid bruits  Abdomen: soft, tender, distended, normal bowel sounds, no masses or organomegaly  Extremities: no cyanosis, no clubbing and no edema  Neurologic: no cranial nerve deficit and speech normal    LABS:  Recent Labs     08/15/22  2213   *   K 2.8*   CL 92*   CO2 19*   BUN 8   CREATININE 0.7   GLUCOSE 116*   CALCIUM 7.8*       Recent Labs     08/16/22  0308   WBC 8.9   RBC 2.16*   HGB 7.6*   HCT 22.1*   .3*   MCH 35.2*   MCHC 34.4   RDW 21.4*   PLT 96*   MPV 11.3       No results for input(s): POCGLU in the last 72 hours. I reviewed all labs and diagnostic images        Radiology: No results found. EKG:     ASSESSMENT:      Principal Problem:    Electrolyte abnormality  Active Problems:    Jaundice    Elevated liver enzymes    Alcoholic cirrhosis (HCC)  Resolved Problems:    * No resolved hospital problems. *      PLAN:    Electrolyte abnormalities  -Na 125, K 2.8, Mag 1.3  -secondary to alcohol abuse, dehydration, liver cirrhosis  -replace potassium and Mag, follow BMP and Mag levels  -consult nephrology    2.  alcoholic liver cirrhosis  -follows with Dr. Alicia Carrel at [facility] [place] consult  -elevated liver enzymes, jaundice  -check ammonia level  -resume home lasix, aldactone, lactulose, creon    3. Alcohol abuse  -continues to drink  -counseled on cessation    5. Anemia, macrocytic  -likely due to alcohol abuse, liver disease  - add thiamine, folic acid  -hgb 7.6. Monitor H&H, plans to transfuse for Hgb <7.0. No overt signs of bleeding. 6. Headache:  -prn Ibuprofen (ok per pharmacy)    7. HTN:  -resume home coreg    Code Status: full  DVT prophylaxis: SCDs        Time spent : 45 mins     Electronically signed by RADHA Parkinson - CNP on 8/16/2022 at 9:03 AM    Addendum: I have personally participated in the history, exam, medical decision making with Erika Harris on the date of service and I agree with all of the pertinent clinical information unless otherwise noted.  I have also reviewed and agree with the past medical, family, and social history unless otherwise noted. Patient was admitted with weakness    PHYSICAL EXAM:  Vitals:  BP (!) 123/93   Pulse (!) 113   Temp 98 °F (36.7 °C) (Oral)   Resp 18   Ht 5' 4\" (1.626 m)   Wt 118 lb (53.5 kg)   SpO2 96%   BMI 20.25 kg/m²   Gen: awake, alert, NAD  Lungs: clear to auscultation bilaterally no crackles no wheezing. Heart: RRR, no murmur   Abdomen: soft nontender nondistended positive bowel sounds. Extremities: full range of motion no peripheral edema. Impression:  Principal Problem:    Electrolyte abnormality  Active Problems:    Jaundice    Elevated liver enzymes    Alcoholic cirrhosis (HCC)  Resolved Problems:    * No resolved hospital problems. *      My findings/plan include:    Nephrology has been consulted for electrolyte abnormalities including hyponatremia, hypokalemia, and hypomagnesiumia. She also has alcoholic liver disease and GI is on board. NOTE: This report was transcribed using voice recognition software. Every effort was made to ensure accuracy; however, inadvertent computerized transcription errors may be present.     Electronically signed by Tami Vaz DO on 8/16/2022 at 5:23 PM

## 2022-08-16 NOTE — PROGRESS NOTES
Patient brought to room 613 by transport. Moved to room 619 to be closer to nurse station for potential withdrawal side effects.

## 2022-08-16 NOTE — CONSULTS
Nephrology Consult Note  Patient's Name: Jose Eduardo Moran  2:57 PM  8/16/2022    Nephrologist: Duran Chandler    Reason for Consult:  Electrolyte abnormalities  Requesting Physician:  Dr. Sonny Camarena. Del    Chief Complaint:  Fatigue and Abnl Labs    History Obtained From:  patient and past medical records    History of Present Ilness:    Jose Eduardo Moran is a 46 y.o. female with prior history of ETOH Chronic Liver Disease with Cirrhosis. She notes she had paracentesis on 8/11/22 and then over the weekend becoming increasingly weaker. She notes her urine as taken on a deeper color, no dysuria no gross hematuria. She has abd tenderness at the site of the paracentesis. She notes an increase in her jaundice. Labs in the ED Na+ 125, K+ 2.8, HCO3 19, Mg++ 1.3, Ca++ 7.8 and alb 2.1.  She denies N/V/D, no lightheadedness or dizziness    Past Medical History:   Diagnosis Date    Alcohol abuse     Alcoholic cirrhosis ([address]     Anxiety     not on any meds for it    Hypertension     has not required any med a few years as of 8/2019    Hypothyroidism     in her early 25s - pt was on levothyroxine for a while and then hypothyroidism apparently resolved and pt has been off med since her mid-20s    Pancreatic cyst 01/2017    Pt underwent partial pancreatectomy and splenectomy at [facility] 2/23/2017 and was told pathology showed MCN (mucinous cystic neoplasm) of pancreas with clean margins at surgery       Past Surgical History:   Procedure Laterality Date    [address]    HAND SURGERY Left 2/22/2022    LEFT STERNOCLAVICULAR JOINT DEBRIDEMENT performed by Leonor Glover MD at [place] [address] PICC LINE  2/26/2022         PANCREAS SURGERY  02/23/2017    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 - 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    SPLENECTOMY, TOTAL  02/23/2017    along with partial pancreatectomy    UPPER GASTROINTESTINAL ENDOSCOPY N/A 7/4/2020    EGD CONTROL HEMORRHAGE performed by John Tee MD at [facility] ENDOSCOPY  2020    EGD ESOPHAGOGASTRODUODENOSCOPY ENDOSCOPIC VARICEAL TREATMENT performed by John Tee MD at [address] [place] [address] N/A 2021    EGD, BANDING OF VARICES performed by John Tee MD at [facility] N/A 2022    EGD BAND LIGATION performed by John Tee MD at [facility] ENDOSCOPY       Family History   Problem Relation Age of Onset    Other Mother         lymphoma    High Blood Pressure Mother     Other Father         ms    Breast Cancer Other         AUNT        reports that she has never smoked. She has never used smokeless tobacco. She reports current alcohol use. She reports that she does not use drugs.     Allergies:  Pcn [penicillins]    Current Medications:    carvedilol (COREG) tablet 3.125 mg, BID  lipase-protease-amylase (CREON) delayed release capsule 12,000 Units, 4x Daily AC & HS  escitalopram (LEXAPRO) tablet 5 mg, Daily  folic acid (FOLVITE) tablet 1 mg, Daily  furosemide (LASIX) tablet 20 mg, Daily  potassium chloride (KLOR-CON M) extended release tablet 40 mEq, BID WC  lactulose (CHRONULAC) 10 GM/15ML solution 20 g, TID PRN  pantoprazole (PROTONIX) injection 40 mg, Daily  spironolactone (ALDACTONE) tablet 50 mg, Daily  vitamin A capsule 3 mg (Patient Supplied), Daily  thiamine tablet 100 mg, Daily  sodium chloride flush 0.9 % injection 5-40 mL, 2 times per day  sodium chloride flush 0.9 % injection 5-40 mL, PRN  0.9 % sodium chloride infusion, PRN  ondansetron (ZOFRAN-ODT) disintegrating tablet 4 mg, Q8H PRN   Or  ondansetron (ZOFRAN) injection 4 mg, Q6H PRN  polyethylene glycol (GLYCOLAX) packet 17 g, Daily PRN  acetaminophen (TYLENOL) tablet 650 mg, Q6H PRN   Or  acetaminophen (TYLENOL) suppository 650 mg, Q6H PRN  magnesium sulfate 2000 mg in 50 mL IVPB premix, PRN  potassium chloride (KLOR-CON M) extended release tablet 40 mEq, PRN   Or  potassium bicarb-citric acid (EFFER-K) effervescent tablet 40 mEq, PRN   Or  potassium chloride 10 mEq/100 mL IVPB (Peripheral Line), PRN  ibuprofen (ADVIL;MOTRIN) tablet 600 mg, Q6H PRN  promethazine (PHENERGAN) injection 25 mg, Q6H PRN        Review of Systems:   Pertinent items are noted in HPI.     Physical exam:   Constitutional:  Young female in NAD  Vitals: VITALS:  BP (!) 123/93   Pulse (!) 113   Temp 98 °F (36.7 °C) (Oral)   Resp 18   Ht 5' 4\" (1.626 m)   Wt 118 lb (53.5 kg)   SpO2 96%   BMI 20.25 kg/m²   24HR INTAKE/OUTPUT:  No intake or output data in the 24 hours ending 08/16/22 1459  URINARY CATHETER OUTPUT (Cerna):     DRAIN/TUBE OUTPUT:     VENT SETTINGS:     Additional Respiratory Assessments  Heart Rate: (!) 113  Resp: 18  SpO2: 96 %  Skin: no rash, turgor wnl, jaundiced  Heent:  eomi, mmm, scleral icterus  Neck: no bruits or jvd noted  Cardiovascular:  S1, S2 without m/r/g  Respiratory: CTA B without w/r/r  Abdomen:  +bs, soft, distended, tender, (+) fluid wave  Ext: trace LE edema lower extremity edema  Psychiatric: mood and affect appropriate        Data:   Labs:  CBC:   Lab Results   Component Value Date/Time    WBC 8.9 08/16/2022 03:08 AM    RBC 2.16 08/16/2022 03:08 AM    RBC 3.55 07/08/2019 02:45 PM    HGB 7.6 08/16/2022 03:08 AM    HCT 22.1 08/16/2022 03:08 AM    .3 08/16/2022 03:08 AM    MCH 35.2 08/16/2022 03:08 AM    MCHC 34.4 08/16/2022 03:08 AM    RDW 21.4 08/16/2022 03:08 AM    PLT 96 08/16/2022 03:08 AM    MPV 11.3 08/16/2022 03:08 AM     CBC with Differential:    Lab Results   Component Value Date/Time    WBC 8.9 08/16/2022 03:08 AM    RBC 2.16 08/16/2022 03:08 AM    RBC 3.55 07/08/2019 02:45 PM    HGB 7.6 08/16/2022 03:08 AM    HCT 22.1 08/16/2022 03:08 AM    PLT 96 08/16/2022 03:08 AM    .3 08/16/2022 03:08 AM    MCH 35.2 08/16/2022 03:08 AM    MCHC 34.4 08/16/2022 03:08 AM    RDW 21.4 08/16/2022 03:08 AM    NRBC 0.0 03/25/2022 03:26 PM    SEGSPCT 47 03/13/2012 11:30 AM    BLASTSPCT 0.9 07/08/2020 03:40 AM    METASPCT 0.9 02/25/2022 06:38 AM    LYMPHOPCT 14.8 08/16/2022 03:08 AM    LYMPHOPCT 30.0 07/08/2019 02:45 PM    PROMYELOPCT 0.9 03/15/2022 02:43 PM    MONOPCT 11.9 08/16/2022 03:08 AM    MYELOPCT 1.7 03/15/2022 02:43 PM    EOSPCT 2.2 07/08/2019 02:45 PM    BASOPCT 1.1 08/16/2022 03:08 AM    MONOSABS 1.05 08/16/2022 03:08 AM    LYMPHSABS 1.31 08/16/2022 03:08 AM    EOSABS 0.42 08/16/2022 03:08 AM    BASOSABS 0.10 08/16/2022 03:08 AM     Hemoglobin/Hematocrit:    Lab Results   Component Value Date/Time    HGB 7.6 08/16/2022 03:08 AM    HCT 22.1 08/16/2022 03:08 AM     CMP:    Lab Results   Component Value Date/Time     08/16/2022 10:03 AM    K 2.8 08/16/2022 10:03 AM    K 2.8 08/15/2022 10:13 PM    CL 99 08/16/2022 10:03 AM    CO2 22 08/16/2022 10:03 AM    BUN 8 08/16/2022 10:03 AM    CREATININE 0.6 08/16/2022 10:03 AM    GFRAA >60 08/16/2022 10:03 AM    LABGLOM >60 08/16/2022 10:03 AM    GLUCOSE 91 08/16/2022 10:03 AM    GLUCOSE 102 07/08/2019 02:45 PM    PROT 8.1 08/16/2022 10:03 AM    LABALBU 1.8 08/16/2022 10:03 AM    LABALBU 5.0 03/13/2012 11:30 AM    CALCIUM 7.5 08/16/2022 10:03 AM    BILITOT 10.5 08/16/2022 10:03 AM    ALKPHOS 139 08/16/2022 10:03 AM     08/16/2022 10:03 AM    ALT 28 08/16/2022 10:03 AM     BMP:    Lab Results   Component Value Date/Time     08/16/2022 10:03 AM    K 2.8 08/16/2022 10:03 AM    K 2.8 08/15/2022 10:13 PM    CL 99 08/16/2022 10:03 AM    CO2 22 08/16/2022 10:03 AM    BUN 8 08/16/2022 10:03 AM    LABALBU 1.8 08/16/2022 10:03 AM    LABALBU 5.0 03/13/2012 11:30 AM    CREATININE 0.6 08/16/2022 10:03 AM    CALCIUM 7.5 08/16/2022 10:03 AM    GFRAA >60 08/16/2022 10:03 AM    LABGLOM >60 08/16/2022 10:03 AM    GLUCOSE 91 08/16/2022 10:03 AM    GLUCOSE 102 07/08/2019 02:45 PM     Potassium:    Lab Results   Component Value Date/Time    K 2.8 08/16/2022 10:03 AM    K 2.8 08/15/2022 10:13 PM     Hepatic Function Panel:    Lab Results   Component Value Date/Time    [facility] SOUTH 139 08/16/2022 10:03 AM    ALT 28 08/16/2022 10:03 AM     08/16/2022 10:03 AM    PROT 8.1 08/16/2022 10:03 AM    BILITOT 10.5 08/16/2022 10:03 AM    BILIDIR 3.8 09/19/2021 03:55 AM    IBILI 2.3 09/19/2021 03:55 AM    LABALBU 1.8 08/16/2022 10:03 AM    LABALBU 5.0 03/13/2012 11:30 AM     Albumin:    Lab Results   Component Value Date/Time    LABALBU 1.8 08/16/2022 10:03 AM    LABALBU 5.0 03/13/2012 11:30 AM     Calcium:    Lab Results   Component Value Date/Time    CALCIUM 7.5 08/16/2022 10:03 AM     Ionized Calcium:  No results found for: IONCA  Magnesium:    Lab Results   Component Value Date/Time    MG 1.5 08/16/2022 10:03 AM     Phosphorus:    Lab Results   Component Value Date/Time    PHOS 2.8 03/01/2022 03:18 PM     LDH:  No results found for: LDH  Uric Acid:  No results found for: LABURIC, URICACID  PT/INR:    Lab Results   Component Value Date/Time    PROTIME 28.2 07/19/2022 02:42 PM    INR 2.5 07/19/2022 02:42 PM     PTT:    Lab Results   Component Value Date/Time    APTT 33.4 02/20/2022 11:36 AM   [APTT}  Troponin:    Lab Results   Component Value Date/Time    TROPONINI <0.01 07/04/2020 02:07 PM     U/A:    Lab Results   Component Value Date/Time    COLORU ORANGE 08/15/2022 10:13 PM    PROTEINU Negative 08/15/2022 10:13 PM    PHUR 6.5 08/15/2022 10:13 PM    LABCAST MODERATE 08/09/2019 01:58 PM    WBCUA 2-5 08/15/2022 10:13 PM    RBCUA 0-1 08/15/2022 10:13 PM    MUCUS Present 08/09/2019 01:58 PM    BACTERIA MODERATE 08/15/2022 10:13 PM    CLARITYU Clear 08/15/2022 10:13 PM    SPECGRAV <=1.005 08/15/2022 10:13 PM    LEUKOCYTESUR Negative 08/15/2022 10:13 PM    UROBILINOGEN 4.0 08/15/2022 10:13 PM    BILIRUBINUR LARGE 08/15/2022 10:13 PM    BLOODU TRACE-LYSED 08/15/2022 10:13 PM    GLUCOSEU Negative 08/15/2022 10:13 PM     ABG:    Lab Results   Component Value Date/Time    PH 7.427 07/09/2020 04:54 AM    PCO2 37.7 07/09/2020 04:54 AM    PO2 72.9 07/09/2020 04:54 AM    HCO3 24.3 07/09/2020 04:54 AM    BE 0.0 07/09/2020 04:54 AM    O2SAT 94.0 07/09/2020 04:54 AM     HgBA1c:  No results found for: LABA1C  Microalbumen/Creatinine ratio:  No components found for: RUCREAT  FLP:    Lab Results   Component Value Date/Time    TRIG 112 06/01/2017 11:15 AM    HDL 81 06/01/2017 11:15 AM    LDLCALC 132 06/01/2017 11:15 AM    LABVLDL 22 06/01/2017 11:15 AM     TSH:    Lab Results   Component Value Date/Time    TSH 2.830 07/01/2022 12:42 AM     VITAMIN B12: No components found for: B12  FOLATE:    Lab Results   Component Value Date/Time    FOLATE 11.0 05/24/2022 09:35 AM     Iron Saturation:  No components found for: PERCENTFE  FERRITIN:    Lab Results   Component Value Date/Time    FERRITIN 37 05/24/2022 09:35 AM     LIPASE:    Lab Results   Component Value Date/Time    LIPASE 47 08/16/2022 03:08 AM     Fibrinogen Level:  No components found for: FIB  Urine Toxicology:  No components found for: IAMMENTA, IBARBIT, IBENZO, ICOCAINE, IMARTHC, IOPIATES, IPHENCYC  24 Hour Urine for Protein:  No components found for: RAWUPRO, UHRS3, YNPE72PI, UTV3  24 Hour Urine for Creatinine Clearance:  No components found for: CREAT4, UHRS10, UTV10     Imaging:  CXR results:XR CHEST (2 VW) [[id number]] Collected: 08/15/22 2350     Order Status: Completed Specimen: Chest Updated: 08/15/22 2356    Narrative:      EXAMINATION:   TWO XRAY VIEWS OF THE CHEST     8/15/2022 11:26 pm     COMPARISON:   None. HISTORY:   ORDERING SYSTEM PROVIDED HISTORY: sob   TECHNOLOGIST PROVIDED HISTORY:   Reason for exam:->sob     FINDINGS:   The heart size and pulmonary vasculature are within normal limits. No acute pulmonary infiltrate, pleural effusion or pneumothorax is   identified. Medial right basilar density may represent vasculature although   lung nodule is not totally excluded, elevated left hemidiaphragm/eventration   obscures lateral view assessment.      No acute osseous abnormality is seen.     Impression:      Vascular aggregation versus nodular opacification in the right medial base. Suggest clinical correlation and short-term follow-up evaluation by   radiographs or CT. Assessment  1-Hypervolemic hyponatremia in the  setting of the Acute on Chronic Liver Disease with Ascites  Na+ up from 125-->132  PLAN:  Continue to monitor  Free water restriction  Follow Na+ on the Spironolactone    2-Hypokalemia in the setting of the outpt lactulose and furosemide and the Hypomagnesemia  PLAN:  Continue to supplement K+ & Mg++  Follow K+ & Mg++    3-Hypocalcemia in the setting of the Hypoalbuminemia and Hypomagnesemia  PLAN:  1. Follow Ionized Ca++, Vit D, PTH, PO4    4-Macrocytic Anemia  PLAN:  1. Check B12, folate, TSH and free T4    5- Acute on Chronic ETOH Liver Disease  PLAN:  1.  Defer to GI      Thank you  for allowing us to participate in care of Joanne Stock MD  2:57 PM  8/16/2022

## 2022-08-16 NOTE — CONSULTS
[address] [address]                                  CONSULTATION    PATIENT NAME: Kendell Ahumada                   :        1980  MED REC NO:   [id number]                            ROOM:       [id number]  ACCOUNT NO:   [de-identified]                           ADMIT DATE: 2022  PROVIDER:     Drew Deleon MD    CONSULT DATE:  2022    REASON FOR CONSULTATION:  Decompensated cirrhosis with electrolyte  abnormalities. SUBJECTIVE:  She is 46years of age. She has a longstanding history of  alcohol and continues to drink. She has had multiple episodes of GI  bleeding occurring in the setting of small varices that she did well  after her last banding. However, her baseline bilirubin, which has been  2 for a long period of time, was noted to rise to 9 when she was  hospitalized in July with weakness and hypokalemia and she probably  should stay in the hospital longer, but she had a vacation planned  And wanted to go, which she did do. She suggested she traveled with her mother. She returned home and was prepared to return to work. 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 However, she has had issues with  ascites recently and underwent paracentesis on two separate occasions. She does take spironolactone at home at a dose of 50 mg and furosemide  at 20. She takes lactulose and was taking potassium. She called me in the office yesterday and suggested she was exceedingly  weak. She was eating. She was denying vomiting of bleeding, however,  based on her previous history of rather severe hypokalemia, I suggested  an emergency room visit. When she arrived, she had hypokalemia  confirmed as well as significant hyponatremia and bilirubin which was  8.4 a month ago, which was now 10.7. She acknowledges ongoing alcohol  consumption.   She has been persistently anemic, but she is macrocytic. Renal function is currently remarkable for a BUN of 8 and creatinine  0.7. PAST MEDICAL AND SURGICAL HISTORY:  Includes a splenectomy and distal  pancreatectomy for mucinous cystadenoma with dysplasia at the [facility] in 2017. She had multiple endoscopic procedures. She had a  septic sternoclavicular joint earlier this year requiring prolonged  antibiotic therapy. There is a history of hypertension, alcohol abuse,  and cirrhosis with decompensation. MEDICATIONS:  At home prior to admission, she was on Ativan, vitamin D,  Lexapro, thiamine, Coreg, Aldactone, Lasix, Chronulac, omeprazole,  potassium, vitamin A, and Creon. OBJECTIVE:  She is afebrile. She just woke up. She is oriented. Last  recorded blood pressure 123/93 with room air saturation of 96% and a  pulse which has been persistently 100 to 110. She is obviously  jaundiced. She had a degree of pallor. Muscle mass is diminished. Cranial nerves intact. Heart and lungs unremarkable and some ascites  and a little bit of edema distally. ASSESSMENT:  An unfortunate story. She really cannot be considered a  transplant candidate because of her acknowledged ongoing alcohol abuse. She has significant electrolyte abnormalities. Renal to see. Potassium  is being replaced. Magnesium is being replaced. She is malnourished. She is anemic, but not bleeding. RECOMMENDATIONS:  Potassium replacement. Fluid restriction. Withholding diuretics currently. Prognosis is terrible.         Brian Wu MD    D: 08/16/2022 10:10:21       T: 08/16/2022 10:15:26     [email or web address]  Job#: [id number]     Doc#: [id number]    CC:

## 2022-08-16 NOTE — ED NOTES
Department of Emergency Medicine  FIRST PROVIDER TRIAGE NOTE             Independent MLP           8/15/22  8:54 PM EDT    Date of Encounter: 8/15/22   MRN: [id number]      HPI: Tim Luna is a 46 y.o. female who presents to the ED for Fatigue and Abnormal Lab (States has been unsteady and had blood work done and states Dr. Miller Newer her that her electrolytes were off)   paracentesis Thursday   feels weak and unsteady. Mild sob with exertion    ROS: Negative for abd pain or fever. PE: Gen Appearance/Constitutional: alert  CV: regular rate  Pulm: CTA bilat     Initial Plan of Care: All treatment areas with department are currently occupied. Plan to order/Initiate the following while awaiting opening in ED: labs, EKG, and imaging studies.   Initiate Treatment-Testing, Proceed toTreatment Area When Bed Available for ED Attending/MLP to Continue Care    Electronically signed by SYDNIE Roberson   DD: 8/15/22      SYDNIE Roberson  08/15/22 [id number]

## 2022-08-16 NOTE — LETTER
[address] [facility] [address]  Phone: [phone]    No name on file. August 17, 2022     Patient: Sirisha Wild   YOB: 1980   Date of Visit: 8/15/2022       To Whom It May Concern: It is my medical opinion that Edi Harjinder should remain out of work until 08/22/22. If you have any questions or concerns, please don't hesitate to call.     Sincerely,      Elian Garcia, DO

## 2022-08-16 NOTE — ED PROVIDER NOTES
never smoked. She has never used smokeless tobacco. She reports current alcohol use. She reports that she does not use drugs. Family History: family history includes Breast Cancer in an other family member; High Blood Pressure in her mother; Other in her father and mother. The patients home medications have been reviewed. Allergies: Pcn [penicillins]        ---------------------------------------------------PHYSICAL EXAM--------------------------------------    Constitutional/General: Alert and oriented x3, well appearing, non toxic in NAD; thin  Head: Normocephalic and atraumatic  Eyes: PERRL, EOMI, conjunctive normal, positive for scleral icterus  Mouth: Oropharynx clear, handling secretions, no trismus, no asymmetry of the posterior oropharynx or uvular edema  Neck: Supple, full ROM, non tender to palpation in the midline, no stridor, no crepitus, no meningeal signs  Respiratory: Lungs clear to auscultation bilaterally, no wheezes, rales, or rhonchi. Not in respiratory distress  Cardiovascular:  Regular rate. Regular rhythm. No murmurs, gallops, or rubs. 2+ distal pulses  Chest: No chest wall tenderness  GI:  Abdomen Soft, Non tender, mildly distended with fluid wave and ascites +BS. No organomegaly, no palpable masses,  No rebound, guarding, or rigidity. Musculoskeletal: Moves all extremities x 4. Warm and well perfused, no clubbing, cyanosis, or edema. Capillary refill <3 seconds  Integument: skin warm and dry. No rashes. Significant jaundice  Lymphatic: no lymphadenopathy noted  Neurologic: GCS 15, no focal deficits, symmetric strength 5/5 in the upper and lower extremities bilaterally  Psychiatric: Normal Affect    -------------------------------------------------- RESULTS -------------------------------------------------  I have personally reviewed all laboratory and imaging results for this patient. Results are listed below.      LABS:  Results for orders placed or performed during the hospital encounter of 08/16/22   COVID-19, Rapid    Specimen: Nasopharyngeal Swab   Result Value Ref Range    SARS-CoV-2, NAAT Not Detected Not Detected   Comprehensive Metabolic Panel w/ Reflex to MG   Result Value Ref Range    Sodium 125 (L) 132 - 146 mmol/L    Potassium reflex Magnesium 2.8 (L) 3.5 - 5.0 mmol/L    Chloride 92 (L) 98 - 107 mmol/L    CO2 19 (L) 22 - 29 mmol/L    Anion Gap 14 7 - 16 mmol/L    Glucose 116 (H) 74 - 99 mg/dL    BUN 8 6 - 20 mg/dL    Creatinine 0.7 0.5 - 1.0 mg/dL    GFR Non-African American >60 >=60 mL/min/1.73    GFR African American >60     Calcium 7.8 (L) 8.6 - 10.2 mg/dL    Total Protein 9.4 (H) 6.4 - 8.3 g/dL    Albumin 2.1 (L) 3.5 - 5.2 g/dL    Total Bilirubin 10.7 (H) 0.0 - 1.2 mg/dL    Alkaline Phosphatase 175 (H) 35 - 104 U/L    ALT 33 (H) 0 - 32 U/L     (H) 0 - 31 U/L   Urinalysis   Result Value Ref Range    Color, UA ORANGE (A) Straw/Yellow    Clarity, UA Clear Clear    Glucose, Ur Negative Negative mg/dL    Bilirubin Urine LARGE (A) Negative    Ketones, Urine Negative Negative mg/dL    Specific Gravity, UA <=1.005 1.005 - 1.030    Blood, Urine TRACE-LYSED Negative    pH, UA 6.5 5.0 - 9.0    Protein, UA Negative Negative mg/dL    Urobilinogen, Urine 4.0 (A) <2.0 E.U./dL    Nitrite, Urine Negative Negative    Leukocyte Esterase, Urine Negative Negative   Troponin   Result Value Ref Range    Troponin, High Sensitivity 111 (H) 0 - 9 ng/L   Brain Natriuretic Peptide   Result Value Ref Range    Pro- (H) 0 - 125 pg/mL   SPECIMEN REJECTION   Result Value Ref Range    Rejected Test cbcwd     Reason for Rejection see below    CBC with Auto Differential   Result Value Ref Range    WBC 8.9 4.5 - 11.5 E9/L    RBC 2.16 (L) 3.50 - 5.50 E12/L    Hemoglobin 7.6 (L) 11.5 - 15.5 g/dL    Hematocrit 22.1 (L) 34.0 - 48.0 %    .3 (H) 80.0 - 99.9 fL    MCH 35.2 (H) 26.0 - 35.0 pg    MCHC 34.4 32.0 - 34.5 %    RDW 21.4 (H) 11.5 - 15.0 fL    Platelets 96 (L) 101 - 450 E9/L    MPV 11.3 7.0 - 12.0 fL    Neutrophils % 66.5 43.0 - 80.0 %    Immature Granulocytes % 1.0 0.0 - 5.0 %    Lymphocytes % 14.8 (L) 20.0 - 42.0 %    Monocytes % 11.9 2.0 - 12.0 %    Eosinophils % 4.7 0.0 - 6.0 %    Basophils % 1.1 0.0 - 2.0 %    Neutrophils Absolute 5.88 1.80 - 7.30 E9/L    Immature Granulocytes # 0.09 E9/L    Lymphocytes Absolute 1.31 (L) 1.50 - 4.00 E9/L    Monocytes Absolute 1.05 (H) 0.10 - 0.95 E9/L    Eosinophils Absolute 0.42 0.05 - 0.50 E9/L    Basophils Absolute 0.10 0.00 - 0.20 E9/L    Anisocytosis 2+     Hypochromia 1+     Poikilocytes 1+     Ovalocytes 1+     Target Cells 1+    Magnesium   Result Value Ref Range    Magnesium 1.3 (L) 1.6 - 2.6 mg/dL   Microscopic Urinalysis   Result Value Ref Range    WBC, UA 2-5 0 - 5 /HPF    RBC, UA 0-1 0 - 2 /HPF    Epithelial Cells, UA MODERATE /HPF    Bacteria, UA MODERATE (A) None Seen /HPF   Troponin   Result Value Ref Range    Troponin, High Sensitivity 107 (H) 0 - 9 ng/L   Lipase   Result Value Ref Range    Lipase 47 13 - 60 U/L   Platelet Confirmation   Result Value Ref Range    Platelet Confirmation CONFIRMED    Magnesium   Result Value Ref Range    Magnesium 1.5 (L) 1.6 - 2.6 mg/dL   Comprehensive Metabolic Panel   Result Value Ref Range    Sodium 132 132 - 146 mmol/L    Potassium 2.8 (L) 3.5 - 5.0 mmol/L    Chloride 99 98 - 107 mmol/L    CO2 22 22 - 29 mmol/L    Anion Gap 11 7 - 16 mmol/L    Glucose 91 74 - 99 mg/dL    BUN 8 6 - 20 mg/dL    Creatinine 0.6 0.5 - 1.0 mg/dL    GFR Non-African American >60 >=60 mL/min/1.73    GFR African American >60     Calcium 7.5 (L) 8.6 - 10.2 mg/dL    Total Protein 8.1 6.4 - 8.3 g/dL    Albumin 1.8 (L) 3.5 - 5.2 g/dL    Total Bilirubin 10.5 (H) 0.0 - 1.2 mg/dL    Alkaline Phosphatase 139 (H) 35 - 104 U/L    ALT 28 0 - 32 U/L     (H) 0 - 31 U/L   Ammonia   Result Value Ref Range    Ammonia 66.7 (H) 11.0 - 51.0 umol/L   POC Pregnancy Urine Qual   Result Value Ref Range    HCG, Urine, POC Negative Negative    Lot Number [id number]     Positive QC Pass/Fail Pass     Negative QC Pass/Fail Pass    EKG 12 Lead   Result Value Ref Range    Ventricular Rate 89 BPM    Atrial Rate 89 BPM    P-R Interval 158 ms    QRS Duration 98 ms    Q-T Interval 440 ms    QTc Calculation (Bazett) 535 ms    P Axis 57 degrees    R Axis 39 degrees    T Axis 40 degrees       RADIOLOGY:  Interpreted by Radiologist.  XR CHEST (2 VW)   Final Result   Vascular aggregation versus nodular opacification in the right medial base. Suggest clinical correlation and short-term follow-up evaluation by   radiographs or CT. EKG:  EKG shows normal sinus rhythm 89 bpm no signs of any ST changes no ST elevation. QTc was 535. Chronic T wave inversion in V1. No change from prior EKG. EKG interpreted by myself.      ------------------------- NURSING NOTES AND VITALS REVIEWED ---------------------------   The nursing notes within the ED encounter and vital signs as below have been reviewed by myself. BP (!) 123/93   Pulse (!) 113   Temp 98 °F (36.7 °C) (Oral)   Resp 18   Ht 5' 4\" (1.626 m)   Wt 118 lb (53.5 kg)   SpO2 96%   BMI 20.25 kg/m²   Oxygen Saturation Interpretation: Normal    The patients available past medical records and past encounters were reviewed.         ------------------------------ ED COURSE/MEDICAL DECISION MAKING----------------------  Medications   carvedilol (COREG) tablet 3.125 mg (3.125 mg Oral Given 8/16/22 1103)   lipase-protease-amylase (CREON) delayed release capsule 12,000 Units (12,000 Units Oral Given 8/16/22 1102)   escitalopram (LEXAPRO) tablet 5 mg (5 mg Oral Given 8/75/03 9702)   folic acid (FOLVITE) tablet 1 mg (1 mg Oral Given 8/16/22 1103)   furosemide (LASIX) tablet 20 mg (20 mg Oral Given 8/16/22 1102)   potassium chloride (KLOR-CON M) extended release tablet 40 mEq (40 mEq Oral Given 8/16/22 8293)   lactulose (CHRONULAC) 10 GM/15ML solution 20 g (has no administration in time range)   pantoprazole admission. [KK]      ED Course User Index  [KK] Krystal Juan MD       Medical Decision Making:   Patient is a 72-year-old female history of alcohol abuse and history of cirrhosis. Patient comes in with electrolyte abnormalities generalized fatigue and muscle cramping the last couple days. She has had low potassium in the past we will recheck electrolytes and replete as necessary. Patient denies abdominal pain or fever    Differential diagnosis electrolyte abnormality dehydration EMERSON chronic anemia osteoporosis        This patient has been closely monitored during their ED course. EKG shows normal sinus rhythm 89 beats minute no signs of any ST changes. Patient presents without abdominal pain recent paracentesis. Abdomen is soft slightly distended due to ascites and fluid wave but no tenderness no pain. No concerns for SBP. Patient was given IV fluids. CBC came back with stable white count 8.9 hemoglobin stable at 7.6. No need for acute transfusion. Patient is chronic anemia of chronic disease. Troponin was 111 and 107 respectively with a delta of only 4 no chest pain lipase was normal.  Chemistry returned with hyponatremia of 125 glucose 116 normal anion gap CO2 was 19 potassium was 2.8 mag was 1.3. Patient was given IV potassium and magnesium repletion. Urinalysis is negative for acute infection urine pregnancy was negative COVID was negative chest x-ray Showed some bad vascular aggregation at the base but no other acute abnormalities. Patient will need to be admitted for rehydration and management of electrolytes. I spoke to the hospitalist agrees admit the patient to his service monitored bed. Re-Evaluations:             Re-evaluation.   Patients symptoms improved  Re-examination  8/16/22   1:48 AM EDT          Vital Signs:   Vitals:    08/15/22 2055 08/16/22 0357 08/16/22 0633 08/16/22 0728   BP: 119/79 134/82 (!) 100/58 (!) 123/93   Pulse: 94 (!) 112 99 (!) 113   Resp: 16 16 16 18   Temp: 97.8 °F (36.6 °C) 98.1 °F (36.7 °C)  98 °F (36.7 °C)   TempSrc:    Oral   SpO2: 94% 94% 95% 96%   Weight: 118 lb (53.5 kg)      Height: 5' 4\" (1.626 m)            CRITICAL CARE:  34 MINUTES. Please note that the withdrawal or failure to initiate urgent interventions for this patient would likely result in a life threatening deterioration or permanent disability. Accordingly this patient received the above mentioned time, excluding separately billable procedures. Counseling: The emergency provider has spoken with the patient and discussed todays results, in addition to providing specific details for the plan of care and counseling regarding the diagnosis and prognosis. Questions are answered at this time and they are agreeable with the plan.       --------------------------------- IMPRESSION AND DISPOSITION ---------------------------------    IMPRESSION  1. Alcoholic cirrhosis of liver with ascites ([facility] [address]    2. Hyponatremia    3. Dehydration    4. Hypokalemia        DISPOSITION  [facility]  Patient condition is fair      NOTE: This report was transcribed using voice recognition software.  Every effort was made to ensure accuracy; however, inadvertent computerized transcription errors may be present        Garett Joy MD  08/16/22 1124 [facility]       Garett Joy MD  09/23/22 5167

## 2022-08-16 NOTE — PROGRESS NOTES
P Quality Flow/Interdisciplinary Rounds Progress Note        Quality Flow Rounds held on August 16, 2022    Disciplines Attending:  Bedside Nurse, , , and Nursing Unit Leadership    Brody Hutton was admitted on 8/16/2022  1:25 AM    Anticipated Discharge Date:       Disposition:    Jeffry Score:       Readmission Risk              Risk of Unplanned Readmission:  0           Discussed patient goal for the day, patient clinical progression, and barriers to discharge. The following Goal(s) of the Day/Commitment(s) have been identified:  Monitor DT's, check GI plan.       Miles Pinzon RN  August 16, 2022

## 2022-08-17 VITALS
DIASTOLIC BLOOD PRESSURE: 78 MMHG | BODY MASS INDEX: 21.03 KG/M2 | RESPIRATION RATE: 19 BRPM | TEMPERATURE: 98 F | WEIGHT: 123.2 LBS | OXYGEN SATURATION: 95 % | HEART RATE: 84 BPM | SYSTOLIC BLOOD PRESSURE: 109 MMHG | HEIGHT: 64 IN

## 2022-08-17 PROBLEM — I10 PRIMARY HYPERTENSION: Status: ACTIVE | Noted: 2022-01-01

## 2022-08-17 PROBLEM — F10.10 ALCOHOL ABUSE: Status: ACTIVE | Noted: 2022-08-17

## 2022-08-17 PROBLEM — D53.9 MACROCYTIC ANEMIA: Status: ACTIVE | Noted: 2022-08-17

## 2022-08-17 PROBLEM — R51.9 NONINTRACTABLE HEADACHE: Status: ACTIVE | Noted: 2022-01-01

## 2022-08-17 PROBLEM — F32.A DEPRESSION: Status: ACTIVE | Noted: 2022-01-01

## 2022-08-17 LAB
ALBUMIN SERPL-MCNC: 1.8 G/DL (ref 3.5–5.2)
ALP BLD-CCNC: 131 U/L (ref 35–104)
ALT SERPL-CCNC: 27 U/L (ref 0–32)
ANION GAP SERPL CALCULATED.3IONS-SCNC: 10 MMOL/L (ref 7–16)
AST SERPL-CCNC: 192 U/L (ref 0–31)
BILIRUB SERPL-MCNC: 10.5 MG/DL (ref 0–1.2)
BUN BLDV-MCNC: 10 MG/DL (ref 6–20)
CALCIUM IONIZED: 1.14 MMOL/L (ref 1.15–1.33)
CALCIUM SERPL-MCNC: 7.8 MG/DL (ref 8.6–10.2)
CHLORIDE BLD-SCNC: 103 MMOL/L (ref 98–107)
CO2: 20 MMOL/L (ref 22–29)
CREAT SERPL-MCNC: 0.7 MG/DL (ref 0.5–1)
GFR AFRICAN AMERICAN: >60
GFR NON-AFRICAN AMERICAN: >60 ML/MIN/1.73
GLUCOSE BLD-MCNC: 88 MG/DL (ref 74–99)
HCT VFR BLD CALC: 24.1 % (ref 34–48)
HEMOGLOBIN: 8.1 G/DL (ref 11.5–15.5)
INR BLD: 3.3
MAGNESIUM: 1.7 MG/DL (ref 1.6–2.6)
MCH RBC QN AUTO: 35.5 PG (ref 26–35)
MCHC RBC AUTO-ENTMCNC: 33.6 % (ref 32–34.5)
MCV RBC AUTO: 105.7 FL (ref 80–99.9)
PARATHYROID HORMONE INTACT: 28 PG/ML (ref 15–65)
PDW BLD-RTO: 22.4 FL (ref 11.5–15)
PHOSPHORUS: 3.5 MG/DL (ref 2.5–4.5)
PLATELET # BLD: 114 E9/L (ref 130–450)
PMV BLD AUTO: 11.8 FL (ref 7–12)
POTASSIUM REFLEX MAGNESIUM: 3.9 MMOL/L (ref 3.5–5)
PROTHROMBIN TIME: 35.3 SEC (ref 9.3–12.4)
RBC # BLD: 2.28 E12/L (ref 3.5–5.5)
SODIUM BLD-SCNC: 133 MMOL/L (ref 132–146)
TOTAL PROTEIN: 8.4 G/DL (ref 6.4–8.3)
WBC # BLD: 8.1 E9/L (ref 4.5–11.5)

## 2022-08-17 PROCEDURE — 99239 HOSP IP/OBS DSCHRG MGMT >30: CPT | Performed by: PHYSICIAN ASSISTANT

## 2022-08-17 PROCEDURE — 85027 COMPLETE CBC AUTOMATED: CPT

## 2022-08-17 PROCEDURE — 82330 ASSAY OF CALCIUM: CPT

## 2022-08-17 PROCEDURE — 2580000003 HC RX 258: Performed by: NURSE PRACTITIONER

## 2022-08-17 PROCEDURE — 6360000002 HC RX W HCPCS: Performed by: NURSE PRACTITIONER

## 2022-08-17 PROCEDURE — 6370000000 HC RX 637 (ALT 250 FOR IP): Performed by: NURSE PRACTITIONER

## 2022-08-17 PROCEDURE — 6360000002 HC RX W HCPCS: Performed by: INTERNAL MEDICINE

## 2022-08-17 PROCEDURE — 80053 COMPREHEN METABOLIC PANEL: CPT

## 2022-08-17 PROCEDURE — 83735 ASSAY OF MAGNESIUM: CPT

## 2022-08-17 PROCEDURE — 36415 COLL VENOUS BLD VENIPUNCTURE: CPT

## 2022-08-17 PROCEDURE — 97161 PT EVAL LOW COMPLEX 20 MIN: CPT

## 2022-08-17 PROCEDURE — 2580000003 HC RX 258: Performed by: INTERNAL MEDICINE

## 2022-08-17 PROCEDURE — C9113 INJ PANTOPRAZOLE SODIUM, VIA: HCPCS | Performed by: NURSE PRACTITIONER

## 2022-08-17 PROCEDURE — 84100 ASSAY OF PHOSPHORUS: CPT

## 2022-08-17 PROCEDURE — 85610 PROTHROMBIN TIME: CPT

## 2022-08-17 RX ADMIN — Medication 100 MG: at 10:46

## 2022-08-17 RX ADMIN — PROMETHAZINE HYDROCHLORIDE 25 MG: 25 INJECTION INTRAMUSCULAR; INTRAVENOUS at 11:40

## 2022-08-17 RX ADMIN — PANCRELIPASE 12000 UNITS: 60000; 12000; 38000 CAPSULE, DELAYED RELEASE PELLETS ORAL at 06:43

## 2022-08-17 RX ADMIN — SODIUM CHLORIDE, PRESERVATIVE FREE 10 ML: 5 INJECTION INTRAVENOUS at 10:48

## 2022-08-17 RX ADMIN — FUROSEMIDE 20 MG: 20 TABLET ORAL at 10:46

## 2022-08-17 RX ADMIN — SPIRONOLACTONE 50 MG: 25 TABLET ORAL at 10:47

## 2022-08-17 RX ADMIN — PANCRELIPASE 12000 UNITS: 60000; 12000; 38000 CAPSULE, DELAYED RELEASE PELLETS ORAL at 11:02

## 2022-08-17 RX ADMIN — CALCIUM GLUCONATE 2000 MG: 98 INJECTION, SOLUTION INTRAVENOUS at 11:39

## 2022-08-17 RX ADMIN — POTASSIUM CHLORIDE 40 MEQ: 1500 TABLET, EXTENDED RELEASE ORAL at 10:46

## 2022-08-17 RX ADMIN — PANTOPRAZOLE SODIUM 40 MG: 40 INJECTION, POWDER, FOR SOLUTION INTRAVENOUS at 10:47

## 2022-08-17 RX ADMIN — CARVEDILOL 3.12 MG: 3.12 TABLET, FILM COATED ORAL at 10:46

## 2022-08-17 RX ADMIN — ESCITALOPRAM OXALATE 5 MG: 10 TABLET ORAL at 10:46

## 2022-08-17 RX ADMIN — FOLIC ACID 1 MG: 1 TABLET ORAL at 10:46

## 2022-08-17 NOTE — PROGRESS NOTES
Physical Therapy  Facility/Department: [facility]  Physical Therapy Initial Assessment    Name: Carol Brennan  : 1980  MRN: [id number]  Date of Service: 2022      Patient Diagnosis(es): The primary encounter diagnosis was Alcoholic cirrhosis of liver with ascites ([facility] [address] Diagnoses of Hyponatremia, Dehydration, and Hypokalemia were also pertinent to this visit. Past Medical History:  has a past medical history of Alcohol abuse, Alcoholic cirrhosis ([facility] [address] Anxiety, Hypertension, Hypothyroidism, and Pancreatic cyst.  Past Surgical History:  has a past surgical history that includes Arm Surgery (); Splenectomy, total (2017); Pancreas surgery (2017); Upper gastrointestinal endoscopy (N/A, 2020); Upper gastrointestinal endoscopy (2020); Upper gastrointestinal endoscopy (N/A, 2021); Hand surgery (Left, 2022); Upper gastrointestinal endoscopy (N/A, 2022); and Insert Picc Line (2022). Evaluating Therapist: Leland Meléndez PT    Room #:  3352/1332-B  Diagnosis:  Dehydration [E86.0]  Hypokalemia [E87.6]  Hyponatremia [Z28.3]  Alcoholic cirrhosis of liver with ascites (HCC) [K70.31]  Electrolyte abnormality [E87.8]  PMHx/PSHx:  Alcohol abuse, alcoholic cirrhosis  Precautions:  falls, alarm      Social:  Pt lives with parents and brother in a 2 floor plan. Prior to admission independent without device. Works as a      Initial Evaluation  Date: 22 Treatment      Short Term/ Long Term   Goals   Was pt agreeable to Eval/treatment? yes     Does pt have pain?  No c/o pain     Bed Mobility  Rolling: independent  Supine to sit: independent  Sit to supine: independent  Scooting: independent  independent   Transfers Sit to stand: CGA  Stand to sit: CGA  Stand pivot: CGA  independent   Ambulation    40 feet with no device  with CGA/min assist  150 feet with no device with supervision   Stair Negotiation  Ascended and descended  NT   12 steps with 1 rail with supervision   LE strength     3+/5    4/5   balance      fair     AM-PAC Raw score               19/24         Pt is alert and Oriented   LE ROM: WFL  Sensation: intact  Edema: none  Endurance: fair     ASSESSMENT:    Pt displays functional ability as noted in the objective portion of this evaluation. Patient education  Pt educated on PT objectives    Patient response to education:   Pt verbalized understanding Pt demonstrated skill Pt requires further education in this area   yes           Comments:  Pt unsteady with ambulation, Wide base of support, decreased step length and foot clearance. Fatigues quickly with ambulation    Pt's/ family goals   1. To get stronger    Conditions Requiring Skilled Therapeutic Intervention:    [x]Decreased strength     []Decreased ROM  [x]Decreased functional mobility  [x]Decreased balance   [x]Decreased endurance   []Decreased posture  []Decreased sensation  []Decreased coordination   []Decreased vision  []Decreased safety awareness   []Increased pain       Patient and or family understand(s) diagnosis, prognosis, and plan of care.     Prognosis is good for reaching above PT goals    PHYSICAL THERAPY PLAN OF CARE:    PT POC is established based on physician order and patient diagnosis     Referring provider/PT Order: Rafa John, DO/ PT eval and treat      Current Treatment Recommendations:     [x] Strengthening to improve independence with functional mobility   [] ROM to improve independence with functional mobility   [x] Balance Training to improve static/dynamic balance and to reduce fall risk  [x] Endurance Training to improve activity tolerance during functional mobility   [x] Transfer Training to improve safety and independence with all functional transfers   [x] Gait Training to improve gait mechanics, endurance and assess need for appropriate assistive device  [x] Stair Training in preparation for safe discharge home and/or into the community   [] Positioning to prevent skin breakdown and contractures  [x] Safety and Education Training   [x] Patient/Caregiver Education   [] HEP  [] Other     PT long term treatment goals are located in above grid    Frequency of treatments: 2-5x/week x 5-7 days. Time in  1100  Time out  1113      Evaluation Time includes thorough review of current medical information, gathering information on past medical history/social history and prior level of function, completion of standardized testing/informal observation of tasks, assessment of data and education on plan of care and goals.       CPT codes:  [x] Low Complexity PT evaluation 62460  [] Moderate Complexity PT evaluation 48897  [] High Complexity PT evaluation 63999  [] PT Re-evaluation 97792  [] Gait training 38644 minutes  [] Manual therapy 79464 minutes  [] Therapeutic activities 44312 minutes  [] Therapeutic exercises 86652 minutes  [] Neuromuscular reeducation 65248 minutes     [facility] PT [id number]

## 2022-08-17 NOTE — PROGRESS NOTES
Nephrology Progress Note  Patient's Name: Le Dozier  4:09 PM  8/17/2022    Nephrologist: Michelle Jorgensen    Reason for Consult:  Electrolyte abnormalities    History of Present Ilness from the 8/16/22 note:    Le Dozier is a 46 y.o. female with prior history of ETOH Chronic Liver Disease with Cirrhosis. She notes she had paracentesis on 8/11/22 and then over the weekend becoming increasingly weaker. She notes her urine as taken on a deeper color, no dysuria no gross hematuria. She has abd tenderness at the site of the paracentesis. She notes an increase in her jaundice. Labs in the ED Na+ 125, K+ 2.8, HCO3 19, Mg++ 1.3, Ca++ 7.8 and alb 2.1.  She denies N/V/D, no lightheadedness or dizziness    8/17/22: Pt states she feels better and is for D/C today    Past Medical History:   Diagnosis Date    Alcohol abuse     Alcoholic cirrhosis ([facility] [address]     Anxiety     not on any meds for it    Hypertension     has not required any med a few years as of 8/2019    Hypothyroidism     in her early 25s - pt was on levothyroxine for a while and then hypothyroidism apparently resolved and pt has been off med since her mid-20s    Pancreatic cyst 01/2017    Pt underwent partial pancreatectomy and splenectomy at [facility] 2/23/2017 and was told pathology showed MCN (mucinous cystic neoplasm) of pancreas with clean margins at surgery       Past Surgical History:   Procedure Laterality Date    [address]    HAND SURGERY Left 2/22/2022    LEFT STERNOCLAVICULAR JOINT DEBRIDEMENT performed by Delane Romberg, MD at [place] [address] PICC LINE  2/26/2022         PANCREAS SURGERY  02/23/2017    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 - 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    SPLENECTOMY, TOTAL  02/23/2017    along with partial pancreatectomy    UPPER GASTROINTESTINAL ENDOSCOPY N/A 7/4/2020    EGD CONTROL HEMORRHAGE performed by Cyrus Marie MD at [address] [place] [address] GASTROINTESTINAL ENDOSCOPY  7/4/2020    EGD ESOPHAGOGASTRODUODENOSCOPY ENDOSCOPIC VARICEAL TREATMENT performed by Stanford Duff MD at [facility] 86 N/A 9/17/2021    EGD, BANDING OF VARICES performed by Stanford Duff MD at [facility] 35 N/A 2/23/2022    EGD BAND LIGATION performed by Stanford Duff MD at [facility] ENDOSCOPY       Family History   Problem Relation Age of Onset    Other Mother         lymphoma    High Blood Pressure Mother     Other Father         ms    Breast Cancer Other         AUNT        reports that she has never smoked. She has never used smokeless tobacco. She reports current alcohol use. She reports that she does not use drugs.     Allergies:  Pcn [penicillins]    Current Medications:    carvedilol (COREG) tablet 3.125 mg, BID  lipase-protease-amylase (CREON) delayed release capsule 12,000 Units, 4x Daily AC & HS  escitalopram (LEXAPRO) tablet 5 mg, Daily  folic acid (FOLVITE) tablet 1 mg, Daily  furosemide (LASIX) tablet 20 mg, Daily  potassium chloride (KLOR-CON M) extended release tablet 40 mEq, BID WC  lactulose (CHRONULAC) 10 GM/15ML solution 20 g, TID PRN  pantoprazole (PROTONIX) injection 40 mg, Daily  spironolactone (ALDACTONE) tablet 50 mg, Daily  vitamin A capsule 3 mg (Patient Supplied), Daily  thiamine tablet 100 mg, Daily  sodium chloride flush 0.9 % injection 5-40 mL, 2 times per day  sodium chloride flush 0.9 % injection 5-40 mL, PRN  0.9 % sodium chloride infusion, PRN  ondansetron (ZOFRAN-ODT) disintegrating tablet 4 mg, Q8H PRN   Or  ondansetron (ZOFRAN) injection 4 mg, Q6H PRN  polyethylene glycol (GLYCOLAX) packet 17 g, Daily PRN  acetaminophen (TYLENOL) tablet 650 mg, Q6H PRN   Or  acetaminophen (TYLENOL) suppository 650 mg, Q6H PRN  magnesium sulfate 2000 mg in 50 mL IVPB premix, PRN  potassium chloride (KLOR-CON M) extended release tablet 40 mEq, PRN   Or  potassium bicarb-citric acid (EFFER-K) AM    METASPCT 0.9 02/25/2022 06:38 AM    LYMPHOPCT 14.8 08/16/2022 03:08 AM    LYMPHOPCT 30.0 07/08/2019 02:45 PM    PROMYELOPCT 0.9 03/15/2022 02:43 PM    MONOPCT 11.9 08/16/2022 03:08 AM    MYELOPCT 1.7 03/15/2022 02:43 PM    EOSPCT 2.2 07/08/2019 02:45 PM    BASOPCT 1.1 08/16/2022 03:08 AM    MONOSABS 1.05 08/16/2022 03:08 AM    LYMPHSABS 1.31 08/16/2022 03:08 AM    EOSABS 0.42 08/16/2022 03:08 AM    BASOSABS 0.10 08/16/2022 03:08 AM     Hemoglobin/Hematocrit:    Lab Results   Component Value Date/Time    HGB 8.1 08/17/2022 05:00 AM    HCT 24.1 08/17/2022 05:00 AM     CMP:    Lab Results   Component Value Date/Time     08/17/2022 05:00 AM    K 3.9 08/17/2022 05:00 AM     08/17/2022 05:00 AM    CO2 20 08/17/2022 05:00 AM    BUN 10 08/17/2022 05:00 AM    CREATININE 0.7 08/17/2022 05:00 AM    GFRAA >60 08/17/2022 05:00 AM    LABGLOM >60 08/17/2022 05:00 AM    GLUCOSE 88 08/17/2022 05:00 AM    GLUCOSE 102 07/08/2019 02:45 PM    PROT 8.4 08/17/2022 05:00 AM    LABALBU 1.8 08/17/2022 05:00 AM    LABALBU 5.0 03/13/2012 11:30 AM    CALCIUM 7.8 08/17/2022 05:00 AM    BILITOT 10.5 08/17/2022 05:00 AM    ALKPHOS 131 08/17/2022 05:00 AM     08/17/2022 05:00 AM    ALT 27 08/17/2022 05:00 AM     BMP:    Lab Results   Component Value Date/Time     08/17/2022 05:00 AM    K 3.9 08/17/2022 05:00 AM     08/17/2022 05:00 AM    CO2 20 08/17/2022 05:00 AM    BUN 10 08/17/2022 05:00 AM    LABALBU 1.8 08/17/2022 05:00 AM    LABALBU 5.0 03/13/2012 11:30 AM    CREATININE 0.7 08/17/2022 05:00 AM    CALCIUM 7.8 08/17/2022 05:00 AM    GFRAA >60 08/17/2022 05:00 AM    LABGLOM >60 08/17/2022 05:00 AM    GLUCOSE 88 08/17/2022 05:00 AM    GLUCOSE 102 07/08/2019 02:45 PM     Potassium:    Lab Results   Component Value Date/Time    K 3.9 08/17/2022 05:00 AM     Hepatic Function Panel:    Lab Results   Component Value Date/Time    ALKPHOS 131 08/17/2022 05:00 AM    ALT 27 08/17/2022 05:00 AM     08/17/2022 05:00 AM    PROT 8.4 08/17/2022 05:00 AM    BILITOT 10.5 08/17/2022 05:00 AM    BILIDIR 3.8 09/19/2021 03:55 AM    IBILI 2.3 09/19/2021 03:55 AM    LABALBU 1.8 08/17/2022 05:00 AM    LABALBU 5.0 03/13/2012 11:30 AM     Albumin:    Lab Results   Component Value Date/Time    LABALBU 1.8 08/17/2022 05:00 AM    LABALBU 5.0 03/13/2012 11:30 AM     Calcium:    Lab Results   Component Value Date/Time    CALCIUM 7.8 08/17/2022 05:00 AM     Ionized Calcium:  No results found for: IONCA  Magnesium:    Lab Results   Component Value Date/Time    MG 1.7 08/17/2022 05:00 AM     Phosphorus:    Lab Results   Component Value Date/Time    PHOS 3.5 08/17/2022 05:00 AM     LDH:  No results found for: LDH  Uric Acid:  No results found for: LABURIC, URICACID  PT/INR:    Lab Results   Component Value Date/Time    PROTIME 35.3 08/17/2022 05:00 AM    INR 3.3 08/17/2022 05:00 AM     PTT:    Lab Results   Component Value Date/Time    APTT 33.4 02/20/2022 11:36 AM   [APTT}  Troponin:    Lab Results   Component Value Date/Time    TROPONINI <0.01 07/04/2020 02:07 PM     U/A:    Lab Results   Component Value Date/Time    COLORU ORANGE 08/15/2022 10:13 PM    PROTEINU Negative 08/15/2022 10:13 PM    PHUR 6.5 08/15/2022 10:13 PM    LABCAST MODERATE 08/09/2019 01:58 PM    WBCUA 2-5 08/15/2022 10:13 PM    RBCUA 0-1 08/15/2022 10:13 PM    MUCUS Present 08/09/2019 01:58 PM    BACTERIA MODERATE 08/15/2022 10:13 PM    CLARITYU Clear 08/15/2022 10:13 PM    SPECGRAV <=1.005 08/15/2022 10:13 PM    LEUKOCYTESUR Negative 08/15/2022 10:13 PM    UROBILINOGEN 4.0 08/15/2022 10:13 PM    BILIRUBINUR LARGE 08/15/2022 10:13 PM    BLOODU TRACE-LYSED 08/15/2022 10:13 PM    GLUCOSEU Negative 08/15/2022 10:13 PM     ABG:    Lab Results   Component Value Date/Time    PH 7.427 07/09/2020 04:54 AM    PCO2 37.7 07/09/2020 04:54 AM    PO2 72.9 07/09/2020 04:54 AM    HCO3 24.3 07/09/2020 04:54 AM    BE 0.0 07/09/2020 04:54 AM    O2SAT 94.0 07/09/2020 04:54 AM     HgBA1c:  No results found for: LABA1C  Microalbumen/Creatinine ratio:  No components found for: RUCREAT  FLP:    Lab Results   Component Value Date/Time    TRIG 112 06/01/2017 11:15 AM    HDL 81 06/01/2017 11:15 AM    LDLCALC 132 06/01/2017 11:15 AM    LABVLDL 22 06/01/2017 11:15 AM     TSH:    Lab Results   Component Value Date/Time    TSH 2.830 07/01/2022 12:42 AM     VITAMIN B12: No components found for: B12  FOLATE:    Lab Results   Component Value Date/Time    FOLATE 11.0 05/24/2022 09:35 AM     Iron Saturation:  No components found for: PERCENTFE  FERRITIN:    Lab Results   Component Value Date/Time    FERRITIN 37 05/24/2022 09:35 AM     LIPASE:    Lab Results   Component Value Date/Time    LIPASE 47 08/16/2022 03:08 AM     Fibrinogen Level:  No components found for: FIB  Urine Toxicology:  No components found for: IAMMENTA, IBARBIT, IBENZO, ICOCAINE, IMARTHC, IOPIATES, IPHENCYC  24 Hour Urine for Protein:  No components found for: RAWUPRO, UHRS3, WORQ30DN, UTV3  24 Hour Urine for Creatinine Clearance:  No components found for: CREAT4, UHRS10, UTV10     Imaging:  CXR results:XR CHEST (2 VW) [[id number]] Collected: 08/15/22 2350     Order Status: Completed Specimen: Chest Updated: 08/15/22 2356    Narrative:      EXAMINATION:   TWO XRAY VIEWS OF THE CHEST     8/15/2022 11:26 pm     COMPARISON:   None. HISTORY:   ORDERING SYSTEM PROVIDED HISTORY: sob   TECHNOLOGIST PROVIDED HISTORY:   Reason for exam:->sob     FINDINGS:   The heart size and pulmonary vasculature are within normal limits. No acute pulmonary infiltrate, pleural effusion or pneumothorax is   identified. Medial right basilar density may represent vasculature although   lung nodule is not totally excluded, elevated left hemidiaphragm/eventration   obscures lateral view assessment. No acute osseous abnormality is seen. Impression:      Vascular aggregation versus nodular opacification in the right medial base.    Suggest clinical correlation and short-term follow-up evaluation by   radiographs or CT. Assessment  1-Hypervolemic hyponatremia in the  setting of the Acute on Chronic Liver Disease with Ascites  Na+ up from 125-->132-->133  PLAN:  Recheck labs as an outpt    2-Hypokalemia in the setting of the outpt lactulose and furosemide and the Hypomagnesemia  K+ 3.9 and Mg++ 1.7  PLAN:  Follow K+ & Mg++    3-Hypocalcemia in the setting of the Hypoalbuminemia and Hypomagnesemia  Ionized Ca++ 1.14, Vit D 25, PTH 28  PLAN:  1. Follow as an outpt    4-Macrocytic Anemia  PLAN:  1. Check B12, folate, TSH and free T4 as an outpt    5- Acute on Chronic ETOH Liver Disease  PLAN:  1.  Defer to GI      Thank you  for allowing us to participate in care of Yumiko Stock MD  4:09 PM  8/17/2022

## 2022-08-17 NOTE — PROGRESS NOTES
Occupational Therapy  OCCUPATIONAL THERAPY INITIAL EVALUATION     Nathalie Rankin [facility], [place]        FVPD:                                                  Patient Name: Rich Gramajo    MRN: [id number]    : 1980    Room: [address] [place] [address]      Evaluating OT: Lucien Peralta, OTR/L [id number]      Referring Provider: Cayetano Severance, DO    Specific Provider Orders/Date:OT eval and treat 22      Diagnosis:  Dehydration [E86.0]  Hypokalemia [E87.6]  Hyponatremia [J55.0]  Alcoholic cirrhosis of liver with ascites ([facility] [address] [K70.31]  Electrolyte abnormality [E87.8]      Pertinent Medical History: alcohol abuse, alcoholic cirrhosis, anxiety, HTN       Precautions:  Fall Risk, alarm     Assessment of current deficits    [x] Functional mobility  [x]ADLs  [x] Strength               []Cognition    [x] Functional transfers   [x] IADLs         [x] Safety Awareness   [x]Endurance    [] Fine Coordination              [x] Balance      [] Vision/perception   []Sensation     []Gross Motor Coordination  [] ROM  [] Delirium                   [] Motor Control     OT PLAN OF CARE   OT POC based on physician orders, patient diagnosis and results of clinical assessment    Frequency/Duration 2-4 days/wk for 2 weeks PRN   Specific OT Treatment Interventions to include:   * Instruction/training on adapted ADL techniques and AE recommendations to increase functional independence within precautions       * Training on energy conservation strategies, correct breathing pattern and techniques to improve independence/tolerance for self-care routine  * Functional transfer/mobility training/DME recommendations for increased independence, safety, and fall prevention  * Patient/Family education to increase follow through with safety techniques and functional independence  * Recommendation of environmental modifications for increased safety with functional transfers/mobility and ADLs  * Therapeutic exercise to improve motor endurance, ROM, and functional strength for ADLs/functional transfers  * Therapeutic activities to facilitate/challenge dynamic balance, stand tolerance for increased safety and independence with ADLs        Recommended Adaptive Equipment: TBD     Home Living: Pt lives with parents, brother, and nephew in 2 story house . Pt's bedroom and bathroom are on the 2nd floor. Bathroom setup: tub/shower   Equipment owned: none    Prior Level of Function: independent with ADLs , independent with IADLs; functional mobility: no device  Driving: yes  Occupation:     Pain Level: pt did not report pain this date; pt agreeable to therapy  Cognition: A&O: pt alert and oriented grossly; WFL command follow demonstrated   Memory:  WFL   Sequencing:  WFL   Problem solving:  WFL   Judgement/safety:  WFL     Functional Assessment:  AM-PAC Daily Activity Raw Score: 19/24   Initial Eval Status  Date: 8/17/22 Treatment Status  Date: STGs = LTGs  Time frame: 10-14 days   Feeding Independent      Grooming CGA  Decreased standing tolerance and balance noted  Mod I/I   UB Dressing Min A  Mod I/I   LB Dressing SBA  To don slippers, seated  Min A  For simulated pants    Mod I/I-with use of AD as appropriate/needed   Bathing Min A  Mod I/I -with use of AD as appropriate/needed   Toileting Min A  Mod I/I    Bed Mobility  Supine to sit: independent    Sit to supine: independent    Independent    Functional Transfers CGA with no device  Sit<>stand from EOB  Independent    Functional Mobility Min A/CGA with no device  Short distance in room  Unsteadiness and shakiness noted.  Pt fatigues easily   Sup/I -with device as needed to maximize independence with ADLs and functional task completion   Balance Sitting:     Static:  independent     Dynamic:SBA  Standing: CGA   I for dynamic sitting balance to maximize independence with ADLs and functional task completion    I for standing balance to maximize independence with ADLs and functional task completion   Activity Tolerance Fair- with light activity. Pt limited by fatigue and weakness. Good with ADL activity. Pt will demonstrate good understanding of education provided on EC/WS techniques    Visual/  Perceptual Glasses: none at bedside                Additional long-term goal: Pt will increase functional independence to PLOF to allow pt to live in least restrictive environment. Hand Dominance R   AROM (PROM) Strength Additional Info:    RUE  WFL WFL good  and wfl FMC/dexterity noted during ADL tasks       LUE WFL WFL good  and wfl FMC/dexterity noted during ADL tasks       Hearing: WFL   Sensation:  No c/o numbness or tingling   Tone: WFL   Edema: none noted    Comments: Upon arrival patient lying in bed. At end of session, patient returned to bed with call light and phone within reach, all lines and tubes intact, and alarm set. Overall patient demonstrated decreased independence and safety during completion of ADL/functional transfer/mobility tasks. Pt would benefit from continued skilled OT to increase safety and independence with completion of ADL/IADL tasks for functional independence and quality of life. Rehab Potential: Good for established goals     Patient / Family Goal: to return home    Patient and/or family were instructed on functional diagnosis, prognosis/goals and OT plan of care. Demonstrated good understanding.      Eval Complexity: Low    Time In: 1107  Time Out: 1117    Min Units   OT Eval Low 97165  x  1   OT Eval Medium 07149      OT Eval High 29128      OT Re-Eval [id number]       Therapeutic Ex [id number]       Therapeutic Activities [id number]       ADL/Self Care [id number]       Orthotic Management 61987       Manual [id number]     Neuro Re-Ed [id number]       Non-Billable Time          Evaluation Time additionally includes thorough review of current medical information, gathering information on past medical history/social history and prior

## 2022-08-17 NOTE — DISCHARGE SUMMARY
[facility] Physician Discharge Summary       [place] [phone]    Schedule an appointment as soon as possible for a visit      Candi Stewart MD  [facility] [phone]    Schedule an appointment as soon as possible for a visit      Activity level: As tolerated     Dispo: Home      Condition on discharge: Stable     Patient ID:  Martin Gamez  [id number]  46 y.o.  1980    Admit date: 8/16/2022    Discharge date and time:  8/17/2022  2:17 PM    Admission Diagnoses: Principal Problem:    Electrolyte abnormality  Active Problems:    Jaundice    Elevated liver enzymes    Alcoholic cirrhosis ([address]  Resolved Problems:    * No resolved hospital problems. *      Discharge Diagnoses: Principal Problem:    Electrolyte abnormality  Active Problems:    Jaundice    Elevated liver enzymes    Alcoholic cirrhosis (HCC)  Resolved Problems:    * No resolved hospital problems. *      Consults:  IP CONSULT TO GI  IP CONSULT TO NEPHROLOGY    Procedures: None    Hospital Course:   Patient Martin Gamez is a 46 y.o. presented with Dehydration [E86.0]  Hypokalemia [E87.6]  Hyponatremia [F10.7]  Alcoholic cirrhosis of liver with ascites (HCC) [K70.31]  Electrolyte abnormality [E808]    46year old female with past medical history of alcoholic liver cirrhosis, alcohol abuse, macrocytic anemia, hypertension and depression presented to the ED for weakness. She was admitted and treated as below. Electrolyte abnormalities: Hypokalemia, hyponatremia and hypomagnesemia resolved. Secondary to alcohol abuse, dehydration and liver cirrhosis. Monitor. Nephrology following. 2.  Alcoholic liver cirrhosis: S/p paracentesis on Thursday. Follows with Dr. Tammy Vernon at [address]. GI consulted. LFTs ordered. Bilirubin elevated. Ammonia 66.7. Continue Lasix, Aldactone, Lactulose and Creon.  Not transplant candidate due to ongoing alcohol abuse. MELD score is 30 which indicates about 53% chance of 3 month mortality. 3. Alcohol abuse: Continues to drink outpatient. Cessation encouraged. 5.  Macrocytic Anemia: Likely due to alcohol abuse and liver disease. Continue folate and thiamine. Hgb 8.1. Monitor H&H. Transfuse prn for <7. No signs of bleeding. 6. Headache: Ibuprofen prn. Okay by Pharmacy      7. HTN: Continue coreg     8. Depression: Continue Lexapro    Patient is hemodynamically stable and ready for discharge. She is to follow up with PCP, and GI. Discharge Exam:  General Appearance: alert and oriented to person, place and time and in no acute distress, jaundice   Skin: warm and dry  Head: normocephalic and atraumatic  Eyes: pupils equal, round, and reactive to light, extraocular eye movements intact, conjunctivae normal  Neck: neck supple and non tender without mass   Pulmonary/Chest: clear to auscultation bilaterally- no wheezes, rales or rhonchi, normal air movement, no respiratory distress  Cardiovascular: normal rate, normal S1 and S2 and no carotid bruits  Abdomen: soft, non-tender, non-distended, normal bowel sounds, no masses or organomegaly  Extremities: no cyanosis, no clubbing and no edema  Neurologic: no cranial nerve deficit and speech normal    No intake/output data recorded. No intake/output data recorded. LABS:  Recent Labs     08/16/22  1003 08/16/22  2025 08/17/22  0500    131* 133   K 2.8* 4.2 3.9   CL 99 101 103   CO2 22 20* 20*   BUN 8 8 10   CREATININE 0.6 0.7 0.7   GLUCOSE 91 107* 88   CALCIUM 7.5* 7.7* 7.8*       Recent Labs     08/16/22  0308 08/17/22  0500   WBC 8.9 8.1   RBC 2.16* 2.28*   HGB 7.6* 8.1*   HCT 22.1* 24.1*   .3* 105.7*   MCH 35.2* 35.5*   MCHC 34.4 33.6   RDW 21.4* 22.4*   PLT 96* 114*   MPV 11.3 11.8       No results for input(s): POCGLU in the last 72 hours. Imaging:  No results found.     Patient Instructions:      Medication List        START taking these medications      vitamin B-1 100 MG tablet  Commonly known as: THIAMINE  Take 1 tablet by mouth daily            CONTINUE taking these medications      ATIVAN PO     carvedilol 3.125 MG tablet  Commonly known as: COREG  Take 1 tablet by mouth 2 times daily     Creon [id number] units delayed release capsule  Generic drug: lipase-protease-amylase     escitalopram 5 MG tablet  Commonly known as: LEXAPRO  Take 1 tablet by mouth daily     folic acid 1 MG tablet  Commonly known as: FOLVITE  Take 1 tablet by mouth daily     furosemide 20 MG tablet  Commonly known as: LASIX     Klor-Con M20 20 MEQ extended release tablet  Generic drug: potassium chloride     lactulose 10 GM/15ML solution  Commonly known as: CHRONULAC     omeprazole 20 MG delayed release capsule  Commonly known as: PRILOSEC     spironolactone 50 MG tablet  Commonly known as: ALDACTONE     vitamin A 3 MG (58153 UT) capsule     vitamin D 50 MCG (2000 UT) Caps capsule                35 minutes time spent reviewing patient chart, assessing patient, discussing plan of care with patient and family, discussing plan of care with collaborating physician, and charting. Signed:  Electronically signed by Ghazal Cheema PA-C on 8/17/2022 at 2:17 PM   Addendum: I have personally participated in the history, exam, medical decision making with Walt Baldo on the date of service and I agree with all of the pertinent clinical information unless otherwise noted. I have also reviewed and agree with the past medical, family, and social history unless otherwise noted. Patient was admitted with weakness     PHYSICAL EXAM:  Vitals:  /78   Pulse 85   Temp 98.1 °F (36.7 °C) (Oral)   Resp 18   Ht 5' 4\" (1.626 m)   Wt 123 lb 3.2 oz (55.9 kg)   SpO2 96%   BMI 21.15 kg/m²   Gen: awake, alert, NAD  Lungs: clear to auscultation bilaterally no crackles no wheezing.   Heart: RRR, no murmur  Abdomen: soft nontender nondistended positive bowel sounds. Extremities: full range of motion no peripheral edema. Impression:  Principal Problem:    Electrolyte abnormality  Active Problems:    Jaundice    Elevated liver enzymes    Alcoholic cirrhosis (HCC)  Resolved Problems:    * No resolved hospital problems. *        My findings/plan include:     46year old female with alcoholic cirrhosis who continues to drink presents with weakness due to electrolyte abnormalities. MELD score is 30 which indicates about 53% chance of 3 month mortality. GI has been consulted. Currently not a candidate for transplant. Nephrology has been consulted for electrolytes. Sodium and potassium levels have improved. Medically stable for discharge. NOTE: This report was transcribed using voice recognition software. Every effort was made to ensure accuracy; however, inadvertent computerized transcription errors may be present.     Electronically signed by Redd Nunes DO on 8/17/2022 at 10:47 AM

## 2022-08-18 ENCOUNTER — TELEPHONE (OUTPATIENT)
Dept: PRIMARY CARE CLINIC | Age: 42
End: 2022-08-18

## 2022-08-23 ENCOUNTER — OFFICE VISIT (OUTPATIENT)
Dept: PRIMARY CARE CLINIC | Age: 42
End: 2022-08-23
Payer: MEDICAID

## 2022-08-23 VITALS
TEMPERATURE: 97.5 F | BODY MASS INDEX: 21.11 KG/M2 | OXYGEN SATURATION: 95 % | SYSTOLIC BLOOD PRESSURE: 118 MMHG | HEART RATE: 97 BPM | WEIGHT: 123 LBS | DIASTOLIC BLOOD PRESSURE: 70 MMHG

## 2022-08-23 DIAGNOSIS — E87.6 HYPOKALEMIA: ICD-10-CM

## 2022-08-23 DIAGNOSIS — R11.0 NAUSEA: ICD-10-CM

## 2022-08-23 DIAGNOSIS — Z09 HOSPITAL DISCHARGE FOLLOW-UP: Primary | ICD-10-CM

## 2022-08-23 LAB
ALBUMIN SERPL-MCNC: 2.1 G/DL (ref 3.5–5.2)
ALP BLD-CCNC: 168 U/L (ref 35–104)
ALT SERPL-CCNC: 28 U/L (ref 0–32)
ANION GAP SERPL CALCULATED.3IONS-SCNC: 9 MMOL/L (ref 7–16)
AST SERPL-CCNC: 134 U/L (ref 0–31)
BILIRUB SERPL-MCNC: 10.2 MG/DL (ref 0–1.2)
BUN BLDV-MCNC: 8 MG/DL (ref 6–20)
CALCIUM SERPL-MCNC: 7.9 MG/DL (ref 8.6–10.2)
CHLORIDE BLD-SCNC: 100 MMOL/L (ref 98–107)
CO2: 18 MMOL/L (ref 22–29)
CREAT SERPL-MCNC: 0.7 MG/DL (ref 0.5–1)
GFR AFRICAN AMERICAN: >60
GFR NON-AFRICAN AMERICAN: >60 ML/MIN/1.73
GLUCOSE BLD-MCNC: 104 MG/DL (ref 74–99)
POTASSIUM SERPL-SCNC: 4.1 MMOL/L (ref 3.5–5)
SODIUM BLD-SCNC: 127 MMOL/L (ref 132–146)
TOTAL PROTEIN: 8.8 G/DL (ref 6.4–8.3)

## 2022-08-23 PROCEDURE — 99214 OFFICE O/P EST MOD 30 MIN: CPT | Performed by: INTERNAL MEDICINE

## 2022-08-23 PROCEDURE — 1111F DSCHRG MED/CURRENT MED MERGE: CPT | Performed by: INTERNAL MEDICINE

## 2022-08-23 RX ORDER — PROMETHAZINE HYDROCHLORIDE 25 MG/1
25 TABLET ORAL 4 TIMES DAILY PRN
Qty: 20 TABLET | Refills: 0 | Status: SHIPPED
Start: 2022-08-23 | End: 2022-09-15

## 2022-08-23 ASSESSMENT — PATIENT HEALTH QUESTIONNAIRE - PHQ9
SUM OF ALL RESPONSES TO PHQ QUESTIONS 1-9: 0
6. FEELING BAD ABOUT YOURSELF - OR THAT YOU ARE A FAILURE OR HAVE LET YOURSELF OR YOUR FAMILY DOWN: 0
1. LITTLE INTEREST OR PLEASURE IN DOING THINGS: 0
7. TROUBLE CONCENTRATING ON THINGS, SUCH AS READING THE NEWSPAPER OR WATCHING TELEVISION: 0
9. THOUGHTS THAT YOU WOULD BE BETTER OFF DEAD, OR OF HURTING YOURSELF: 0
3. TROUBLE FALLING OR STAYING ASLEEP: 0
5. POOR APPETITE OR OVEREATING: 0
SUM OF ALL RESPONSES TO PHQ9 QUESTIONS 1 & 2: 0
2. FEELING DOWN, DEPRESSED OR HOPELESS: 0
10. IF YOU CHECKED OFF ANY PROBLEMS, HOW DIFFICULT HAVE THESE PROBLEMS MADE IT FOR YOU TO DO YOUR WORK, TAKE CARE OF THINGS AT HOME, OR GET ALONG WITH OTHER PEOPLE: 0
SUM OF ALL RESPONSES TO PHQ QUESTIONS 1-9: 0
4. FEELING TIRED OR HAVING LITTLE ENERGY: 0
8. MOVING OR SPEAKING SO SLOWLY THAT OTHER PEOPLE COULD HAVE NOTICED. OR THE OPPOSITE, BEING SO FIGETY OR RESTLESS THAT YOU HAVE BEEN MOVING AROUND A LOT MORE THAN USUAL: 0

## 2022-08-23 NOTE — PROGRESS NOTES
Post-Discharge Transitional Care  Follow Up      Abilio Warren   YOB: 1980    Date of Office Visit:  8/23/2022  Date of Hospital Admission: 8/16/22  Date of Hospital Discharge: 8/17/22  Risk of hospital readmission (high >=14%. Medium >=10%) :Readmission Risk Score: 22.1      Care management risk score Rising risk (score 2-5) and Complex Care (Scores >=6): No Risk Score On File     Non face to face  following discharge, date last encounter closed (first attempt may have been earlier): 08/18/2022    Call initiated 2 business days of discharge: Yes    ASSESSMENT/PLAN:   Hospital discharge follow-up  -     [place] DISCHARGE MEDS RECONCILED W/ CURRENT OUTPATIENT MED LIST    Medical Decision Making: moderate complexity  Return if symptoms worsen or fail to improve. On this date 8/23/2022 I have spent 35 minutes reviewing previous notes, test results and face to face with the patient discussing the diagnosis and importance of compliance with the treatment plan as well as documenting on the day of the visit. Subjective:   HPI:  Follow up of Hospital problems/diagnosis(es): See below      Inpatient course: Discharge summary reviewed- see chart.     Interval history/Current status: See below    Patient Active Problem List   Diagnosis    Sepsis (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 If you have any questions, ask your nurse or doctor. CONTINUE taking these medications      ATIVAN PO     carvedilol 3.125 MG tablet  Commonly known as: COREG  Take 1 tablet by mouth 2 times daily     Creon 92131-55826 units delayed release capsule  Generic drug: lipase-protease-amylase     escitalopram 5 MG tablet  Commonly known as: LEXAPRO  Take 1 tablet by mouth daily     folic acid 1 MG tablet  Commonly known as: FOLVITE  Take 1 tablet by mouth daily     furosemide 20 MG tablet  Commonly known as: LASIX     Klor-Con M20 20 MEQ extended release tablet  Generic drug: potassium chloride     lactulose 10 GM/15ML solution  Commonly known as: CHRONULAC     omeprazole 20 MG delayed release capsule  Commonly known as: PRILOSEC     spironolactone 50 MG tablet  Commonly known as: ALDACTONE     vitamin A 3 MG (65246 UT) capsule     vitamin B-1 100 MG tablet  Commonly known as: THIAMINE  Take 1 tablet by mouth daily     vitamin D 50 MCG (2000 UT) Caps capsule                Medications marked \"taking\" at this time  No outpatient medications have been marked as taking for the 8/23/22 encounter (Office Visit) with Michelle Osorio DO.        Medications patient taking as of now reconciled against medications ordered at time of hospital discharge: Yes    A comprehensive review of systems was negative except for what was noted in the HPI.     Objective:    /70   Pulse 97   Temp 97.5 °F (36.4 °C)   Wt 123 lb (55.8 kg)   SpO2 95%   BMI 21.11 kg/m²   General Appearance: alert and oriented to person, place and time, well developed and well- nourished, in no acute distress  Skin: warm and dry, no rash or erythema  Head: normocephalic and atraumatic  Eyes: pupils equal, round, and reactive to light, extraocular eye movements intact, conjunctivae normal  ENT: tympanic membrane, external ear and ear canal normal bilaterally, nose without deformity, nasal mucosa and turbinates normal without polyps  Neck: supple and non-tender without mass, no thyromegaly or thyroid nodules, no cervical lymphadenopathy  Pulmonary/Chest: clear to auscultation bilaterally- no wheezes, rales or rhonchi, normal air movement, no respiratory distress  Cardiovascular: normal rate, regular rhythm, normal S1 and S2, no murmurs, rubs, clicks, or gallops, distal pulses intact, no carotid bruits  Abdomen: soft, non-tender, non-distended, normal bowel sounds, no masses or organomegaly  Extremities: no cyanosis, clubbing or edema  Musculoskeletal: normal range of motion, no joint swelling, deformity or tenderness  Neurologic: reflexes normal and symmetric, no cranial nerve deficit, gait, coordination and speech normal      An electronic signature was used to authenticate this note. --Delayne Waco, DO    She was recently admitted to the hospital and discharged for electrolyte abnormalities. She was seen by nephrology for management of her hypokalemia hyponatremia and hypomagnesemia. Unfortunately, she continues to drink alcohol. She was discharged home on thiamine.       Plan  Add phenergan as needed  Repeat CMP  Following with GI  Discussed need for vaccines will defer today, consider at next appointment

## 2022-08-24 ENCOUNTER — TELEPHONE (OUTPATIENT)
Dept: PRIMARY CARE CLINIC | Age: 42
End: 2022-08-24

## 2022-08-24 NOTE — TELEPHONE ENCOUNTER
Patient received results on blood work from yesterday. She wants to know what you recommend to help increase her sodium level, either prescription or dietary. Please advise, thanks.

## 2022-08-30 ENCOUNTER — HOSPITAL ENCOUNTER (OUTPATIENT)
Age: 42
Discharge: HOME | End: 2022-08-30
Payer: MEDICAID

## 2022-08-30 LAB
ALBUMIN SERPL-MCNC: 2 G/DL (ref 3.5–5.2)
ALP BLD-CCNC: 155 U/L (ref 35–104)
ALT SERPL-CCNC: 29 U/L (ref 0–32)
ANION GAP SERPL CALCULATED.3IONS-SCNC: 10 MMOL/L (ref 7–16)
AST SERPL-CCNC: 159 U/L (ref 0–31)
BILIRUB SERPL-MCNC: 10.4 MG/DL (ref 0–1.2)
BUN BLDV-MCNC: 8 MG/DL (ref 6–20)
CALCIUM SERPL-MCNC: 8.1 MG/DL (ref 8.6–10.2)
CHLORIDE BLD-SCNC: 107 MMOL/L (ref 98–107)
CO2: 19 MMOL/L (ref 22–29)
CREAT SERPL-MCNC: 0.6 MG/DL (ref 0.5–1)
GFR AFRICAN AMERICAN: >60
GFR NON-AFRICAN AMERICAN: >60 ML/MIN/1.73
GLUCOSE BLD-MCNC: 112 MG/DL (ref 74–99)
HCT VFR BLD CALC: 24.6 % (ref 34–48)
HEMOGLOBIN: 8.3 G/DL (ref 11.5–15.5)
INR BLD: 2.7
MCH RBC QN AUTO: 38.1 PG (ref 26–35)
MCHC RBC AUTO-ENTMCNC: 33.7 % (ref 32–34.5)
MCV RBC AUTO: 112.8 FL (ref 80–99.9)
PDW BLD-RTO: 21.2 FL (ref 11.5–15)
PLATELET # BLD: 228 E9/L (ref 130–450)
PMV BLD AUTO: 10 FL (ref 7–12)
POTASSIUM SERPL-SCNC: 3.8 MMOL/L (ref 3.5–5)
PROTHROMBIN TIME: 30.1 SEC (ref 9.3–12.4)
RBC # BLD: 2.18 E12/L (ref 3.5–5.5)
SODIUM BLD-SCNC: 136 MMOL/L (ref 132–146)
TOTAL PROTEIN: 8.7 G/DL (ref 6.4–8.3)
WBC # BLD: 7.5 E9/L (ref 4.5–11.5)

## 2022-08-30 PROCEDURE — 36415 COLL VENOUS BLD VENIPUNCTURE: CPT

## 2022-08-30 PROCEDURE — 85027 COMPLETE CBC AUTOMATED: CPT

## 2022-08-30 PROCEDURE — 80053 COMPREHEN METABOLIC PANEL: CPT

## 2022-08-30 PROCEDURE — 85610 PROTHROMBIN TIME: CPT

## 2022-09-15 RX ORDER — PROMETHAZINE HYDROCHLORIDE 25 MG/1
25 TABLET ORAL 4 TIMES DAILY PRN
Qty: 20 TABLET | Refills: 0 | Status: SHIPPED
Start: 2022-09-15 | End: 2022-10-10

## 2022-10-10 RX ORDER — PROMETHAZINE HYDROCHLORIDE 25 MG/1
25 TABLET ORAL 4 TIMES DAILY PRN
Qty: 20 TABLET | Refills: 0 | Status: SHIPPED | OUTPATIENT
Start: 2022-10-10 | End: 2022-10-17

## 2022-10-30 PROBLEM — I61.9 ACUTE INTRACEREBRAL HEMORRHAGE (HCC): Status: ACTIVE | Noted: 2022-01-01

## 2022-10-30 PROBLEM — I62.9 INTRACRANIAL BLEEDING (HCC): Status: ACTIVE | Noted: 2022-10-30

## 2022-10-30 NOTE — ED NOTES
Family is willing to speak with [facility] regarding organ donation. Spoke with Deanna Lara who is in the area and will come to this facility to speak with family directly.       Crispin Naik RN  10/30/22 [id number]

## 2022-10-30 NOTE — CONSULTS
Critical Care Admit/Consult Note     Patient - Fifi Briones   MRN -  [id number]   [facility] # - [de-identified]   - 1980      Date of Admission -  10/30/2022  6:48 PM  Date of evaluation -  10/31/2022  4410/4410-A   Hospital Day - 1    ADMIT/CONSULT DETAILS     Reason for Admit/Consult   Found unresponsive, intercranial hemorrhage     Consulting [address] Saavedra DO  Primary Care Physician - Aparna Bloom DO       HPI   Ms. Irma Espinosa is a 38 y.o. female who was admitted on 2022 after presented to [facility] ED via EMS, as the patient's family found her unresponsive. The patient reported a headache earlier. Head CT demonstrated an intraparenchymal hemorrhage centered within the right thalamus with extensive intraventricular extension with marked distension of the lateral, 3rd and 4th ventricles. Neurosurgery was consulted and the patient is not a surgical candidate. CT brain perfusion shows there is an area of active extravasation in the region of the mid right thalamus, third ventricle with interval worsening of the amount of intraventricular blood. Patient was given 2 of FFP, thiamine, Keppra, vitamin K, 1 unit RBCs, and started on Levophed. The patient was then transferred to [address] [place] [address] MICU for further management. Significant past medical history of ESLD, alcoholism with current alcohol use, decompensated cirrhosis, marked synthetic liver dysfunction with ascites and varices with banding, high meld score, HTN, pancreatectomy for mucinous cystadenoma with dysplasia, hx of septic sternoclavicular joint, macrocytic anemia, hypothyroidism       Upon arrival to MICU, the patient is intubated, on levophed, vasopressin, bicarbonate drip and one unit of RBC transfusing. The patient is breathing over the ventilator, pupils are not reactive, no corneal, no cough, no gag, no response to painful stimuli. The patient's family was updated.  The patient's [facility] and is adamant about following up with organ donation.      Past Medical History         Diagnosis Date    Alcohol abuse     Alcoholic cirrhosis ([address]     Anxiety     not on any meds for it    Hypertension     has not required any med a few years as of 8/2019    Hypothyroidism     in her early 25s - pt was on levothyroxine for a while and then hypothyroidism apparently resolved and pt has been off med since her mid-20s    Pancreatic cyst 01/2017    Pt underwent partial pancreatectomy and splenectomy at [facility] 2/23/2017 and was told pathology showed MCN (mucinous cystic neoplasm) of pancreas with clean margins at surgery        Past Surgical History           Procedure Laterality Date    [address]    HAND SURGERY Left 2/22/2022    LEFT STERNOCLAVICULAR JOINT DEBRIDEMENT performed by Christian Roy MD at [address] [facility]  2/26/2022         PANCREAS SURGERY  02/23/2017    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 - 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    SPLENECTOMY, TOTAL  02/23/2017    along with partial pancreatectomy    UPPER GASTROINTESTINAL ENDOSCOPY N/A 7/4/2020    EGD CONTROL HEMORRHAGE performed by Calin Walker MD at [place] [address]  7/4/2020    EGD ESOPHAGOGASTRODUODENOSCOPY ENDOSCOPIC VARICEAL TREATMENT performed by Calin Walker MD at [place] [address] N/A 9/17/2021    EGD, BANDING OF VARICES performed by Calin Walker MD at [facility] N/A 2/23/2022    EGD BAND LIGATION performed by Calin Walker MD at [facility]:  Not indicated  Pneumococcal Polysaccharide:  Not indicated    Current Medications   Current Medications   Scheduled Meds:   albumin human  25 g IntraVENous Q8H    calcium gluconate IVPB  1,000 mg IntraVENous Once    magnesium sulfate  4,000 mg IntraVENous Once    sodium phosphate IVPB  15 mmol IntraVENous Once    sodium chloride flush  5-40 mL IntraVENous 2 times per day    chlorhexidine  15 mL Mouth/Throat BID    famotidine (PEPCID) injection  20 mg IntraVENous BID     Continuous Infusions:   sodium chloride      sodium chloride      sodium chloride      sodium chloride      vasopressin (Septic Shock) infusion 0.03 Units/min (10/31/22 0622)    norepinephrine 30 mcg/min (10/31/22 0659)    sodium chloride      lactated ringers 125 mL/hr at 10/31/22 0801     PRN Meds:.sodium chloride, sodium chloride, sodium chloride, sodium chloride, sodium chloride flush, sodium chloride, ondansetron **OR** ondansetron, polyethylene glycol     IV Drips/Infusions   sodium chloride      sodium chloride      sodium chloride      sodium chloride      vasopressin (Septic Shock) infusion 0.03 Units/min (10/31/22 0622)    norepinephrine 30 mcg/min (10/31/22 0659)    sodium chloride      lactated ringers 125 mL/hr at 10/31/22 0801       Home Medications  Medications Prior to Admission: LORazepam (ATIVAN PO), Take by mouth Patient doesn't know dose or frequency its prescribed for. She has only taken it once and it was too strong for her. Cholecalciferol (VITAMIN D) 50 MCG (2000 UT) CAPS capsule, Take by mouth daily  escitalopram (LEXAPRO) 5 MG tablet, Take 1 tablet by mouth daily  folic acid (FOLVITE) 1 MG tablet, Take 1 tablet by mouth daily  vitamin B-1 (THIAMINE) 100 MG tablet, Take 1 tablet by mouth daily  carvedilol (COREG) 3.125 MG tablet, Take 1 tablet by mouth 2 times daily  spironolactone (ALDACTONE) 50 MG tablet, Take 50 mg by mouth daily  furosemide (LASIX) 20 MG tablet, Take 20 mg by mouth in the morning.   lactulose (CHRONULAC) 10 GM/15ML solution, Take 20 g by mouth 3 times daily as needed (titrate to 3 BM)  omeprazole (PRILOSEC) 20 MG delayed release capsule, Take 20 mg by mouth Daily As needed  KLOR-CON M20 20 MEQ extended release tablet, Take 20 mEq by mouth daily Takes 2 pills daily  vitamin A 3 MG (00033 UT) capsule, Take 10,000 Units by mouth daily  CREON 02421 units delayed release capsule, Take 12,000 Units by mouth 4 times daily (before meals and nightly)     Diet/Nutrition   Diet NPO    Allergies   Pcn [penicillins]    Social History   Tobacco   reports that she has never smoked. She has never used smokeless tobacco.    Alcohol     reports current alcohol use. Occupational history :    Family History         Problem Relation Age of Onset    Other Mother         lymphoma    High Blood Pressure Mother     Other Father         ms    Breast Cancer Other         AUNT       Sleep History   n/a, On CPAP at home    ROS   REVIEW OF SYSTEMS:  Review of systems not obtained due to patient factors - intubation and mental status    Lines and Devices   Left radial arterial line  Right femoral TLC    Mechanical Ventilation Data   VENT SETTINGS (Comprehensive)  Vent Information  Vent Mode: AC/VC  Additional Respiratory Assessments  Heart Rate: (!) 118  Resp: 14  SpO2: 90 %  Position: Semi-Sorto's  Humidification Source: Heated wire  Humidification Temp: 37    ABG  Lab Results   Component Value Date/Time    PH 7.439 10/31/2022 05:52 AM    PCO2 37.5 10/31/2022 05:52 AM    PO2 79.7 10/31/2022 05:52 AM    HCO3 24.8 10/31/2022 05:52 AM    O2SAT 95.9 10/31/2022 05:52 AM     Lab Results   Component Value Date/Time    MODE AC 10/31/2022 05:52 AM     Vitals    weight is 161 lb 2.5 oz (73.1 kg). Her bladder temperature is 98.1 °F (36.7 °C). Her blood pressure is 110/60 and her pulse is 118 (abnormal). Her respiration is 14 and oxygen saturation is 90%.        Temperature Range: Temp: 98.1 °F (36.7 °C) Temp  Av °F (35.6 °C)  Min: 91.8 °F (33.2 °C)  Max: 99.3 °F (37.4 °C)  BP Range:  Systolic (65XWU), PQA:252 , Min:26 , UGH:023     Diastolic (79RZQ), YNV:13, Min:18, Max:105    Pulse Range: Pulse  Av.5  Min: 89  Max: 144  Respiration Range: Resp  Av.8  Min: 14 Max: 27  Current Pulse Ox[de-identified]  SpO2: 90 %  24HR Pulse Ox Range:  SpO2  Av.9 %  Min: 90 %  Max: 100 %  Oxygen Amount and Delivery:        I/O (24 Hours)    Patient Vitals for the past 8 hrs:   BP Temp Temp src Pulse Resp SpO2   10/31/22 0800 110/60 98.1 °F (36.7 °C) Bladder (!) 118 14 90 %   10/31/22 0630 -- -- -- (!) 115 14 94 %   10/31/22 0615 -- -- -- (!) 114 17 96 %   10/31/22 0600 109/62 98.1 °F (36.7 °C) Bladder (!) 116 14 100 %   10/31/22 0545 -- -- -- (!) 116 14 100 %   10/31/22 0530 -- -- -- (!) 116 14 100 %   10/31/22 0515 -- -- -- (!) 116 14 98 %   10/31/22 0500 (!) 97/56 -- -- (!) 116 14 98 %   10/31/22 0445 -- -- -- (!) 116 14 98 %   10/31/22 0430 -- -- -- (!) 121 14 99 %   10/31/22 0415 (!) 103/58 98.4 °F (36.9 °C) Bladder (!) 122 14 99 %   10/31/22 0400 -- 98.6 °F (37 °C) Bladder (!) 123 14 99 %   10/31/22 0345 -- -- -- (!) 123 16 99 %   10/31/22 0330 -- -- -- (!) 123 16 99 %   10/31/22 0321 -- -- -- (!) 123 19 99 %   10/31/22 0315 122/79 98.4 °F (36.9 °C) -- (!) 124 19 99 %   10/31/22 0302 125/75 98.6 °F (37 °C) -- (!) 124 14 99 %   10/31/22 0300 125/71 -- -- (!) 124 17 99 %   10/31/22 0200 115/63 -- -- (!) 123 19 97 %   10/31/22 0155 117/64 99.3 °F (37.4 °C) -- (!) 123 14 97 %   10/31/22 0100 115/70 -- -- (!) 122 15 96 %       Intake/Output Summary (Last 24 hours) at 10/31/2022 0843  Last data filed at 10/31/2022 0622  Gross per 24 hour   Intake 4315.4 ml   Output 455 ml   Net 3860.4 ml     I/O last 3 completed shifts: In: 4315.4 [I.V.:1204.4; Blood:1807.5; IV Piggyback:1303.5]  Out: 455 [Urine:455]   Date 10/31/22 0000 - 10/31/22 2359   Shift 6573-3521 0624-6001 3739-0112 24 Hour Total   INTAKE   P.O. 0   0   I. V.(mL/kg/hr) 1090.8(1.9)   1090.8   Blood 1807.5   1807.5   IV Piggyback 1303.5   1303.5   Shift Total(mL/kg) 4201. 7(57.5)   4201. 7(57.5)   OUTPUT   Urine(mL/kg/hr) 255(0.4)   255   Shift Total(mL/kg) 255(3.5)   255(3.5)   Weight (kg) 73.1 73.1 73.1 73.1     Patient Vitals for the past 96 hrs (Last 3 readings):   Weight   10/30/22 2030 161 lb 2.5 oz (73.1 kg)     Drains/Tubes Outputs  Cerna    Exam   PHYSICAL EXAM:  CONSTITUTIONAL: Critically ill appearing, intubated, neuro exam limited   ENT:  Normocephalic, without obvious abnormality, atraumatic, pupils equal 5 mm, not reactive  LUNGS:  No increased work of breathing, good air exchange, clear to auscultation bilaterally  CARDIOVASCULAR:  tachycardic with regular rhythm  ABDOMEN:  Rounded, hypoactive bowel sounds  MUSCULOSKELETAL:  There is no redness, warmth, or swelling of the joints.     NEUROLOGIC: No cough/gag, no corneal reflex, breathing over the ventilator, no movement to painful stimuli   SKIN: Jaundiced, no rash or lesions    Data   Old records and images have been reviewed    Lab Results   CBC     Lab Results   Component Value Date/Time    WBC 10.4 10/31/2022 05:42 AM    RBC 1.70 10/31/2022 05:42 AM    RBC 3.55 07/08/2019 02:45 PM    HGB 5.8 10/31/2022 05:42 AM    HCT 17.9 10/31/2022 05:42 AM     10/31/2022 05:42 AM    .3 10/31/2022 05:42 AM    MCH 34.1 10/31/2022 05:42 AM    MCHC 32.4 10/31/2022 05:42 AM    RDW 22.7 10/31/2022 05:42 AM    NRBC 1.8 10/30/2022 11:58 PM    SEGSPCT 47 03/13/2012 11:30 AM    BLASTSPCT 0.9 07/08/2020 03:40 AM    METASPCT 0.9 10/30/2022 11:58 PM    LYMPHOPCT 6.1 10/31/2022 05:42 AM    LYMPHOPCT 30.0 07/08/2019 02:45 PM    PROMYELOPCT 2.6 10/31/2022 05:42 AM    MONOPCT 5.3 10/31/2022 05:42 AM    MYELOPCT 0.9 10/30/2022 11:58 PM    EOSPCT 2.2 07/08/2019 02:45 PM    BASOPCT 0.9 10/31/2022 05:42 AM    MONOSABS 0.52 10/31/2022 05:42 AM    LYMPHSABS 0.62 10/31/2022 05:42 AM    EOSABS 0.36 10/31/2022 05:42 AM    BASOSABS 0.09 10/31/2022 05:42 AM       BMP   Lab Results   Component Value Date/Time     10/31/2022 05:42 AM    K 4.5 10/31/2022 05:42 AM    K 8.5 10/30/2022 01:45 PM     10/31/2022 05:42 AM    CO2 25 10/31/2022 05:42 AM    BUN 11 10/31/2022 05:42 AM    CREATININE 0.7 10/31/2022 05:42 AM    GLUCOSE 106 10/31/2022 05:42 AM    GLUCOSE 102 07/08/2019 02:45 PM    CALCIUM 8.1 10/31/2022 05:42 AM       LFTS  Lab Results   Component Value Date/Time    [facility] SOUTH 119 10/31/2022 05:42 AM    ALT 43 10/31/2022 05:42 AM     10/31/2022 05:42 AM    PROT 6.6 10/31/2022 05:42 AM    BILITOT 8.8 10/31/2022 05:42 AM    BILIDIR 3.6 10/30/2022 11:58 PM    IBILI 3.4 10/30/2022 07:31 AM    LABALBU 2.5 10/31/2022 05:42 AM    LABALBU 5.0 03/13/2012 11:30 AM       INR  Recent Labs     10/30/22  0731 10/30/22  1345 10/30/22  2358   PROTIME 34.4* 25.9* 38.3*   INR 3.1 2.4 3.6       APTT  Recent Labs     10/30/22  1345   APTT 41.6*       Lactic Acid  Lab Results   Component Value Date/Time    LACTA 1.7 10/31/2022 05:42 AM    LACTA 3.6 10/30/2022 11:58 PM    LACTA 12.6 10/30/2022 01:45 PM        BNP   No results for input(s): BNP in the last 72 hours. Cultures   No results for input(s): BC in the last 72 hours. No results for input(s): Christean Castelan in the last 72 hours. No results for input(s): LABURIN in the last 72 hours. Radiology   CXR  CXR 10/31/22   No acute process. CT Scans  CT  Head WO Contrast 10/30/22   1. There appears to be in intraparenchymal hemorrhage centered within the   right thalamus with extensive intraventricular extension with marked   distension of the lateral, 3rd and 4th ventricles. 2. Hydrocephalus with likely transependymal flow CSF. 3. No evidence of midline shift. Findings were discussed with Dr. Lakshmi Hong on 10/30/2022 at 8:49 am.         CT Chest W Contrast 10/30/22   Atelectatic changes or infiltrate seen at the lung bases bilaterally from   aspiration. The endotracheal tube is within 1 cm of the deepak which slight   retraction is recommended. Cirrhotic morphology identified of the liver with portal venous hypertension. Moderate ascites throughout the abdomen and pelvis. There is diffuse wall   thickening identified the stomach and colon.   Gastritis or colitis cannot be   excluded. Findings may be related to the surrounding ascites. Patient is status post distal pancreatectomy and splenectomy with stones in   the gallbladder. No evidence of biliary ductal dilatation. CT Cervical Spine  10/30/22   1. There is no acute compression fracture or subluxation of the cervical spine   2. There is a massive intracranial hemorrhage. I alerted the core team at   8:40 a.m. and requested for neuro radiology to interpret the massive   intracranial hemorrhage. .     CTA Head, Neck, Brain Perfusion   1. Unreliable perfusion analysis. 2. There is an area of active extravasation in the region of the medial right   thalamus/3rd ventricle with interval worsening of the amount of   intraventricular blood. 3. No evidence of a dissection or flow limiting stenosis of the cervical   carotid/vertebral arteries. 4. No significant stenosis or large vessel occlusion seen of the   [id number]. Findings were discussed with Dr. Bhanu Carrera on 10/30/2022 at 10:21 am.           SYSTEMS ASSESSMENT AND PLAN    Neuro   Massive intracranial hemorrhage   -Neurosurgery consulted, patient is not a candidate for surgical intervention   -Neurologic assessment shows the patient is only breathing over the ventilator    Respiratory   Acute respiratory failure s/p intubation   -Daily CXR   -Daily ABG and prn with ventilator changes  -Wean oxygen as tolerated.  Keep O2 sat 90-92%  -S/p BAL with specimens sent    Cardiovascular   Hemodynamic shock, requiring vasopressor therapy   -Titrate pressors for MAP >/ 65 mmHg   -Obtain cortisol level  Elevated troponin, most likely demand ischemia, Type II MI   -Continue to trend troponin  HFpEF, Echo from 2/2022, EF 55 to 60%  HTN now with hypotension    Gastrointestinal   ESLD with ascites  Current alcoholism abuse   -Obtain ammonia level   -Continue to monitor live profile  Hypoalbuminemia   -Give SPA 25G x 3 doses   -MELD Score 29  Hx of GI bleeding/varices with banding   -Previously followed with Dr. Katy Wright   -Dr. Marisa Arriaza most likely 2/2 lactic acidosis, previously on sodium bicarbonate drip  Lactic acidosis   -Continue to trend lactic Q4  Hypokalemia  Hypomagnesemia  Hypocalcemia  Nontraumatic rhabdomyolysis   -Continue to trend Total CK   -BMP, Mag, Phos Q4      Infectious Disease   No acute process   -No leukocytosis   -Pan cultures pending   -Not currently on antibiotics   -CBC with diff    -Obtain rapid Covid     Hematology/Oncology   Anemia, macrocytic and acute drop in hemoglobin  Supratherapeutic INR 2/2 ESLD   -On admission, INR 3.1, repeat 3.6   -S/p Vitamin K at [address] [place] [address]   --S/p 4 FFP   --S/p 1 of platelets and 1 RBC's   -TEG obtained   -CBC with Diff Q8    Endocrine   -Obtain cortisol level r/o adrenal insufficiency     Social/Spiritual/DNR/Other   Code: Full  Disposition: ICU  Fluids/lytes/nutrition: LR at 125cc/hr, replace as needed, NPO    The patient's family was updated regarding the plan of care. The patient's family expressed wishes for the patient to be an organ donor while at [address] [place] [address] ED. Will consult Palliative care for family support and goals of care. Emotional support provided. Case and plan discussed with Dr. Augustine Camara who saw and examined the patient at bedside, agreeable with the plan of care. RADHA Benavidez - CNP    10/31/2022, 8:43 AM        I personally saw and examined the patient with Alfred GARCIA. Please also see my independent consult note.    Gerardo Schwab DO

## 2022-10-30 NOTE — ED NOTES
Name: Jimmy Loser  : 1980  MRN: [id number]    Date: 10/30/2022    Benefits of immediately proceeding with Radiology exam outweigh the risks and therefore the following is being waived:      [x] Pregnancy test    [] Protocol for Iodine allergy    [] MRI questionnaire    [x] BUN/Creatinine        Carlos Clark, DO Carlos Clark, DO  10/30/22 [address] Merline Flirt,   10/30/22 1700

## 2022-10-30 NOTE — ED PROVIDER NOTES
[facility]  Department of Emergency Medicine     Written by: Digna Arango DO  Patient Name: Patrick Michaels  Attending Provider: Shubham Guido DO  Admit Date: 10/30/2022  7:20 AM  MRN: [id number]                   : 1980        Chief Complaint   Patient presents with    Altered Mental Status     Found on the toilet by her mother, recent liver history, Narcan 2mg given by EMS pt unresponsive OA ? ETOH     - Chief complaint    Patient is a 28-year-old female past medical history of alcoholic cirrhosis. Patient presents with chief complaint altered mental status. Patient on arrival was obtunded she was unable to provide any history. Shortly after arrival patient's mom did arrive to bedside. According to mother patient woke up around 3:00 in the morning so that she is complaining of a headache and she was going to use the bathroom. Patient became unresponsive. Mom states the patient did possibly have 2 white claw last night but as far she knows has not ingested any drugs or foreign substances. Symptoms have been severe in severity and constant since onset there are no exacerbating or relieving factors. There are no reported similar episodes in the past.  Mom states the patient has had issues with alcoholic cirrhosis in the past and has been referred to [facility] clinic for possible liver transplant has not been evaluated yet. Further history review of systems limited secondary to patient's altered mental status. The history is provided by a parent. The history is limited by the condition of the patient. No  was used. Review of Systems   Unable to perform ROS: Mental status change      Physical Exam  Vitals and nursing note reviewed. Constitutional:       General: She is not in acute distress. Appearance: Normal appearance. She is ill-appearing and toxic-appearing. HENT:      Head: Normocephalic and atraumatic.       Nose: No congestion or rhinorrhea. Mouth/Throat:      Mouth: Mucous membranes are moist.      Pharynx: Oropharynx is clear. Eyes:      General: Scleral icterus present. Extraocular Movements: Extraocular movements intact. Pupils: Pupils are equal, round, and reactive to light. Cardiovascular:      Rate and Rhythm: Normal rate and regular rhythm. Heart sounds: No murmur heard. No gallop. Pulmonary:      Effort: Pulmonary effort is normal. No respiratory distress. Breath sounds: No wheezing, rhonchi or rales. Abdominal:      General: Abdomen is flat. There is distension. Palpations: Abdomen is soft. There is no mass. Tenderness: There is no abdominal tenderness. There is no guarding. Hernia: No hernia is present. Comments: Significant ascites present   Musculoskeletal:         General: No swelling, tenderness or signs of injury. Normal range of motion. Cervical back: Normal range of motion. No rigidity. No muscular tenderness. Skin:     General: Skin is warm and dry. Capillary Refill: Capillary refill takes less than 2 seconds. Comments: Jaundice   Neurological:      General: No focal deficit present. Mental Status: She is unresponsive. Comments: On neuro exam patient is obtunded unresponsive to painful stimuli she does have a nasopharyngeal airway in place she does not withdraw to painful stimuli. Psychiatric:         Mood and Affect: Mood normal.         Behavior: Behavior normal.        Procedures   Intubation Procedure Note    Indication: impending airway compromise    Consent: Unable to be obtained due to the emergent nature of this procedure. Medications Used: etomidate intravenously and rocuronium intravenously    Procedure: The patient was placed in the appropriate position. Cricoid pressure was utilized. Intubation was performed by direct laryngoscopy using a video laryngoscope and a 7.5 cuffed endotracheal tube.   The cuff was then inflated and the tube was secured appropriately at a distance of 23 cm to the dental ridge. Initial confirmation of placement included bilateral breath sounds. A chest x-ray to verify correct placement of the tube showed appropriate tube position. The patient tolerated the procedure well. Complications: None    Central Line Placement Procedure Note    Indication: vascular access    Consent: Unable to be obtained due to the emergent nature of this procedure. Procedure: The patient was positioned appropriately and the skin over the right femoral vein was prepped with chlorhexidine and draped in a sterile fashion. Local anesthesia was not performed due to the emergent nature of this procedure. A large bore needle was used to identify the vein. A guide wire was then inserted into the vein through the needle. A triple lumen catheter was then inserted into the vessel over the guide wire using the Seldinger technique. All ports showed good, free flowing blood return and were flushed with saline solution. The catheter was then securely fastened to the skin with sutures and covered with a sterile dressing. A post procedure X-ray was not indicated. The patient tolerated the procedure well. Complications: None        MDM  Number of Diagnoses or Management Options  Anemia, unspecified type  End stage liver disease (HCC)  Hypokalemia  Intracranial hemorrhage (HCC)  Metabolic acidosis  Non-traumatic rhabdomyolysis  Supratherapeutic INR  Diagnosis management comments: Patient is a 63-year-old female past medical history of alcoholic cirrhosis. Patient presents to complain of altered mental status. Patient is obtunded on arrival unable provide history. Patient given Narcan with minimal improvement. Patient intubated shortly after arrival.  Laboratory work obtained CBC demonstrated chronic anemia hemoglobin 6.8, BMP obtained demonstrated hypokalemia, hepatic function panel demonstrated decompensated cirrhosis. Lactic acid elevated 10.7, urinalysis not indicative infection, CK elevated at 1506, ammonia 75, troponin 100, serum drug screen did demonstrate elevated ethanol level 146. Lipase 30. INR 3.1 urine pregnancy test negative, urine drug screen negative. Scans of the head demonstrated large intraparenchymal hemorrhage. Remainder of pan scans unremarkable. Repeat neuro exam patient nani obtunded she has been responsive to painful stimuli. Case discussed with neurosurgery patient likely brain dead with massive intraparenchymal hemorrhage. Patient given mannitol as well as vitamin K, Keppra and FFP for elevated INR. Extensive discussion held with family at the bedside. Prognosis discussed with family at bedside they are understanding of neurological demise. His family is requesting life bank lipase was consulted patient's family did give consent for organ procurement. Due to large intraparenchymal hemorrhage patient unable to be admitted at local facility thus decision made to transfer patient to [place] [id number] for further evaluation. Case discussed with intensivist who agreed to accept patient. Plan of care discussed with intensivist who agreed to accept patient. Case discussed with hospitalist who agreed to admit patient. Plan of care discussed with family they are updated on plan of care. Patient will be transferred to [facility] ICU in critical condition.        Amount and/or Complexity of Data Reviewed  Clinical lab tests: reviewed  Tests in the radiology section of CPT®: reviewed  Decide to obtain previous medical records or to obtain history from someone other than the patient: yes    Risk of Complications, Morbidity, and/or Mortality  Presenting problems: high  Diagnostic procedures: high  Management options: high    Patient Progress  Patient progress: stable       ED Course as of 10/30/22 1657   Sun Oct 30, 2022   0736 ECG  0736  HR 95  Normal sinus rhythm  Nonspecific st abnormality, qt more prolong than previous, prolong qtc normal axis  Similar c/w previous [MINA]   1021 D/w rad partners dr Bill Palomares active extravasation medial thalmus area vs third ventricle [MINA]   1501 D/w dr Rao Brought ICU intensivists at [address] accepts to ICU wants to evaluate before life banc is involved at [facility], family aware [MINA]   [phone] D/w dr Nathalie Win [facility] accepts at [facility] [MINA]      ED Course User Index  [MINA] Shubham Abeben, DO        --------------------------------------------- PAST HISTORY ---------------------------------------------  Past Medical History:  has a past medical history of Alcohol abuse, Alcoholic cirrhosis ([facility] [address] Anxiety, Hypertension, Hypothyroidism, and Pancreatic cyst.    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 Social History:  reports that she has never smoked. She has never used smokeless tobacco. She reports current alcohol use. She reports that she does not use drugs. Family History: family history includes Breast Cancer in an other family member; High Blood Pressure in her mother; Other in her father and mother. The patients home medications have been reviewed.     Allergies: Pcn [penicillins]    -------------------------------------------------- RESULTS -------------------------------------------------    Lab  Results for orders placed or performed during the hospital encounter of 10/30/22   COVID-19, Rapid    Specimen: Nasopharyngeal Swab   Result Value Ref Range    SARS-CoV-2, NAAT Not Detected Not Detected   Lactate, Sepsis   Result Value Ref Range    Lactic Acid, Sepsis 10.7 (HH) 0.5 - 1.9 mmol/L CBC with Auto Differential   Result Value Ref Range    WBC 6.6 4.5 - 11.5 E9/L    RBC 1.88 (L) 3.50 - 5.50 E12/L    Hemoglobin 6.6 (LL) 11.5 - 15.5 g/dL    Hematocrit 21.3 (L) 34.0 - 48.0 %    .3 (H) 80.0 - 99.9 fL    MCH 35.1 (H) 26.0 - 35.0 pg    MCHC 31.0 (L) 32.0 - 34.5 %    RDW 19.7 (H) 11.5 - 15.0 fL    Platelets 892 715 - 825 E9/L    MPV 10.6 7.0 - 12.0 fL    Neutrophils % 71.2 43.0 - 80.0 %    Immature Granulocytes % 1.4 0.0 - 5.0 %    Lymphocytes % 14.6 (L) 20.0 - 42.0 %    Monocytes % 9.3 2.0 - 12.0 %    Eosinophils % 2.6 0.0 - 6.0 %    Basophils % 0.9 0.0 - 2.0 %    Neutrophils Absolute 4.73 1.80 - 7.30 E9/L    Immature Granulocytes # 0.09 E9/L    Lymphocytes Absolute 0.97 (L) 1.50 - 4.00 E9/L    Monocytes Absolute 0.62 0.10 - 0.95 E9/L    Eosinophils Absolute 0.17 0.05 - 0.50 E9/L    Basophils Absolute 0.06 0.00 - 0.20 E9/L    Anisocytosis 2+     Polychromasia 1+     Poikilocytes 2+     Schistocytes 1+     Acanthocytes 1+     [place] Cells 1+     Ovalocytes 1+     Target Cells 1+     Blue-Jolly Bodies 1+    Basic Metabolic Panel   Result Value Ref Range    Sodium 140 132 - 146 mmol/L    Potassium 2.1 (LL) 3.5 - 5.0 mmol/L    Chloride 103 98 - 107 mmol/L    CO2 17 (L) 22 - 29 mmol/L    Anion Gap 20 (H) 7 - 16 mmol/L    Glucose 148 (H) 74 - 99 mg/dL    BUN 8 6 - 20 mg/dL    Creatinine 0.8 0.5 - 1.0 mg/dL    Est, Glom Filt Rate >60 >=60 mL/min/1.73    Calcium 7.4 (L) 8.6 - 10.2 mg/dL   Magnesium   Result Value Ref Range    Magnesium 1.7 1.6 - 2.6 mg/dL   Serum Drug Screen   Result Value Ref Range    Ethanol Lvl 146 mg/dL    Acetaminophen Level <5.0 (L) 10.0 - 90.4 mcg/mL    Salicylate, Serum <9.8 0.0 - 30.0 mg/dL   Osmolality, Serum   Result Value Ref Range    Osmolality 350 (H) 285 - 310 mOsm/Kg   Hepatic Function Panel   Result Value Ref Range    Total Protein 7.3 6.4 - 8.3 g/dL    Albumin 2.0 (L) 3.5 - 5.2 g/dL    Alkaline Phosphatase 183 (H) 35 - 104 U/L    ALT 37 (H) 0 - 32 U/L     (H) 0 - 31 U/L    Total Bilirubin 8.5 (H) 0.0 - 1.2 mg/dL    Bilirubin, Direct 5.1 (H) 0.0 - 0.3 mg/dL    Bilirubin, Indirect 3.4 (H) 0.0 - 1.0 mg/dL   Lipase   Result Value Ref Range    Lipase 30 13 - 60 U/L   Troponin   Result Value Ref Range    Troponin, High Sensitivity 100 (H) 0 - 9 ng/L   Brain Natriuretic Peptide   Result Value Ref Range    Pro-BNP 76 0 - 125 pg/mL   Protime-INR   Result Value Ref Range    Protime 34.4 (H) 9.3 - 12.4 sec    INR 3.1    Sedimentation Rate   Result Value Ref Range    Sed Rate 28 (H) 0 - 20 mm/Hr   Urinalysis with Microscopic   Result Value Ref Range    Color, UA Yellow Straw/Yellow    Clarity, UA Clear Clear    Glucose, Ur Negative Negative mg/dL    Bilirubin Urine SMALL (A) Negative    Ketones, Urine Negative Negative mg/dL    Specific Gravity, UA 1.010 1.005 - 1.030    Blood, Urine MODERATE (A) Negative    pH, UA 7.0 5.0 - 9.0    Protein, UA Negative Negative mg/dL    Urobilinogen, Urine 1.0 <2.0 E.U./dL    Nitrite, Urine Negative Negative    Leukocyte Esterase, Urine Negative Negative    Hyaline Casts, UA 0-2 0 - 2 /LPF    WBC, UA NONE 0 - 5 /HPF    RBC, UA 2-5 0 - 2 /HPF    Epithelial Cells, UA FEW /HPF    Bacteria, UA FEW (A) None Seen /HPF   HCG Qualitative, Serum   Result Value Ref Range    hCG Qual NEGATIVE NEGATIVE   URINE DRUG SCREEN   Result Value Ref Range    Amphetamine Screen, Urine NOT DETECTED Negative <1000 ng/mL    Barbiturate Screen, Ur NOT DETECTED Negative < 200 ng/mL    Benzodiazepine Screen, Urine NOT DETECTED Negative < 200 ng/mL    Cannabinoid Scrn, Ur NOT DETECTED Negative < 50ng/mL    Cocaine Metabolite Screen, Urine NOT DETECTED Negative < 300 ng/mL    Opiate Scrn, Ur NOT DETECTED Negative < 300ng/mL    PCP Screen, Urine NOT DETECTED Negative < 25 ng/mL    Methadone Screen, Urine NOT DETECTED Negative <300 ng/mL    Oxycodone Urine NOT DETECTED Negative <100 ng/mL    FENTANYL SCREEN, URINE NOT DETECTED Negative <1 ng/mL    Drug Screen Comment: see below CK   Result Value Ref Range    Total CK 1,506 (H) 20 - 180 U/L   Ammonia   Result Value Ref Range    Ammonia 75.8 (H) 11.0 - 51.0 umol/L   TSH   Result Value Ref Range    TSH 1.550 0.270 - 4.200 uIU/mL   Blood Gas, Arterial   Result Value Ref Range    Date Analyzed 20221030     Time Analyzed 0728     Source: Blood Arterial     pH, Blood Gas 7.391 7.350 - 7.450    PCO2 27.3 (L) 35.0 - 45.0 mmHg    PO2 126.5 (H) 75.0 - 100.0 mmHg    HCO3 16.2 (L) 22.0 - 26.0 mmol/L    B.E. -7.9 (L) -3.0 - 3.0 mmol/L    O2 Sat 99.5 (H) 92.0 - 98.5 %    O2Hb 95.6 94.0 - 97.0 %    COHb 3.5 (H) 0.0 - 1.5 %    MetHb 0.4 0.0 - 1.5 %    O2 Content 10.4 mL/dL    HHb 0.5 0.0 - 5.0 %    tHb (est) 7.5 (L) 11.5 - 16.5 g/dL    Mode NC-6  L     Date Of Collection      Time Collected      Pt Temp 37.0 C     ID [id number]     Lab [id number]     Critical(s) Notified .  No Critical Values    CBC with Auto Differential   Result Value Ref Range    WBC 7.9 4.5 - 11.5 E9/L    RBC 1.85 (L) 3.50 - 5.50 E12/L    Hemoglobin 6.4 (LL) 11.5 - 15.5 g/dL    Hematocrit 21.4 (L) 34.0 - 48.0 %    .7 (H) 80.0 - 99.9 fL    MCH 34.6 26.0 - 35.0 pg    MCHC 29.9 (L) 32.0 - 34.5 %    RDW 20.3 (H) 11.5 - 15.0 fL    Platelets 263 209 - 101 E9/L    MPV 10.4 7.0 - 12.0 fL    Neutrophils % 78.4 43.0 - 80.0 %    Immature Granulocytes % 0.5 0.0 - 5.0 %    Lymphocytes % 9.5 (L) 20.0 - 42.0 %    Monocytes % 11.1 2.0 - 12.0 %    Eosinophils % 0.1 0.0 - 6.0 %    Basophils % 0.4 0.0 - 2.0 %    Neutrophils Absolute 6.19 1.80 - 7.30 E9/L    Immature Granulocytes # 0.04 E9/L    Lymphocytes Absolute 0.75 (L) 1.50 - 4.00 E9/L    Monocytes Absolute 0.88 0.10 - 0.95 E9/L    Eosinophils Absolute 0.01 (L) 0.05 - 0.50 E9/L    Basophils Absolute 0.03 0.00 - 0.20 E9/L    Anisocytosis 2+     Polychromasia 1+     Poikilocytes 1+     Schistocytes 1+     Acanthocytes 1+     [place] Cells 1+     Ovalocytes 1+     Target Cells 1+     Blue-Jolly Bodies 1+    Comprehensive Metabolic Panel w/ Reflex to MG   Result Value Ref Range    Sodium 139 132 - 146 mmol/L    Potassium reflex Magnesium 8.5 (HH) 3.5 - 5.0 mmol/L    Chloride 109 (H) 98 - 107 mmol/L    CO2 14 (L) 22 - 29 mmol/L    Anion Gap 16 7 - 16 mmol/L    Glucose 188 (H) 74 - 99 mg/dL    BUN 7 6 - 20 mg/dL    Creatinine 0.6 0.5 - 1.0 mg/dL    Est, Glom Filt Rate >60 >=60 mL/min/1.73    Calcium 7.9 (L) 8.6 - 10.2 mg/dL    Total Protein 8.3 6.4 - 8.3 g/dL    Albumin 2.5 (L) 3.5 - 5.2 g/dL    Total Bilirubin 10.3 (H) 0.0 - 1.2 mg/dL    Alkaline Phosphatase 188 (H) 35 - 104 U/L    ALT 44 (H) 0 - 32 U/L     (H) 0 - 31 U/L   Lactic Acid   Result Value Ref Range    Lactic Acid 12.6 (HH) 0.5 - 2.2 mmol/L   Protime-INR   Result Value Ref Range    Protime 25.9 (H) 9.3 - 12.4 sec    INR 2.4    APTT   Result Value Ref Range    aPTT 41.6 (H) 24.5 - 35.1 sec   Blood Gas, Arterial   Result Value Ref Range    Date Analyzed 20221030     Time Analyzed 1352     Source: Blood Arterial     pH, Blood Gas 7.297 (L) 7.350 - 7.450    PCO2 30.1 (L) 35.0 - 45.0 mmHg    PO2 373.4 (H) 75.0 - 100.0 mmHg    HCO3 14.4 (L) 22.0 - 26.0 mmol/L    B.E. -11.0 (L) -3.0 - 3.0 mmol/L    O2 Sat 99.8 (H) 92.0 - 98.5 %    PO2/FIO2 3.73 mmHg/%    O2Hb 96.5 94.0 - 97.0 %    COHb 3.0 (H) 0.0 - 1.5 %    MetHb 0.3 0.0 - 1.5 %    O2 Content 11.8 mL/dL    HHb 0.2 0.0 - 5.0 %    tHb (est) 7.9 (L) 11.5 - 16.5 g/dL    Mode AC     FIO2 100.0 %    Rr Mechanical 18.0 b/min    Vt Mechanical 400.0 mL    Peep/Cpap 5.0 cmH2O    Date Of Collection      Time Collected      Pt Temp 37.0 C     ID [id number]     Lab [id number]     Critical(s) Notified .  No Critical Values    Potassium   Result Value Ref Range    Potassium 3.7 3.5 - 5.0 mmol/L   Magnesium   Result Value Ref Range    Magnesium 1.7 1.6 - 2.6 mg/dL   POCT Glucose   Result Value Ref Range    Meter Glucose 134 (H) 74 - 99 mg/dL   POCT Glucose   Result Value Ref Range    Meter Glucose 198 (H) 74 - 99 mg/dL   TYPE AND SCREEN   Result Value Ref Range    ABO/Rh O NEG     Antibody Screen NEG    PREPARE PLASMA, 2 Units   Result Value Ref Range    Product Code Blood Bank [id number]     Description Blood Bank Plasma, 5 Day, Thawed     Unit Number [id number]     Dispense Status Blood Bank issued     Product Code Blood Bank [id number]     Description Blood Bank Plasma, 5 Day, Thawed     Unit Number [id number]     Dispense Status Blood Bank issued    PREPARE RBC (CROSSMATCH), 2 Units   Result Value Ref Range    Product Code Blood Bank N8241G13     Description Blood Bank Red Blood Cells, Apheresis, Leuko-reduced     Unit Number [id number]     Dispense Status Blood Bank issued     Product Code Blood Bank [id number]     Description Blood Bank Red Blood Cells, Leuko-reduced     Unit Number [id number]     Dispense Status Blood Bank issued        Radiology  CT BRAIN PERFUSION   Final Result   1. Unreliable perfusion analysis. 2. There is an area of active extravasation in the region of the medial right   thalamus/3rd ventricle with interval worsening of the amount of   intraventricular blood. 3. No evidence of a dissection or flow limiting stenosis of the cervical   carotid/vertebral arteries. 4. No significant stenosis or large vessel occlusion seen of the   [id number]. Findings were discussed with Dr. Maggie Odell on 10/30/2022 at 10:21 am.         CTA HEAD W CONTRAST   Final Result   1. Unreliable perfusion analysis. 2. There is an area of active extravasation in the region of the medial right   thalamus/3rd ventricle with interval worsening of the amount of   intraventricular blood. 3. No evidence of a dissection or flow limiting stenosis of the cervical   carotid/vertebral arteries. 4. No significant stenosis or large vessel occlusion seen of the   [id number]-[id number]. Findings were discussed with Dr. Maggie Odell on 10/30/2022 at 10:21 am.         CTA NECK W CONTRAST   Final Result   1. Unreliable perfusion analysis.    2. There is an area of active extravasation in the region of the medial right   thalamus/3rd ventricle with interval worsening of the amount of   intraventricular blood. 3. No evidence of a dissection or flow limiting stenosis of the cervical   carotid/vertebral arteries. 4. No significant stenosis or large vessel occlusion seen of the   [id number]-pa-Kldkro. Findings were discussed with Dr. Celena Sky on 10/30/2022 at 10:21 am.         CT HEAD WO CONTRAST   Final Result   1. There appears to be in intraparenchymal hemorrhage centered within the   right thalamus with extensive intraventricular extension with marked   distension of the lateral, 3rd and 4th ventricles. 2. Hydrocephalus with likely transependymal flow CSF. 3. No evidence of midline shift. Findings were discussed with Dr. Peng Colmenares on 10/30/2022 at 8:49 am.         CT Cervical Spine WO Contrast   Final Result   1. There is no acute compression fracture or subluxation of the cervical spine   2. There is a massive intracranial hemorrhage. I alerted the core team at   8:40 a.m. and requested for neuro radiology to interpret the massive   intracranial hemorrhage. .         CT CHEST W CONTRAST   Final Result   Atelectatic changes or infiltrate seen at the lung bases bilaterally from   aspiration. The endotracheal tube is within 1 cm of the deepak which slight   retraction is recommended. Cirrhotic morphology identified of the liver with portal venous hypertension. Moderate ascites throughout the abdomen and pelvis. There is diffuse wall   thickening identified the stomach and colon. Gastritis or colitis cannot be   excluded. Findings may be related to the surrounding ascites. Patient is status post distal pancreatectomy and splenectomy with stones in   the gallbladder. No evidence of biliary ductal dilatation.          CT ABDOMEN PELVIS W IV CONTRAST Additional Contrast? None   Final Result   Atelectatic changes or infiltrate seen at the lung bases bilaterally from aspiration. The endotracheal tube is within 1 cm of the deepak which slight   retraction is recommended. Cirrhotic morphology identified of the liver with portal venous hypertension. Moderate ascites throughout the abdomen and pelvis. There is diffuse wall   thickening identified the stomach and colon. Gastritis or colitis cannot be   excluded. Findings may be related to the surrounding ascites. Patient is status post distal pancreatectomy and splenectomy with stones in   the gallbladder. No evidence of biliary ductal dilatation. XR CHEST PORTABLE   Final Result   No acute process. ------------------------- NURSING NOTES AND VITALS REVIEWED ---------------------------  Date / Time Roomed:  10/30/2022  7:20 AM  ED Bed Assignment:  17/17    The nursing notes within the ED encounter and vital signs as below have been reviewed.    Patient Vitals for the past 24 hrs:   BP Temp Temp src Pulse Resp SpO2 Weight   10/30/22 1655 114/60 -- -- (!) 139 18 -- --   10/30/22 1650 112/60 -- -- (!) 142 17 -- --   10/30/22 1645 116/61 -- -- (!) 142 17 -- --   10/30/22 1640 113/61 -- -- (!) 144 17 -- --   10/30/22 1635 (!) 112/59 (!) 96.3 °F (35.7 °C) CORE (!) 144 18 98 % --   10/30/22 1609 (!) 95/50 -- -- (!) 140 17 -- --   10/30/22 1608 (!) 95/50 -- -- (!) 139 18 -- --   10/30/22 1605 (!) 85/45 -- -- (!) 137 17 -- --   10/30/22 1600 (!) 84/45 -- -- (!) 133 17 -- --   10/30/22 1555 (!) 84/41 -- -- (!) 133 18 -- --   10/30/22 1550 (!) 84/42 (!) 95.4 °F (35.2 °C) CORE (!) 133 17 97 % --   10/30/22 1545 (!) 83/43 -- -- (!) 133 17 -- --   10/30/22 1540 (!) 77/42 -- -- (!) 132 17 -- --   10/30/22 1535 (!) 72/37 -- -- (!) 127 17 -- --   10/30/22 1530 (!) 51/30 (!) 95.7 °F (35.4 °C) CORE (!) 121 18 98 % --   10/30/22 1526 (!) 49/26 -- -- (!) 109 21 -- --   10/30/22 1515 (!) 26/18 -- -- 93 18 -- --   10/30/22 1500 (!) 110/50 -- -- 97 18 -- --   10/30/22 1445 (!) 100/45 -- -- 96 15 -- --   10/30/22 1444 (!) 100/45 -- -- 89 18 -- --   10/30/22 1440 (!) 73/39 -- -- 97 18 -- --   10/30/22 1430 132/78 -- -- (!) 121 19 99 % --   10/30/22 1415 (!) 171/104 (!) 95.2 °F (35.1 °C) CORE (!) 113 27 99 % --   10/30/22 1400 (!) 170/103 -- -- (!) 106 26 -- --   10/30/22 1345 (!) 170/105 -- -- (!) 105 25 -- --   10/30/22 1330 (!) 170/104 -- -- 96 25 -- --   10/30/22 1315 (!) 155/93 (!) 94.1 °F (34.5 °C) CORE (!) 103 25 99 % --   10/30/22 1300 (!) 149/95 -- -- (!) 101 26 -- --   10/30/22 1245 (!) 146/93 -- -- 96 21 -- --   10/30/22 1230 (!) 149/90 (!) 94 °F (34.4 °C) CORE 95 21 -- --   10/30/22 1215 (!) 146/90 -- -- 96 25 99 % --   10/30/22 1200 139/89 (!) 93.2 °F (34 °C) CORE 94 23 99 % --   10/30/22 1145 (!) 142/91 -- -- 96 26 99 % --   10/30/22 1130 (!) 142/91 (!) 92.7 °F (33.7 °C) CORE 95 24 99 % --   10/30/22 1115 139/88 -- -- 93 22 -- --   10/30/22 1113 -- -- -- -- -- 99 % --   10/30/22 1045 139/88 -- -- 92 18 -- --   10/30/22 1030 139/88 (!) 92.1 °F (33.4 °C) -- 95 21 -- --   10/30/22 1026 139/88 (!) 91.9 °F (33.3 °C) CORE 95 18 100 % --   10/30/22 1015 139/88 -- -- 99 22 99 % --   10/30/22 1000 135/87 -- -- -- -- -- --   10/30/22 0945 135/87 -- -- 96 19 -- --   10/30/22 0930 138/88 -- -- 94 27 -- --   10/30/22 0915 135/85 (!) 91.8 °F (33.2 °C) CORE 92 20 -- --   10/30/22 0845 133/88 -- -- 91 18 -- --   10/30/22 0830 133/88 (!) 95 °F (35 °C) -- 93 18 -- --   10/30/22 0745 115/72 -- -- -- -- -- --   10/30/22 0736 125/75 97.9 °F (36.6 °C) Axillary 94 -- -- --   10/30/22 0732 -- -- -- -- -- -- 138 lb (62.6 kg)       Oxygen Saturation Interpretation: Abnormal and Improved after treatment      ------------------------------------------ PROGRESS NOTES ------------------------------------------  Re-evaluation(s):  Time: 1440. Patients symptoms are worsening  Repeat physical examination is worsened    Time: 1600.   Patients symptoms show no change  Repeat physical examination is not changed    I have spoken with the mother and father and discussed todays results, in addition to providing specific details for the plan of care and counseling regarding the diagnosis and prognosis. Their questions are answered at this time and they are agreeable with the plan. I have discussed the risks and benefits of transfer and they wish to proceed with the transfer. --------------------------------- ADDITIONAL PROVIDER NOTES ---------------------------------  Consultations:  Spoke with Dr. Karen Israel (Critical care). Discussed case. They have accept the patient to the ICU. Spoke with Dr. Darlene Stevens (Medicine). Discussed case. They will admit this patient. Reason for transfer: Massive intraparenchymal hemorrhage need for medical stabilization in the ICU. This patient's ED course included: a personal history and physicial examination, re-evaluation prior to disposition, multiple bedside re-evaluations, IV medications, cardiac monitoring, continuous pulse oximetry, and complex medical decision making and emergency management    This patient has initially deteriorated, but then stabilized during their ED course. Please note that the withdrawal or failure to initiate urgent interventions for this patient would likely result in a life threatening deterioration or permanent disability. Clinical Impression  1. Intracranial hemorrhage ([facility] [address]    2. Hypokalemia    3. Metabolic acidosis    4. Non-traumatic rhabdomyolysis    5. End stage liver disease ([facility] [address]    6. Supratherapeutic INR    7. Anemia, unspecified type          Disposition  Patient's disposition: Transfer to [facility]. Transferred by: [facility]. [id number]  [facility].      Patient was seen and evaluated by myself and my attending Rahul Francois DO. Assessment and Plan discussed with attending provider, please see attestation for final plan of care.      DO Guillaume Mike DO  Resident  10/30/22 3899

## 2022-10-31 NOTE — PROCEDURES
Department of Internal Medicine  Division of Pulmonary, Critical Care & Sleep Medicine  Pulmonary [address] [facility]    PROCEDURE NOTE    PROCEDURE:  Arterial line placement. INDICATIONS:  Continuous hemodynamic monitoring of vital signs    PROCEDURE : Dr. Chris Multani:   RADHA Yancey-CNP    ANESTHESIA:  Local infiltration of 1% lidocaine. CONSCENT:   Informed written obtained. Technique:  Procedure was done using strict aseptic technique. Left radial site was cleaned with chloraprep and draped. Radial artery was identified. Radial arterial line was inserted, a good blood flow was obtained, after which guidewire was inserted all the way with no resistance. Then the canula was inserted and needle with guidewire was withdrawn. Pulsatile bright red blood flow was observed. The canula was connected to BP monitoring apparatus and a good waveform was noted. Then the canula was secured with 2 stay sutures of 3-0 silk following which dressing was applied. Number of sticks: . 2    Number of Kits used: 1     Complications: No immediate complication. Estimated blood loss: About 2 ml. Comment: Patient tolerated the procedure well.      I personally performed the procedure    Rubio Sarkar DO  9:58 PM  10/30/2022

## 2022-10-31 NOTE — PLAN OF CARE
Problem: Respiratory - Adult  Goal: Achieves optimal ventilation and oxygenation  Outcome: Not Progressing   Pt's oxygenation is worsening and apnea test performed

## 2022-10-31 NOTE — PROGRESS NOTES
[facility]   Department of Internal Medicine   MICU Progress Note    Patient:  Penny Jack 38 y.o. female   MRN: [id number]       Date of Service: 10/31/2022    Allergy: Pcn [penicillins]  Unresponsiveness   Subjective   Intubation/no sedation  No corneal reflex, no gag, no cough, no pain stimuli  Currently on vasopressin and Levophed  Temp of 97.9  [employer] following  Brain test today with neurology, and apnea test  Family updated  No overnight event  Unable to obtain ROS     Objective     TEMPERATURE:  Current - Temp: 97.9 °F (36.6 °C); Max - Temp  Av.7 °F (35.9 °C)  Min: 92.7 °F (33.7 °C)  Max: 99.3 °F (37.4 °C)    RESPIRATIONS RANGE: Resp  Av.4  Min: 14  Max: 27    PULSE RANGE: Pulse  Av.2  Min: 89  Max: 144    BLOOD PRESSURE RANGE:  Systolic (55SWT), NAL:869 , Min:26 , WNU:447   ; Diastolic (10FUZ), EYT:16, Min:18, Max:105      PULSE OXIMETRY RANGE: SpO2  Av.5 %  Min: 90 %  Max: 100 %    I & O - 24hr:    Intake/Output Summary (Last 24 hours) at 10/31/2022 1117  Last data filed at 10/31/2022 1000  Gross per 24 hour   Intake 4315.4 ml   Output 575 ml   Net 3740.4 ml     I/O last 3 completed shifts: In: 4315.4 [I.V.:1204.4; Blood:1807.5; IV Piggyback:1303.5]  Out: 455 [Urine:455] I/O this shift:  In: -   Out: 120 [Urine:120]   Weight change:     Physical Exam:  General Appearance: alert, appears stated age, and intubated/no sedation on vent,  jaundice looking  HEENT:  Head: Normocephalic, no lesions, without obvious abnormality. Eye: Pupil non reactive to light, MMD  Neck: no adenopathy, no carotid bruit, no JVD, supple, symmetrical, trachea midline, and thyroid not enlarged, symmetric, no tenderness/mass/nodules  Lung: clear to auscultation bilaterally  Heart: regular rate and rhythm, S1, S2 normal, no murmur, click, rub or gallop  Abdomen: soft, non-tender; bowel sounds normal; no masses,  no organomegaly  Extremities:   1+ lower extremity edema noted. Musculokeletal: No joint swelling, no muscle tenderness. ROM normal in all joints of extremities. Neurologic: Mental status: intubated/no sedated on vent.  Limited neuro exam.       Medications     Continuous Infusions:   sodium chloride      sodium chloride      sodium chloride      sodium chloride      vasopressin (Septic Shock) infusion 0.03 Units/min (10/31/22 1040)    norepinephrine 30 mcg/min (10/31/22 0659)    sodium chloride      lactated ringers 125 mL/hr at 10/31/22 0801     Scheduled Meds:   albumin human  25 g IntraVENous Q8H    magnesium sulfate  4,000 mg IntraVENous Once    sodium phosphate IVPB  15 mmol IntraVENous Once    sodium chloride flush  5-40 mL IntraVENous 2 times per day    chlorhexidine  15 mL Mouth/Throat BID    famotidine (PEPCID) injection  20 mg IntraVENous BID     PRN Meds: sodium chloride, sodium chloride, sodium chloride, sodium chloride, sodium chloride flush, sodium chloride, ondansetron **OR** ondansetron, polyethylene glycol  Nutrition:   NPO     Labs and Imaging Studies     CBC:   Recent Labs     10/30/22  1345 10/30/22  2358 10/31/22  0542   WBC 7.9 9.3 10.4   RBC 1.85* 2.12* 1.70*   HGB 6.4* 7.1* 5.8*   HCT 21.4* 21.4* 17.9*   .7* 100.9* 105.3*   MCH 34.6 33.5 34.1   MCHC 29.9* 33.2 32.4   RDW 20.3* 21.6* 22.7*    100* 122*   MPV 10.4 11.1 11.0       BMP:    Recent Labs     10/30/22  1345 10/30/22  1503 10/30/22  2358 10/31/22  0542     --  143 144   K 8.5* 3.7 3.2* 4.5   *  --  107 110*   CO2 14*  --  25 25   BUN 7  --  11 11   CREATININE 0.6  --  0.7 0.7   GLUCOSE 188*  --  141* 106*   CALCIUM 7.9*  --  7.9* 8.1*   PROT 8.3  --  7.0 6.6   LABALBU 2.5*  --  2.0* 2.5*   BILITOT 10.3*  --  9.4* 8.8*   ALKPHOS 188*  --  135* 119*   *  --  210* 203*   ALT 44*  --  48* 43*       LIVER PROFILE:   Recent Labs     10/30/22  0731 10/30/22  1345 10/30/22  2120 10/30/22  2358 10/31/22  0542   * 195*  --  210* 203*   ALT 37* 44*  --  48* 43* LIPASE 30  --  40  --   --    BILIDIR 5.1*  --   --  3.6*  --    BILITOT 8.5* 10.3*  --  9.4* 8.8*   ALKPHOS 183* 188*  --  135* 119*       PT/INR:   Recent Labs     10/30/22  0731 10/30/22  1345 10/30/22  2358   PROTIME 34.4* 25.9* 38.3*   INR 3.1 2.4 3.6       APTT:   Recent Labs     10/30/22  1345   APTT 41.6*       Fasting Lipid Panel:    Lab Results   Component Value Date/Time    CHOL 235 06/01/2017 11:15 AM    TRIG 112 06/01/2017 11:15 AM    HDL 81 06/01/2017 11:15 AM       Cardiac Enzymes:    Lab Results   Component Value Date    CKTOTAL 1,098 (H) 10/30/2022    CKTOTAL 1,506 (H) 10/30/2022    TROPONINI <0.01 07/04/2020    TROPONINI <0.01 03/06/2017       Notable Cultures:      Blood cultures   Blood Culture, Routine   Date Value Ref Range Status   02/20/2022 5 Days no growth  Final     Respiratory cultures No results found for: RESPCULTURE   Gram Stain Result   Date Value Ref Range Status   07/25/2022   Final    Gram stain performed on unspun fluid  Polymorphonuclear leukocytes not seen  Epithelial cells not seen  No organisms seen       Urine   Urine Culture, Routine   Date Value Ref Range Status   06/30/2022 >100,000 CFU/ml  Final     Legionella No results found for: LABLEGI  C Diff PCR No results found for: CDIFPCR  Wound culture/abscess: No results for input(s): WNDABS in the last 72 hours. Tip culture:No results for input(s): CXCATHTIP in the last 72 hours.      Antibiotic  Days  Day started                                Oxygen:     Vent Information  Vent Mode: AC/VC    Additional Respiratory Assessments  Heart Rate: (!) 118  Resp: 14  SpO2: 95 %  Position: Semi-Sorto's  Humidification Source: Heated wire  Humidification Temp: 37     Nasal cannula L/min     Face mask %     Reservoirs mask %       ABG     PH     PCO2     PO2     HCO3     Sat%     FIO2     DATES        Lines:  Site  Day  Date inserted     TLC   Rt femoral    10/30/22     PICC              Arterial line   Left radial    10/30/22 Peripheral line              HD cath            Urinary Catheter 10/30/22 Cerna-Output (mL): 45 mL    Imaging Studies:    XR CHEST PORTABLE   Final Result   Bibasilar infiltrates         XR CHEST PORTABLE    (Results Pending)       APRN- CNP Assessment and PLan     In summary, 38 years female PMHx  ESLD, alcoholism with current alcohol use, decompensated cirrhosis, marked synthetic liver dysfunction with ascites and varices with banding, high meld score, HTN, pancreatectomy for mucinous cystadenoma with dysplasia, hx of septic sternoclavicular joint, macrocytic anemia, hypothyroidism , admitted on 10/30/22 to [facility] with unresponsiveness, found to have intraparenchymal hemorrhage centered within the right thalamus with extensive intraventricular extension with marked distension of the lateral, 3rd and 4th ventricles. Neurosurgery was consulted and the patient is not a surgical candidate. Transfer to [facility] and critical care was consulted. Neuro     ICH -- intraparenchymal hemorrhage centered within the right thalamus with extensive intraventricular extension with marked distension of the lateral, 3rd and 4th ventricles  Acute anoxic encephalopathy  Hyperalbuminemia  - Neurosurgery consulted, input appreciated, not a surgical candidate  - life bank involved  - plan for Brain death test and apnea test today  - Family would like to proceed with like bank for DCD    - No sedation  - Neuro exam per protocol   Respiratory   Acute anoxic and hypercapnic respiratory failure 2/2 ICH- unable to protect airway               -Daily CXR              -Daily ABG and prn with ventilator changes  -Wean oxygen as tolerated.  Keep O2 sat 90-92%  - s/p bronch (10/30/22), sputum culture pending   - Bronchodilator chapin and PRN   - NO plan for SBT      Cardiovascular   - Neurogenic shock now on two pressors    - Currently on vasopressin and levophed              -Titrate pressors for MAP >/ 65 mmHg              -Cortisol level 2.72 -- plan for [employer] to take over ; will defer steroids per there protocol   Elevated troponin, most likely demand ischemia, Type II MI              -Continue to trend troponin  HFpEF, Echo from 2/2022, EF 55 to 60%  HTN now with hypotension     Gastrointestinal   ESLD with ascites  Current alcoholism abuse              -Obtain ammonia level              -Continue to monitor live profile  Hypoalbuminemia              -Give SPA 25G x 3 doses              -MELD Score 29  Hx of GI bleeding/varices with banding              -Previously followed with Dr. Go Lenz              -Dr. Octaviano Romero most likely 2/2 lactic acidosis, previously on sodium bicarbonate drip  Lactic acidosis              -Continue to trend lactic Q4  Hypokalemia  Hypomagnesemia  Hypocalcemia  Nontraumatic rhabdomyolysis              -Continue to trend Total CK              -BMP, Mag, Phos Q4                 Infectious Disease   No acute process              -No leukocytosis              -Pan cultures pending              -Not currently on antibiotics              -CBC with diff               -Obtain rapid Covid                Hematology/Oncology   Anemia, macrocytic and acute drop in hemoglobin  Supratherapeutic INR 2/2 ESLD              -On admission, INR 3.1, repeat 3.6              -S[address]p Vitamin K [address]              --S/p 4 FFP              --S/p 1 of platelets and 1 RBC's              -TEG obtained              -CBC with Diff Q8     Endocrine   -Cortisol level low - will discuss with [employer] in regards to steroids and organ donation      Social/Spiritual/DNR/Other   Code: DNR-CCA  Disposition: ICU  Fluids/lytes/nutrition: LR at 125cc/hr, replace as needed, NPO       Huong Cain, APRN-CNP   Critical Care     Discussed case with Attending Physician: Dr. Federica Wallace of care discussed with both parents at bedside, and would like to change code status to [facility] until brain death process than proceed with [employer] with DCD.    Positive Apnea test   Brain death Neurological exam performed    Patient is pronounce Brain Death -- 10/31/2022 at 1231

## 2022-10-31 NOTE — PROCEDURES
Bronchoscopy Inpatient Procedure Note    Date of Procedure: 10/30/2022    Pre-op Diagnosis: Intracerebral hemorrhage    Post-op Diagnosis: Intracerebral hemorrhage    Bronchoscopist: Ashwin Hollis DO      Anesthesia: None    Procedure: Flexible fiberoptic bronchoscopy    Estimated Blood Loss: none     Complications: None    Indications and History  The patient is a 38 y.o. female with acute intracerebral hemorrhage. She has suffered a devastating neurological injury. Indication for bronchoscopy was specimen acquisition for possible organ donation. (Please see today's progress notes for the latest issues,  physical exam and lab data)    Consent to Procedure  Consent was obtained as part of screening for organ donation. Description of Procedure  The patient was intubated on mechanical ventilation and placed on 100% oxygen. Rachel Halt was monitored by the Critical Nursing and Respiratory therapy staff and the standard ICU monitoring devices. Alvino Romero and the procedure were verified as Flexible Fiberoptic Bronchoscopy. A Time Out was held and the above information confirmed. The patient was placed in the appropriate position. Patient did not require sedation    The bronchoscope was then passed into the trachea via the ET tube. After careful inspection of the tracheal, the bronchoscope was sequentially passed into all segments of the left and right endobronchial trees to the second and/or third divisions. Endobronchial findings    Trachea  Normal mucosa  Renetta  Normal mucosa, scant bile-stained secretions    Right Main Stem Bronchus  Normal mucosa  Right Upper Lobe Bronchi Normal mucosa  Right Middle Lobe Bronchi  Normal mucosa  Right Lower Lobe Bronchi (including the Superior segment)  Normal mucosa    Left Main Stem Bronchus Normal mucosa  Left Upper Lobe Bronchus, Upper Division Normal mucosa  Left Upper Lobe Bronchus, Lingula  Normal mucosa, very minimal tan secretions.   Easily cleared with suctioning. Did not reaccumulate  Left Lower Lobe Bronchus (including the Superior segment)  Normal mucosa    Specimens Taken      BAL -completed from right middle lobe with instillation of 60 mL of saline with return of 37 mL. Specimen was split with half of specimen going to [facility]. Half of specimen going to lab at [facility]. Testing ordered for BAL with culture, gram stain, cell count, fungal smears, respiratory viral panel.     Electronically signed by Jorge Luis Johnson DO on 10/30/2022 at 10:00 PM

## 2022-10-31 NOTE — CONSULTS
Patient seen and examined. She apparently had complained of a headache to her family and was later found unresponsive. Discussed case with referring provided from [facility], also discussed with Dr. Quintana Left neurosurgery attending. Unfortunately the patient has suffered a devastating acute intraparenchymal hemorrhage. There are no surgical options available to her. CT perfusion analysis was limited however did suggest delayed perfusion involving the entire supratentorial compartment with areas marked as core infarct representing the dilated ventricles. On my physical examination pupils are dilated and nonreactive, corneal reflexes absent, gag reflex is absent, cough reflex is absent. The patient is breathing over the ventilator. There is no withdrawal to noxious stimuli. While the neuro exam is consistent with a devastating neurological injury it is currently not consistent with brain death. She does have a past medical history consistent with alcoholic liver cirrhosis, alcohol abuse, macrocytic anemia, hypertension, depression. Assessment:  Intracranial hemorrhage  History of alcoholic cirrhosis  Supratherapeutic INR  Lactic acidosis  Elevated CK  Elevated LFTs  Acute blood loss anemia on chronic anemia  History of hypertension      Plan:  Patient remains mechanically ventilated. ABG pending. Continue Levophed and vasopressin drips  Repeat CMP, CBC, CK, INR pending  Continue to transfuse as needed for normalization of coags  Art line placed  Unfortunately the patient has suffered a devastating neurological injury and while she has not proceeded to brain death her prognosis is extremely poor. At her family's request and per wishes the patient had formerly expressed  regarding organ donation life [facility] has been consulted for possible organ donation. Screening bronchoscopy completed.   BAL was sent for culture, gram stain, COVID PCR, cell count  Please seen NP note from [address] for full Consult/Assessment and plan. Please note exclusive of procedures I spent 75 minutes at patient's bedside and discussing case. Patient remains critically ill. On mechanical ventilation, vasopressor support.     Jamaal Massey DO

## 2022-10-31 NOTE — H&P
Inpatient H&P      PCP:  Kapil Sanderson DO  Admitting Physician:  Rafael Foreman DO  Consultants:  Critical care, neurosurgery  Chief Complaint:  No chief complaint on file. History of Present Illness  Heath Thomas is a 38 y.o. female who presents to [facility] ER unresponsive. Sanchez Carrera has a past medical history that includes alcoholic liver cirrhosis, alcohol abuse, macrocytic anemia, hypertension, depression. [place] presented to [place] [address] ER after she apparently had complained of a headache to her family and was later found unresponsive. Unfortunately the patient has suffered a devastating acute intraparenchymal hemorrhage. Neurosurgery consulted from ER and unfortunately there are no surgical options available to her. CT perfusion analysis was limited however did suggest delayed perfusion involving the entire supratentorial compartment with areas marked as core infarct representing the dilated ventricles. She was transferred to MICU for evaluation. Family reportedly interested in [address] [place] [address]    ER Course  Upon presentation to the ER, routine labwork was performed which revealed potassium 2.1, elevated LFTs, elevated ammonia, hemoglobin 6.6. Imaging results are as outlined below in the Imaging section of this note. The patient was transferred to MICU for evaluation. Last Hospital Admission - 8/16/22    Electrolyte abnormality    Jaundice    Elevated liver enzymes    Alcoholic cirrhosis (HCC)    Last Echocardiogram - 2/20/22   Normal left ventricular size and systolic function. Ejection fraction is visually estimated at 55-60%. Indeterminate diastolic function. No regional wall motion abnormalities seen. Normal left ventricular wall thickness. Mildly enlarged right ventricle with normal function. No significant valvular abnormalities or vegetations.            ED TRIAGE VITALS  BP: 109/62, Temp: 98.1 °F (36.7 °C), Heart Rate: (!) 115, Resp: 14, SpO2: 94 %    Vitals:    10/31/22 0545 10/31/22 0600 10/31/22 0615 10/31/22 0630   BP:  109/62     Pulse: (!) 116 (!) 116 (!) 114 (!) 115   Resp: 14 14 17 14   Temp:  98.1 °F (36.7 °C)     TempSrc:  Bladder     SpO2: 100% 100% 96% 94%   Weight:             Histories  Past Medical History:   Diagnosis Date    Alcohol abuse     Alcoholic cirrhosis (HCC)     Anxiety     not on any meds for it    Hypertension     has not required any med a few years as of 8/2019    Hypothyroidism     in her early 25s - pt was on levothyroxine for a while and then hypothyroidism apparently resolved and pt has been off med since her mid-20s    Pancreatic cyst 01/2017    Pt underwent partial pancreatectomy and splenectomy at [facility] 2/23/2017 and was told pathology showed MCN (mucinous cystic neoplasm) of pancreas with clean margins at surgery     Past Surgical History:   Procedure Laterality Date    [address]    HAND SURGERY Left 2/22/2022    LEFT STERNOCLAVICULAR JOINT DEBRIDEMENT performed by Sulma Garay MD at [address] [place] [address]  2/26/2022         PANCREAS SURGERY  02/23/2017    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 - 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    SPLENECTOMY, TOTAL  02/23/2017    along with partial pancreatectomy    UPPER GASTROINTESTINAL ENDOSCOPY N/A 7/4/2020    EGD CONTROL HEMORRHAGE performed by Arley De MD at [place] [address]  7/4/2020    EGD ESOPHAGOGASTRODUODENOSCOPY ENDOSCOPIC VARICEAL TREATMENT performed by Arely De MD at [place] [address] N/A 9/17/2021    EGD, BANDING OF VARICES performed by Arely De MD at [address] [place] [address] N/A 2/23/2022    EGD BAND LIGATION performed by Arely De MD at [facility] ENDOSCOPY     Family History   Problem Relation Age of Onset    Other Mother         lymphoma    High Blood Pressure Mother     Other Father         ms    Breast Cancer Other         AUNT       Home Medications  Prior to Admission medications    Medication Sig Start Date End Date Taking? Authorizing Provider   LORazepam (ATIVAN PO) Take by mouth Patient doesn't know dose or frequency its prescribed for. She has only taken it once and it was too strong for her. Historical Provider, MD   Cholecalciferol (VITAMIN D) 50 MCG (2000 UT) CAPS capsule Take by mouth daily    Historical Provider, MD   escitalopram (LEXAPRO) 5 MG tablet Take 1 tablet by mouth daily 4/7/22   Kelle Lawrence DO   folic acid (FOLVITE) 1 MG tablet Take 1 tablet by mouth daily 2/26/22   Patrick Enamorado MD   vitamin B-1 (THIAMINE) 100 MG tablet Take 1 tablet by mouth daily 2/26/22   Patrick Enamorado MD   carvedilol (COREG) 3.125 MG tablet Take 1 tablet by mouth 2 times daily 2/26/22   Patrick Enamorado MD   spironolactone (ALDACTONE) 50 MG tablet Take 50 mg by mouth daily 9/13/21   Historical Provider, MD   furosemide (LASIX) 20 MG tablet Take 20 mg by mouth in the morning.  5/7/21   Historical Provider, MD   lactulose (CHRONULAC) 10 GM/15ML solution Take 20 g by mouth 3 times daily as needed (titrate to 3 BM) 2/12/21   Historical Provider, MD   omeprazole (PRILOSEC) 20 MG delayed release capsule Take 20 mg by mouth Daily As needed 3/5/21   Historical Provider, MD   KLOR-CON M20 20 MEQ extended release tablet Take 20 mEq by mouth daily Takes 2 pills daily 3/30/21   Historical Provider, MD   vitamin A 3 MG (03261 UT) capsule Take 10,000 Units by mouth daily 10/1/20   Historical Provider, MD   CREON 90911 units delayed release capsule Take 12,000 Units by mouth 4 times daily (before meals and nightly)  2/12/20   Historical Provider, MD       Allergies  Pcn [penicillins]    Social Hx  Social History     Socioeconomic History    Marital status: Single     Spouse name: Not on file    Number of children: Not on file    Years of education: Not on file    Highest education level: Not on file   Occupational History    Not on file   Tobacco Use    Smoking status: Never    Smokeless tobacco: Never   Vaping Use    Vaping Use: Never used   Substance and Sexual Activity    Alcohol use: Yes     Comment: daily wine or beer    Drug use: No    Sexual activity: Yes     Partners: Male   Other Topics Concern    Not on file   Social History Narrative    Not on file     Social Determinants of Health     Financial Resource Strain: Not on file   Food Insecurity: Not on file   Transportation Needs: Not on file   Physical Activity: Not on file   Stress: Not on file   Social Connections: Not on file   Intimate Partner Violence: Not on file   Housing Stability: Not on file       Review of Systems  Unable to be obtained    Physical Examination  Vitals:  /62   Pulse (!) 115   Temp 98.1 °F (36.7 °C) (Bladder)   Resp 14   Wt 161 lb 2.5 oz (73.1 kg)   SpO2 94%   BMI 27.66 kg/m²   General Appearance:  intubated  HEENT:  No corneal reflex  Neck:  no adenopathy, bruit, JVD, tenderness, masses, or nodules; supple, symmetrical, trachea midline, thyroid not enlarged  Lung:  ventilated BS  Heart:  tachycardic and regular rhythm without murmur, rub, or gallop  Abdomen:  soft, nontender, nondistended; normoactive bowel sounds; no organomegaly  Extremities:  extremities normal, atraumatic, no cyanosis or edema  Neurologic: no corneal reflex.  No gag, no response to noxious stimuli    Laboratory Data  Recent Results (from the past 24 hour(s))   TYPE AND SCREEN    Collection Time: 10/30/22  9:10 AM   Result Value Ref Range    ABO/Rh O NEG     Antibody Screen NEG    PREPARE PLASMA, 2 Units    Collection Time: 10/30/22  9:10 AM   Result Value Ref Range    Product Code Blood Bank [id number]     Description Blood Bank Plasma, 5 Day, Thawed     Unit Number [id number]     Dispense Status Blood Bank transfused     Product Code Blood Bank V9177T13     Description Blood Bank Plasma, 5 Day, Thawed     Unit Number [id number] Dispense Status Blood Bank transfused    PREPARE RBC (CROSSMATCH), 2 Units    Collection Time: 10/30/22  9:10 AM   Result Value Ref Range    Product Code Blood Bank [id number]     Description Blood Bank Red Blood Cells, Apheresis, Leuko-reduced     Unit Number [id number]     Dispense Status Blood Bank transfused     Product Code Blood Bank L8619H90     Description Blood Bank Red Blood Cells, Leuko-reduced     Unit Number [id number]     Dispense Status Blood Bank transfused    COVID-19, Rapid    Collection Time: 10/30/22  9:29 AM    Specimen: Nasopharyngeal Swab   Result Value Ref Range    SARS-CoV-2, NAAT Not Detected Not Detected   POCT Glucose    Collection Time: 10/30/22 12:31 PM   Result Value Ref Range    Meter Glucose 198 (H) 74 - 99 mg/dL   CBC with Auto Differential    Collection Time: 10/30/22  1:45 PM   Result Value Ref Range    WBC 7.9 4.5 - 11.5 E9/L    RBC 1.85 (L) 3.50 - 5.50 E12/L    Hemoglobin 6.4 (LL) 11.5 - 15.5 g/dL    Hematocrit 21.4 (L) 34.0 - 48.0 %    .7 (H) 80.0 - 99.9 fL    MCH 34.6 26.0 - 35.0 pg    MCHC 29.9 (L) 32.0 - 34.5 %    RDW 20.3 (H) 11.5 - 15.0 fL    Platelets 088 003 - 828 E9/L    MPV 10.4 7.0 - 12.0 fL    Neutrophils % 78.4 43.0 - 80.0 %    Immature Granulocytes % 0.5 0.0 - 5.0 %    Lymphocytes % 9.5 (L) 20.0 - 42.0 %    Monocytes % 11.1 2.0 - 12.0 %    Eosinophils % 0.1 0.0 - 6.0 %    Basophils % 0.4 0.0 - 2.0 %    Neutrophils Absolute 6.19 1.80 - 7.30 E9/L    Immature Granulocytes # 0.04 E9/L    Lymphocytes Absolute 0.75 (L) 1.50 - 4.00 E9/L    Monocytes Absolute 0.88 0.10 - 0.95 E9/L    Eosinophils Absolute 0.01 (L) 0.05 - 0.50 E9/L    Basophils Absolute 0.03 0.00 - 0.20 E9/L    Anisocytosis 2+     Polychromasia 1+     Poikilocytes 1+     Schistocytes 1+     Acanthocytes 1+     Jacquelyn Cells 1+     Ovalocytes 1+     Target Cells 1+     Blue-Jolly Bodies 1+    Comprehensive Metabolic Panel w/ Reflex to MG    Collection Time: 10/30/22  1:45 PM   Result Value Ref Range Sodium 139 132 - 146 mmol/L    Potassium reflex Magnesium 8.5 (HH) 3.5 - 5.0 mmol/L    Chloride 109 (H) 98 - 107 mmol/L    CO2 14 (L) 22 - 29 mmol/L    Anion Gap 16 7 - 16 mmol/L    Glucose 188 (H) 74 - 99 mg/dL    BUN 7 6 - 20 mg/dL    Creatinine 0.6 0.5 - 1.0 mg/dL    Est, Glom Filt Rate >60 >=60 mL/min/1.73    Calcium 7.9 (L) 8.6 - 10.2 mg/dL    Total Protein 8.3 6.4 - 8.3 g/dL    Albumin 2.5 (L) 3.5 - 5.2 g/dL    Total Bilirubin 10.3 (H) 0.0 - 1.2 mg/dL    Alkaline Phosphatase 188 (H) 35 - 104 U/L    ALT 44 (H) 0 - 32 U/L     (H) 0 - 31 U/L   Lactic Acid    Collection Time: 10/30/22  1:45 PM   Result Value Ref Range    Lactic Acid 12.6 (HH) 0.5 - 2.2 mmol/L   Protime-INR    Collection Time: 10/30/22  1:45 PM   Result Value Ref Range    Protime 25.9 (H) 9.3 - 12.4 sec    INR 2.4    APTT    Collection Time: 10/30/22  1:45 PM   Result Value Ref Range    aPTT 41.6 (H) 24.5 - 35.1 sec   Blood Gas, Arterial    Collection Time: 10/30/22  1:52 PM   Result Value Ref Range    Date Analyzed 20221030     Time Analyzed 1352     Source: Blood Arterial     pH, Blood Gas 7.297 (L) 7.350 - 7.450    PCO2 30.1 (L) 35.0 - 45.0 mmHg    PO2 373.4 (H) 75.0 - 100.0 mmHg    HCO3 14.4 (L) 22.0 - 26.0 mmol/L    B.E. -11.0 (L) -3.0 - 3.0 mmol/L    O2 Sat 99.8 (H) 92.0 - 98.5 %    PO2/FIO2 3.73 mmHg/%    O2Hb 96.5 94.0 - 97.0 %    COHb 3.0 (H) 0.0 - 1.5 %    MetHb 0.3 0.0 - 1.5 %    O2 Content 11.8 mL/dL    HHb 0.2 0.0 - 5.0 %    tHb (est) 7.9 (L) 11.5 - 16.5 g/dL    Mode AC     FIO2 100.0 %    Rr Mechanical 18.0 b/min    Vt Mechanical 400.0 mL    Peep/Cpap 5.0 cmH2O    Date Of Collection      Time Collected      Pt Temp 37.0 C     ID [id number]     Lab [id number]     Critical(s) Notified .  No Critical Values    Potassium    Collection Time: 10/30/22  3:03 PM   Result Value Ref Range    Potassium 3.7 3.5 - 5.0 mmol/L   Magnesium    Collection Time: 10/30/22  3:03 PM   Result Value Ref Range    Magnesium 1.7 1.6 - 2.6 mg/dL Hemoglobin A1C    Collection Time: 10/30/22  9:20 PM   Result Value Ref Range    Hemoglobin A1C 4.2 4.0 - 5.6 %   Lipase    Collection Time: 10/30/22  9:20 PM   Result Value Ref Range    Lipase 40 13 - 60 U/L   Procalcitonin    Collection Time: 10/30/22  9:20 PM   Result Value Ref Range    Procalcitonin 0.80 (H) 0.00 - 0.08 ng/mL   TEG lab test    Collection Time: 10/30/22  9:20 PM   Result Value Ref Range    R (Reaction Time) 6.2 5.0 - 10.0 min    K (Clotting Time) 2.4 1.0 - 3.0 min    Angle (Clot Strength) 62.5 59.0 - 74.0 degree    MA (Max Amplitude) 46.8 (L) 50.0 - 70.0 mm    G-TEG 4.4 (L) 4.5 - 11.0 K d/sc    EPL-TEG 0.0 0.0 - 15.0 %    LY30 (Fibrinolysis) 0.0 0.0 - 8.0 %   Troponin    Collection Time: 10/30/22  9:20 PM   Result Value Ref Range    Troponin, High Sensitivity 179 (H) 0 - 9 ng/L   CK    Collection Time: 10/30/22  9:20 PM   Result Value Ref Range    Total CK 1,098 (H) 20 - 180 U/L   Urinalysis with Microscopic    Collection Time: 10/30/22  9:33 PM   Result Value Ref Range    Color, UA ORANGE (A) Straw/Yellow    Clarity, UA SL CLOUDY Clear    Glucose, Ur Negative Negative mg/dL    Bilirubin Urine MODERATE (A) Negative    Ketones, Urine Negative Negative mg/dL    Specific Gravity, UA <=1.005 1.005 - 1.030    Blood, Urine LARGE (A) Negative    pH, UA 7.0 5.0 - 9.0    Protein, UA TRACE Negative mg/dL    Urobilinogen, Urine >=8.0 (A) <2.0 E.U./dL    Nitrite, Urine Negative Negative    Leukocyte Esterase, Urine Negative Negative    WBC, UA 1-3 0 - 5 /HPF    RBC, UA >20 0 - 2 /HPF    Bacteria, UA FEW (A) None Seen /HPF   Gram stain    Collection Time: 10/30/22  9:33 PM    Specimen: Lung   Result Value Ref Range    Gram Stain Orderable       Few Polymorphonuclear leukocytes  Rare Epithelial cells  Rare Mixed Bacterial Morphotypes     Blood Gas, Arterial    Collection Time: 10/30/22 11:22 PM   Result Value Ref Range    Date Analyzed [id number]     Time Analyzed 0982     Source: Blood Arterial     pH, Blood Gas 7.607 (HH) 7.350 - 7.450    PCO2 27.3 (L) 35.0 - 45.0 mmHg    PO2 92.6 75.0 - 100.0 mmHg    HCO3 26.6 (H) 22.0 - 26.0 mmol/L    B.E. 5.0 (H) -3.0 - 3.0 mmol/L    O2 Sat 98.3 92.0 - 98.5 %    PO2/FIO2 1.85 mmHg/%    AaDO2 220.7 mmHg    RI(T) 2.38     O2Hb 95.2 94.0 - 97.0 %    COHb 2.2 (H) 0.0 - 1.5 %    MetHb 1.0 0.0 - 1.5 %    HHb 1.6 0.0 - 5.0 %    tHb (est) 7.5 (L) 11.5 - 16.5 g/dL    Mode AC     FIO2 50.0 %    Rr Mechanical 18.0 b/min    Vt Mechanical 400.0 mL    Peep/Cpap 5.0 cmH2O    Date Of Collection      Time Collected      Pt Temp 37.0 C     ID [id number]     Lab [id number]     Critical(s) Notified Handed report to Dr/RN    Lactic Acid    Collection Time: 10/30/22 11:58 PM   Result Value Ref Range    Lactic Acid 3.6 (HH) 0.5 - 2.2 mmol/L   Comprehensive Metabolic Panel    Collection Time: 10/30/22 11:58 PM   Result Value Ref Range    Sodium 143 132 - 146 mmol/L    Potassium 3.2 (L) 3.5 - 5.0 mmol/L    Chloride 107 98 - 107 mmol/L    CO2 25 22 - 29 mmol/L    Anion Gap 11 7 - 16 mmol/L    Glucose 141 (H) 74 - 99 mg/dL    BUN 11 6 - 20 mg/dL    Creatinine 0.7 0.5 - 1.0 mg/dL    Est, Glom Filt Rate >60 >=60 mL/min/1.73    Calcium 7.9 (L) 8.6 - 10.2 mg/dL    Total Protein 7.0 6.4 - 8.3 g/dL    Albumin 2.0 (L) 3.5 - 5.2 g/dL    Total Bilirubin 9.4 (H) 0.0 - 1.2 mg/dL    Alkaline Phosphatase 135 (H) 35 - 104 U/L    ALT 48 (H) 0 - 32 U/L     (H) 0 - 31 U/L   Calcium, Ionized    Collection Time: 10/30/22 11:58 PM   Result Value Ref Range    Calcium, Ionized 1.11 (L) 1.15 - 1.33 mmol/L   CBC with Auto Differential    Collection Time: 10/30/22 11:58 PM   Result Value Ref Range    WBC 9.3 4.5 - 11.5 E9/L    RBC 2.12 (L) 3.50 - 5.50 E12/L    Hemoglobin 7.1 (L) 11.5 - 15.5 g/dL    Hematocrit 21.4 (L) 34.0 - 48.0 %    .9 (H) 80.0 - 99.9 fL    MCH 33.5 26.0 - 35.0 pg    MCHC 33.2 32.0 - 34.5 %    RDW 21.6 (H) 11.5 - 15.0 fL    Platelets 778 (L) 541 - 450 E9/L    MPV 11.1 7.0 - 12.0 fL    Neutrophils % 84.1 (H) 43.0 - 80.0 %    Lymphocytes % 8.8 (L) 20.0 - 42.0 %    Monocytes % 3.5 2.0 - 12.0 %    Eosinophils % 1.8 0.0 - 6.0 %    Basophils % 0.9 0.0 - 2.0 %    Neutrophils Absolute 8.00 (H) 1.80 - 7.30 E9/L    Lymphocytes Absolute 0.84 (L) 1.50 - 4.00 E9/L    Monocytes Absolute 0.37 0.10 - 0.95 E9/L    Eosinophils Absolute 0.17 0.05 - 0.50 E9/L    Basophils Absolute 0.00 0.00 - 0.20 E9/L    Metamyelocytes Relative 0.9 0.0 - 1.0 %    Myelocyte Percent 0.9 0 - 0 %    nRBC 1.8 /100 WBC    Anisocytosis 1+     Polychromasia 1+     Hypochromia 1+     Poikilocytes 2+     Schistocytes 1+     Jacquelyn Cells 2+     Ovalocytes 1+     Target Cells 2+     Blue-Jolly Bodies 1+    Bilirubin, Direct    Collection Time: 10/30/22 11:58 PM   Result Value Ref Range    Bilirubin, Direct 3.6 (H) 0.0 - 0.3 mg/dL   Protime-INR    Collection Time: 10/30/22 11:58 PM   Result Value Ref Range    Protime 38.3 (H) 9.3 - 12.4 sec    INR 3.6    Magnesium    Collection Time: 10/30/22 11:58 PM   Result Value Ref Range    Magnesium 1.3 (L) 1.6 - 2.6 mg/dL   Phosphorus    Collection Time: 10/30/22 11:58 PM   Result Value Ref Range    Phosphorus 1.5 (L) 2.5 - 4.5 mg/dL   Blood Gas, Arterial    Collection Time: 10/31/22 12:55 AM   Result Value Ref Range    Date Analyzed 20221031     Time Analyzed 0055     Source: Blood Arterial     pH, Blood Gas 7.507 (H) 7.350 - 7.450    PCO2 33.8 (L) 35.0 - 45.0 mmHg    PO2 86.8 75.0 - 100.0 mmHg    HCO3 26.2 (H) 22.0 - 26.0 mmol/L    B.E. 3.0 -3.0 - 3.0 mmol/L    O2 Sat 97.1 92.0 - 98.5 %    PO2/FIO2 1.74 mmHg/%    AaDO2 219.2 mmHg    RI(T) 2.53     O2Hb 94.1 94.0 - 97.0 %    COHb 2.1 (H) 0.0 - 1.5 %    MetHb 1.0 0.0 - 1.5 %    HHb 2.8 0.0 - 5.0 %    tHb (est) 7.8 (L) 11.5 - 16.5 g/dL    Mode AC     FIO2 50.0 %    Rr Mechanical 14.0 b/min    Vt Mechanical 350.0 mL    Peep/Cpap 5.0 cmH2O    Date Of Collection      Time Collected      Pt Temp 37.0 C     ID [id number]     Lab [id number]     Critical(s) Notified .  No Critical Values    TYPE AND SCREEN    Collection Time: 10/31/22  1:00 AM   Result Value Ref Range    ABO/Rh O NEG     Antibody Screen NEG    PREPARE PLASMA, 2 Units    Collection Time: 10/31/22  1:00 AM   Result Value Ref Range    Product Code Blood Bank [id number]     Description Blood Bank Plasma, 5 Day, Thawed     Unit Number [id number]     Dispense Status Blood Bank issued     Product Code Blood Bank K4628C59     Description Blood Bank Plasma, 5 Day, Thawed     Unit Number [id number]     Dispense Status Blood Bank issued    PREPARE PLATELETS    Collection Time: 10/31/22  1:00 AM   Result Value Ref Range    Product Code Blood Bank E6870E10     Description Blood Bank      Unit Number [id number]     Dispense Status Blood Bank issued    CBC with Auto Differential    Collection Time: 10/31/22  5:42 AM   Result Value Ref Range    WBC 10.4 4.5 - 11.5 E9/L    RBC 1.70 (L) 3.50 - 5.50 E12/L    Hemoglobin 5.8 (LL) 11.5 - 15.5 g/dL    Hematocrit 17.9 (L) 34.0 - 48.0 %    .3 (H) 80.0 - 99.9 fL    MCH 34.1 26.0 - 35.0 pg    MCHC 32.4 32.0 - 34.5 %    RDW 22.7 (H) 11.5 - 15.0 fL    Platelets 609 (L) 314 - 450 E9/L    MPV 11.0 7.0 - 12.0 fL   Lactic Acid    Collection Time: 10/31/22  5:42 AM   Result Value Ref Range    Lactic Acid 1.7 0.5 - 2.2 mmol/L   Comprehensive Metabolic Panel    Collection Time: 10/31/22  5:42 AM   Result Value Ref Range    Sodium 144 132 - 146 mmol/L    Potassium 4.5 3.5 - 5.0 mmol/L    Chloride 110 (H) 98 - 107 mmol/L    CO2 25 22 - 29 mmol/L    Anion Gap 9 7 - 16 mmol/L    Glucose 106 (H) 74 - 99 mg/dL    BUN 11 6 - 20 mg/dL    Creatinine 0.7 0.5 - 1.0 mg/dL    Est, Glom Filt Rate >60 >=60 mL/min/1.73    Calcium 8.1 (L) 8.6 - 10.2 mg/dL    Total Protein 6.6 6.4 - 8.3 g/dL    Albumin 2.5 (L) 3.5 - 5.2 g/dL    Total Bilirubin 8.8 (H) 0.0 - 1.2 mg/dL    Alkaline Phosphatase 119 (H) 35 - 104 U/L    ALT 43 (H) 0 - 32 U/L     (H) 0 - 31 U/L   Calcium, Ionized    Collection Time: 10/31/22  5:42 AM   Result Value Ref Range    Calcium, Ionized 1.13 (L) 1.15 - 1.33 mmol/L   Blood Gas, Arterial    Collection Time: 10/31/22  5:52 AM   Result Value Ref Range    Date Analyzed [id number]     Time Analyzed 0552     Source: Blood Arterial     pH, Blood Gas 7.439 7.350 - 7.450    PCO2 37.5 35.0 - 45.0 mmHg    PO2 79.7 75.0 - 100.0 mmHg    HCO3 24.8 22.0 - 26.0 mmol/L    B.E. 0.6 -3.0 - 3.0 mmol/L    O2 Sat 95.9 92.0 - 98.5 %    PO2/FIO2 1.59 mmHg/%    AaDO2 222.1 mmHg    RI(T) 2.79     O2Hb 92.4 (L) 94.0 - 97.0 %    COHb 2.6 (H) 0.0 - 1.5 %    MetHb 1.0 0.0 - 1.5 %    HHb 4.0 0.0 - 5.0 %    tHb (est) 6.3 (L) 11.5 - 16.5 g/dL    Mode AC     FIO2 50.0 %    Rr Mechanical 14.0 b/min    Vt Mechanical 350.0 mL    Peep/Cpap 5.0 cmH2O    Date Of Collection      Time Collected      Pt Temp 37.0 C     ID [id number]     Lab [id number]     Critical(s) Notified . No Critical Values        Imaging  CT HEAD WO CONTRAST    Result Date: 10/30/2022  EXAMINATION: CT OF THE HEAD WITHOUT CONTRAST  10/30/2022 8:11 am TECHNIQUE: CT of the head was performed without the administration of intravenous contrast. Automated exposure control, iterative reconstruction, and/or weight based adjustment of the mA/kV was utilized to reduce the radiation dose to as low as reasonably achievable. COMPARISON: None. HISTORY: ORDERING SYSTEM PROVIDED HISTORY: unresponsive TECHNOLOGIST PROVIDED HISTORY: Has a \"code stroke\" or \"stroke alert\" been called? ->No Reason for exam:->unresponsive Decision Support Exception - unselect if not a suspected or confirmed emergency medical condition->Emergency Medical Condition (MA) Initial evaluation. FINDINGS: BRAIN/VENTRICLES: There appears to be an acute intraparenchymal hemorrhage centered within the region of the right thalamus measuring approximately 2.9 x 1.1 x 1.7 cm. There is extensive intraventricular extension with blood seen distending the lateral, 3rd and 4th ventricles.   There is associated hydrocephalus. Decreased attenuation within the periventricular white matter may represent transependymal flow CSF. The gray-white differentiation appears grossly maintained. No evidence of midline shift. ORBITS: The visualized portion of the orbits demonstrate no acute abnormality. SINUSES: The visualized paranasal sinuses and mastoid air cells demonstrate no acute abnormality. SOFT TISSUES/SKULL:  No acute abnormality of the visualized skull or soft tissues. 1. There appears to be in intraparenchymal hemorrhage centered within the right thalamus with extensive intraventricular extension with marked distension of the lateral, 3rd and 4th ventricles. 2. Hydrocephalus with likely transependymal flow CSF. 3. No evidence of midline shift. Findings were discussed with Dr. Johanna Boateng on 10/30/2022 at 8:49 am.     CT CHEST W CONTRAST    Result Date: 10/30/2022  EXAMINATION: CT OF THE ABDOMEN AND PELVIS WITH CONTRAST; CT OF THE CHEST WITH CONTRAST 10/30/2022 8:11 am TECHNIQUE: CT of the abdomen and pelvis was performed with the administration of intravenous contrast. Multiplanar reformatted images are provided for review. Automated exposure control, iterative reconstruction, and/or weight based adjustment of the mA/kV was utilized to reduce the radiation dose to as low as reasonably achievable.; CT of the chest was performed with the administration of intravenous contrast. Multiplanar reformatted images are provided for review. Automated exposure control, iterative reconstruction, and/or weight based adjustment of the mA/kV was utilized to reduce the radiation dose to as low as reasonably achievable.  COMPARISON: None HISTORY: ORDERING SYSTEM PROVIDED HISTORY: trauma unresponsive, hsx ascites and etoh liver dz cirrhosis TECHNOLOGIST PROVIDED HISTORY: Additional Contrast?->None Reason for exam:->trauma unresponsive, hsx ascites and etoh liver dz cirrhosis Decision Support Exception - unselect if not a suspected or confirmed emergency medical condition->Emergency Medical Condition (MA); ORDERING SYSTEM PROVIDED HISTORY: trauma unresponsive TECHNOLOGIST PROVIDED HISTORY: Reason for exam:->trauma unresponsive Decision Support Exception - unselect if not a suspected or confirmed emergency medical condition->Emergency Medical Condition (MA) FINDINGS: Chest: Mediastinum: Thyroid is homogeneous. Endotracheal tube within 1 cm of the deepak. Slight retraction 2-3 cm is recommended. The cardiac chambers are within normal limits. No pericardial effusion. No bulky hilar or axillary lymphadenopathy. Lungs/pleura: There is increased markings identified lung bases bilaterally suggesting either atelectatic change or early infiltrate from aspiration. The remainder of the lung parenchyma is grossly clear. Soft Tissues/Bones: The bony structures reveal minimal degenerative changes seen within the spine. No acute chest wall abnormality. Abdomen/Pelvis: Organs: Cirrhotic morphology identified of the liver. Findings of portal venous hypertension with multiple varices seen throughout the upper abdomen. Distension identified of the gallbladder. Stones in the gallbladder. No intrahepatic or extrahepatic biliary ductal dilatation. Spleen is not visualized. The pancreas demonstrates distal pancreatectomy with surgical clips present. There is calcification identified at the surgical margin. There is calcifications seen of the walls of the distal pancreatic duct at the surgical margin. Both adrenal glands are within normal limits. Symmetric enhancement of the renal parenchyma. Small cyst identified the left kidney. GI/Bowel: The stomach is revealing wall thickening. Gastritis cannot be excluded. Correlation upper endoscopy should be considered. There is abnormal wall thickening diffusely throughout the colon. Colitis cannot be excluded. There is no abnormality seen within the small bowel. Pelvis:  The bladder reveals Cerna catheter balloon placement. The uterus is unremarkable. Peritoneum/Retroperitoneum: There is no abdominal or retroperitoneal lymphadenopathy. Moderate amount of ascites seen throughout the abdomen and pelvis. Bones/Soft Tissues: The bony structures reveal degenerative changes seen within the spine. No ventral abdominal wall mass or defect. Atelectatic changes or infiltrate seen at the lung bases bilaterally from aspiration. The endotracheal tube is within 1 cm of the deepak which slight retraction is recommended. Cirrhotic morphology identified of the liver with portal venous hypertension. Moderate ascites throughout the abdomen and pelvis. There is diffuse wall thickening identified the stomach and colon. Gastritis or colitis cannot be excluded. Findings may be related to the surrounding ascites. 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 No evidence of biliary ductal dilatation. CT Cervical Spine WO Contrast    Result Date: 10/30/2022  EXAMINATION: CT OF THE CERVICAL SPINE WITHOUT CONTRAST 10/30/2022 8:11 am TECHNIQUE: CT of the cervical spine was performed without the administration of intravenous contrast. Multiplanar reformatted images are provided for review. Automated exposure control, iterative reconstruction, and/or weight based adjustment of the mA/kV was utilized to reduce the radiation dose to as low as reasonably achievable. COMPARISON: None. HISTORY: ORDERING SYSTEM PROVIDED HISTORY: trauma unresponsive TECHNOLOGIST PROVIDED HISTORY: Reason for exam:->trauma unresponsive Decision Support Exception - unselect if not a suspected or confirmed emergency medical condition->Emergency Medical Condition (MA) FINDINGS: BONES/ALIGNMENT: There is no acute fracture or traumatic malalignment. The ring of C1 is intact as is the dense. There is straightening of the normal cervical doses. No significant degenerative changes are noted.   There is no jumped or perched facet is noted. The posterior elements are intact. DEGENERATIVE CHANGES: No significant degenerative changes. SOFT TISSUES: There is no prevertebral soft tissue swelling. 1. There is no acute compression fracture or subluxation of the cervical spine 2. There is a massive intracranial hemorrhage. I alerted the core team at 8:40 a.m. and requested for neuro radiology to interpret the massive intracranial hemorrhage. Maria G Balling CT ABDOMEN PELVIS W IV CONTRAST Additional Contrast? None    Result Date: 10/30/2022  EXAMINATION: CT OF THE ABDOMEN AND PELVIS WITH CONTRAST; CT OF THE CHEST WITH CONTRAST 10/30/2022 8:11 am TECHNIQUE: CT of the abdomen and pelvis was performed with the administration of intravenous contrast. Multiplanar reformatted images are provided for review. Automated exposure control, iterative reconstruction, and/or weight based adjustment of the mA/kV was utilized to reduce the radiation dose to as low as reasonably achievable.; CT of the chest was performed with the administration of intravenous contrast. Multiplanar reformatted images are provided for review. Automated exposure control, iterative reconstruction, and/or weight based adjustment of the mA/kV was utilized to reduce the radiation dose to as low as reasonably achievable.  COMPARISON: None HISTORY: ORDERING SYSTEM PROVIDED HISTORY: trauma unresponsive, hsx ascites and etoh liver dz cirrhosis TECHNOLOGIST PROVIDED HISTORY: Additional Contrast?->None Reason for exam:->trauma unresponsive, hsx ascites and etoh liver dz cirrhosis Decision Support Exception - unselect if not a suspected or confirmed emergency medical condition->Emergency Medical Condition (MA); ORDERING SYSTEM PROVIDED HISTORY: trauma unresponsive TECHNOLOGIST PROVIDED HISTORY: Reason for exam:->trauma unresponsive Decision Support Exception - unselect if not a suspected or confirmed emergency medical condition->Emergency Medical Condition (MA) FINDINGS: Chest: Mediastinum: Thyroid is homogeneous. Endotracheal tube within 1 cm of the deepak. Slight retraction 2-3 cm is recommended. The cardiac chambers are within normal limits. No pericardial effusion. No bulky hilar or axillary lymphadenopathy. Lungs/pleura: There is increased markings identified lung bases bilaterally suggesting either atelectatic change or early infiltrate from aspiration. The remainder of the lung parenchyma is grossly clear. Soft Tissues/Bones: The bony structures reveal minimal degenerative changes seen within the spine. No acute chest wall abnormality. Abdomen/Pelvis: Organs: Cirrhotic morphology identified of the liver. Findings of portal venous hypertension with multiple varices seen throughout the upper abdomen. Distension identified of the gallbladder. Stones in the gallbladder. No intrahepatic or extrahepatic biliary ductal dilatation. Spleen is not visualized. The pancreas demonstrates distal pancreatectomy with surgical clips present. There is calcification identified at the surgical margin. There is calcifications seen of the walls of the distal pancreatic duct at the surgical margin. Both adrenal glands are within normal limits. Symmetric enhancement of the renal parenchyma. Small cyst identified the left kidney. GI/Bowel: The stomach is revealing wall thickening. Gastritis cannot be excluded. Correlation upper endoscopy should be considered. There is abnormal wall thickening diffusely throughout the colon. Colitis cannot be excluded. There is no abnormality seen within the small bowel. Pelvis: The bladder reveals Cerna catheter balloon placement. The uterus is unremarkable. Peritoneum/Retroperitoneum: There is no abdominal or retroperitoneal lymphadenopathy. Moderate amount of ascites seen throughout the abdomen and pelvis. Bones/Soft Tissues: The bony structures reveal degenerative changes seen within the spine. No ventral abdominal wall mass or defect.      Atelectatic changes or infiltrate seen at the lung bases bilaterally from aspiration. The endotracheal tube is within 1 cm of the deepak which slight retraction is recommended. Cirrhotic morphology identified of the liver with portal venous hypertension. Moderate ascites throughout the abdomen and pelvis. There is diffuse wall thickening identified the stomach and colon. Gastritis or colitis cannot be excluded. Findings may be related to the surrounding ascites. 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 No evidence of biliary ductal dilatation. XR CHEST PORTABLE    Result Date: 10/30/2022  EXAMINATION: ONE XRAY VIEW OF THE CHEST 10/30/2022 8:03 am COMPARISON: None. HISTORY: ORDERING SYSTEM PROVIDED HISTORY: unresponsive TECHNOLOGIST PROVIDED HISTORY: Reason for exam:->unresponsive FINDINGS: The lungs are without acute focal process. There is no effusion or pneumothorax. The cardiomediastinal silhouette is without acute process. The osseous structures are without acute process. The endotracheal tube tip is located 2.6 cm proximal to the deepak     No acute process. CTA NECK W CONTRAST    Result Date: 10/30/2022  EXAMINATION: CTA OF THE HEAD WITH CONTRAST; CTA OF THE NECK; CTA OF THE HEAD WITH CONTRAST WITH PERFUSION 10/30/2022 9:50 am: TECHNIQUE: CTA of the head/brain was performed with the administration of intravenous contrast. Multiplanar reformatted images are provided for review. MIP images are provided for review. Automated exposure control, iterative reconstruction, and/or weight based adjustment of the mA/kV was utilized to reduce the radiation dose to as low as reasonably achievable.; CTA of the neck was performed with the administration of intravenous contrast. Multiplanar reformatted images are provided for review. MIP images are provided for review. Stenosis of the internal carotid arteries measured using NASCET criteria.  Automated exposure control, iterative reconstruction, and/or weight based adjustment of the mA/kV was utilized to reduce the radiation dose to as low as reasonably achievable. COMPARISON: None. HISTORY: ORDERING SYSTEM PROVIDED HISTORY: ICH TECHNOLOGIST PROVIDED HISTORY: Reason for exam:->ICH Has a \"code stroke\" or \"stroke alert\" been called? ->No Decision Support Exception - unselect if not a suspected or confirmed emergency medical condition->Emergency Medical Condition (MA); ORDERING SYSTEM PROVIDED HISTORY: brain death TECHNOLOGIST PROVIDED HISTORY: Reason for exam:->brain death Has a \"code stroke\" or \"stroke alert\" been called? ->No Decision Support Exception - unselect if not a suspected or confirmed emergency medical condition->Emergency Medical Condition (MA) FINDINGS: CTA NECK: AORTIC ARCH/ARCH VESSELS: No dissection or arterial injury. No significant stenosis of the brachiocephalic or subclavian arteries. CAROTID ARTERIES: No dissection, arterial injury, or hemodynamically significant stenosis by NASCET criteria. VERTEBRAL ARTERIES: No dissection, arterial injury, or significant stenosis. SOFT TISSUES: Patchy areas of opacification are seen within the dependent aspect the lungs. Unclear whether this represents dependent atelectasis versus aspiration pneumonitis. No acute abnormality within the visualized mediastinum. BONES: No acute osseous abnormality. CTA HEAD: ANTERIOR CIRCULATION: No significant stenosis of the intracranial internal carotid, anterior cerebral, or middle cerebral arteries. POSTERIOR CIRCULATION: No significant stenosis of the vertebral, basilar, or posterior cerebral arteries. Predominant fetal supply of the PCAs bilaterally. OTHER: No dural venous sinus thrombosis on this non-dedicated study. BRAIN: There is a focus of active extravasation in the region of the medial right thalamus/3rd ventricle. There is interval increase in the amount of intraventricular blood compared with the prior noncontrast CT head.  CT PERFUSION: The perfusion analysis is limited. The analysis suggests delayed perfusion involving the entire supratentorial compartment with the areas marked as core infarct representing the dilated ventricles. 1. Unreliable perfusion analysis. 2. There is an area of active extravasation in the region of the medial right thalamus/3rd ventricle with interval worsening of the amount of intraventricular blood. 3. No evidence of a dissection or flow limiting stenosis of the cervical carotid/vertebral arteries. 4. No significant stenosis or large vessel occlusion seen of the [id number]. Findings were discussed with Dr. Graciela Aparicio on 10/30/2022 at 10:21 am.     CT BRAIN PERFUSION    Result Date: 10/30/2022  EXAMINATION: CTA OF THE HEAD WITH CONTRAST; CTA OF THE NECK; CTA OF THE HEAD WITH CONTRAST WITH PERFUSION 10/30/2022 9:50 am: TECHNIQUE: CTA of the head/brain was performed with the administration of intravenous contrast. Multiplanar reformatted images are provided for review. MIP images are provided for review. Automated exposure control, iterative reconstruction, and/or weight based adjustment of the mA/kV was utilized to reduce the radiation dose to as low as reasonably achievable.; CTA of the neck was performed with the administration of intravenous contrast. Multiplanar reformatted images are provided for review. MIP images are provided for review. Stenosis of the internal carotid arteries measured using NASCET criteria. Automated exposure control, iterative reconstruction, and/or weight based adjustment of the mA/kV was utilized to reduce the radiation dose to as low as reasonably achievable. COMPARISON: None. HISTORY: ORDERING SYSTEM PROVIDED HISTORY: ICH TECHNOLOGIST PROVIDED HISTORY: Reason for exam:->ICH Has a \"code stroke\" or \"stroke alert\" been called? ->No Decision Support Exception - unselect if not a suspected or confirmed emergency medical condition->Emergency Medical Condition (MA); ORDERING SYSTEM PROVIDED HISTORY: brain death TECHNOLOGIST PROVIDED HISTORY: Reason for exam:->brain death Has a \"code stroke\" or \"stroke alert\" been called? ->No Decision Support Exception - unselect if not a suspected or confirmed emergency medical condition->Emergency Medical Condition (MA) FINDINGS: CTA NECK: AORTIC ARCH/ARCH VESSELS: No dissection or arterial injury. No significant stenosis of the brachiocephalic or subclavian arteries. CAROTID ARTERIES: No dissection, arterial injury, or hemodynamically significant stenosis by NASCET criteria. VERTEBRAL ARTERIES: No dissection, arterial injury, or significant stenosis. SOFT TISSUES: Patchy areas of opacification are seen within the dependent aspect the lungs. Unclear whether this represents dependent atelectasis versus aspiration pneumonitis. No acute abnormality within the visualized mediastinum. BONES: No acute osseous abnormality. CTA HEAD: ANTERIOR CIRCULATION: No significant stenosis of the intracranial internal carotid, anterior cerebral, or middle cerebral arteries. POSTERIOR CIRCULATION: No significant stenosis of the vertebral, basilar, or posterior cerebral arteries. Predominant fetal supply of the PCAs bilaterally. OTHER: No dural venous sinus thrombosis on this non-dedicated study. BRAIN: There is a focus of active extravasation in the region of the medial right thalamus/3rd ventricle. There is interval increase in the amount of intraventricular blood compared with the prior noncontrast CT head. CT PERFUSION: The perfusion analysis is limited. The analysis suggests delayed perfusion involving the entire supratentorial compartment with the areas marked as core infarct representing the dilated ventricles. 1. Unreliable perfusion analysis. 2. There is an area of active extravasation in the region of the medial right thalamus/3rd ventricle with interval worsening of the amount of intraventricular blood.  3. No evidence of a dissection or flow limiting stenosis of the cervical carotid/vertebral arteries. 4. No significant stenosis or large vessel occlusion seen of the [id number]. Findings were discussed with Dr. Xochilt Dia on 10/30/2022 at 10:21 am.     CTA HEAD W CONTRAST    Result Date: 10/30/2022  EXAMINATION: CTA OF THE HEAD WITH CONTRAST; CTA OF THE NECK; CTA OF THE HEAD WITH CONTRAST WITH PERFUSION 10/30/2022 9:50 am: TECHNIQUE: CTA of the head/brain was performed with the administration of intravenous contrast. Multiplanar reformatted images are provided for review. MIP images are provided for review. Automated exposure control, iterative reconstruction, and/or weight based adjustment of the mA/kV was utilized to reduce the radiation dose to as low as reasonably achievable.; CTA of the neck was performed with the administration of intravenous contrast. Multiplanar reformatted images are provided for review. MIP images are provided for review. Stenosis of the internal carotid arteries measured using NASCET criteria. Automated exposure control, iterative reconstruction, and/or weight based adjustment of the mA/kV was utilized to reduce the radiation dose to as low as reasonably achievable. COMPARISON: None. HISTORY: ORDERING SYSTEM PROVIDED HISTORY: ICH TECHNOLOGIST PROVIDED HISTORY: Reason for exam:->ICH Has a \"code stroke\" or \"stroke alert\" been called? ->No Decision Support Exception - unselect if not a suspected or confirmed emergency medical condition->Emergency Medical Condition (MA); ORDERING SYSTEM PROVIDED HISTORY: brain death TECHNOLOGIST PROVIDED HISTORY: Reason for exam:->brain death Has a \"code stroke\" or \"stroke alert\" been called? ->No Decision Support Exception - unselect if not a suspected or confirmed emergency medical condition->Emergency Medical Condition (MA) FINDINGS: CTA NECK: AORTIC ARCH/ARCH VESSELS: No dissection or arterial injury. No significant stenosis of the brachiocephalic or subclavian arteries.  CAROTID ARTERIES: No dissection, arterial injury, or hemodynamically significant stenosis by NASCET criteria. VERTEBRAL ARTERIES: No dissection, arterial injury, or significant stenosis. SOFT TISSUES: Patchy areas of opacification are seen within the dependent aspect the lungs. Unclear whether this represents dependent atelectasis versus aspiration pneumonitis. No acute abnormality within the visualized mediastinum. BONES: No acute osseous abnormality. CTA HEAD: ANTERIOR CIRCULATION: No significant stenosis of the intracranial internal carotid, anterior cerebral, or middle cerebral arteries. POSTERIOR CIRCULATION: No significant stenosis of the vertebral, basilar, or posterior cerebral arteries. Predominant fetal supply of the PCAs bilaterally. OTHER: No dural venous sinus thrombosis on this non-dedicated study. BRAIN: There is a focus of active extravasation in the region of the medial right thalamus/3rd ventricle. There is interval increase in the amount of intraventricular blood compared with the prior noncontrast CT head. CT PERFUSION: The perfusion analysis is limited. The analysis suggests delayed perfusion involving the entire supratentorial compartment with the areas marked as core infarct representing the dilated ventricles. 1. Unreliable perfusion analysis. 2. There is an area of active extravasation in the region of the medial right thalamus/3rd ventricle with interval worsening of the amount of intraventricular blood. 3. No evidence of a dissection or flow limiting stenosis of the cervical carotid/vertebral arteries. 4. No significant stenosis or large vessel occlusion seen of the [id number]. Findings were discussed with Dr. Roberto Carlos Villagomez on 10/30/2022 at 10:21 am.           Assessment and Plan  Patient is a 38 y.o. female who presented with unresponsiveness. The active problem list is as follows:    Devastating Intracranial hemorrhage- no neurosurgical options per neurosurgery. Prognosis is poor.   At her family's request and per wishes the patient had formerly expressed  regarding organ donation [facility] has been consulted for possible organ donation. Neurology consulted  [facility] candidate- screening bronchoscopy completed. Currently on pressors. History of alcoholic cirrhosis/elevated LFTs  Supratherapeutic INR- coagulopathy  Lactic acidosis- resolved  Elevated CK  Acute blood loss anemia on chronic anemia- transfuse for hemoglobin <7  History of hypertension    Routine labs in the morning. DVT prophylaxis. Please see orders for further management and care.         Destiny Macias DO    7:33 AM  10/31/2022

## 2022-10-31 NOTE — PROGRESS NOTES
Spoke with Gege Amaral from [facility] office, patient is not a coroners case and can be released.

## 2022-10-31 NOTE — PLAN OF CARE
Problem: Discharge Planning  Goal: Discharge to home or other facility with appropriate resources  Outcome: Not Progressing     Problem: Pain  Goal: Verbalizes/displays adequate comfort level or baseline comfort level  Outcome: Not Progressing     Problem: Respiratory - Adult  Goal: Achieves optimal ventilation and oxygenation  10/31/2022 1818 by Delano Dawkins RN  Outcome: Not Progressing  10/31/2022 1244 by Chelly Hsu RCP  Outcome: Not Progressing     Problem: Discharge Planning  Goal: Discharge to home or other facility with appropriate resources  Outcome: Not Progressing     Problem: Pain  Goal: Verbalizes/displays adequate comfort level or baseline comfort level  Outcome: Not Progressing     Problem: Respiratory - Adult  Goal: Achieves optimal ventilation and oxygenation  10/31/2022 1818 by Delano Dawkins RN  Outcome: Not Progressing  10/31/2022 1244 by Chelly Hsu RCP  Outcome: Not Progressing

## 2022-10-31 NOTE — CONSULTS
[phone]  Neurology Consult    Date:  10/31/2022  Patient Name:  Kylah Ibanez  YOB: 1980  MRN: [id number]     PCP:  Jessica Sharma DO   Referring:  Janki Cervantes DO      Chief Complaint: intracranial hemorrhage    History obtained from: chart, staff    Assessment  Kylah Ibanez is a 38 y.o. female who has no intact brainstem reflexes as noted in my exam below. If her apnea test is conclusive later today this would be consistent with death by neurologic criteria. This was discussed at bedside today with her parents. Plan  Discussed with MICU team. They will perform apnea test today. Neurology service will be available as needed        History of Present Illness:  Kylah Ibanez is a 38 y.o. female presenting for evaluation of intracranial hemorrhage. The history is taken from the chart. Patient with a history of alcohol abuse and cirrhosis admitted with intracranial hemorrhage. As per the chart she had reported a severe headache around 0300 the morning prior to admission. She was later found unresponsive at home with some empty [place] cans near her. She was taken to the ED and intubated for airway protection. CT showed a large ICH with intraventricular involvement and hydrocephalus. Neurosurgery was consulted in the ED and was not thought to be a surgical candidate for any interventions. CTA head and neck was obtained which showed active extravasation in the area of the right medial thalamus with interval worsening of IVH. She was given FFP, thiamine, Keppra, Vit K as she was noted to have an elevated INR (3.6) on 10/30/22. She was transferred to [facility] MICU and was noted to have some absent brain stem reflexes and neurology was consulted for concern for potential development of brain death.       Review of Systems:  Unable to obtain due to intubated, altered mental status    Medical History:   Past Medical History:   Diagnosis Date Alcohol abuse     Alcoholic cirrhosis ([facility] [address] [facility]     Anxiety     not on any meds for it    Hypertension     has not required any med a few years as of 8/2019    Hypothyroidism     in her early 25s - pt was on levothyroxine for a while and then hypothyroidism apparently resolved and pt has been off med since her mid-20s    Pancreatic cyst 01/2017    Pt underwent partial pancreatectomy and splenectomy at [facility] 2/23/2017 and was told pathology showed MCN (mucinous cystic neoplasm) of pancreas with clean margins at surgery        Surgical History:   Past Surgical History:   Procedure Laterality Date    [address]    HAND SURGERY Left 2/22/2022    LEFT STERNOCLAVICULAR JOINT DEBRIDEMENT performed by Guanaco Arenas MD at [address] [place] [address]  2/26/2022         PANCREAS SURGERY  02/23/2017    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 - 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    SPLENECTOMY, TOTAL  02/23/2017    along with partial pancreatectomy    UPPER GASTROINTESTINAL ENDOSCOPY N/A 7/4/2020    EGD CONTROL HEMORRHAGE performed by Corrina Bailey MD at [address] [place] [address]  7/4/2020    EGD ESOPHAGOGASTRODUODENOSCOPY ENDOSCOPIC VARICEAL TREATMENT performed by Corrina Bailey MD at [address] [place] [address] N/A 9/17/2021    EGD, BANDING OF VARICES performed by Corrina Bailey MD at [address] [place] [address] N/A 2/23/2022    EGD BAND LIGATION performed by Corrina Bailey MD at [facility] ENDOSCOPY        Family History:   Family History   Problem Relation Age of Onset    Other Mother         lymphoma    High Blood Pressure Mother     Other Father         ms    Breast Cancer Other         AUNT       Social History:  Social History     Tobacco Use    Smoking status: Never    Smokeless tobacco: Never   Vaping Use    Vaping Use: Never used   Substance Use Topics    Alcohol use: Yes     Comment: daily wine or beer    Drug use: No        Current Medications:      Current Facility-Administered Medications   Medication Dose Route Frequency Provider Last Rate Last Admin    0.9 % sodium chloride infusion   IntraVENous PRN Elizabethann Yuly, APRN - CNP        albumin human 25 % IV solution 25 g  25 g IntraVENous Q8H Elizabethann Yuly, APRN -  mL/hr at 10/31/22 1301 Rate Verify at 10/31/22 1301    0.9 % sodium chloride infusion   IntraVENous PRN Elizabethann Yuly, APRN - CNP        0.9 % sodium chloride infusion   IntraVENous PRN Elizabethann Yuly, APRN - CNP        0.9 % sodium chloride infusion   IntraVENous PRN Elizabethann Yuly, APRN - CNP        sodium phosphate 15 mmol in sodium chloride 0.9 % 250 mL IVPB  15 mmol IntraVENous Once Elizabethann Yuly, APRN - CNP 62.5 mL/hr at 10/31/22 1301 Rate Verify at 10/31/22 1301    vasopressin 20 Units in dextrose 5 % 100 mL infusion  0.03 Units/min IntraVENous Continuous Elizabethann Yuly, APRN - CNP 9 mL/hr at 10/31/22 1301 0.03 Units/min at 10/31/22 1301    norepinephrine (LEVOPHED) 16 mg in dextrose 5% 250 mL infusion  1-100 mcg/min IntraVENous Continuous Elizabethann Yuly, APRN - CNP 37.5 mL/hr at 10/31/22 1301 40 mcg/min at 10/31/22 1301    sodium chloride flush 0.9 % injection 5-40 mL  5-40 mL IntraVENous 2 times per day Elizabethann Yuly, APRN - CNP   10 mL at 10/31/22 0758    sodium chloride flush 0.9 % injection 5-40 mL  5-40 mL IntraVENous PRN Elizabethann Yuly, APRN - CNP        0.9 % sodium chloride infusion   IntraVENous PRN Elizabethann Yuly, APRN - CNP        ondansetron (ZOFRAN-ODT) disintegrating tablet 4 mg  4 mg Oral Q8H PRN Elizabethann Yuly, APRN - CNP        Or    ondansetron [facility]) injection 4 mg  4 mg IntraVENous Q6H PRN Elizabethann Yuly, APRN - CNP        polyethylene glycol (GLYCOLAX) packet 17 g  17 g Oral Daily JOSEPN Earlene Emery APRN - CNP        lactated ringers infusion   IntraVENous Continuous Ponce Pedroza 10/30/22  2322 10/31/22  0542 10/31/22  0552 10/31/22  1107 10/31/22  1147 10/31/22  1231      < >  --    < > 144  --  144  --   --    K 2.1*   < >  --    < > 4.5  --  3.7  --   --       < >  --    < > 110*  --  110*  --   --    CO2 17*   < >  --    < > 25  --  28  --   --    BUN 8   < >  --    < > 11  --  13  --   --    CREATININE 0.8   < >  --    < > 0.7  --  0.7  --   --    GLUCOSE 148*   < >  --    < > 106*  --  105*  --   --    CALCIUM 7.4*   < >  --    < > 8.1*  --  8.3*  --   --    PROT 7.3   < >  --    < > 6.6  --  6.5  --   --    LABALBU 2.0*   < >  --    < > 2.5*  --  2.3*  --   --    BILITOT 8.5*   < >  --    < > 8.8*  --  9.7*  --   --    ALKPHOS 183*   < >  --    < > 119*  --  116*  --   --    *   < >  --    < > 203*  --  248*  --   --    ALT 37*   < >  --    < > 43*  --  58*  --   --    WBC 6.6   < >  --    < > 10.4  --   --   --   --    RBC 1.88*   < >  --    < > 1.70*  --   --   --   --    HGB 6.6*   < >  --    < > 5.8*  --   --   --   --    HCT 21.3*   < >  --    < > 17.9*  --   --   --   --    .3*   < >  --    < > 105.3*  --   --   --   --    MCH 35.1*   < >  --    < > 34.1  --   --   --   --    MCHC 31.0*   < >  --    < > 32.4  --   --   --   --    RDW 19.7*   < >  --    < > 22.7*  --   --   --   --       < >  --    < > 122*  --   --   --   --    MPV 10.6   < >  --    < > 11.0  --   --   --   --    PH  --    < >  --    < >  --    < >  --   --  7.276*   PO2  --    < >  --    < >  --    < >  --   --  54.7*   PCO2  --    < >  --    < >  --    < >  --   --  58.7*   HCO3  --    < >  --    < >  --    < >  --    < > 26.7*   BE  --    < >  --    < >  --    < >  --    < > -0.3   O2SAT  --    < >  --    < >  --    < >  --    < > 82.6*   LACTA  --    < >  --    < > 1.7  --  2.5*  --   --    LABA1C  --   --  4.2  --   --   --   --   --   --    SEDRATE 28*  --   --   --   --   --   --   --   --    CRP 0.8*  --   --   --   --   --   --   --   --     < > = values in this interval not displayed.        Imaging  XR CHEST PORTABLE   Final Result   Bibasilar infiltrates         XR CHEST PORTABLE    (Results Pending)         Electronically signed by Eli Ray DO on 10/31/2022 at 1:42 PM

## 2022-11-01 ENCOUNTER — APPOINTMENT (OUTPATIENT)
Dept: GENERAL RADIOLOGY | Age: 42
DRG: 044 | End: 2022-11-01
Attending: INTERNAL MEDICINE
Payer: MEDICAID

## 2022-11-01 ENCOUNTER — ANESTHESIA EVENT (OUTPATIENT)
Dept: OPERATING ROOM | Age: 42
DRG: 044 | End: 2022-11-01
Payer: MEDICAID

## 2022-11-01 ENCOUNTER — ANESTHESIA (OUTPATIENT)
Dept: OPERATING ROOM | Age: 42
DRG: 044 | End: 2022-11-01
Payer: MEDICAID

## 2022-11-01 VITALS
BODY MASS INDEX: 28.34 KG/M2 | TEMPERATURE: 100.8 F | OXYGEN SATURATION: 92 % | DIASTOLIC BLOOD PRESSURE: 50 MMHG | WEIGHT: 166 LBS | RESPIRATION RATE: 10 BRPM | HEIGHT: 64 IN | SYSTOLIC BLOOD PRESSURE: 105 MMHG

## 2022-11-01 LAB
AADO2: 117.7 MMHG
AADO2: 120.2 MMHG
AADO2: 402.3 MMHG
ACANTHOCYTES: ABNORMAL
ALBUMIN SERPL-MCNC: 2.7 G/DL (ref 3.5–5.2)
ALBUMIN SERPL-MCNC: 2.9 G/DL (ref 3.5–5.2)
ALBUMIN SERPL-MCNC: 3.4 G/DL (ref 3.5–5.2)
ALP BLD-CCNC: 82 U/L (ref 35–104)
ALP BLD-CCNC: 84 U/L (ref 35–104)
ALP BLD-CCNC: 94 U/L (ref 35–104)
ALT SERPL-CCNC: 46 U/L (ref 0–32)
ALT SERPL-CCNC: 47 U/L (ref 0–32)
ALT SERPL-CCNC: 56 U/L (ref 0–32)
AMORPHOUS: ABNORMAL
ANION GAP SERPL CALCULATED.3IONS-SCNC: 20 MMOL/L (ref 7–16)
ANION GAP SERPL CALCULATED.3IONS-SCNC: 25 MMOL/L (ref 7–16)
ANION GAP SERPL CALCULATED.3IONS-SCNC: 26 MMOL/L (ref 7–16)
ANISOCYTOSIS: ABNORMAL
ANISOCYTOSIS: ABNORMAL
AST SERPL-CCNC: 160 U/L (ref 0–31)
AST SERPL-CCNC: 179 U/L (ref 0–31)
AST SERPL-CCNC: 205 U/L (ref 0–31)
B.E.: -11 MMOL/L (ref -3–3)
B.E.: -7.3 MMOL/L (ref -3–3)
B.E.: -8 MMOL/L (ref -3–3)
BACTERIA: ABNORMAL /HPF
BASOPHILIC STIPPLING: ABNORMAL
BASOPHILS ABSOLUTE: 0.11 E9/L (ref 0–0.2)
BASOPHILS ABSOLUTE: 0.12 E9/L (ref 0–0.2)
BASOPHILS RELATIVE PERCENT: 0.9 % (ref 0–2)
BASOPHILS RELATIVE PERCENT: 0.9 % (ref 0–2)
BILIRUB SERPL-MCNC: 10.1 MG/DL (ref 0–1.2)
BILIRUB SERPL-MCNC: 10.6 MG/DL (ref 0–1.2)
BILIRUB SERPL-MCNC: 10.9 MG/DL (ref 0–1.2)
BILIRUBIN DIRECT: 5 MG/DL (ref 0–0.3)
BILIRUBIN URINE: ABNORMAL
BILIRUBIN, INDIRECT: 5.1 MG/DL (ref 0–1)
BLOOD BANK DISPENSE STATUS: NORMAL
BLOOD BANK PRODUCT CODE: NORMAL
BLOOD, URINE: ABNORMAL
BPU ID: NORMAL
BUN BLDV-MCNC: 14 MG/DL (ref 6–20)
BUN BLDV-MCNC: 14 MG/DL (ref 6–20)
BUN BLDV-MCNC: 16 MG/DL (ref 6–20)
BURR CELLS: ABNORMAL
BURR CELLS: ABNORMAL
CALCIUM IONIZED: 1.15 MMOL/L (ref 1.15–1.33)
CALCIUM IONIZED: 1.21 MMOL/L (ref 1.15–1.33)
CALCIUM IONIZED: 1.23 MMOL/L (ref 1.15–1.33)
CALCIUM SERPL-MCNC: 10.4 MG/DL (ref 8.6–10.2)
CALCIUM SERPL-MCNC: 9.3 MG/DL (ref 8.6–10.2)
CALCIUM SERPL-MCNC: 9.4 MG/DL (ref 8.6–10.2)
CHLORIDE BLD-SCNC: 106 MMOL/L (ref 98–107)
CHLORIDE BLD-SCNC: 108 MMOL/L (ref 98–107)
CHLORIDE BLD-SCNC: 108 MMOL/L (ref 98–107)
CLARITY: CLEAR
CO2: 16 MMOL/L (ref 22–29)
CO2: 18 MMOL/L (ref 22–29)
CO2: 20 MMOL/L (ref 22–29)
COARSE CASTS, UA: ABNORMAL /LPF (ref 0–2)
COHB: 2.3 % (ref 0–1.5)
COHB: 2.4 % (ref 0–1.5)
COHB: 2.4 % (ref 0–1.5)
COLOR: YELLOW
CREAT SERPL-MCNC: 0.8 MG/DL (ref 0.5–1)
CREAT SERPL-MCNC: 0.9 MG/DL (ref 0.5–1)
CREAT SERPL-MCNC: 1.1 MG/DL (ref 0.5–1)
CRITICAL: ABNORMAL
DATE ANALYZED: ABNORMAL
DATE OF COLLECTION: ABNORMAL
DESCRIPTION BLOOD BANK: NORMAL
EKG ATRIAL RATE: 95 BPM
EKG P AXIS: 70 DEGREES
EKG P-R INTERVAL: 158 MS
EKG Q-T INTERVAL: 470 MS
EKG QRS DURATION: 100 MS
EKG QTC CALCULATION (BAZETT): 590 MS
EKG R AXIS: 44 DEGREES
EKG T AXIS: 56 DEGREES
EKG VENTRICULAR RATE: 95 BPM
EOSINOPHILS ABSOLUTE: 0.22 E9/L (ref 0.05–0.5)
EOSINOPHILS ABSOLUTE: 0.61 E9/L (ref 0.05–0.5)
EOSINOPHILS RELATIVE PERCENT: 1.7 % (ref 0–6)
EOSINOPHILS RELATIVE PERCENT: 5.2 % (ref 0–6)
EPITHELIAL CELLS, UA: ABNORMAL /HPF
FIBRINOGEN: 80 MG/DL (ref 200–400)
FIO2: 100 %
FIO2: 40 %
FIO2: 40 %
GFR SERPL CREATININE-BSD FRML MDRD: >60 ML/MIN/1.73
GLUCOSE BLD-MCNC: 119 MG/DL (ref 74–99)
GLUCOSE BLD-MCNC: 120 MG/DL (ref 74–99)
GLUCOSE BLD-MCNC: 161 MG/DL (ref 74–99)
GLUCOSE URINE: NEGATIVE MG/DL
HCO3: 15 MMOL/L (ref 22–26)
HCO3: 17.6 MMOL/L (ref 22–26)
HCO3: 18.4 MMOL/L (ref 22–26)
HCT VFR BLD CALC: 16.8 % (ref 34–48)
HCT VFR BLD CALC: 21.5 % (ref 34–48)
HEMOGLOBIN: 5.4 G/DL (ref 11.5–15.5)
HEMOGLOBIN: 7.1 G/DL (ref 11.5–15.5)
HHB: 0.3 % (ref 0–5)
HHB: 1.6 % (ref 0–5)
HHB: 2.4 % (ref 0–5)
HYALINE CASTS: ABNORMAL /LPF (ref 0–2)
HYPOCHROMIA: ABNORMAL
I/E RATIO: ABNORMAL
INR BLD: 2.1
INR BLD: 2.2
INR BLD: 2.3
INR BLD: 2.6
KETONES, URINE: ABNORMAL MG/DL
LAB: ABNORMAL
LACTIC ACID: 12.7 MMOL/L (ref 0.5–2.2)
LACTIC ACID: 13.2 MMOL/L (ref 0.5–2.2)
LACTIC ACID: 9.2 MMOL/L (ref 0.5–2.2)
LEUKOCYTE ESTERASE, URINE: NEGATIVE
LYMPHOCYTES ABSOLUTE: 0.59 E9/L (ref 1.5–4)
LYMPHOCYTES ABSOLUTE: 1.16 E9/L (ref 1.5–4)
LYMPHOCYTES RELATIVE PERCENT: 5.2 % (ref 20–42)
LYMPHOCYTES RELATIVE PERCENT: 8.7 % (ref 20–42)
Lab: ABNORMAL
MAGNESIUM: 1.8 MG/DL (ref 1.6–2.6)
MAGNESIUM: 1.9 MG/DL (ref 1.6–2.6)
MCH RBC QN AUTO: 32.3 PG (ref 26–35)
MCH RBC QN AUTO: 32.5 PG (ref 26–35)
MCHC RBC AUTO-ENTMCNC: 32.1 % (ref 32–34.5)
MCHC RBC AUTO-ENTMCNC: 33 % (ref 32–34.5)
MCV RBC AUTO: 101.2 FL (ref 80–99.9)
MCV RBC AUTO: 97.7 FL (ref 80–99.9)
METAMYELOCYTES RELATIVE PERCENT: 1.7 % (ref 0–1)
METAMYELOCYTES RELATIVE PERCENT: 2.6 % (ref 0–1)
METHB: 0.9 % (ref 0–1.5)
METHB: 0.9 % (ref 0–1.5)
METHB: 1.2 % (ref 0–1.5)
MODE: ABNORMAL
MONOCYTES ABSOLUTE: 0.39 E9/L (ref 0.1–0.95)
MONOCYTES ABSOLUTE: 0.47 E9/L (ref 0.1–0.95)
MONOCYTES RELATIVE PERCENT: 2.6 % (ref 2–12)
MONOCYTES RELATIVE PERCENT: 3.5 % (ref 2–12)
MUCUS: PRESENT /LPF
MYELOCYTE PERCENT: 0.9 % (ref 0–0)
MYELOCYTE PERCENT: 0.9 % (ref 0–0)
NEUTROPHILS ABSOLUTE: 11.09 E9/L (ref 1.8–7.3)
NEUTROPHILS ABSOLUTE: 9.95 E9/L (ref 1.8–7.3)
NEUTROPHILS RELATIVE PERCENT: 81.7 % (ref 43–80)
NEUTROPHILS RELATIVE PERCENT: 83.5 % (ref 43–80)
NITRITE, URINE: NEGATIVE
O2 SATURATION: 97.5 % (ref 92–98.5)
O2 SATURATION: 98.3 % (ref 92–98.5)
O2 SATURATION: 99.7 % (ref 92–98.5)
O2HB: 94 % (ref 94–97)
O2HB: 95.1 % (ref 94–97)
O2HB: 96.5 % (ref 94–97)
OPERATOR ID: 359
OPERATOR ID: ABNORMAL
OPERATOR ID: ABNORMAL
ORGANISM: ABNORMAL
OVALOCYTES: ABNORMAL
PATIENT TEMP: 37 C
PCO2: 32.7 MMHG (ref 35–45)
PCO2: 34.1 MMHG (ref 35–45)
PCO2: 42.2 MMHG (ref 35–45)
PDW BLD-RTO: 21 FL (ref 11.5–15)
PDW BLD-RTO: 22.1 FL (ref 11.5–15)
PEEP/CPAP: 0 CMH2O
PEEP/CPAP: 0 CMH2O
PEEP/CPAP: 5 CMH2O
PFO2: 2.43 MMHG/%
PFO2: 2.89 MMHG/%
PFO2: 3 MMHG/%
PH BLOOD GAS: 7.26 (ref 7.35–7.45)
PH BLOOD GAS: 7.26 (ref 7.35–7.45)
PH BLOOD GAS: 7.35 (ref 7.35–7.45)
PH UA: 5.5 (ref 5–9)
PIP: 14 CMH2O
PIP: 20 CMH2O
PIP: 22 CMH2O
PLATELET # BLD: 107 E9/L (ref 130–450)
PLATELET # BLD: 78 E9/L (ref 130–450)
PLATELET CONFIRMATION: NORMAL
PMV BLD AUTO: 10.7 FL (ref 7–12)
PMV BLD AUTO: 11.2 FL (ref 7–12)
PO2: 115.8 MMHG (ref 75–100)
PO2: 119.9 MMHG (ref 75–100)
PO2: 243.5 MMHG (ref 75–100)
POIKILOCYTES: ABNORMAL
POIKILOCYTES: ABNORMAL
POLYCHROMASIA: ABNORMAL
POLYCHROMASIA: ABNORMAL
POTASSIUM SERPL-SCNC: 3.4 MMOL/L (ref 3.5–5)
POTASSIUM SERPL-SCNC: 3.5 MMOL/L (ref 3.5–5)
POTASSIUM SERPL-SCNC: 3.7 MMOL/L (ref 3.5–5)
PROTEIN UA: 30 MG/DL
PROTHROMBIN TIME: 22.5 SEC (ref 9.3–12.4)
PROTHROMBIN TIME: 23.3 SEC (ref 9.3–12.4)
PROTHROMBIN TIME: 24.8 SEC (ref 9.3–12.4)
PROTHROMBIN TIME: 27.9 SEC (ref 9.3–12.4)
RBC # BLD: 1.66 E12/L (ref 3.5–5.5)
RBC # BLD: 2.2 E12/L (ref 3.5–5.5)
RBC UA: >20 /HPF (ref 0–2)
RENAL EPITHELIAL, UA: ABNORMAL /HPF
RI(T): 0.98
RI(T): 1.04
RI(T): 1.65
RR MECHANICAL: 10 B/MIN
RR MECHANICAL: 10 B/MIN
RR MECHANICAL: 12 B/MIN
SCHISTOCYTES: ABNORMAL
SCHISTOCYTES: ABNORMAL
SODIUM BLD-SCNC: 146 MMOL/L (ref 132–146)
SODIUM BLD-SCNC: 149 MMOL/L (ref 132–146)
SODIUM BLD-SCNC: 152 MMOL/L (ref 132–146)
SOURCE, BLOOD GAS: ABNORMAL
SPECIFIC GRAVITY UA: >=1.03 (ref 1–1.03)
T4 FREE: 3.64 NG/DL (ref 0.93–1.7)
THB: 5.8 G/DL (ref 11.5–16.5)
THB: 6.6 G/DL (ref 11.5–16.5)
THB: 6.8 G/DL (ref 11.5–16.5)
TIME ANALYZED: 250
TIME ANALYZED: 657
TIME ANALYZED: 924
TOTAL PROTEIN: 5.7 G/DL (ref 6.4–8.3)
TOTAL PROTEIN: 6 G/DL (ref 6.4–8.3)
TOTAL PROTEIN: 6.4 G/DL (ref 6.4–8.3)
URINE CULTURE, ROUTINE: ABNORMAL
UROBILINOGEN, URINE: 0.2 E.U./DL
WBC # BLD: 11.7 E9/L (ref 4.5–11.5)
WBC # BLD: 12.9 E9/L (ref 4.5–11.5)
WBC UA: >20 /HPF (ref 0–5)

## 2022-11-01 PROCEDURE — 2709999900 HC NON-CHARGEABLE SUPPLY

## 2022-11-01 PROCEDURE — P9047 ALBUMIN (HUMAN), 25%, 50ML: HCPCS

## 2022-11-01 PROCEDURE — 2580000003 HC RX 258

## 2022-11-01 PROCEDURE — 83605 ASSAY OF LACTIC ACID: CPT

## 2022-11-01 PROCEDURE — 3700000000 HC ANESTHESIA ATTENDED CARE

## 2022-11-01 PROCEDURE — 3600000004 HC SURGERY LEVEL 4 BASE

## 2022-11-01 PROCEDURE — 6360000002 HC RX W HCPCS

## 2022-11-01 PROCEDURE — 81001 URINALYSIS AUTO W/SCOPE: CPT

## 2022-11-01 PROCEDURE — A4216 STERILE WATER/SALINE, 10 ML: HCPCS

## 2022-11-01 PROCEDURE — 82330 ASSAY OF CALCIUM: CPT

## 2022-11-01 PROCEDURE — 87077 CULTURE AEROBIC IDENTIFY: CPT

## 2022-11-01 PROCEDURE — 83735 ASSAY OF MAGNESIUM: CPT

## 2022-11-01 PROCEDURE — 87186 SC STD MICRODIL/AGAR DIL: CPT

## 2022-11-01 PROCEDURE — 3700000001 HC ADD 15 MINUTES (ANESTHESIA)

## 2022-11-01 PROCEDURE — 85610 PROTHROMBIN TIME: CPT

## 2022-11-01 PROCEDURE — 84439 ASSAY OF FREE THYROXINE: CPT

## 2022-11-01 PROCEDURE — 82805 BLOOD GASES W/O2 SATURATION: CPT

## 2022-11-01 PROCEDURE — 2500000003 HC RX 250 WO HCPCS

## 2022-11-01 PROCEDURE — 82248 BILIRUBIN DIRECT: CPT

## 2022-11-01 PROCEDURE — 2580000003 HC RX 258: Performed by: NURSE PRACTITIONER

## 2022-11-01 PROCEDURE — 36430 TRANSFUSION BLD/BLD COMPNT: CPT

## 2022-11-01 PROCEDURE — 2500000003 HC RX 250 WO HCPCS: Performed by: NURSE ANESTHETIST, CERTIFIED REGISTERED

## 2022-11-01 PROCEDURE — 80053 COMPREHEN METABOLIC PANEL: CPT

## 2022-11-01 PROCEDURE — 71045 X-RAY EXAM CHEST 1 VIEW: CPT

## 2022-11-01 PROCEDURE — 85025 COMPLETE CBC W/AUTO DIFF WBC: CPT

## 2022-11-01 PROCEDURE — 94640 AIRWAY INHALATION TREATMENT: CPT

## 2022-11-01 PROCEDURE — 6360000002 HC RX W HCPCS: Performed by: NURSE ANESTHETIST, CERTIFIED REGISTERED

## 2022-11-01 PROCEDURE — 87088 URINE BACTERIA CULTURE: CPT

## 2022-11-01 PROCEDURE — 85384 FIBRINOGEN ACTIVITY: CPT

## 2022-11-01 PROCEDURE — 3600000014 HC SURGERY LEVEL 4 ADDTL 15MIN

## 2022-11-01 PROCEDURE — 6370000000 HC RX 637 (ALT 250 FOR IP)

## 2022-11-01 RX ORDER — SODIUM CHLORIDE 9 MG/ML
INJECTION, SOLUTION INTRAVENOUS PRN
Status: DISCONTINUED | OUTPATIENT
Start: 2022-11-01 | End: 2022-11-01 | Stop reason: HOSPADM

## 2022-11-01 RX ORDER — ALBUMIN (HUMAN) 12.5 G/50ML
25 SOLUTION INTRAVENOUS ONCE
Status: COMPLETED | OUTPATIENT
Start: 2022-11-01 | End: 2022-11-01

## 2022-11-01 RX ORDER — FUROSEMIDE 10 MG/ML
60 INJECTION INTRAMUSCULAR; INTRAVENOUS ONCE
Status: COMPLETED | OUTPATIENT
Start: 2022-11-01 | End: 2022-11-01

## 2022-11-01 RX ORDER — VECURONIUM BROMIDE 1 MG/ML
INJECTION, POWDER, LYOPHILIZED, FOR SOLUTION INTRAVENOUS PRN
Status: DISCONTINUED | OUTPATIENT
Start: 2022-11-01 | End: 2022-11-01 | Stop reason: SDUPTHER

## 2022-11-01 RX ORDER — MAGNESIUM SULFATE 1 G/100ML
1000 INJECTION INTRAVENOUS ONCE
Status: DISCONTINUED | OUTPATIENT
Start: 2022-11-01 | End: 2022-11-01

## 2022-11-01 RX ORDER — FUROSEMIDE 10 MG/ML
120 INJECTION INTRAMUSCULAR; INTRAVENOUS ONCE
Status: COMPLETED | OUTPATIENT
Start: 2022-11-01 | End: 2022-11-01

## 2022-11-01 RX ORDER — POTASSIUM CHLORIDE 29.8 MG/ML
20 INJECTION INTRAVENOUS ONCE
Status: DISCONTINUED | OUTPATIENT
Start: 2022-11-01 | End: 2022-11-01

## 2022-11-01 RX ORDER — HEPARIN SODIUM 1000 [USP'U]/ML
INJECTION, SOLUTION INTRAVENOUS; SUBCUTANEOUS PRN
Status: DISCONTINUED | OUTPATIENT
Start: 2022-11-01 | End: 2022-11-01 | Stop reason: SDUPTHER

## 2022-11-01 RX ADMIN — PHENYLEPHRINE HYDROCHLORIDE 200 MCG: 10 INJECTION INTRAVENOUS at 14:53

## 2022-11-01 RX ADMIN — PHYTONADIONE 20 MG: 10 INJECTION, EMULSION INTRAMUSCULAR; INTRAVENOUS; SUBCUTANEOUS at 08:49

## 2022-11-01 RX ADMIN — PHENYLEPHRINE HYDROCHLORIDE 300 MCG: 10 INJECTION INTRAVENOUS at 14:59

## 2022-11-01 RX ADMIN — ALBUMIN (HUMAN) 25 G: 12.5 SOLUTION INTRAVENOUS at 08:14

## 2022-11-01 RX ADMIN — CEFEPIME 2000 MG: 2 INJECTION, POWDER, FOR SOLUTION INTRAVENOUS at 02:38

## 2022-11-01 RX ADMIN — PHENYLEPHRINE HYDROCHLORIDE 300 MCG: 10 INJECTION INTRAVENOUS at 14:55

## 2022-11-01 RX ADMIN — FUROSEMIDE 60 MG: 10 INJECTION, SOLUTION INTRAMUSCULAR; INTRAVENOUS at 04:04

## 2022-11-01 RX ADMIN — CALCIUM CHLORIDE 1000 MG: 100 INJECTION, SOLUTION INTRAVENOUS at 08:51

## 2022-11-01 RX ADMIN — PHENYLEPHRINE HYDROCHLORIDE 300 MCG: 10 INJECTION INTRAVENOUS at 15:01

## 2022-11-01 RX ADMIN — HEPARIN SODIUM 30000 UNITS: 1000 INJECTION INTRAVENOUS; SUBCUTANEOUS at 15:16

## 2022-11-01 RX ADMIN — VECURONIUM BROMIDE 10 MG: 10 INJECTION, POWDER, LYOPHILIZED, FOR SOLUTION INTRAVENOUS at 14:40

## 2022-11-01 RX ADMIN — METHYLPREDNISOLONE SODIUM SUCCINATE 1000 MG: 1 INJECTION, POWDER, LYOPHILIZED, FOR SOLUTION INTRAMUSCULAR; INTRAVENOUS at 13:04

## 2022-11-01 RX ADMIN — PHENYLEPHRINE HYDROCHLORIDE 300 MCG: 10 INJECTION INTRAVENOUS at 14:58

## 2022-11-01 RX ADMIN — FUROSEMIDE 120 MG: 10 INJECTION, SOLUTION INTRAMUSCULAR; INTRAVENOUS at 10:27

## 2022-11-01 RX ADMIN — ALBUTEROL SULFATE 2.5 MG: 2.5 SOLUTION RESPIRATORY (INHALATION) at 05:19

## 2022-11-01 RX ADMIN — SODIUM CHLORIDE, POTASSIUM CHLORIDE, SODIUM LACTATE AND CALCIUM CHLORIDE: 600; 310; 30; 20 INJECTION, SOLUTION INTRAVENOUS at 06:58

## 2022-11-01 RX ADMIN — SODIUM BICARBONATE 100 MEQ: 84 INJECTION INTRAVENOUS at 09:53

## 2022-11-01 RX ADMIN — ALBUMIN (HUMAN) 25 G: 12.5 SOLUTION INTRAVENOUS at 03:46

## 2022-11-01 RX ADMIN — CHLORHEXIDINE GLUCONATE 15 ML: 1.2 RINSE ORAL at 08:11

## 2022-11-01 RX ADMIN — ALBUTEROL SULFATE 2.5 MG: 2.5 SOLUTION RESPIRATORY (INHALATION) at 09:49

## 2022-11-01 RX ADMIN — PHENYLEPHRINE HYDROCHLORIDE 300 MCG: 10 INJECTION INTRAVENOUS at 15:03

## 2022-11-01 RX ADMIN — METHYLPREDNISOLONE SODIUM SUCCINATE 1000 MG: 1 INJECTION, POWDER, LYOPHILIZED, FOR SOLUTION INTRAMUSCULAR; INTRAVENOUS at 04:55

## 2022-11-01 RX ADMIN — PHENYLEPHRINE HYDROCHLORIDE 300 MCG: 10 INJECTION INTRAVENOUS at 14:56

## 2022-11-01 RX ADMIN — SODIUM BICARBONATE 50 MEQ: 84 INJECTION INTRAVENOUS at 03:44

## 2022-11-01 RX ADMIN — FAMOTIDINE 20 MG: 10 INJECTION, SOLUTION INTRAVENOUS at 08:06

## 2022-11-01 RX ADMIN — VASOPRESSIN 0.03 UNITS/MIN: 20 INJECTION INTRAVENOUS at 13:07

## 2022-11-01 RX ADMIN — SODIUM BICARBONATE 100 MEQ: 84 INJECTION INTRAVENOUS at 08:07

## 2022-11-01 RX ADMIN — Medication 12 MCG/MIN: at 03:21

## 2022-11-01 RX ADMIN — SODIUM CHLORIDE, PRESERVATIVE FREE 10 ML: 5 INJECTION INTRAVENOUS at 08:12

## 2022-11-01 ASSESSMENT — PULMONARY FUNCTION TESTS
PIF_VALUE: 24
PIF_VALUE: 24
PIF_VALUE: 22
PIF_VALUE: 24
PIF_VALUE: 23
PIF_VALUE: 24
PIF_VALUE: 23
PIF_VALUE: 21
PIF_VALUE: 23
PIF_VALUE: 23
PIF_VALUE: 24

## 2022-11-01 NOTE — ANESTHESIA PRE PROCEDURE
Department of Anesthesiology  Preprocedure Note       Name:  Gregory Moore   Age:  38 y.o.  :  1980                                          MRN:  [id number]         Date:  2022      Surgeon: Sadia David):  Radha    Procedure: Procedure(s):  ORGAN PROCUREMENT, KINDEYS AND POSSIBLE LUNGS, POSSIBLE PANCREAS    Medications prior to admission:   Prior to Admission medications    Not on File       Current medications:    Current Facility-Administered Medications   Medication Dose Route Frequency Provider Last Rate Last Admin    0.9 % sodium chloride infusion   IntraVENous PRN [employer]        0.9 % sodium chloride infusion   IntraVENous PRN [employer]        0.9 % sodium chloride infusion   IntraVENous PRN [employer]        0.9 % sodium chloride infusion   IntraVENous PRN [employer]        0.9 % sodium chloride infusion   IntraVENous PRN [employer]        0.9 % sodium chloride infusion   IntraVENous PRN [employer]        0.9 % sodium chloride infusion   IntraVENous PRN Celia Reels, APRN - CNP        0.9 % sodium chloride infusion   IntraVENous PRN Celia Reels, APRN - CNP        0.9 % sodium chloride infusion   IntraVENous PRN Celia Reels, APRN - CNP        0.9 % sodium chloride infusion   IntraVENous PRN Celia Reels, APRN - CNP        cefepime (MAXIPIME) 2,000 mg in sodium chloride 0.9 % 50 mL IVPB ([id number])  2,000 mg IntraVENous Q12H [employer]   Stopped at 22 0323    0.9 % sodium chloride infusion   IntraVENous PRN [employer]        methylPREDNISolone sodium (SOLU-MEDROL) 1,000 mg in sodium chloride 0.9 % 250 mL IVPB  1,000 mg IntraVENous Q8H [employer] 500 mL/hr at 22 1304 1,000 mg at 22 1304    0.9 % sodium chloride infusion   IntraVENous Continuous [employer] 30 mL/hr at 22 0618 Rate Verify at 22 0618    albuterol (PROVENTIL) nebulizer solution 2.5 mg  2.5 mg Nebulization Q4H [employer]   2.5 mg at 22 0949    0.9 % sodium chloride infusion IntraVENous PRN [employer]        vasopressin 20 Units in dextrose 5 % 100 mL infusion  0.03 Units/min IntraVENous Continuous [place], APRN - CNP 9 mL/hr at 11/01/22 1307 0.03 Units/min at 11/01/22 1307    norepinephrine (LEVOPHED) 16 mg in dextrose 5% 250 mL infusion  1-100 mcg/min IntraVENous Continuous [place], APRN - CNP 23.4 mL/hr at 11/01/22 0715 25 mcg/min at 11/01/22 0715    sodium chloride flush 0.9 % injection 5-40 mL  5-40 mL IntraVENous 2 times per day [place], APRN - CNP   10 mL at 11/01/22 1837    sodium chloride flush 0.9 % injection 5-40 mL  5-40 mL IntraVENous PRN [place] Alan, APRN - CNP        0.9 % sodium chloride infusion   IntraVENous PRN [place] Alan, APRN - CNP        ondansetron (ZOFRAN-ODT) disintegrating tablet 4 mg  4 mg Oral Q8H PRN [place] Alan, APRN - CNP        Or    ondansetron [facility]) injection 4 mg  4 mg IntraVENous Q6H PRN [place], APRN - CNP        polyethylene glycol (GLYCOLAX) packet 17 g  17 g Oral Daily PRN [place] Alan, APRN - CNP        lactated ringers infusion   IntraVENous Continuous Laverda Lien, APRN - CNP 90 mL/hr at 11/01/22 0658 New Bag at 11/01/22 0658    chlorhexidine (PERIDEX) 0.12 % solution 15 mL  15 mL Mouth/Throat BID [place], APRN - CNP   15 mL at 11/01/22 0811    famotidine (PEPCID) 20 mg in sodium chloride (PF) 0.9 % 10 mL injection  20 mg IntraVENous BID [place] Alan, APRN - CNP   20 mg at 11/01/22 3118       Allergies:     Allergies   Allergen Reactions    Pcn [Penicillins] Hives, Itching, Swelling and Rash       Problem List:    Patient Active Problem List   Diagnosis Code    Sepsis ([facility] A41.9    Jaundice R17    Elevated liver enzymes R74.8    Hyperammonemia (HCC) E72.20    Liver metastases ([facility] [address] C78.7    Malignant ascites R18.0    H/O malignant neoplasm of pancreas Z85.07    Hematemesis K92.0    GI bleed K92.2    Upper GI bleed K92.2    UGI bleed K92.2    Anemia associated with acute blood loss D62    Leukocytosis A68.379    Alcoholic cirrhosis (HCC) B89.59    Left shoulder pain M25.512    Secondary esophageal varices with bleeding (HCC) I85.11    Septic arthritis of left sternoclavicular joint (HCC) M00.9    Hypomagnesemia E83.42    Hyponatremia E87.1    Hypokalemia E87.6    Electrolyte abnormality E87.8    Alcohol abuse F10.10    Macrocytic anemia D53.9    Nonintractable headache R51.9    Primary hypertension I10    Depression F32. A    Acute intracerebral hemorrhage (HCC) I61.9    Intracranial bleeding (HCC) I62.9    Brain death G80.80       Past Medical History:        Diagnosis Date    Alcohol abuse     Alcoholic cirrhosis ([facility] [address]     Anxiety     not on any meds for it    Hypertension     has not required any med a few years as of 8/2019    Hypothyroidism     in her early 25s - pt was on levothyroxine for a while and then hypothyroidism apparently resolved and pt has been off med since her mid-20s    Pancreatic cyst 01/2017    Pt underwent partial pancreatectomy and splenectomy at [facility] 2/23/2017 and was told pathology showed MCN (mucinous cystic neoplasm) of pancreas with clean margins at surgery       Past Surgical History:        Procedure Laterality Date   [address]    HAND SURGERY Left 2/22/2022    LEFT STERNOCLAVICULAR JOINT DEBRIDEMENT performed by Qi Akins MD at [address] [place] [address] [place]  2/26/2022         PANCREAS SURGERY  02/23/2017    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 - 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    SPLENECTOMY, TOTAL  02/23/2017    along with partial pancreatectomy    UPPER GASTROINTESTINAL ENDOSCOPY N/A 7/4/2020    EGD CONTROL HEMORRHAGE performed by Carlos A Gage MD at [facility]  7/4/2020    EGD ESOPHAGOGASTRODUODENOSCOPY ENDOSCOPIC VARICEAL TREATMENT performed by Carlos A Gage MD at [address] [place] N/A 9/17/2021    EGD, BANDING OF VARICES performed by Fatuma Lance MD at [address]  N/A 2/23/2022    EGD BAND LIGATION performed by Fatuma Lance MD at [address] History:    Social History     Tobacco Use    Smoking status: Never    Smokeless tobacco: Never   Substance Use Topics    Alcohol use: Yes     Comment: daily wine or beer                                Counseling given: Not Answered      Vital Signs (Current):   Vitals:    11/01/22 1030 11/01/22 1100 11/01/22 1200 11/01/22 1300   BP: 112/62 (!) 107/54 (!) 100/53 (!) 105/50   Pulse: (!) 127 (!) 128 (!) 127 (!) 127   Resp: 20 20 15 (!) 9   Temp:   (!) 38.2 °C (100.8 °F)    TempSrc:   Bladder    SpO2: 94% 94% 93% 92%   Weight:       Height:                                                  BP Readings from Last 3 Encounters:   11/01/22 (!) 105/50   10/30/22 116/67   08/23/22 118/70       NPO Status:                                                                                 BMI:   Wt Readings from Last 3 Encounters:   11/01/22 166 lb (75.3 kg)   10/30/22 138 lb (62.6 kg)   08/23/22 123 lb (55.8 kg)     Body mass index is 28.49 kg/m².     CBC:   Lab Results   Component Value Date/Time    WBC 12.9 11/01/2022 06:43 AM    RBC 1.66 11/01/2022 06:43 AM    RBC 3.55 07/08/2019 02:45 PM    HGB 5.4 11/01/2022 06:43 AM    HCT 16.8 11/01/2022 06:43 AM    .2 11/01/2022 06:43 AM    RDW 22.1 11/01/2022 06:43 AM     11/01/2022 06:43 AM       CMP:   Lab Results   Component Value Date/Time     11/01/2022 11:00 AM    K 3.5 11/01/2022 11:00 AM    K 8.5 10/30/2022 01:45 PM     11/01/2022 11:00 AM    CO2 20 11/01/2022 11:00 AM    BUN 16 11/01/2022 11:00 AM    CREATININE 1.1 11/01/2022 11:00 AM    GFRAA >60 08/30/2022 11:23 AM    LABGLOM >60 11/01/2022 11:00 AM    GLUCOSE 161 11/01/2022 11:00 AM    GLUCOSE 102 07/08/2019 02:45 PM PROT 6.4 11/01/2022 11:00 AM    CALCIUM 10.4 11/01/2022 11:00 AM    BILITOT 10.9 11/01/2022 11:00 AM    ALKPHOS 84 11/01/2022 11:00 AM     11/01/2022 11:00 AM    ALT 46 11/01/2022 11:00 AM       POC Tests: No results for input(s): POCGLU, POCNA, POCK, POCCL, POCBUN, POCHEMO, POCHCT in the last 72 hours. Coags:   Lab Results   Component Value Date/Time    PROTIME 22.5 11/01/2022 12:55 PM    INR 2.1 11/01/2022 12:55 PM    APTT 41.6 10/30/2022 01:45 PM       HCG (If Applicable):   Lab Results   Component Value Date    PREGTESTUR NEGATIVE 12/21/2021        ABGs:   Lab Results   Component Value Date/Time    BJM3EZI 21.8 07/06/2020 04:10 AM        Type & Screen (If Applicable):  No results found for: LABABO, LABRH    Drug/Infectious Status (If Applicable):  No results found for: HIV, HEPCAB    COVID-19 Screening (If Applicable):   Lab Results   Component Value Date/Time    COVID19 Not Detected 10/30/2022 09:29 AM    COVID19 Not Detected 11/26/2020 05:54 PM           Anesthesia Evaluation  Nursing notes reviewed  Airway:          Intubated Dental:          Pulmonary:                              Cardiovascular:    (+) hypertension:,                   Neuro/Psych:   (+) headaches: cluster headaches, psychiatric history:             ROS comment: ETOH    Severe ICB; brain death declared 10/31/22 GI/Hepatic/Renal:   (+) liver disease: esophageal varices,          ROS comment: S/p splenectomy    Esophageal banding. Endo/Other:    (+) hypothyroidism, blood dyscrasia: anemia:., .                 Abdominal:             Vascular:           Other Findings:           Anesthesia Plan      general     ASA 6 - emergent       Induction: inhalational.  arterial line                          RADHA Bryant CRNA   11/1/2022

## 2022-11-01 NOTE — PROGRESS NOTES
Per policy, I inquired with Mrs. Gibson Bartolome her preference on code status her daughter Jose Luis Lamas. For the purpose of organ donation, she consents to CPR/defibrillation if indicated.

## 2022-11-01 NOTE — DISCHARGE SUMMARY
Patient: Jamal Obrien Sex: female DOA: 10/30/2022   YOB: 1980 Age: 38 y.o. LOS:  LOS: 2 days    Admit Date: 10/30/2022   Discharge Date: 11/01/22   Expiration date: 10/31/2022 at 12:31 pm  Primary Care Physician: Gavin Ontiveros DO   Discharge to: [facility] admission:  Per HPI:\" Jamal Obrien is a 38 y.o. female who presents to [facility] ER unresponsive. Shirleyann Lundborg has a past medical history that includes alcoholic liver cirrhosis, alcohol abuse, macrocytic anemia, hypertension, depression. [facility] presented [facility] ER after she apparently had complained of a headache to her family and was later found unresponsive. Unfortunately the patient has suffered a devastating acute intraparenchymal hemorrhage. Neurosurgery consulted from ER and unfortunately there are no surgical options available to her. CT perfusion analysis was limited however did suggest delayed perfusion involving the entire supratentorial compartment with areas marked as core infarct representing the dilated ventricles. She was transferred to MICU for evaluation. Family reportedly interested in [address] [place] [address]     ER Course  Upon presentation to the ER, routine labwork was performed which revealed potassium 2.1, elevated LFTs, elevated ammonia, hemoglobin 6.6. Imaging results are as outlined below in the Imaging section of this note. The patient was transferred to MICU for evaluation. Last Hospital Admission - 8/16/22    Electrolyte abnormality    Jaundice    Elevated liver enzymes    Alcoholic cirrhosis (HCC)     Last Echocardiogram - 2/20/22   Normal left ventricular size and systolic function. Ejection fraction is visually estimated at 55-60%. Indeterminate diastolic function. No regional wall motion abnormalities seen. Normal left ventricular wall thickness. Mildly enlarged right ventricle with normal function. No significant valvular abnormalities or vegetations. ED TRIAGE VITALS  BP: 109/62, Temp: 98.1 °F (36.7 °C), Heart Rate: (!) 115, Resp: 14, SpO2: 94 % \"    Hospital Course and discharge assessment with plan:     Veronica Menendez with PMH: alcoholic liver cirrhosis, alcohol abuse, macrocytic anemia, hypertension, depression. She presented to [facility] ER after she apparently had complained of a headache to her family and was later found unresponsive. Unfortunately the patient has suffered a devastating acute intraparenchymal hemorrhage. Neurosurgery consulted from ER and unfortunately there are no surgical options available to her.   CT perfusion analysis was limited however did suggest delayed perfusion involving the entire supratentorial compartment with areas marked as core infarct representing the dilated ventricles     Devastating Intracranial hemorrhage  Neurogenic shock  Brain death declaration 10/31/2022 @ 12:31     Acute hypoxic and hypercapnic respiratory failure 2/2 ICH and inability to protect airway     ESLD/Alcoholic cirrhosis with continued alcohol abuse up until hospitalization with hx/o GI bleeding/varices with banding  Supratherapeutic INR/coagulopathy  Hypoalbuminemia     Lactic acidosis, resolved     Nontraumatic rhabdomyolysis     Acute blood loss anemia on chronic anemia  S/p 4 U FFP  S/p Vit K  S/p 1 U plts and 1 U PRBCs     Hypertension     DNR/CCA but full code for transfer to life bank    Discharge Diagnoses:   Patient Active Problem List   Diagnosis    Sepsis ([address]    Jaundice    Elevated liver enzymes    Hyperammonemia (HCC)    Liver metastases ([address]    Malignant ascites    H/O malignant neoplasm of pancreas    Hematemesis    GI bleed    Upper GI bleed    UGI bleed    Anemia associated with acute blood loss    Leukocytosis    Alcoholic cirrhosis (HCC)    Left shoulder pain    Secondary esophageal varices with bleeding ([address]    Septic arthritis of left sternoclavicular joint (HCC)    Hypomagnesemia    Hyponatremia    Hypokalemia Electrolyte abnormality    Alcohol abuse    Macrocytic anemia    Nonintractable headache    Primary hypertension    Depression    Acute intracerebral hemorrhage ([facility] [address]    Intracranial bleeding ([facility] [address]    Brain death       Discharge Condition: stable      Consults: pulmonary/intensive care and neurology    Lan Gutiérrez MD   11/01/22 1:15 PM    Hospital Medicine   Time Spent: 45 min

## 2022-11-01 NOTE — ANESTHESIA PRE PROCEDURE
Department of Anesthesiology  Preprocedure Note       Name:  Giovanny Kaur   Age:  38 y.o.  :  1980                                          MRN:  [id number]         Date:  2022      Surgeon: Cuate Julio):  Lifek[facility]le    Procedure: Procedure(s):  ORGAN PROCUREMENT, KINDEYS AND POSSIBLE LUNGS, POSSIBLE PANCREAS    Medications prior to admission:   Prior to Admission medications    Not on File       Current medications:    Current Facility-Administered Medications   Medication Dose Route Frequency Provider Last Rate Last Admin    0.9 % sodium chloride infusion   IntraVENous PRN [facility]        0.9 % sodium chloride infusion   IntraVENous PRN [facility]        0.9 % sodium chloride infusion   IntraVENous PRN [facility]        0.9 % sodium chloride infusion   IntraVENous PRN [facility]        0.9 % sodium chloride infusion   IntraVENous PRN [facility]        0.9 % sodium chloride infusion   IntraVENous PRN [facility]        0.9 % sodium chloride infusion   IntraVENous PRN Anderson Fleeting, APRN - CNP        0.9 % sodium chloride infusion   IntraVENous PRN Naderson Fleeting, APRN - CNP        0.9 % sodium chloride infusion   IntraVENous PRN Anderson Fleeting, APRN - CNP        0.9 % sodium chloride infusion   IntraVENous PRN Anderson Fleeting, APRN - CNP        cefepime (MAXIPIME) 2,000 mg in sodium chloride 0.9 % 50 mL IVPB ([id number])  2,000 mg IntraVENous Q12H [facility]   Stopped at 22 0323    0.9 % sodium chloride infusion   IntraVENous PRN [facility]        methylPREDNISolone sodium (SOLU-MEDROL) 1,000 mg in sodium chloride 0.9 % 250 mL IVPB  1,000 mg IntraVENous Q8H [facility] 500 mL/hr at 22 1304 1,000 mg at 22 1304    0.9 % sodium chloride infusion   IntraVENous Continuous [facility]   Stopped at 22 1527    albuterol (PROVENTIL) nebulizer solution 2.5 mg  2.5 mg Nebulization Q4H [facility]   2.5 mg at 22 0949    0.9 % sodium chloride infusion   IntraVENous PRN [facility]  vasopressin 20 Units in dextrose 5 % 100 mL infusion  0.03 Units/min IntraVENous Continuous Lattie Push, APRN - CNP   Stopped at 11/01/22 1527    norepinephrine (LEVOPHED) 16 mg in dextrose 5% 250 mL infusion  1-100 mcg/min IntraVENous Continuous Lattie Push, APRN - CNP   Stopped at 11/01/22 1527    sodium chloride flush 0.9 % injection 5-40 mL  5-40 mL IntraVENous 2 times per day Lattie Push, APRN - CNP   10 mL at 11/01/22 4960    sodium chloride flush 0.9 % injection 5-40 mL  5-40 mL IntraVENous PRN Lattie Push, APRN - CNP        0.9 % sodium chloride infusion   IntraVENous PRN Lattie Push, APRN - CNP        ondansetron (ZOFRAN-ODT) disintegrating tablet 4 mg  4 mg Oral Q8H PRN Lattie Push, APRN - CNP        Or    ondansetron [facility]) injection 4 mg  4 mg IntraVENous Q6H PRN Lattie Push, APRN - CNP        polyethylene glycol (GLYCOLAX) packet 17 g  17 g Oral Daily PRN Lattie Push, APRN - CNP        lactated ringers infusion   IntraVENous Continuous Alejandro Flurry, APRN - CNP   Stopped at 11/01/22 1527    chlorhexidine (PERIDEX) 0.12 % solution 15 mL  15 mL Mouth/Throat BID Lattie Push, APRN - CNP   15 mL at 11/01/22 0811    famotidine (PEPCID) 20 mg in sodium chloride (PF) 0.9 % 10 mL injection  20 mg IntraVENous BID Lattie Push, APRN - CNP   20 mg at 11/01/22 9627       Allergies:     Allergies   Allergen Reactions    Pcn [Penicillins] Hives, Itching, Swelling and Rash       Problem List:    Patient Active Problem List   Diagnosis Code    Sepsis ([facility] A41.9    Jaundice R17    Elevated liver enzymes R74.8    Hyperammonemia (HCC) E72.20    Liver metastases ([facility] C78.7    Malignant ascites R18.0    H/O malignant neoplasm of pancreas Z85.07    Hematemesis K92.0    GI bleed K92.2    Upper GI bleed K92.2    UGI bleed K92.2    Anemia associated with acute blood loss D62    Leukocytosis S30.949    Alcoholic cirrhosis ([address] K70.30    Left shoulder pain M25.512    Secondary esophageal varices with bleeding (HCC) I85.11    Septic arthritis of left sternoclavicular joint (HCC) M00.9    Hypomagnesemia E83.42    Hyponatremia E87.1    Hypokalemia E87.6    Electrolyte abnormality E87.8    Alcohol abuse F10.10    Macrocytic anemia D53.9    Nonintractable headache R51.9    Primary hypertension I10    Depression F32. A    Acute intracerebral hemorrhage (HCC) I61.9    Intracranial bleeding (HCC) I62.9    Brain death G80.80       Past Medical History:        Diagnosis Date    Alcohol abuse     Alcoholic cirrhosis ([facility]     Anxiety     not on any meds for it    Hypertension     has not required any med a few years as of 8/2019    Hypothyroidism     in her early 25s - pt was on levothyroxine for a while and then hypothyroidism apparently resolved and pt has been off med since her mid-20s    Pancreatic cyst 01/2017    Pt underwent partial pancreatectomy and splenectomy at [facility] 2/23/2017 and was told pathology showed MCN (mucinous cystic neoplasm) of pancreas with clean margins at surgery       Past Surgical History:        Procedure Laterality Date   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    SPLENECTOMY, TOTAL  02/23/2017    along with partial pancreatectomy    UPPER GASTROINTESTINAL ENDOSCOPY N/A 7/4/2020    EGD CONTROL HEMORRHAGE performed by Aron Carter MD at [address] [place] [address]  7/4/2020    EGD ESOPHAGOGASTRODUODENOSCOPY ENDOSCOPIC VARICEAL TREATMENT performed by Aron Carter MD at [address] [place] [address] N/A 9/17/2021    EGD, BANDING OF VARICES performed by Brit Carranza MD at [address] [facility] N/A 2/23/2022    EGD BAND LIGATION performed by Brit Carranza MD at [address] History:    Social History     Tobacco Use    Smoking status: Never    Smokeless tobacco: Never   Substance Use Topics    Alcohol use: Yes     Comment: daily wine or beer                                Counseling given: Not Answered      Vital Signs (Current):   Vitals:    11/01/22 1100 11/01/22 1200 11/01/22 1300 11/01/22 1546   BP: (!) 107/54 (!) 100/53 (!) 105/50    Pulse: (!) 128 (!) 127 (!) 127 (!) 0   Resp: 20 15 (!) 9 10   Temp:  (!) 100.8 °F (38.2 °C)     TempSrc:  Bladder     SpO2: 94% 93% 92%    Weight:       Height:                                                  BP Readings from Last 3 Encounters:   11/01/22 (!) 105/50   10/30/22 116/67   08/23/22 118/70       NPO Status:                                                                                 BMI:   Wt Readings from Last 3 Encounters:   11/01/22 166 lb (75.3 kg)   10/30/22 138 lb (62.6 kg)   08/23/22 123 lb (55.8 kg)     Body mass index is 28.49 kg/m².     CBC:   Lab Results   Component Value Date/Time    WBC 12.9 11/01/2022 06:43 AM    RBC 1.66 11/01/2022 06:43 AM    RBC 3.55 07/08/2019 02:45 PM    HGB 5.4 11/01/2022 06:43 AM    HCT 16.8 11/01/2022 06:43 AM    .2 11/01/2022 06:43 AM    RDW 22.1 11/01/2022 06:43 AM     11/01/2022 06:43 AM       CMP:   Lab Results   Component Value Date/Time     11/01/2022 11:00 AM    K 3.5 11/01/2022 11:00 AM    K 8.5 10/30/2022 01:45 PM     11/01/2022 11:00 AM    CO2 20 11/01/2022 11:00 AM    BUN 16 11/01/2022 11:00 AM    CREATININE 1.1 11/01/2022 11:00 AM    GFRAA >60 08/30/2022 11:23 AM    LABGLOM >60 11/01/2022 11:00 AM    GLUCOSE 161 11/01/2022 11:00 AM    GLUCOSE 102 07/08/2019 02:45 PM    PROT 6.4 11/01/2022 11:00 AM    CALCIUM 10.4 11/01/2022 11:00 AM    BILITOT 10.9 11/01/2022 11:00 AM    ALKPHOS 84 11/01/2022 11:00 AM     11/01/2022 11:00 AM    ALT 46 11/01/2022 11:00 AM       POC Tests: No results for input(s): POCGLU, POCNA, POCK, POCCL, POCBUN, POCHEMO, POCHCT in the last 72 hours. Coags:   Lab Results   Component Value Date/Time    PROTIME 22.5 11/01/2022 12:55 PM    INR 2.1 11/01/2022 12:55 PM    APTT 41.6 10/30/2022 01:45 PM       HCG (If Applicable):   Lab Results   Component Value Date    PREGTESTUR NEGATIVE 12/21/2021        ABGs:   Lab Results   Component Value Date/Time    BVP5IKK 21.8 07/06/2020 04:10 AM        Type & Screen (If Applicable):  No results found for: LABABO, [address]    Drug/Infectious Status (If Applicable):  No results found for: HIV, HEPCAB    COVID-19 Screening (If Applicable):   Lab Results   Component Value Date/Time    COVID19 Not Detected 10/30/2022 09:29 AM    COVID19 Not Detected 11/26/2020 05:54 PM           Anesthesia Evaluation    Airway: Mallampati: Unable to assess / NA          Dental:          Pulmonary:                              Cardiovascular:                      Neuro/Psych:               GI/Hepatic/Renal:             Endo/Other:                     Abdominal:             Vascular:           Other Findings:           Jayde Grimes MD   11/1/2022

## 2022-11-01 NOTE — PLAN OF CARE
Problem: Skin/Tissue Integrity  Goal: Absence of new skin breakdown  Description: 1. Monitor for areas of redness and/or skin breakdown  2. Assess vascular access sites hourly  3. Every 4-6 hours minimum:  Change oxygen saturation probe site  4. Every 4-6 hours:  If on nasal continuous positive airway pressure, respiratory therapy assess nares and determine need for appliance change or resting period. 11/1/2022 1019 by Steven Roque RN  Outcome: Not Progressing  10/31/2022 2349 by Marc Prajapati RN  Outcome: Progressing     Problem: Respiratory - Adult  Goal: Achieves optimal ventilation and oxygenation  11/1/2022 1019 by Steven Roque RN  Outcome: Not Progressing  10/31/2022 2349 by Marc Prajapati RN  Outcome: Progressing     Problem: Skin/Tissue Integrity  Goal: Absence of new skin breakdown  Description: 1. Monitor for areas of redness and/or skin breakdown  2. Assess vascular access sites hourly  3. Every 4-6 hours minimum:  Change oxygen saturation probe site  4. Every 4-6 hours:  If on nasal continuous positive airway pressure, respiratory therapy assess nares and determine need for appliance change or resting period.   11/1/2022 1019 by Steven Roque RN  Outcome: Not Progressing  10/31/2022 2349 by Marc Prajapati RN  Outcome: Progressing     Problem: Respiratory - Adult  Goal: Achieves optimal ventilation and oxygenation  11/1/2022 1019 by Steven Roque RN  Outcome: Not Progressing  10/31/2022 2349 by Marc Prajapati RN  Outcome: Progressing

## 2022-11-01 NOTE — CARE COORDINATION
11/1:  Transition of care:  Pt presented to the [facility] for being found unresponsive with empty white claws cans near her. Pt had been c/o headache earlier. Pt was intubated & transferred to MICU. CT scan     showed massive ICH. NS consulted & advised not a survivable & family request to talk with [facility]. Pt was transferred to [facility] on 10/30. Pt was pronounced brain death. [facility] is following. Sw/REIM will continue to follow.  Electronically signed by Dianne May RN on 11/1/2022 at 7:02 AM

## 2022-11-01 NOTE — FLOWSHEET NOTE
Patient supine,  O2 and ventilation remain stable. Became acutely hypotensive requiring increased dose of levophed. Very little urine output noted. Again pads saturated with blood from lines oozing.

## 2022-11-01 NOTE — ANESTHESIA POSTPROCEDURE EVALUATION
Department of Anesthesiology  Postprocedure Note    Patient: Dedra Kim  MRN: [id number]  YOB: 1980  Date of evaluation: 11/1/2022      Procedure Summary     Date: 11/01/22 Room / Location: [place] [address] / [facility]    Anesthesia Start: 2679 Anesthesia Stop: 8108    Procedure: ORGAN PROCUREMENT, KINDEYS AND POSSIBLE LUNGS, POSSIBLE PANCREAS (Abdomen) Diagnosis:       Organ donation      (Organ donation [Z52.9])    Surgeons: Erwin Pereyra Responsible Provider: Cady Augustin MD    Anesthesia Type: Not recorded ASA Status: 6          Anesthesia Type: No value filed.     Mitch Phase I:      Mitch Phase II:        Anesthesia Post Evaluation    Comments: Organ procurement

## 2022-11-01 NOTE — PLAN OF CARE
Problem: Discharge Planning  Goal: Discharge to home or other facility with appropriate resources  10/31/2022 2349 by Abigail Pichardo RN  Outcome: Completed  10/31/2022 1818 by Anuj Daniels RN  Outcome: Not Progressing     Problem: Pain  Goal: Verbalizes/displays adequate comfort level or baseline comfort level  10/31/2022 2349 by Abigail Pichardo RN  Outcome: Completed  10/31/2022 1818 by Anuj Daniels RN  Outcome: Not Progressing     Problem: Respiratory - Adult  Goal: Achieves optimal ventilation and oxygenation  10/31/2022 2349 by Abigail Pichardo RN  Outcome: Progressing  10/31/2022 1818 by Anuj Daniels RN  Outcome: Not Progressing  10/31/2022 1244 by Stacie Scott RCP  Outcome: Not Progressing

## 2022-11-02 LAB
ORGANISM: ABNORMAL
URINE CULTURE, ROUTINE: ABNORMAL

## 2022-11-03 LAB
ORGANISM: ABNORMAL
URINE CULTURE, ROUTINE: ABNORMAL

## 2022-11-04 LAB
BLOOD BANK DISPENSE STATUS: NORMAL
BLOOD BANK PRODUCT CODE: NORMAL
BPU ID: NORMAL
DESCRIPTION BLOOD BANK: NORMAL

## 2022-11-05 LAB
BLOOD CULTURE, ROUTINE: NORMAL
CULTURE, BLOOD 2: NORMAL

## (undated) DEVICE — YANKAUER,OPEN TIP,W/O VENT,STERILE: Brand: MEDLINE INDUSTRIES, INC.

## (undated) DEVICE — PACK PROCEDURE SURG GEN CUST

## (undated) DEVICE — PAD,ABDOMINAL,5"X9",ST,LF,25/BX: Brand: MEDLINE INDUSTRIES, INC.

## (undated) DEVICE — VALVE SUCTION AIR H2O SET ORCA POD + DISP

## (undated) DEVICE — CANNULA NSL ORAL AD FOR CAPNOFLEX CO2 O2 AIRLFE

## (undated) DEVICE — PAD GEN USE BORDERED ADH 14IN 2IN AND 12IN 4IN GZ UNIV ST

## (undated) DEVICE — CLIP INT SM TI EZ LD LIG SYS WECK HORZ

## (undated) DEVICE — SOLUTION IV IRRIG POUR BRL 0.9% SODIUM CHL 2F7124

## (undated) DEVICE — SYRINGE MED 10ML LUERLOCK TIP W/O SFTY DISP

## (undated) DEVICE — APPLIER CLP L L13IN TI MULT RNG HNDL 20 CLP STR LIGACLP

## (undated) DEVICE — NEEDLE HYPO 25GA L1.5IN BLU POLYPR HUB S STL REG BVL STR

## (undated) DEVICE — SWAB SPEC COLL SHFT L5.25IN POLYUR FOAM TIP SFT DBL MEDIA

## (undated) DEVICE — INSTRUMENT SYSTEM 7 BATTERY REUSABLE

## (undated) DEVICE — BLOCK BITE 60FR RUBBER ADLT DENTAL

## (undated) DEVICE — ELECTRODE PT RET AD L9FT HI MOIST COND ADH HYDRGEL CORDED

## (undated) DEVICE — GRADUATE TRIANG MEASURE 1000ML BLK PRNT

## (undated) DEVICE — PADDLE INTERN DEFIB 12 ADLT

## (undated) DEVICE — TEN SHOOTER SAEED MULTI-BAND LIGATOR: Brand: SAEED

## (undated) DEVICE — GAUZE,SPONGE,4"X4",8PLY,STRL,LF,10/TRAY: Brand: MEDLINE

## (undated) DEVICE — HANDPIECE SET WITH BONE CLEANING TIP AND SUCTION TUBE: Brand: INTERPULSE

## (undated) DEVICE — 1000 S-DRAPE TOWEL DRAPE 10/BX: Brand: STERI-DRAPE™

## (undated) DEVICE — GLOVE SURG SZ 65 THK91MIL LTX FREE SYN POLYISOPRENE

## (undated) DEVICE — STRYKER PERFORMANCE SERIES STERNUM BLADE: Brand: STRYKER PERFORMANCE SERIES

## (undated) DEVICE — DEFENDO AIR WATER SUCTION AND BIOPSY VALVE KIT FOR  OLYMPUS: Brand: DEFENDO AIR/WATER/SUCTION AND BIOPSY VALVE

## (undated) DEVICE — Z INACTIVE USE 2660664 SOLUTION IRRIG 3000ML 0.9% SOD CHL USP UROMATIC PLAS CONT

## (undated) DEVICE — TRAP,MUCUS SPECIMEN,40CC: Brand: MEDLINE

## (undated) DEVICE — CLIP INT M L GRN TI TRNSVRS GRV CHEVRON SHP W/ PRECIS TIP

## (undated) DEVICE — COVER,TABLE,60X90,STERILE: Brand: MEDLINE

## (undated) DEVICE — SYRINGE MED 50ML LUERSLIP TIP

## (undated) DEVICE — 4-PORT MANIFOLD: Brand: NEPTUNE 2

## (undated) DEVICE — GOWN,BREATHABLE SLV,AURORA,XLG,STRL: Brand: MEDLINE

## (undated) DEVICE — E-Z CLEAN, NON-STICK, PTFE COATED, ELECTROSURGICAL BLADE ELECTRODE, 6.5 INCH (16.5 CM): Brand: MEGADYNE

## (undated) DEVICE — GLOVE SURG SZ 85 L12IN FNGR THK13MIL WHT ISOLEX POLYISOPRENE

## (undated) DEVICE — INTENDED FOR TISSUE SEPARATION, AND OTHER PROCEDURES THAT REQUIRE A SHARP SURGICAL BLADE TO PUNCTURE OR CUT.: Brand: BARD-PARKER ® STAINLESS STEEL BLADES

## (undated) DEVICE — GOWN,SIRUS,FABRNF,XL,20/CS: Brand: MEDLINE

## (undated) DEVICE — SYSTEM TPS ORTHO

## (undated) DEVICE — MAGNETIC INSTR DRAPE 20X16: Brand: MEDLINE INDUSTRIES, INC.

## (undated) DEVICE — WAX SURG 2.5GM HEMSTAT BNE BEESWAX PARAFFIN ISO PALMITATE

## (undated) DEVICE — Device: Brand: JELCO

## (undated) DEVICE — GLOVE SURG SZ 6 THK91MIL LTX FREE SYN POLYISOPRENE ANTI

## (undated) DEVICE — Device

## (undated) DEVICE — CHLORAPREP 26ML ORANGE

## (undated) DEVICE — T-MAX DISPOSABLE FACE MASK 8 PER BOX

## (undated) DEVICE — TRAY LAMBOTS REUSABLE

## (undated) DEVICE — DOUBLE BASIN SET: Brand: MEDLINE INDUSTRIES, INC.

## (undated) DEVICE — SPONGE GZ W4XL4IN RAYON POLY FILL CVR W/ NONWOVEN FAB

## (undated) DEVICE — INSTRUMENT CURRETTES REUSABLE

## (undated) DEVICE — TOWEL,OR,DSP,ST,BLUE,DLX,10/PK,8PK/CS: Brand: MEDLINE

## (undated) DEVICE — TRAY SYSTEM 7 DRILL REUSABLE

## (undated) DEVICE — ALCOHOL RUBBING ISO 16OZ 70%

## (undated) DEVICE — ANTISEPTIC 16OZ H PEROX 1ST AID ORAL DEBRIDING AGNT

## (undated) DEVICE — APPLICATOR PREP 26ML 0.7% IOD POVACRYLEX 74% ISO ALC ST

## (undated) DEVICE — BITEBLOCK 54FR W/ DENT RIM BLOX

## (undated) DEVICE — YANKAUER,POOLE TIP,STERILE,50/CS: Brand: MEDLINE

## (undated) DEVICE — CONTROL SYRINGE LUER-LOCK TIP: Brand: MONOJECT

## (undated) DEVICE — 3M™ COBAN™ NL STERILE NON-LATEX SELF-ADHERENT WRAP, 2084S, 4 IN X 5 YD (10 CM X 4,5 M), 18 ROLLS/CASE: Brand: 3M™ COBAN™

## (undated) DEVICE — SOLUTION,WATER,IRRIGATION,500ML,STERILE: Brand: MEDLINE

## (undated) DEVICE — GAUZE,SPONGE,POST-OP,4X3,STRL,LF: Brand: MEDLINE

## (undated) DEVICE — DECANTER BAG 9": Brand: MEDLINE INDUSTRIES, INC.

## (undated) DEVICE — SET ORTHO STD STORTSTD1

## (undated) DEVICE — TOWEL,OR,DSP,ST,BLUE,STD,6/PK,12PK/CS: Brand: MEDLINE

## (undated) DEVICE — SET SURG BASIN OPEN HEART NO  1 REUSABLE

## (undated) DEVICE — 3M™ STERI-DRAPE™ U-DRAPE 1015: Brand: STERI-DRAPE™

## (undated) DEVICE — CONVERTORS STOCKINETTE: Brand: CONVERTORS

## (undated) DEVICE — GLOVE SURG SZ 9 THK91MIL LTX FREE SYN POLYISOPRENE ANTI

## (undated) DEVICE — PENCIL ES L3M BTTN SWCH HOLSTER W/ BLDE ELECTRD EDGE

## (undated) DEVICE — BAG PRSS INFUS 1000ML 2 PRSS SFTY VLV RIG HNGR SLIP EASILY

## (undated) DEVICE — SYRINGE MED 10ML POLYPR LUERSLIP TIP FLAT TOP W/O SFTY DISP

## (undated) DEVICE — AGENT HEMSTAT W4XL8IN OXIDIZED REGENERATED CELOS ABSRB

## (undated) DEVICE — CLIP LIG M BLU TI HRT SHP WIRE HORZ 600 PER BX